# Patient Record
Sex: FEMALE | Race: WHITE | NOT HISPANIC OR LATINO | Employment: UNEMPLOYED | URBAN - METROPOLITAN AREA
[De-identification: names, ages, dates, MRNs, and addresses within clinical notes are randomized per-mention and may not be internally consistent; named-entity substitution may affect disease eponyms.]

---

## 2018-01-02 ENCOUNTER — OFFICE VISIT (OUTPATIENT)
Dept: FAMILY MEDICINE | Facility: CLINIC | Age: 63
End: 2018-01-02
Payer: COMMERCIAL

## 2018-01-02 VITALS
SYSTOLIC BLOOD PRESSURE: 130 MMHG | HEART RATE: 87 BPM | HEIGHT: 63 IN | BODY MASS INDEX: 37.92 KG/M2 | DIASTOLIC BLOOD PRESSURE: 74 MMHG | WEIGHT: 214 LBS | OXYGEN SATURATION: 96 % | TEMPERATURE: 99 F

## 2018-01-02 DIAGNOSIS — H66.002 ACUTE SUPPURATIVE OTITIS MEDIA OF LEFT EAR WITHOUT SPONTANEOUS RUPTURE OF TYMPANIC MEMBRANE, RECURRENCE NOT SPECIFIED: ICD-10-CM

## 2018-01-02 DIAGNOSIS — R05.9 COUGH: Primary | ICD-10-CM

## 2018-01-02 DIAGNOSIS — J01.00 ACUTE MAXILLARY SINUSITIS, RECURRENCE NOT SPECIFIED: ICD-10-CM

## 2018-01-02 PROCEDURE — 99213 OFFICE O/P EST LOW 20 MIN: CPT | Mod: ,,, | Performed by: NURSE PRACTITIONER

## 2018-01-02 RX ORDER — ATORVASTATIN CALCIUM 20 MG/1
20 TABLET, FILM COATED ORAL DAILY
COMMUNITY
End: 2018-06-11 | Stop reason: SDUPTHER

## 2018-01-02 RX ORDER — ALBUTEROL SULFATE 90 UG/1
2 AEROSOL, METERED RESPIRATORY (INHALATION) EVERY 6 HOURS PRN
Qty: 1 INHALER | Refills: 2 | Status: SHIPPED | OUTPATIENT
Start: 2018-01-02 | End: 2018-03-05

## 2018-01-02 RX ORDER — DOXYCYCLINE HYCLATE 100 MG
100 TABLET ORAL 2 TIMES DAILY
Qty: 20 TABLET | Refills: 0 | Status: SHIPPED | OUTPATIENT
Start: 2018-01-02 | End: 2018-03-05

## 2018-01-02 RX ORDER — PHENTERMINE HYDROCHLORIDE 37.5 MG/1
37.5 CAPSULE ORAL EVERY MORNING
COMMUNITY
End: 2018-03-05 | Stop reason: SDUPTHER

## 2018-01-02 RX ORDER — PROMETHAZINE HYDROCHLORIDE AND CODEINE PHOSPHATE 6.25; 1 MG/5ML; MG/5ML
5 SOLUTION ORAL EVERY 4 HOURS PRN
Qty: 118 ML | Refills: 0 | Status: SHIPPED | OUTPATIENT
Start: 2018-01-02 | End: 2018-01-12

## 2018-01-02 RX ORDER — FLUTICASONE PROPIONATE 50 MCG
1 SPRAY, SUSPENSION (ML) NASAL 2 TIMES DAILY
Qty: 1 BOTTLE | Refills: 1 | Status: SHIPPED | OUTPATIENT
Start: 2018-01-02 | End: 2018-03-05

## 2018-01-02 NOTE — PROGRESS NOTES
Subjective:       Patient ID: Nalini Wooten is a 62 y.o. female.    Chief Complaint: Cough and Nasal Congestion    Cough   This is a new problem. The current episode started 1 to 4 weeks ago (3 weeks). The problem has been waxing and waning. The problem occurs constantly. The cough is productive of sputum. Associated symptoms include chest pain, chills, ear congestion, a fever, headaches, myalgias, nasal congestion, postnasal drip, rhinorrhea and a sore throat. Pertinent negatives include no ear pain, heartburn, hemoptysis, rash, shortness of breath, sweats, weight loss or wheezing. The symptoms are aggravated by stress and cold air. She has tried rest (promethizine cough syrup and amoxil, mucinex) for the symptoms. The treatment provided mild relief. Her past medical history is significant for asthma. There is no history of environmental allergies.     Review of Systems   Constitutional: Positive for activity change, appetite change, chills, fatigue and fever. Negative for diaphoresis, unexpected weight change and weight loss.   HENT: Positive for postnasal drip, rhinorrhea, sinus pain, sinus pressure, sneezing and sore throat. Negative for congestion, ear discharge, ear pain, hearing loss, tinnitus, trouble swallowing and voice change.    Eyes: Negative for photophobia, pain, discharge and visual disturbance.   Respiratory: Positive for cough. Negative for hemoptysis, chest tightness, shortness of breath and wheezing.    Cardiovascular: Positive for chest pain. Negative for palpitations.   Gastrointestinal: Negative for abdominal pain, heartburn, nausea and vomiting.   Endocrine: Negative for cold intolerance and heat intolerance.   Genitourinary: Negative for difficulty urinating and dysuria.   Musculoskeletal: Positive for myalgias. Negative for arthralgias and gait problem.   Skin: Negative for rash.   Allergic/Immunologic: Negative for environmental allergies and immunocompromised state.   Neurological:  Positive for weakness and headaches. Negative for speech difficulty.   Psychiatric/Behavioral: Negative for confusion, self-injury and suicidal ideas.       Past Medical History:   Diagnosis Date    Bulging disc     Carpal tunnel syndrome     Degenerative disc disease     High cholesterol     Pinched nerve in neck     Urinary incontinence     Urinary tract infection       Past Surgical History:   Procedure Laterality Date    BLADDER SUSPENSION      FOOT SURGERY      HYSTERECTOMY      JOINT REPLACEMENT      KNEE SURGERY      MANDIBLE FRACTURE SURGERY      SPINE SURGERY      TONSILLECTOMY         Family History   Problem Relation Age of Onset    Arthritis Mother     Arthritis Father     Diabetes Father     Hearing loss Father     Heart disease Father     Hypertension Father     Diabetes Paternal Grandmother     Heart disease Paternal Grandfather        Social History     Social History    Marital status:      Spouse name: N/A    Number of children: N/A    Years of education: N/A     Social History Main Topics    Smoking status: Never Smoker    Smokeless tobacco: Never Used    Alcohol use 0.0 oz/week    Drug use: No    Sexual activity: Yes     Birth control/ protection: None     Other Topics Concern    None     Social History Narrative    None       Current Outpatient Prescriptions   Medication Sig Dispense Refill    atorvastatin (LIPITOR) 20 MG tablet Take 20 mg by mouth once daily.      mirabegron (MYRBETRIQ) 25 mg Tb24 ER tablet Take 25 mg by mouth once daily.      montelukast (SINGULAIR) 10 mg tablet Take 10 mg by mouth once daily.       phentermine 37.5 MG capsule Take 37.5 mg by mouth every morning.      albuterol 90 mcg/actuation inhaler Inhale 2 puffs into the lungs every 6 (six) hours as needed for Wheezing. 1 Inhaler 2    doxycycline (VIBRA-TABS) 100 MG tablet Take 1 tablet (100 mg total) by mouth 2 (two) times daily. 20 tablet 0    fluticasone (FLONASE) 50  "mcg/actuation nasal spray 1 spray by Each Nare route 2 (two) times daily. 1 Bottle 1    promethazine-codeine 6.25-10 mg/5 ml (PHENERGAN WITH CODEINE) 6.25-10 mg/5 mL syrup Take 5 mLs by mouth every 4 (four) hours as needed for Cough. 118 mL 0     No current facility-administered medications for this visit.        Review of patient's allergies indicates:   Allergen Reactions    Estrogens Other (See Comments)     Mini stroke    Tetanus vaccines and toxoid Swelling and Other (See Comments)     Swelling at injection site, fever     Objective:      Blood pressure 130/74, pulse 87, temperature 98.6 °F (37 °C), height 5' 3" (1.6 m), weight 97.1 kg (214 lb), SpO2 96 %. Body mass index is 37.91 kg/m².   Physical Exam   Constitutional: She is oriented to person, place, and time. She appears well-developed and well-nourished. She is cooperative. No distress.   HENT:   Head: Normocephalic and atraumatic.   Right Ear: Tympanic membrane is bulging. Tympanic membrane is not injected. A middle ear effusion is present.   Left Ear: Tympanic membrane is injected and bulging. A middle ear effusion is present.   Nose: Mucosal edema and rhinorrhea present. Right sinus exhibits maxillary sinus tenderness. Right sinus exhibits no frontal sinus tenderness. Left sinus exhibits maxillary sinus tenderness. Left sinus exhibits no frontal sinus tenderness.   Mouth/Throat: Uvula is midline and mucous membranes are normal. Oropharyngeal exudate (post nasal drainage noted) present. No posterior oropharyngeal erythema.   Eyes: Conjunctivae, EOM and lids are normal. Pupils are equal, round, and reactive to light. Lids are everted and swept, no foreign bodies found. Right pupil is round and reactive. Left pupil is round and reactive.   Neck: Trachea normal and normal range of motion. Neck supple.   Cardiovascular: Normal rate, regular rhythm, S1 normal, S2 normal, normal heart sounds and intact distal pulses.    No murmur heard.  Pulmonary/Chest: " Effort normal and breath sounds normal. No respiratory distress.   Abdominal: Soft. Bowel sounds are normal. There is no rigidity and no guarding.   Musculoskeletal: Normal range of motion.   Lymphadenopathy:     She has cervical adenopathy.        Right cervical: Superficial cervical adenopathy present.        Left cervical: Superficial cervical adenopathy present.     She has no axillary adenopathy.   Neurological: She is alert and oriented to person, place, and time.   Skin: Skin is warm and dry. Capillary refill takes less than 2 seconds.   Psychiatric: She has a normal mood and affect. Her behavior is normal. Judgment and thought content normal.   Nursing note and vitals reviewed.          Assessment:       1. Cough    2. Acute maxillary sinusitis, recurrence not specified    3. Acute suppurative otitis media of left ear without spontaneous rupture of tympanic membrane, recurrence not specified        Plan:       Nalini was seen today for cough and nasal congestion.    Diagnoses and all orders for this visit:    Cough  -     Cancel: POCT Influenza A/B  -     albuterol 90 mcg/actuation inhaler; Inhale 2 puffs into the lungs every 6 (six) hours as needed for Wheezing.  -     promethazine-codeine 6.25-10 mg/5 ml (PHENERGAN WITH CODEINE) 6.25-10 mg/5 mL syrup; Take 5 mLs by mouth every 4 (four) hours as needed for Cough.    Acute maxillary sinusitis, recurrence not specified  -     doxycycline (VIBRA-TABS) 100 MG tablet; Take 1 tablet (100 mg total) by mouth 2 (two) times daily.  -     fluticasone (FLONASE) 50 mcg/actuation nasal spray; 1 spray by Each Nare route 2 (two) times daily.    Acute suppurative otitis media of left ear without spontaneous rupture of tympanic membrane, recurrence not specified

## 2018-01-02 NOTE — PATIENT INSTRUCTIONS
When to Use Antibiotics   Antibiotics are medicines used to treat infections caused by bacteria. They dont work for illnesses caused by viruses or an allergic reaction. In fact, taking antibiotics for reasons other than a bacterial infection can cause problems. For example, you may have side effects from the medicine. And if you really need an antibiotic, it may not work well.                                                                                                                                              When antibiotics wont help  Your healthcare provider wont usually prescribe antibiotics for the following conditions. You can help by not asking for them if you have:   · A cold. This type of illness is caused by a virus. It can cause a runny nose, stuffed-up nose, sneezing, coughing, headache, mild body aches, and low fever. A cold gets better on its own in a few days to a week.  · The flu (influenza). This is a respiratory illness caused by a virus. The flu usually goes away on its own in a week or so. It can cause fever, body aches, sore throat, and fatigue.  · Bronchitis. This is an infection in the lungs most often caused by a virus. You may have coughing, phlegm, body aches, and a low fever. A common type of bronchitis is known as a chest cold (acute bronchitis). This often happens after you have a respiratory infection like a common cold. Bronchitis can take weeks to go away, but antibiotics usually dont help.  · Most sore throats. Sore throats are most often caused by viruses. Your throat may feel scratchy or achy, and it may hurt to swallow. You may also have a low fever and body aches. A sore throat usually gets better in a few days.  · Most ear infections. An ear infection may be caused by a virus or bacteria. It causes pain in the ear. Antibiotics usually dont help, and the infection goes away on its own.  · Most sinus infections (sinusitis). This kind of infection causes sinus pain and  swelling, and a runny nose. In most cases, sinusitis goes away on its own, and antibiotics dont make recovery quicker.  · Allergic rhinitis. This is a set of symptoms caused by an allergic reaction. You may have sneezing, a runny nose, itchy or watery eyes, or a sore throat. Allergies are not treated with antibiotics.  · Low fever. A mild fever thats less than 100.4°F (38°C) most likely doesnt need treatment with antibiotics.   When antibiotics can help   Antibiotics can be used to treat:                                                     · Strep throat. This is a throat infectioncaused by a certain type of bacteria. Symptoms of strep throat include a sore throat, white patches on the tonsils, red spots on the roof of the mouth, fever, body aches, and nausea and vomiting.  · Urinary tract infection (UTI). This is a bacterial infection of the bladder and the tube that takes urine out of the body. It can cause burning pain and urine thats cloudy or tinted with blood. UTIs are very common. Antibiotics usually help treat these infections.  · Some ear infections. In some cases, a healthcare provider may prescribe antibiotics for an ear infection. You may need a test to show whats causing the ear infection.  · Some sinus infections. In some cases, yourhealthcare provider may give you antibiotics. He or she may first need to make sure your symptoms arent caused by a virus, fungus, allergies, or air pollutants such as smoke.   Your doctor may also recommend antibiotics if you have a condition that can affect your immune system, such as diabetes or cancer.   Self-care at home   If your infection cant be treated with antibiotics, you can take other steps to feel better. Try the remedies below. In general:   · Rest and sleep as much as needed.  · Drink water and other clear fluids.  · Dont smoke, and avoid smoke from other people.  · Use over-the-counter medicine such as acetaminophen to ease pain or fever, as  directed by your healthcare provider.   To treat sinus pain or nasal congestion:   · Put a warm, moist washcloth on your face where you feel sinus pain or pressure.  · Use a nasal spray with medicine or saline, as directed by your healthcare provider.  · Breathe in steam from a hot shower.  · Use a humidifier or cool mist vaporizer.   To quiet a cough:   · Use a humidifier or cool mist vaporizer.  · Breathe in steam from a hot shower.  · Use cough lozenges.   To sooth a sore throat:   · Suck on ice chips, popsicles, or lozenges.  · Use a sore throat spray.  · Use a humidifier or cool mist vaporizer.  · Gargle with saltwater.  · Drink warm liquids.   To ease ear pain:   · Hold a warm, moist washcloth on the ear for 10 minutes at a time.  Date Last Reviewed: 9/1/2016  © 0368-6399 [employer]. [address] [place] [address]. All rights reserved. This information is not intended as a substitute for professional medical care. Always follow your healthcare professional's instructions.        Middle Ear Infection (Adult)  You have an infection of the middle ear, the space behind the eardrum. This is also called acute otitis media (AOM). Sometimes it is caused by the common cold. This is because congestion can block the internal passage (eustachian tube) that drains fluid from the middle ear. When the middle ear fills with fluid, bacteria can grow there and cause an infection. Oral antibiotics are used to treat this illness, not ear drops. Symptoms usually start to improve within 1 to 2 days of treatment.    Home care  The following are general care guidelines:  · Finish all of the antibiotic medicine given, even though you may feel better after the first few days.  · You may use over-the-counter medicine, such as acetaminophen or ibuprofen, to control pain and fever, unless something else was prescribed. If you have chronic liver or kidney disease or have ever had a stomach ulcer or  gastrointestinal bleeding, talk with your healthcare provider before using these medicines. Do not give aspirin to anyone under 18 years of age who has a fever. It may cause severe illness or death.  Follow-up care  Follow up with your healthcare provider, or as advised, in 2 weeks if all symptoms have not gotten better, or if hearing doesn't go back to normal within 1 month.  When to seek medical advice  Call your healthcare provider right away if any of these occur:  · Ear pain gets worse or does not improve after 3 days of treatment  · Unusual drowsiness or confusion  · Neck pain, stiff neck, or headache  · Fluid or blood draining from the ear canal  · Fever of 100.4°F (38°C) or as advised   · Seizure  Date Last Reviewed: 6/1/2016 © 2000-2017 [employer]. [address] [place] [address], [place] [address]. All rights reserved. This information is not intended as a substitute for professional medical care. Always follow your healthcare professional's instructions.        Understanding Sinus Problems    You dont often think about your sinuses until theres a problem. One day you realize you cant smell dinner cooking. Or you find you often have headaches or problems breathing through your nose.  Symptoms of sinus problems  Sinus problems can cause uncomfortable symptoms. Your nose may run constantly. You might have trouble sleeping at night. You may even lose your sense of smell. Other symptoms can include:  · Nasal congestion  · Fullness in ears  · Green, yellow, or bloody drainage from the nose  · Trouble tasting food  · Frequent headaches  · Facial pain  · Cough  When sinuses are blocked  If something blocks the passages in the nose or sinuses, mucus cant drain. Mucus-filled sinuses often become infected.  · Colds cause the lining of the nose and sinuses to swell and make extra mucus. A buildup of mucus can lead to a more serious infection.  · Allergies irritate turbinates and other tissues. This causes  swelling, which can cause a blockage. Over time, this irritation can also lead to sacs of swollen tissue (polyps).  · Polyps may form in both the sinuses and nose. Polyps can grow large enough to clog nasal passages and block drainage.  · A crooked (deviated) septum may block nasal passages. This is often the result of an injury.  Date Last Reviewed: 11/1/2016  © 5954-9854 [employer]. [address] [place] [address], [place] [address]. All rights reserved. This information is not intended as a substitute for professional medical care. Always follow your healthcare professional's instructions.

## 2018-01-24 DIAGNOSIS — R63.5 WEIGHT GAIN: Primary | ICD-10-CM

## 2018-03-04 PROBLEM — E03.9 ACQUIRED HYPOTHYROIDISM: Status: ACTIVE | Noted: 2018-03-04

## 2018-03-04 PROBLEM — Z86.73 HISTORY OF STROKE WITHOUT RESIDUAL DEFICITS: Status: ACTIVE | Noted: 2018-03-04

## 2018-03-04 PROBLEM — N32.81 OVERACTIVE BLADDER: Status: ACTIVE | Noted: 2018-03-04

## 2018-03-05 ENCOUNTER — OFFICE VISIT (OUTPATIENT)
Dept: INTERNAL MEDICINE | Facility: CLINIC | Age: 63
End: 2018-03-05
Payer: COMMERCIAL

## 2018-03-05 VITALS
DIASTOLIC BLOOD PRESSURE: 76 MMHG | TEMPERATURE: 99 F | OXYGEN SATURATION: 98 % | HEIGHT: 63 IN | HEART RATE: 82 BPM | BODY MASS INDEX: 38.27 KG/M2 | RESPIRATION RATE: 20 BRPM | SYSTOLIC BLOOD PRESSURE: 122 MMHG | WEIGHT: 216 LBS

## 2018-03-05 DIAGNOSIS — Z11.59 NEED FOR HEPATITIS C SCREENING TEST: ICD-10-CM

## 2018-03-05 DIAGNOSIS — Z12.39 BREAST SCREENING: Primary | ICD-10-CM

## 2018-03-05 DIAGNOSIS — T78.40XA ALLERGIC REACTION, INITIAL ENCOUNTER: ICD-10-CM

## 2018-03-05 DIAGNOSIS — Z86.73 HISTORY OF STROKE WITHOUT RESIDUAL DEFICITS: ICD-10-CM

## 2018-03-05 DIAGNOSIS — N32.81 OVERACTIVE BLADDER: ICD-10-CM

## 2018-03-05 DIAGNOSIS — E03.9 ACQUIRED HYPOTHYROIDISM: ICD-10-CM

## 2018-03-05 DIAGNOSIS — Z12.11 COLON CANCER SCREENING: ICD-10-CM

## 2018-03-05 DIAGNOSIS — Z00.00 ROUTINE MEDICAL EXAM: ICD-10-CM

## 2018-03-05 DIAGNOSIS — N32.81 OAB (OVERACTIVE BLADDER): ICD-10-CM

## 2018-03-05 DIAGNOSIS — R63.5 WEIGHT GAIN: ICD-10-CM

## 2018-03-05 DIAGNOSIS — L30.9 ECZEMA, UNSPECIFIED TYPE: ICD-10-CM

## 2018-03-05 PROCEDURE — 96372 THER/PROPH/DIAG INJ SC/IM: CPT | Mod: ,,, | Performed by: INTERNAL MEDICINE

## 2018-03-05 PROCEDURE — 99214 OFFICE O/P EST MOD 30 MIN: CPT | Mod: 25,,, | Performed by: INTERNAL MEDICINE

## 2018-03-05 RX ORDER — HYDROCODONE BITARTRATE AND ACETAMINOPHEN 7.5; 325 MG/1; MG/1
1 TABLET ORAL 2 TIMES DAILY PRN
Refills: 0 | COMMUNITY
Start: 2017-12-06 | End: 2018-09-20

## 2018-03-05 RX ORDER — MONTELUKAST SODIUM 10 MG/1
10 TABLET ORAL DAILY
Qty: 90 TABLET | Refills: 1 | Status: SHIPPED | OUTPATIENT
Start: 2018-03-05 | End: 2018-09-20 | Stop reason: SDUPTHER

## 2018-03-05 RX ORDER — CLOTRIMAZOLE 1 %
1 CREAM (GRAM) TOPICAL 2 TIMES DAILY
Qty: 60 G | Refills: 1 | Status: SHIPPED | OUTPATIENT
Start: 2018-03-05 | End: 2018-09-20 | Stop reason: SDUPTHER

## 2018-03-05 RX ORDER — TRIAMCINOLONE ACETONIDE 0.25 MG/G
CREAM TOPICAL 2 TIMES DAILY
Qty: 15 G | Refills: 0 | Status: SHIPPED | OUTPATIENT
Start: 2018-03-05 | End: 2018-04-05

## 2018-03-05 RX ORDER — CYCLOBENZAPRINE HCL 5 MG
5 TABLET ORAL NIGHTLY PRN
Refills: 2 | COMMUNITY
Start: 2018-03-02 | End: 2018-09-11

## 2018-03-05 RX ORDER — METHYLPREDNISOLONE 4 MG/1
TABLET ORAL
Qty: 1 PACKAGE | Refills: 0 | Status: SHIPPED | OUTPATIENT
Start: 2018-03-05 | End: 2018-03-26

## 2018-03-05 RX ORDER — TRAMADOL HYDROCHLORIDE 50 MG/1
50 TABLET ORAL 2 TIMES DAILY
Refills: 1 | COMMUNITY
Start: 2018-03-02 | End: 2018-04-05

## 2018-03-05 RX ORDER — TOLTERODINE 4 MG/1
4 CAPSULE, EXTENDED RELEASE ORAL DAILY
Qty: 30 CAPSULE | Refills: 2 | Status: SHIPPED | OUTPATIENT
Start: 2018-03-05 | End: 2018-04-05

## 2018-03-05 RX ORDER — PHENTERMINE HYDROCHLORIDE 37.5 MG/1
37.5 CAPSULE ORAL EVERY MORNING
Qty: 30 CAPSULE | Refills: 0 | Status: SHIPPED | OUTPATIENT
Start: 2018-03-05 | End: 2018-04-05 | Stop reason: SDUPTHER

## 2018-03-05 RX ORDER — CLOTRIMAZOLE 1 %
1 CREAM (GRAM) TOPICAL 2 TIMES DAILY
COMMUNITY
End: 2018-03-05 | Stop reason: SDUPTHER

## 2018-03-05 NOTE — PROGRESS NOTES
"  SUBJECTIVE:    Patient ID: Nalini Wooten is a 62 y.o. female.    Chief Complaint: Follow-up (3 month) and Medication Refill    HPI     In for recheck--had an allergic reaction to some acrylic liquid she used on her fingernails-now has allergic reaction and facial puffiness and eczema on the eyelids-eye is constantly watering and now the eyes are "bloodshot!"--redness on little finger of right hand progressing from the paronychial edge more proximally    NECK--continues with pain-saw neurosurgeon who did an ablation but it was no help with pain or headaches-last week he thought he did  4-5 and she has 2-3 disease-keeps getting elecrtical shocks in thee shoulder so another procedure is planned for a month  Past Medical History:   Diagnosis Date    Bulging disc     Carpal tunnel syndrome     Degenerative disc disease     High cholesterol     Pinched nerve in neck     Urinary incontinence     Urinary tract infection      Social History     Social History    Marital status:      Spouse name: N/A    Number of children: N/A    Years of education: N/A     Occupational History    Not on file.     Social History Main Topics    Smoking status: Never Smoker    Smokeless tobacco: Never Used    Alcohol use 0.0 oz/week    Drug use: No    Sexual activity: Yes     Birth control/ protection: None     Other Topics Concern    Not on file     Social History Narrative    No narrative on file     Past Surgical History:   Procedure Laterality Date    BLADDER SUSPENSION      FOOT SURGERY      HYSTERECTOMY      JOINT REPLACEMENT      KNEE SURGERY      MANDIBLE FRACTURE SURGERY      SPINE SURGERY      TONSILLECTOMY       Family History   Problem Relation Age of Onset    Arthritis Mother     Arthritis Father     Diabetes Father     Hearing loss Father     Heart disease Father     Hypertension Father     Diabetes Paternal Grandmother     Heart disease Paternal Grandfather        Review of Systems "   Constitutional: Negative for appetite change, chills, diaphoresis, fatigue, fever and unexpected weight change.   HENT: Negative for congestion, ear pain, hearing loss, nosebleeds, postnasal drip, sinus pain, sinus pressure, sneezing, sore throat, tinnitus, trouble swallowing and voice change.    Eyes: Negative for photophobia, pain, itching (allergic reaction) and visual disturbance.   Respiratory: Negative for apnea, cough, chest tightness, shortness of breath, wheezing and stridor.    Cardiovascular: Negative for chest pain, palpitations and leg swelling.   Gastrointestinal: Negative for abdominal distention, abdominal pain, blood in stool, constipation, diarrhea, nausea and vomiting.   Endocrine: Negative for cold intolerance, heat intolerance, polydipsia and polyuria.   Genitourinary: Negative for difficulty urinating, dyspareunia, dysuria, flank pain, frequency, hematuria, menstrual problem, pelvic pain, urgency, vaginal discharge and vaginal pain.        Went to get mybetriq and couldnt get it--   Musculoskeletal: Positive for arthralgias (fingers). Negative for back pain, joint swelling, myalgias, neck pain and neck stiffness.        Carpal tunnel syn   Skin: Negative for pallor.   Allergic/Immunologic: Negative for environmental allergies and food allergies.   Neurological: Positive for headaches. Negative for dizziness, tremors, speech difficulty, weakness, light-headedness and numbness.   Hematological: Does not bruise/bleed easily.   Psychiatric/Behavioral: Negative for agitation, confusion, decreased concentration, sleep disturbance and suicidal ideas. The patient is not nervous/anxious.         Situational depression over loss of pet today          Objective:      Physical Exam   Constitutional: She is oriented to person, place, and time. She appears well-developed and well-nourished. She is cooperative. No distress (situational).   Overweight-sad tear over the death of her dog found in pool this am    HENT:   Head: Normocephalic and atraumatic.   Right Ear: Tympanic membrane normal.   Left Ear: Tympanic membrane normal.   Mouth/Throat: Uvula is midline and mucous membranes are normal.   FACIAL AND NARROWING OF EYES DUE TO SWELLING-ERYTHEMA AND INJECTION OF CONJUNCTIVA   Eyes: Conjunctivae, EOM and lids are normal. Pupils are equal, round, and reactive to light. Right pupil is round and reactive. Left pupil is round and reactive.   Neck: Trachea normal and normal range of motion. Neck supple. No JVD present. No thyromegaly present.   Cardiovascular: Normal rate, regular rhythm, normal heart sounds and intact distal pulses.    Pulmonary/Chest: Effort normal and breath sounds normal. No tachypnea. No respiratory distress.   Abdominal: Soft. Bowel sounds are normal. There is no tenderness.   Musculoskeletal: Normal range of motion.   Lymphadenopathy:     She has no cervical adenopathy.   Neurological: She is alert and oriented to person, place, and time. She has normal strength.   Skin: Skin is warm and dry. No rash noted.   Psychiatric: She has a normal mood and affect. Her speech is normal.   Nursing note and vitals reviewed.          Assessment:       1. Breast screening    2. Acquired hypothyroidism    3. Overactive bladder    4. History of stroke without residual deficits    5. Routine medical exam    6. Colon cancer screening    7. Need for hepatitis C screening test    8. Weight gain    9. Eczema, unspecified type    10. Allergic reaction, initial encounter    11. BMI 38.0-38.9,adult    12. OAB (overactive bladder)         Plan:           Breast screening  -     Mammo Digital Screening Bilat without CA    Acquired hypothyroidism    Overactive bladder  -     tolterodine (DETROL LA) 4 MG 24 hr capsule; Take 1 capsule (4 mg total) by mouth once daily.  Dispense: 30 capsule; Refill: 2    History of stroke without residual deficits  -     Lipid panel; Future; Expected date: 07/05/2018    Routine medical exam  -      CBC auto differential; Future; Expected date: 07/05/2018  -     Comprehensive metabolic panel; Future; Expected date: 07/05/2018    Colon cancer screening  -     Occult blood x 1, stool; Future; Expected date: 07/05/2018    Need for hepatitis C screening test  -     Hepatitis C antibody; Future; Expected date: 07/05/2018    Weight gain  -     Discontinue: mirabegron (MYRBETRIQ) 25 mg Tb24 ER tablet; Take 1 tablet (25 mg total) by mouth once daily.  Dispense: 90 tablet; Refill: 1  -     phentermine 37.5 MG capsule; Take 1 capsule (37.5 mg total) by mouth every morning.  Dispense: 30 capsule; Refill: 0    Eczema, unspecified type  -     clotrimazole (LOTRIMIN) 1 % cream; Apply 1 application topically 2 (two) times daily.  Dispense: 60 g; Refill: 1    Allergic reaction, initial encounter  -     methylPREDNISolone sod suc(PF) injection 125 mg; Inject 125 mg into the muscle one time.    BMI 38.0-38.9,adult    OAB (overactive bladder)  -     tolterodine (DETROL LA) 4 MG 24 hr capsule; Take 1 capsule (4 mg total) by mouth once daily.  Dispense: 30 capsule; Refill: 2    Other orders  -     Cancel: POCT Urinalysis  -     montelukast (SINGULAIR) 10 mg tablet; Take 1 tablet (10 mg total) by mouth once daily.  Dispense: 90 tablet; Refill: 1  -     methylPREDNISolone (MEDROL DOSEPACK) 4 mg tablet; use as directed  Dispense: 1 Package; Refill: 0  -     triamcinolone acetonide 0.025% (KENALOG) 0.025 % cream; Apply topically 2 (two) times daily.  Dispense: 15 g; Refill: 0

## 2018-03-07 ENCOUNTER — PATIENT MESSAGE (OUTPATIENT)
Dept: INTERNAL MEDICINE | Facility: CLINIC | Age: 63
End: 2018-03-07

## 2018-04-05 ENCOUNTER — OFFICE VISIT (OUTPATIENT)
Dept: INTERNAL MEDICINE | Facility: CLINIC | Age: 63
End: 2018-04-05
Payer: COMMERCIAL

## 2018-04-05 VITALS
DIASTOLIC BLOOD PRESSURE: 80 MMHG | RESPIRATION RATE: 18 BRPM | SYSTOLIC BLOOD PRESSURE: 128 MMHG | BODY MASS INDEX: 37.6 KG/M2 | TEMPERATURE: 98 F | HEART RATE: 51 BPM | HEIGHT: 63 IN | WEIGHT: 212.19 LBS | OXYGEN SATURATION: 99 %

## 2018-04-05 DIAGNOSIS — H01.001 BLEPHARITIS OF UPPER EYELIDS OF BOTH EYES, UNSPECIFIED TYPE: Primary | ICD-10-CM

## 2018-04-05 DIAGNOSIS — H01.004 BLEPHARITIS OF UPPER EYELIDS OF BOTH EYES, UNSPECIFIED TYPE: Primary | ICD-10-CM

## 2018-04-05 DIAGNOSIS — R63.5 WEIGHT GAIN: ICD-10-CM

## 2018-04-05 DIAGNOSIS — N32.81 OVERACTIVE BLADDER: Primary | ICD-10-CM

## 2018-04-05 PROCEDURE — 99213 OFFICE O/P EST LOW 20 MIN: CPT | Mod: SA,,, | Performed by: NURSE PRACTITIONER

## 2018-04-05 RX ORDER — POLYMYXIN B SULFATE AND TRIMETHOPRIM 1; 10000 MG/ML; [USP'U]/ML
SOLUTION OPHTHALMIC
Qty: 10 ML | Refills: 0 | Status: SHIPPED | OUTPATIENT
Start: 2018-04-05 | End: 2018-09-23 | Stop reason: ALTCHOICE

## 2018-04-05 RX ORDER — OXYBUTYNIN CHLORIDE 5 MG/1
5 TABLET, EXTENDED RELEASE ORAL DAILY
Qty: 90 TABLET | Refills: 1 | Status: SHIPPED | OUTPATIENT
Start: 2018-04-05 | End: 2018-09-20

## 2018-04-05 RX ORDER — OXYBUTYNIN CHLORIDE 5 MG/1
5 TABLET, EXTENDED RELEASE ORAL DAILY
Refills: 0 | COMMUNITY
Start: 2018-03-09 | End: 2018-04-05 | Stop reason: SDUPTHER

## 2018-04-05 RX ORDER — PHENTERMINE HYDROCHLORIDE 37.5 MG/1
37.5 CAPSULE ORAL EVERY MORNING
Qty: 30 CAPSULE | Refills: 0 | Status: SHIPPED | OUTPATIENT
Start: 2018-04-05 | End: 2018-04-05

## 2018-04-05 RX ORDER — PHENTERMINE HYDROCHLORIDE 37.5 MG/1
37.5 TABLET ORAL
Qty: 30 TABLET | Refills: 0 | Status: SHIPPED | OUTPATIENT
Start: 2018-04-05 | End: 2018-05-05

## 2018-04-05 RX ORDER — PHENTERMINE HYDROCHLORIDE 37.5 MG/1
37.5 CAPSULE ORAL EVERY MORNING
Qty: 30 CAPSULE | Refills: 0 | Status: CANCELLED | OUTPATIENT
Start: 2018-04-05

## 2018-04-05 NOTE — PATIENT INSTRUCTIONS
Bacitracin; Polymyxin B eye ointment  What is this medicine?  BACITRACIN; POLYMYXIN (bass i TRAY sin; lobo i MIX in) is used to treat eye infections.  How should I use this medicine?  This medicine is only for use in the eye. Follow the directions on the prescription label. Wash hands before and after use. Tilt your head back slightly and pull your lower eyelid down with your index finger to form a pouch. Try not to touch the tip of the tube, to your eye, fingertips, or any other surface. Squeeze the end of the tube to apply a thin layer of the ointment to the inside of the lower eyelid. Close the eye gently to spread the ointment. Your vision may blur for a few minutes. Do not use your medicine more often than directed. Finish the full course prescribed by your doctor or health care professional even if you think your condition is better. Do not stop using except on the advice of your doctor or health care professional.  Talk to your pediatrician regarding the use of this medicine in children. Special care may be needed.  What side effects may I notice from receiving this medicine?  Side effects that you should report to your doctor or health care professional as soon as possible:  · allergic reactions like skin rash, itching or hives, swelling of the face, lips, or tongue  · changes in vision  · eye irritation, pain, sensitive to light  Side effects that usually do not require medical attention (Report these to your doctor or health care professional if they continue or are bothersome.):  · eye burning, stinging, irritation  What may interact with this medicine?  Interactions are not expected. Do not use any other eye products without telling your doctor or health care professional.  What if I miss a dose?  If you miss a dose, use it as soon as you can. If it is almost time for your next dose, use only that dose. Do not use double or extra doses.  Where should I keep my medicine?  Keep out of the reach of  children.  Store at room temperature between 15 and 25 degrees C (59 and 77 degrees F). Throw away any unused medicine after the expiration date.  What should I tell my health care provider before I take this medicine?  They need to know if you have any of these conditions:  · kidney disease  · wear contact lenses  · an unusual or allergic reaction to this bacitracin, neomycin, polymyxin B, other medicines, foods, dyes, or preservatives  · pregnant or trying to get pregnant  · breast-feeding  What should I watch for while using this medicine?  Tell your doctor or health care professional if your symptoms do not get better or if they get worse.  If you wear contact lenses, ask your doctor or health care professional when you can use your lenses again.  To prevent the spread of infection, do not share eye products, towels, and washcloths with anyone else. Throw away any unused eye products.  NOTE:This sheet is a summary. It may not cover all possible information. If you have questions about this medicine, talk to your doctor, pharmacist, or health care provider. Copyright© 2017 [employer]        Blepharitis    Blepharitis is an inflammation of the eyelid. It results in swelling of the eyelids, and it is usually caused by a bacterial infection or a skin condition. Blepharitis is a common eye condition. There are two types. Anterior blepharitis occurs where the eyelashes are attached (outside front edge of the eye). Posterior blepharitis affects the inner edge of the eyelid that touches the eyeball.  In addition to swollen eyelids, symptoms of blepharitis can include thick, yellow, dandruff-like scales that stick to the eyelid. There may be oily patches on the eyelid. The eyelashes may be crusted (with dandruff-like scales) when you wake up from sleeping. The irritated area may itch. The eyelids may be red. The eyes can be red and burn or sting. The eyes may tear a lot, or be dry. You can become sensitive to light or  have blurred vision. Symptoms of blepharitis can cause irritability.  Blepharitis is a chronic condition and difficult to cure. Even with successful treatment, recurrences are common. Good hygiene and home treatments (in the Home care section below) can improve your condition.  Causes  Causes of blepharitis may include:  · Problems with the oil glands in the eyelid (meibomian glands)  · Dandruff of the scalp and eyebrows (seborrheic dermatitis)  · Acne rosacea (a skin condition that causes redness of the face, and other symptoms)  · Eyelash mites (tiny organisms in the eyelash follicles)  · Allergic reactions to cosmetics or medicines  Home care  Medicine: The healthcare provider may prescribe an antibiotic eye drops or ointment, artificial tears, and/or steroid eye drops. Follow all instructions for using these medicines. Use all medicines as directed. If you have pain, take medicine as advised by the healthcare provider.  · Wash your hands carefully with soap and warm water before and after caring for your eyes.  · Apply a warm compress or a warm, moist washcloth to the eyelids for 1 minute, 2 to 3 times a day, to loosen the crust. Then, wipe away scales or crust from the eyelids.  · After applying the warm compress, gently scrub the base of the eyelashes for almost 15 seconds per eyelid. To do this, close your eyes and use a moist eyelid cleansing wipe, clean washcloth, or cotton swab. Ask your healthcare provider about products (such as nonirritating baby shampoo) to use to help clean the eyelids.  · You may be instructed to gently massage your eyelids to help unblock the eyelid glands. Follow all instructions given by the healthcare provider.  · Unless told otherwise, on a regular basis, with eyes closed, clean your eyelids as directed by the healthcare provider. Blepharitis can be an ongoing problem.  · Do not wear eye makeup until the inflammation goes away, or as directed by your healthcare  provider.  · Unless told otherwise, stop using contact lenses until you complete treatment for the condition.  · Wash your hands regularly to help prevent dirt and bacteria from coming in contact with your eyelid.  Follow-up care  Follow up with your healthcare provider, or as advised. Your healthcare provider may refer you to an eye specialist (an optometrist or ophthalmologist) for further evaluation and treatment.  When to seek medical advice  Call your healthcare provider right away if any of these occur:  · Increase in redness of the white part of the eye  · Increase in swelling, redness, irritation, or pain of the eyelids  · Eye pain  · Change in vision (trouble seeing or blurring)  · Drainage (pus, blood) from the eyelid  · Fever of 100.4ºF (38ºC) or higher, or as directed by your healthcare provider  Date Last Reviewed: 10/9/2015  © 5393-8769 [employer]. [address] [place] [address], [place] [address]. All rights reserved. This information is not intended as a substitute for professional medical care. Always follow your healthcare professional's instructions.

## 2018-04-05 NOTE — PROGRESS NOTES
SUBJECTIVE:      Patient ID: Nalini Wooten is a 63 y.o. female.    Chief Complaint: Follow-up (from 3/7 office visit)    In for follow-up on eye irritation/swelling    Had redness, swelling, itching to eyes last visit, completed her steroid script, states she has gotten rid of all her make-up since last visit, still using a Casie eye cream that she hasn't thrown out, states symptoms returned once she finished the steroid pack, denies change in perfume/hair products/lotions/make-up          Eye Problem    Both (worse on R) eyes are affected.This is a recurrent problem. The current episode started more than 1 month ago. The problem occurs intermittently. The problem has been waxing and waning. The injury mechanism is unknown. The pain is at a severity of 7/10 (only when she touches it, or puts water on her face). The pain is moderate. There is no known exposure to pink eye. She wears contacts (L eye). Associated symptoms include blurred vision, an eye discharge, eye redness and itching. Pertinent negatives include no double vision, fever, foreign body sensation, nausea, photophobia, recent URI, vomiting or weakness. Associated symptoms comments: Watery L eye, discharge R eye, caked discharge in the AM, states her R lid is peeling & puffy. She has tried eye drops (erythromycin, neosporin, cold compresses, takes daily claritin) for the symptoms. The treatment provided no relief.       Past Surgical History:   Procedure Laterality Date    BLADDER SUSPENSION      FOOT SURGERY      HYSTERECTOMY      JOINT REPLACEMENT      KNEE SURGERY      MANDIBLE FRACTURE SURGERY      SPINE SURGERY      TONSILLECTOMY       Family History   Problem Relation Age of Onset    Arthritis Mother     Arthritis Father     Diabetes Father     Hearing loss Father     Heart disease Father     Hypertension Father     Diabetes Paternal Grandmother     Heart disease Paternal Grandfather       Social History     Social History     Marital status:      Spouse name: N/A    Number of children: N/A    Years of education: N/A     Social History Main Topics    Smoking status: Never Smoker    Smokeless tobacco: Never Used    Alcohol use 0.0 oz/week    Drug use: No    Sexual activity: Yes     Birth control/ protection: None     Other Topics Concern    None     Social History Narrative    None     Current Outpatient Prescriptions   Medication Sig Dispense Refill    atorvastatin (LIPITOR) 20 MG tablet Take 20 mg by mouth once daily.      clotrimazole (LOTRIMIN) 1 % cream Apply 1 application topically 2 (two) times daily. 60 g 1    CYANOCOBALAMIN, VITAMIN B-12, INJ Inject 500 mcg as directed every other day.      cyclobenzaprine (FLEXERIL) 5 MG tablet Take 5 mg by mouth nightly as needed.  2    hydrocodone-acetaminophen 7.5-325mg (NORCO) 7.5-325 mg per tablet Take 1 tablet by mouth 2 (two) times daily as needed.  0    montelukast (SINGULAIR) 10 mg tablet Take 1 tablet (10 mg total) by mouth once daily. 90 tablet 1    oxybutynin (DITROPAN-XL) 5 MG TR24 Take 1 tablet (5 mg total) by mouth once daily. 90 tablet 1    phentermine (ADIPEX-P) 37.5 mg tablet Take 1 tablet (37.5 mg total) by mouth before breakfast. 30 tablet 0    polymyxin B sulf-trimethoprim (POLYTRIM) 10,000 unit- 1 mg/mL Drop Place 1 drop into both eyes every 4 (four) hours x 10 days. 10 mL 0     No current facility-administered medications for this visit.      Review of patient's allergies indicates:   Allergen Reactions    Estrogens Other (See Comments)     Mini stroke    Medrol [methylprednisolone] Other (See Comments)     Erythema and intense heat    Tetanus vaccines and toxoid Swelling and Other (See Comments)     Swelling at injection site, fever      Past Medical History:   Diagnosis Date    Bulging disc     Carpal tunnel syndrome     Degenerative disc disease     High cholesterol     Pinched nerve in neck     Urinary incontinence     Urinary tract  "infection      Past Surgical History:   Procedure Laterality Date    BLADDER SUSPENSION      FOOT SURGERY      HYSTERECTOMY      JOINT REPLACEMENT      KNEE SURGERY      MANDIBLE FRACTURE SURGERY      SPINE SURGERY      TONSILLECTOMY         Review of Systems   Constitutional: Negative for activity change, appetite change, fatigue, fever and unexpected weight change.   HENT: Negative for congestion, ear pain, hearing loss, postnasal drip, sinus pain, sinus pressure, sneezing and sore throat.    Eyes: Positive for blurred vision, discharge, redness, itching and visual disturbance (blurry). Negative for double vision, photophobia and pain.   Respiratory: Negative for cough, chest tightness, shortness of breath and wheezing.    Cardiovascular: Negative for chest pain, palpitations and leg swelling.   Gastrointestinal: Negative for abdominal distention, abdominal pain, blood in stool, constipation, diarrhea, nausea and vomiting.   Endocrine: Negative for cold intolerance, heat intolerance, polydipsia and polyuria.   Genitourinary: Negative for difficulty urinating, dysuria, flank pain, frequency, hematuria, pelvic pain and urgency.   Musculoskeletal: Negative for arthralgias, back pain, joint swelling, myalgias and neck pain.   Skin: Negative for pallor.        Redness both eyelids, swelling around both eyes   Allergic/Immunologic: Negative for environmental allergies and food allergies.   Neurological: Negative for dizziness, weakness, light-headedness and numbness.   Hematological: Does not bruise/bleed easily.   Psychiatric/Behavioral: Negative for agitation, confusion, decreased concentration and sleep disturbance. The patient is not nervous/anxious.       OBJECTIVE:      Vitals:    04/05/18 1003 04/05/18 1005   BP: 130/88 128/80   Pulse: (!) 51    Resp: 18    Temp: 97.5 °F (36.4 °C)    TempSrc: Skin    SpO2: 99%    Weight: 96.3 kg (212 lb 3.2 oz)    Height: 5' 3" (1.6 m)      Physical Exam   Constitutional: " She is oriented to person, place, and time. Vital signs are normal. She appears well-developed and well-nourished. She is cooperative. No distress.   HENT:   Head: Normocephalic and atraumatic.   Nose: Nose normal. No mucosal edema.   Mouth/Throat: Mucous membranes are normal.   Eyes: Conjunctivae, EOM and lids are normal. Pupils are equal, round, and reactive to light. Right eye exhibits exudate (yellow scaly crust along lash line). Right eye exhibits no discharge and no hordeolum. No foreign body present in the right eye. Left eye exhibits exudate (yellow scaly crust along lash line). Left eye exhibits no discharge and no hordeolum. No foreign body present in the left eye. Right conjunctiva is not injected. Left conjunctiva is not injected. No scleral icterus. Right pupil is round and reactive. Left pupil is round and reactive.   Neck: Trachea normal and normal range of motion. Neck supple. No thyromegaly present.   Cardiovascular: Normal rate, regular rhythm, normal heart sounds and intact distal pulses.  Exam reveals no gallop and no friction rub.    No murmur heard.  Pulses:       Radial pulses are 2+ on the right side, and 2+ on the left side.   Pulmonary/Chest: Effort normal and breath sounds normal. No respiratory distress. She has no decreased breath sounds. She has no wheezes. She has no rhonchi. She has no rales.   Abdominal: Soft. Bowel sounds are normal. She exhibits no distension. There is no tenderness. There is no rigidity and no guarding.   Musculoskeletal: Normal range of motion. She exhibits no edema.   Lymphadenopathy:     She has no cervical adenopathy.   Neurological: She is alert and oriented to person, place, and time. GCS eye subscore is 4. GCS verbal subscore is 5. GCS motor subscore is 6.   Skin: Skin is warm and dry. Capillary refill takes less than 2 seconds. No rash noted. There is erythema (pancho upper eyelids).   Psychiatric: She has a normal mood and affect. Her speech is normal and  behavior is normal. Judgment and thought content normal.   Vitals reviewed.     Assessment:       1. Blepharitis of upper eyelids of both eyes, unspecified type    2. Weight gain        Plan:       Blepharitis of upper eyelids of both eyes, unspecified type  -     polymyxin B sulf-trimethoprim (POLYTRIM) 10,000 unit- 1 mg/mL Drop; Place 1 drop into both eyes every 4 (four) hours x 10 days.  Dispense: 10 mL; Refill: 0       - Discontinue eye products/make-up until next visit    Weight gain  -     phentermine (ADIPEX-P) 37.5 mg tablet; Take 1 tablet (37.5 mg total) by mouth before breakfast.  Dispense: 30 tablet; Refill: 0      Follow-up in about 2 weeks (around 4/19/2018) for eye check.      4/6/2018 Katherine Ferguson MD

## 2018-06-11 RX ORDER — ATORVASTATIN CALCIUM 20 MG/1
20 TABLET, FILM COATED ORAL DAILY
Qty: 90 TABLET | Refills: 0 | Status: SHIPPED | OUTPATIENT
Start: 2018-06-11 | End: 2018-09-11 | Stop reason: SDUPTHER

## 2018-06-11 RX ORDER — ATORVASTATIN CALCIUM 20 MG/1
20 TABLET, FILM COATED ORAL DAILY
Qty: 90 TABLET | Refills: 1 | OUTPATIENT
Start: 2018-06-11

## 2018-09-10 ENCOUNTER — TELEPHONE (OUTPATIENT)
Dept: FAMILY MEDICINE | Facility: CLINIC | Age: 63
End: 2018-09-10

## 2018-09-10 NOTE — TELEPHONE ENCOUNTER
----- Message from Carole Moreno sent at 9/5/2018  8:54 AM CDT -----  Prior authorization, 9/04/18

## 2018-09-11 ENCOUNTER — OFFICE VISIT (OUTPATIENT)
Dept: ORTHOPEDICS | Facility: CLINIC | Age: 63
End: 2018-09-11
Payer: COMMERCIAL

## 2018-09-11 VITALS
SYSTOLIC BLOOD PRESSURE: 130 MMHG | HEIGHT: 63 IN | WEIGHT: 220 LBS | DIASTOLIC BLOOD PRESSURE: 70 MMHG | BODY MASS INDEX: 38.98 KG/M2

## 2018-09-11 DIAGNOSIS — S46.012A TRAUMATIC TEAR OF LEFT ROTATOR CUFF, UNSPECIFIED TEAR EXTENT, INITIAL ENCOUNTER: Primary | ICD-10-CM

## 2018-09-11 DIAGNOSIS — S80.02XA CONTUSION OF LEFT KNEE, INITIAL ENCOUNTER: ICD-10-CM

## 2018-09-11 DIAGNOSIS — S92.514A CLOSED NONDISPLACED FRACTURE OF PROXIMAL PHALANX OF LESSER TOE OF RIGHT FOOT, INITIAL ENCOUNTER: ICD-10-CM

## 2018-09-11 PROCEDURE — 73630 X-RAY EXAM OF FOOT: CPT | Mod: RT,,, | Performed by: ORTHOPAEDIC SURGERY

## 2018-09-11 PROCEDURE — 3008F BODY MASS INDEX DOCD: CPT | Mod: ,,, | Performed by: ORTHOPAEDIC SURGERY

## 2018-09-11 PROCEDURE — 73030 X-RAY EXAM OF SHOULDER: CPT | Mod: LT,,, | Performed by: ORTHOPAEDIC SURGERY

## 2018-09-11 PROCEDURE — 99214 OFFICE O/P EST MOD 30 MIN: CPT | Mod: 25,,, | Performed by: ORTHOPAEDIC SURGERY

## 2018-09-11 PROCEDURE — 73562 X-RAY EXAM OF KNEE 3: CPT | Mod: LT,,, | Performed by: ORTHOPAEDIC SURGERY

## 2018-09-11 RX ORDER — ATORVASTATIN CALCIUM 20 MG/1
TABLET, FILM COATED ORAL
Qty: 90 TABLET | Refills: 0 | Status: SHIPPED | OUTPATIENT
Start: 2018-09-11 | End: 2018-10-22 | Stop reason: SDUPTHER

## 2018-09-11 RX ORDER — DICLOFENAC SODIUM 75 MG/1
TABLET, DELAYED RELEASE ORAL
COMMUNITY
Start: 2018-08-25 | End: 2018-09-11

## 2018-09-11 RX ORDER — TRAMADOL HYDROCHLORIDE 50 MG/1
50 TABLET ORAL EVERY 4 HOURS PRN
COMMUNITY
Start: 2018-08-25 | End: 2019-03-11 | Stop reason: SDUPTHER

## 2018-09-11 NOTE — PROGRESS NOTES
[facility]    Subjective:     Chief Complaint:   Chief Complaint   Patient presents with    Left Shoulder - Pain     Left shoulder pain since she slipped and fell yesterday at home     Left Knee - Pain     Left knee pain since she slipped and fell at home yesterday    Right Foot - Pain     Right foot 4th and 5th toes she thinks may be fractured.       Past Medical History:   Diagnosis Date    Bulging disc     Carpal tunnel syndrome     Degenerative disc disease     High cholesterol     Pinched nerve in neck     Urinary incontinence     Urinary tract infection        Past Surgical History:   Procedure Laterality Date    BLADDER SUSPENSION      FOOT SURGERY      HYSTERECTOMY      JOINT REPLACEMENT      KNEE SURGERY      MANDIBLE FRACTURE SURGERY      SPINE SURGERY      TONSILLECTOMY         Current Outpatient Medications   Medication Sig    atorvastatin (LIPITOR) 20 MG tablet TAKE 1 TABLET EVERY DAY    clotrimazole (LOTRIMIN) 1 % cream Apply 1 application topically 2 (two) times daily.    CYANOCOBALAMIN, VITAMIN B-12, INJ Inject 500 mcg as directed every other day.    hydrocodone-acetaminophen 7.5-325mg (NORCO) 7.5-325 mg per tablet Take 1 tablet by mouth 2 (two) times daily as needed.    montelukast (SINGULAIR) 10 mg tablet Take 1 tablet (10 mg total) by mouth once daily.    oxybutynin (DITROPAN-XL) 5 MG TR24 Take 1 tablet (5 mg total) by mouth once daily.    polymyxin B sulf-trimethoprim (POLYTRIM) 10,000 unit- 1 mg/mL Drop Place 1 drop into both eyes every 4 (four) hours x 10 days.    traMADol (ULTRAM) 50 mg tablet      No current facility-administered medications for this visit.        Review of patient's allergies indicates:   Allergen Reactions    Estrogens Other (See Comments)     Mini stroke    Medrol [methylprednisolone] Other (See Comments)     Erythema and intense heat    Tetanus vaccines and toxoid Swelling and Other (See Comments)     Swelling at injection site,  fever       Family History   Problem Relation Age of Onset    Arthritis Mother     Arthritis Father     Diabetes Father     Hearing loss Father     Heart disease Father     Hypertension Father     Diabetes Paternal Grandmother     Heart disease Paternal Grandfather        Social History     Socioeconomic History    Marital status:      Spouse name: Not on file    Number of children: Not on file    Years of education: Not on file    Highest education level: Not on file   Social Needs    Financial resource strain: Not on file    Food insecurity - worry: Not on file    Food insecurity - inability: Not on file    Transportation needs - medical: Not on file    Transportation needs - non-medical: Not on file   Occupational History    Not on file   Tobacco Use    Smoking status: Never Smoker    Smokeless tobacco: Never Used   Substance and Sexual Activity    Alcohol use: Yes     Alcohol/week: 0.0 oz    Drug use: No    Sexual activity: Yes     Birth control/protection: None   Other Topics Concern    Not on file   Social History Narrative    Not on file       History of present illness: Patient comes in today for the left shoulder. Additionally she comes in for her left knee and her right small toe. She had a fall yesterday. She fell directly onto her shoulder. She also struck her knee. In terms of her foot she fell 2 weeks ago. The foot feeling much better she has once a strengthening program.      Review of Systems:    Constitution: Negative for chills, fever, and sweats.  Negative for unexplained weight loss.    HENT:  Negative for headaches and blurry vision.    Cardiovascular:Negative for chest pain or irregular heart beat. Negative for hypertension.    Respiratory:  Negative for cough and shortness of breath.    Gastrointestinal: Negative for abdominal pain, heartburn, melena, nausea, and vomitting.    Genitourinary:  Negative bladder incontinence and dysuria.    Musculoskeletal:  See  HPI for details.     Neurological: Negative for numbness.    Psychiatric/Behavioral: Negative for depression.  The patient is not nervous/anxious.      Endocrine: Negative for polyuria    Hematologic/Lymphatic: Negative for bleeding problem.  Does not bruise/bleed easily.    Skin: Negative for poor would healing and rash    Objective:      Physical Examination:    Vital Signs:    Vitals:    09/11/18 1100   BP: 130/70       Body mass index is 38.97 kg/m².    This a well-developed, well nourished patient in no acute distress.  They are alert and oriented and cooperative to examination.        Patient is tender over the right small toe. She has full subtalar and ankle motion. Sensation is preserved. Full range of motion of the left foot    Full range of motion of the bilateral knees. She has well-healed anterior incisions. She has a contusion over the left patella. She has no effusions bilaterally.    Patient has full range of motion of the left shoulder. She has positive impingement. Her rotator cuff is profoundly weak on the left side. Spurling sign is negative.  Pertinent New Results:    XRAY Report / Interpretation:   AP lateral and sunrise views of the bilateral knees demonstrate total knee arthroplasties to be in ideal position. No fractures or subluxations. No evidence of loosening.    AP and lateral of the left shoulder demonstrates a type II acromion. No fractures or subluxations.    AP lateral and oblique of the right foot demonstrates a fracture the proximal phalanx of the small toe. There is also a prior healed fracture of the fifth metatarsal head    Assessment/Plan:      Left shoulder rotator cuff tear. We will observe this for 2 weeks. If her pain and weakness does not improve she will need an MRI scan.  Contusion to the left knee. She may be weightbearing as tolerated. No intervention.  Minimally displaced fracture of the right proximal phalanx of the right small toe. She will wear a hard soled shoe. No  other intervention      This note was created using Dragon voice recognition software that occasionally misinterpreted phrases or words.

## 2018-09-12 ENCOUNTER — TELEPHONE (OUTPATIENT)
Dept: ORTHOPEDICS | Facility: CLINIC | Age: 63
End: 2018-09-12

## 2018-09-12 DIAGNOSIS — S46.012A TRAUMATIC TEAR OF LEFT ROTATOR CUFF, UNSPECIFIED TEAR EXTENT, INITIAL ENCOUNTER: Primary | ICD-10-CM

## 2018-09-12 NOTE — TELEPHONE ENCOUNTER
----- Message from Ronnie Brunson sent at 9/12/2018  8:19 AM CDT -----  Patient called in regards to her appt from yesterday 09/11/2018 , im assuming she has a few questions. [phone]

## 2018-09-13 ENCOUNTER — TELEPHONE (OUTPATIENT)
Dept: FAMILY MEDICINE | Facility: CLINIC | Age: 63
End: 2018-09-13

## 2018-09-20 ENCOUNTER — OFFICE VISIT (OUTPATIENT)
Dept: FAMILY MEDICINE | Facility: CLINIC | Age: 63
End: 2018-09-20
Payer: COMMERCIAL

## 2018-09-20 VITALS
HEART RATE: 63 BPM | TEMPERATURE: 98 F | DIASTOLIC BLOOD PRESSURE: 80 MMHG | SYSTOLIC BLOOD PRESSURE: 128 MMHG | RESPIRATION RATE: 15 BRPM | HEIGHT: 63 IN | OXYGEN SATURATION: 98 % | WEIGHT: 229 LBS | BODY MASS INDEX: 40.57 KG/M2

## 2018-09-20 DIAGNOSIS — E03.9 ACQUIRED HYPOTHYROIDISM: Primary | ICD-10-CM

## 2018-09-20 DIAGNOSIS — R39.15 URINARY URGENCY: ICD-10-CM

## 2018-09-20 DIAGNOSIS — N32.81 OVERACTIVE BLADDER: ICD-10-CM

## 2018-09-20 DIAGNOSIS — L73.9 FOLLICULITIS: ICD-10-CM

## 2018-09-20 DIAGNOSIS — E78.00 HIGH CHOLESTEROL: ICD-10-CM

## 2018-09-20 DIAGNOSIS — J30.89 ENVIRONMENTAL AND SEASONAL ALLERGIES: ICD-10-CM

## 2018-09-20 DIAGNOSIS — R79.89 LOW VITAMIN B12 LEVEL: ICD-10-CM

## 2018-09-20 DIAGNOSIS — R35.0 URINARY FREQUENCY: ICD-10-CM

## 2018-09-20 DIAGNOSIS — N90.4 LICHEN SCLEROSUS OF FEMALE GENITALIA: ICD-10-CM

## 2018-09-20 PROBLEM — L30.9 ECZEMA: Status: ACTIVE | Noted: 2018-09-20

## 2018-09-20 PROBLEM — L29.9 LOCALIZED PRURITUS: Status: ACTIVE | Noted: 2018-09-20

## 2018-09-20 LAB
ALBUMIN SERPL-MCNC: 4 G/DL (ref 3.1–4.7)
ALP SERPL-CCNC: 98 IU/L (ref 40–104)
ALT (SGPT): 21 IU/L (ref 3–33)
AST SERPL-CCNC: 21 IU/L (ref 10–40)
BASOPHILS NFR BLD: 0.1 K/UL (ref 0–0.2)
BASOPHILS NFR BLD: 0.6 %
BILIRUB SERPL-MCNC: 0.7 MG/DL (ref 0.3–1)
BILIRUB UR QL STRIP: NEGATIVE
BUN SERPL-MCNC: 15 MG/DL (ref 8–20)
CALCIUM SERPL-MCNC: 8.9 MG/DL (ref 7.7–10.4)
CHLORIDE: 105 MMOL/L (ref 98–110)
CO2 SERPL-SCNC: 26 MMOL/L (ref 22.8–31.6)
CREATININE: 0.66 MG/DL (ref 0.6–1.4)
EOSINOPHIL NFR BLD: 0.3 K/UL (ref 0–0.7)
EOSINOPHIL NFR BLD: 3 %
ERYTHROCYTE [DISTWIDTH] IN BLOOD BY AUTOMATED COUNT: 13.2 % (ref 11.7–14.9)
GLUCOSE UR QL STRIP: NEGATIVE
GLUCOSE: 113 MG/DL (ref 70–99)
GRAN #: 5.3 K/UL (ref 1.4–6.5)
GRAN%: 61 %
HCT VFR BLD AUTO: 43 % (ref 36–48)
HGB BLD-MCNC: 13.5 G/DL (ref 12–15)
IMMATURE GRANS (ABS): 0 K/UL (ref 0–1)
IMMATURE GRANULOCYTES: 0.3 %
KETONES UR QL STRIP: NEGATIVE
LEUKOCYTE ESTERASE UR QL STRIP: NEGATIVE
LYMPH #: 2.3 K/UL (ref 1.2–3.4)
LYMPH%: 26.2 %
MCH RBC QN AUTO: 26.6 PG (ref 25–35)
MCHC RBC AUTO-ENTMCNC: 31.4 G/DL (ref 31–36)
MCV RBC AUTO: 84.6 FL (ref 79–98)
MONO #: 0.8 K/UL (ref 0.1–0.6)
MONO%: 8.9 %
NUCLEATED RBCS: 0 %
PH, POC UA: 5
PLATELET # BLD AUTO: 302 K/UL (ref 140–440)
PMV BLD AUTO: 10 FL (ref 8.8–12.7)
POC BLOOD, URINE: NEGATIVE
POC NITRATES, URINE: NEGATIVE
POTASSIUM SERPL-SCNC: 4.1 MMOL/L (ref 3.5–5)
PROT SERPL-MCNC: 6.8 G/DL (ref 6–8.2)
PROT UR QL STRIP: NEGATIVE
RBC # BLD AUTO: 5.08 M/UL (ref 3.5–5.5)
SODIUM: 138 MMOL/L (ref 134–144)
SP GR UR STRIP: 1.02 (ref 1–1.03)
TSH SERPL DL<=0.005 MIU/L-ACNC: 1.8 ULU/ML (ref 0.3–5.6)
UROBILINOGEN UR STRIP-ACNC: NORMAL (ref 0.1–1.1)
VITAMIN B12: 1019 PG/ML (ref 62–940)
WBC # BLD AUTO: 8.7 K/UL (ref 5–10)

## 2018-09-20 PROCEDURE — 3008F BODY MASS INDEX DOCD: CPT | Mod: ,,, | Performed by: NURSE PRACTITIONER

## 2018-09-20 PROCEDURE — 99214 OFFICE O/P EST MOD 30 MIN: CPT | Mod: 25,SA,, | Performed by: NURSE PRACTITIONER

## 2018-09-20 PROCEDURE — 81003 URINALYSIS AUTO W/O SCOPE: CPT | Mod: QW,,, | Performed by: NURSE PRACTITIONER

## 2018-09-20 RX ORDER — PREDNISOLONE ACETATE 10 MG/ML
1 SUSPENSION/ DROPS OPHTHALMIC DAILY
COMMUNITY
End: 2018-11-15

## 2018-09-20 RX ORDER — CYANOCOBALAMIN 1000 UG/ML
1000 INJECTION, SOLUTION INTRAMUSCULAR; SUBCUTANEOUS EVERY OTHER DAY
Qty: 45 ML | Refills: 3 | Status: SHIPPED | OUTPATIENT
Start: 2018-09-20 | End: 2019-09-20

## 2018-09-20 RX ORDER — MUPIROCIN 20 MG/G
OINTMENT TOPICAL 3 TIMES DAILY
Qty: 15 G | Refills: 1 | Status: SHIPPED | OUTPATIENT
Start: 2018-09-20 | End: 2018-09-30

## 2018-09-20 RX ORDER — MONTELUKAST SODIUM 10 MG/1
10 TABLET ORAL DAILY
Qty: 90 TABLET | Refills: 1 | Status: SHIPPED | OUTPATIENT
Start: 2018-09-20 | End: 2018-10-22 | Stop reason: SDUPTHER

## 2018-09-20 RX ORDER — CLOTRIMAZOLE 1 %
1 CREAM (GRAM) TOPICAL 2 TIMES DAILY
Qty: 60 G | Refills: 1 | Status: SHIPPED | OUTPATIENT
Start: 2018-09-20 | End: 2019-03-11

## 2018-09-20 NOTE — PATIENT INSTRUCTIONS
Overactive Bladder Syndrome (OAB)     Normally, urine stays in the bladder until a person decides to release it. With OAB, the bladder muscles contract involuntarily, causing a sudden urge to urinate and even urine leakage.   When the bladder muscle contract or squeeze involuntarily, it is called overactive bladder syndrome. This causes an intense urge to urinate, known as urgency. Urgency can occur many times during the day and night. If urine leaks with the urgency, it is called urge incontinence.  A disease that affects the bladder nerves, such as multiple sclerosis, can cause overactive bladder syndrome. Other conditions, such as urinary tract infection (UTI) or prostate problems in men, can also lead to OAB. But the exact cause is often not known.  How is overactive bladder syndrome diagnosed?  Your healthcare provider will examine you and ask about your symptoms and health history. You may also have one or more of the following:  · Urine test to take samples of urine and have them checked for problems.  · Urinary diary to record how much fluid you take in and urinate out in a 3 day period.  · Bladder ultrasound to study the bladder as it empties. Ultrasound uses sound waves to create detailed images of the inside of the body.  · Cystoscopy to allow the healthcare provider to look for problems in the urinary tract. The test uses a thin, flexible scope called a cystoscope with a light and camera on the end. The scope is inserted into the urethra (the tube that carries urine out of the body).  · Urodynamic studies, a battery of tests designed to measure and record many aspects of urinary bladder function, including pressures, volume, and urine flow.  How is overactive bladder syndrome treated?  Treatment depends on the cause and severity of your OAB. Treatments may include the following:  · Changing urination habits may be suggested. For instance, your healthcare provider may suggest that you urinate as soon as  you feel the urge. You may also need to limit how much fluid you have during the day.  · Exercising your pelvic muscles can help strengthen muscles used during urination. These exercises are called Kegels. They involve pat as if you were stopping your urine stream and tightening your rectum as if trying not to pass gas. Your healthcare provider can help you learn how to do Kegels.  · Biofeedback to help you learn to control the movement of your bladder muscles. Sensors are placed on your abdomen. They turn signals given off by your muscles into lines on a computer screen.  · Medicine may be given to relax the bladder muscle. Medicine can also help ease bladder contractions, which reduces the urge to urinate.  · Neuromodulation may be done if medicine and behavioral changes dont work. Electrical pulses are sent to the sacral nerves (nerves that affect the pelvic area). These pulses help relieve OAB and urge incontinence.  · Surgery to make the bladder larger may be done in severe cases.  With treatment, OAB can be managed. A condition, such as UTI, that has caused you to have OAB will be treated. Treatment may involve taking medicine for months or years. You may also need to make changes in your daily routine. This may include going to the bathroom more often than you think you need to. Or, you may need to cut back on caffeine and alcohol because these can make symptoms worse. Your healthcare provider can tell you more.     When to call your healthcare provider  Call the healthcare provider right away if you have any of the following:  · Fever of 100.4°F (38.0 °C) or higher   · No improvement with treatment  · Trouble urinating because of pain  · Back or abdominal pain   Date Last Reviewed: 1/1/2017 © 2000-2017 [employer]. [address] [place] [address], [place] [address]. All rights reserved. This information is not intended as a substitute for professional medical care. Always follow your  healthcare professional's instructions.

## 2018-09-20 NOTE — PROGRESS NOTES
SUBJECTIVE:      Patient ID: Nalini Wooten is a 63 y.o. female.    Chief Complaint: Medication Refill and Urinary Tract Infection (Strong smell of ammonia to urine., discuss incontinence meds needs to see about [place]  for myrbetriq - ditropan & detrol do not work)    C/o urinary frequency & urgency, has tried detrol & ditropan for overactive bladder, states they work for a while & then problems return, had good success on myrbetriq but had to stop due to insurance change, new insurance now - would like to try myrbetriq again, feels complete bladder emptying, doesn't always have strong stream, may just trickle when she does go, if she doesn't get to bathroom when she feels urge she may have incontinent episode    Saw ENT for eye redness/swelling around lids/dry skin - told her allergy related, gave her daily drops & ointment, admits to not using regularly as prescribed but states relief of symptoms when she does use them    Follows with Dr. Marie (pain management) - bulging disk in neck/back, has been getting injections, had rhizotomy    Discussed outstanding health maintenance - colon with [facility] at [facility], hep C done - states she had to pay OOP as insurance didn't cover, will look at home for her records for both      Urinary Frequency    This is a new problem. The current episode started 1 to 4 weeks ago. The problem occurs every urination. The problem has been unchanged. The patient is experiencing no pain. There has been no fever. There is no history of pyelonephritis. Associated symptoms include frequency, hesitancy, sweats, urgency and rash (scalp, labia). Pertinent negatives include no behavior changes, chills, discharge, flank pain, hematuria, nausea, possible pregnancy, vomiting, weight loss, bubble bath use, constipation or withholding. Treatments tried: ditropan, detrol. The treatment provided mild relief. Her past medical history is significant for recurrent UTIs. There is no history of diabetes mellitus,  hypertension, kidney stones or a single kidney.       Past Surgical History:   Procedure Laterality Date    BLADDER SUSPENSION  1990    FOOT SURGERY  2001    HYSTERECTOMY  1990    JOINT REPLACEMENT      KNEE SURGERY Bilateral 2013    MANDIBLE FRACTURE SURGERY  1981    SPINE SURGERY      TONSILLECTOMY       Family History   Problem Relation Age of Onset    Arthritis Mother     Arthritis Father     Diabetes Father     Hearing loss Father     Heart disease Father     Hypertension Father     Diabetes Paternal Grandmother     Heart disease Paternal Grandfather     Leukemia Son     Crohn's disease Son     Ankylosing spondylitis Son     Other Son         avascular necrosis of pancho hips    Rheumatologic disease Son     Heart defect Son     Kidney failure Son     SIDS Maternal Aunt     Uterine cancer Maternal Aunt     Dementia Paternal Aunt       Social History     Socioeconomic History    Marital status:      Spouse name: None    Number of children: 3    Years of education: None    Highest education level: None   Social Needs    Financial resource strain: None    Food insecurity - worry: None    Food insecurity - inability: None    Transportation needs - medical: None    Transportation needs - non-medical: None   Occupational History    None   Tobacco Use    Smoking status: Never Smoker    Smokeless tobacco: Never Used   Substance and Sexual Activity    Alcohol use: Yes     Alcohol/week: 0.0 oz    Drug use: No    Sexual activity: Yes     Birth control/protection: None   Other Topics Concern    None   Social History Narrative    None     Current Outpatient Medications   Medication Sig Dispense Refill    atorvastatin (LIPITOR) 20 MG tablet TAKE 1 TABLET EVERY DAY 90 tablet 0    clotrimazole (LOTRIMIN) 1 % cream Apply 1 application topically 2 (two) times daily. 60 g 1    loteprednol etabonate 0.5 % Oint 2 (two) times daily.      montelukast (SINGULAIR) 10 mg tablet Take 1  "tablet (10 mg total) by mouth once daily. 90 tablet 1    prednisoLONE acetate (PRED FORTE) 1 % DrpS 1 drop once daily.      traMADol (ULTRAM) 50 mg tablet Take 50 mg by mouth every 4 (four) hours as needed for Pain.       cyanocobalamin 1,000 mcg/mL injection Inject 1 mL (1,000 mcg total) into the muscle every other day. 45 mL 3    mirabegron (MYRBETRIQ) 25 mg Tb24 ER tablet Take 1 tablet (25 mg total) by mouth once daily. 30 tablet 1    mupirocin (BACTROBAN) 2 % ointment Apply topically 3 (three) times daily. To bumps on scalp & hairlline for 10 days 15 g 1    oxybutynin (DITROPAN-XL) 5 MG TR24 TAKE 1 TABLET (5 MG TOTAL) BY MOUTH ONCE DAILY. 90 tablet 1    phentermine 37.5 MG capsule Take 1 capsule (37.5 mg total) by mouth every morning. 30 capsule 1    syringe-needle,safety,disp unt 3 mL 23 gauge x 1 1/2" Syrg 1 syringe with needle of patient's choice to administer with B12 100 Syringe 1     No current facility-administered medications for this visit.      Review of patient's allergies indicates:   Allergen Reactions    Estrogens Other (See Comments)     Mini stroke    Medrol [methylprednisolone] Other (See Comments)     Erythema and intense heat    Tetanus vaccines and toxoid Swelling and Other (See Comments)     Swelling at injection site, fever      Past Medical History:   Diagnosis Date    Bulging disc     Carpal tunnel syndrome     Degenerative disc disease     High cholesterol     Pinched nerve in neck     Urinary incontinence     Urinary tract infection      Past Surgical History:   Procedure Laterality Date    BLADDER SUSPENSION  1990    FOOT SURGERY  2001    HYSTERECTOMY  1990    JOINT REPLACEMENT      KNEE SURGERY Bilateral 2013    MANDIBLE FRACTURE SURGERY  1981    SPINE SURGERY      TONSILLECTOMY         Review of Systems   Constitutional: Negative for activity change, appetite change, chills, fatigue, unexpected weight change and weight loss.   HENT: Negative for congestion, " "ear pain, hearing loss, postnasal drip, sinus pressure, sinus pain, sneezing and sore throat.    Eyes: Positive for redness. Negative for photophobia and pain.   Respiratory: Negative for cough, chest tightness, shortness of breath and wheezing.    Cardiovascular: Negative for chest pain, palpitations and leg swelling.   Gastrointestinal: Negative for abdominal distention, abdominal pain, blood in stool, constipation, diarrhea, nausea and vomiting.   Endocrine: Negative for cold intolerance, heat intolerance, polydipsia and polyuria.   Genitourinary: Positive for frequency, hesitancy and urgency. Negative for difficulty urinating, dysuria, flank pain, hematuria and pelvic pain.   Musculoskeletal: Positive for back pain and neck pain. Negative for arthralgias, joint swelling and myalgias.   Skin: Positive for rash (scalp, labia). Negative for pallor.   Allergic/Immunologic: Negative for environmental allergies and food allergies.   Neurological: Negative for dizziness, weakness, light-headedness and numbness.   Hematological: Does not bruise/bleed easily.   Psychiatric/Behavioral: Negative for agitation, confusion, decreased concentration and sleep disturbance. The patient is not nervous/anxious.       OBJECTIVE:      Vitals:    09/20/18 0855   BP: 128/80   Pulse: 63   Resp: 15   Temp: 98.2 °F (36.8 °C)   SpO2: 98%   Weight: 103.9 kg (229 lb)   Height: 5' 3" (1.6 m)     Physical Exam   Constitutional: She is oriented to person, place, and time. Vital signs are normal. She appears well-developed and well-nourished. No distress.   obese   HENT:   Head: Normocephalic and atraumatic.   Right Ear: Hearing normal.   Left Ear: Hearing normal.   Nose: Nose normal. No rhinorrhea.   Mouth/Throat: Mucous membranes are normal.   Eyes: Pupils are equal, round, and reactive to light. Right eye exhibits no discharge. Left eye exhibits no discharge. Right conjunctiva is injected. Left conjunctiva is injected. Right pupil is round " and reactive. Left pupil is round and reactive. Pupils are equal.   Erythematous & edematous below pancho lower lids   Neck: Trachea normal and normal range of motion. Neck supple. No JVD present. No tracheal deviation present. No thyromegaly present.   Cardiovascular: Normal rate, regular rhythm, normal heart sounds and intact distal pulses. Exam reveals no gallop and no friction rub.   No murmur heard.  Pulses:       Radial pulses are 2+ on the right side, and 2+ on the left side.   Pulmonary/Chest: Effort normal and breath sounds normal. No stridor. No respiratory distress. She has no decreased breath sounds. She has no wheezes. She has no rhonchi. She has no rales.   Abdominal: Soft. Bowel sounds are normal. She exhibits no distension. There is no tenderness. There is no rigidity and no guarding.   Musculoskeletal: Normal range of motion. She exhibits no edema.   Lymphadenopathy:     She has no cervical adenopathy.   Neurological: She is alert and oriented to person, place, and time. She has normal strength. She displays no atrophy. She displays a negative Romberg sign. Coordination and gait normal.   Skin: Skin is warm and dry. Capillary refill takes less than 2 seconds. Rash noted. No lesion noted. No cyanosis. No pallor.   Folliculitis - scalp. Eczema-like rash to outer labia.   Psychiatric: She has a normal mood and affect. Her speech is normal and behavior is normal. Judgment and thought content normal. Cognition and memory are normal. She is attentive.   Nursing note and vitals reviewed.     Assessment:       1. Acquired hypothyroidism    2. Lichen sclerosus of female genitalia    3. Overactive bladder    4. Urinary frequency    5. Urinary urgency    6. Environmental and seasonal allergies    7. High cholesterol    8. Low vitamin B12 level    9. Folliculitis    10. BMI 40.0-44.9, adult        Plan:       Acquired hypothyroidism  -     TSH; Future; Expected date: 09/20/2018    Lichen sclerosus of female  "genitalia  -     clotrimazole (LOTRIMIN) 1 % cream; Apply 1 application topically 2 (two) times daily.  Dispense: 60 g; Refill: 1    Overactive bladder  -     mirabegron (MYRBETRIQ) 25 mg Tb24 ER tablet; Take 1 tablet (25 mg total) by mouth once daily.  Dispense: 30 tablet; Refill: 1    Urinary frequency  -     POCT Urinalysis, Dipstick, Automated, W/O Scope (negative)    Urinary urgency  -     POCT Urinalysis, Dipstick, Automated, W/O Scope (negative)    Environmental and seasonal allergies  -     montelukast (SINGULAIR) 10 mg tablet; Take 1 tablet (10 mg total) by mouth once daily.  Dispense: 90 tablet; Refill: 1    High cholesterol  -     CBC auto differential; Future; Expected date: 09/20/2018  -     Comprehensive metabolic panel; Future; Expected date: 09/20/2018  -     Lipid panel; Future; Expected date: 09/20/2018    Low vitamin B12 level  -     cyanocobalamin 1,000 mcg/mL injection; Inject 1 mL (1,000 mcg total) into the muscle every other day.  Dispense: 45 mL; Refill: 3  -     syringe-needle,safety,disp unt 3 mL 23 gauge x 1 1/2" Syrg; 1 syringe with needle of patient's choice to administer with B12  Dispense: 100 Syringe; Refill: 1  -     Vitamin B12; Future; Expected date: 09/20/2018    Folliculitis  -     mupirocin (BACTROBAN) 2 % ointment; Apply topically 3 (three) times daily. To bumps on scalp & hairlline for 10 days  Dispense: 15 g; Refill: 1    BMI 40.0-44.9, adult  -     phentermine 37.5 MG capsule; Take 1 capsule (37.5 mg total) by mouth every morning.  Dispense: 30 capsule; Refill: 1    Other orders  -     Estimated Glomerular Filtration Rate      Follow-up in about 4 weeks (around 10/18/2018) for bladder & weight check.      9/24/2018 CHICO Blackwood, FNP-C      "

## 2018-09-23 DIAGNOSIS — N32.81 OVERACTIVE BLADDER: ICD-10-CM

## 2018-09-23 PROBLEM — R79.89 LOW VITAMIN B12 LEVEL: Status: ACTIVE | Noted: 2018-09-23

## 2018-09-24 ENCOUNTER — TELEPHONE (OUTPATIENT)
Dept: FAMILY MEDICINE | Facility: CLINIC | Age: 63
End: 2018-09-24

## 2018-09-24 RX ORDER — PHENTERMINE HYDROCHLORIDE 37.5 MG/1
37.5 CAPSULE ORAL EVERY MORNING
Qty: 30 CAPSULE | Refills: 1 | Status: SHIPPED | OUTPATIENT
Start: 2018-09-24 | End: 2018-10-22 | Stop reason: SDUPTHER

## 2018-09-24 RX ORDER — OXYBUTYNIN CHLORIDE 5 MG/1
5 TABLET, EXTENDED RELEASE ORAL DAILY
Qty: 90 TABLET | Refills: 1 | Status: SHIPPED | OUTPATIENT
Start: 2018-09-24 | End: 2018-10-02 | Stop reason: SDUPTHER

## 2018-09-24 NOTE — TELEPHONE ENCOUNTER
Patient called and stated the oxybutin 5mg does not work and would like to be changed back to myrbetriq 25 mg po daily.

## 2018-09-24 NOTE — TELEPHONE ENCOUNTER
The following medication needs a prior authorization:     Medication Name: cyanocobalamin     Dosage: 1,000 mcg/ml inj    Frequency: Every other day    Directions for use: Inject 1 ml IM every other day    Diagnosis: Low vitamin B12 level    Is the request for a reauthorization? no    Is the patient currently stable on therapy? no    Please list all therapeutic alternatives previously used with start/end dates and outcome:    Oral  B12 tablets

## 2018-09-24 NOTE — TELEPHONE ENCOUNTER
The following medication needs a prior authorization:     Medication Name: Mybetriq    Dosage: 25mg    Frequency: po    Directions for use: one po daily    Diagnosis: bladder spasms, urinary incontinence    Is the request for a reauthorization? yes    Is the patient currently stable on therapy? no    Please list all therapeutic alternatives previously used with start/end dates and outcome:    oxybutin 5mg - not effective  detrol - not effective

## 2018-09-25 DIAGNOSIS — N32.81 OVERACTIVE BLADDER: ICD-10-CM

## 2018-10-01 ENCOUNTER — TELEPHONE (OUTPATIENT)
Dept: FAMILY MEDICINE | Facility: CLINIC | Age: 63
End: 2018-10-01

## 2018-10-01 ENCOUNTER — PATIENT MESSAGE (OUTPATIENT)
Dept: FAMILY MEDICINE | Facility: CLINIC | Age: 63
End: 2018-10-01

## 2018-10-01 DIAGNOSIS — N32.81 OVERACTIVE BLADDER: Primary | ICD-10-CM

## 2018-10-02 ENCOUNTER — OFFICE VISIT (OUTPATIENT)
Dept: ORTHOPEDICS | Facility: CLINIC | Age: 63
End: 2018-10-02
Payer: COMMERCIAL

## 2018-10-02 VITALS
HEART RATE: 73 BPM | BODY MASS INDEX: 40.57 KG/M2 | SYSTOLIC BLOOD PRESSURE: 151 MMHG | HEIGHT: 63 IN | WEIGHT: 229 LBS | DIASTOLIC BLOOD PRESSURE: 90 MMHG

## 2018-10-02 DIAGNOSIS — S46.012D TRAUMATIC COMPLETE TEAR OF LEFT ROTATOR CUFF, SUBSEQUENT ENCOUNTER: Primary | ICD-10-CM

## 2018-10-02 PROCEDURE — 99213 OFFICE O/P EST LOW 20 MIN: CPT | Mod: ,,, | Performed by: ORTHOPAEDIC SURGERY

## 2018-10-02 PROCEDURE — 3008F BODY MASS INDEX DOCD: CPT | Mod: ,,, | Performed by: ORTHOPAEDIC SURGERY

## 2018-10-02 RX ORDER — DICLOFENAC SODIUM 75 MG/1
TABLET, DELAYED RELEASE ORAL
Refills: 1 | COMMUNITY
Start: 2018-09-21 | End: 2018-12-13

## 2018-10-02 RX ORDER — CYCLOBENZAPRINE HCL 5 MG
TABLET ORAL
Refills: 0 | COMMUNITY
Start: 2018-09-12 | End: 2019-07-10

## 2018-10-02 RX ORDER — OXYBUTYNIN CHLORIDE 10 MG/1
10 TABLET, EXTENDED RELEASE ORAL DAILY
Qty: 90 TABLET | Refills: 1 | Status: SHIPPED | OUTPATIENT
Start: 2018-10-02 | End: 2018-10-22 | Stop reason: SDUPTHER

## 2018-10-02 NOTE — PROGRESS NOTES
"[facility]    Subjective:     Chief Complaint:   Chief Complaint   Patient presents with    Left Shoulder - Pain     Left shoulder pain/MRI results 09/27/18. States that her shoulder pain is about the same.        Past Medical History:   Diagnosis Date    Bulging disc     Carpal tunnel syndrome     Degenerative disc disease     High cholesterol     Pinched nerve in neck     Urinary incontinence     Urinary tract infection        Past Surgical History:   Procedure Laterality Date    BLADDER SUSPENSION  1990    FOOT SURGERY  2001    HYSTERECTOMY  1990    JOINT REPLACEMENT      KNEE SURGERY Bilateral 2013    MANDIBLE FRACTURE SURGERY  1981    SPINE SURGERY      TONSILLECTOMY         Current Outpatient Medications   Medication Sig    atorvastatin (LIPITOR) 20 MG tablet TAKE 1 TABLET EVERY DAY    clotrimazole (LOTRIMIN) 1 % cream Apply 1 application topically 2 (two) times daily.    cyanocobalamin 1,000 mcg/mL injection Inject 1 mL (1,000 mcg total) into the muscle every other day.    loteprednol etabonate 0.5 % Oint 2 (two) times daily.    montelukast (SINGULAIR) 10 mg tablet Take 1 tablet (10 mg total) by mouth once daily.    oxybutynin (DITROPAN-XL) 10 MG 24 hr tablet Take 1 tablet (10 mg total) by mouth once daily.    phentermine 37.5 MG capsule Take 1 capsule (37.5 mg total) by mouth every morning.    prednisoLONE acetate (PRED FORTE) 1 % DrpS 1 drop once daily.    syringe-needle,safety,disp unt 3 mL 23 gauge x 1 1/2" Syrg 1 syringe with needle of patient's choice to administer with B12    traMADol (ULTRAM) 50 mg tablet Take 50 mg by mouth every 4 (four) hours as needed for Pain.     cyclobenzaprine (FLEXERIL) 5 MG tablet TK ONE T PO HS PRN    diclofenac (VOLTAREN) 75 MG EC tablet TK 1 T PO ONCE D UTD     No current facility-administered medications for this visit.        Review of patient's allergies indicates:   Allergen Reactions    Estrogens Other (See Comments)     Mini " stroke    Medrol [methylprednisolone] Other (See Comments)     Erythema and intense heat    Tetanus vaccines and toxoid Swelling and Other (See Comments)     Swelling at injection site, fever       Family History   Problem Relation Age of Onset    Arthritis Mother     Arthritis Father     Diabetes Father     Hearing loss Father     Heart disease Father     Hypertension Father     Diabetes Paternal Grandmother     Heart disease Paternal Grandfather     Leukemia Son     Crohn's disease Son     Ankylosing spondylitis Son     Other Son         avascular necrosis of pancho hips    Rheumatologic disease Son     Heart defect Son     Kidney failure Son     SIDS Maternal Aunt     Uterine cancer Maternal Aunt     Dementia Paternal Aunt        Social History     Socioeconomic History    Marital status:      Spouse name: Not on file    Number of children: 3    Years of education: Not on file    Highest education level: Not on file   Social Needs    Financial resource strain: Not on file    Food insecurity - worry: Not on file    Food insecurity - inability: Not on file    Transportation needs - medical: Not on file    Transportation needs - non-medical: Not on file   Occupational History    Not on file   Tobacco Use    Smoking status: Never Smoker    Smokeless tobacco: Never Used   Substance and Sexual Activity    Alcohol use: Yes     Alcohol/week: 0.0 oz    Drug use: No    Sexual activity: Yes     Birth control/protection: None   Other Topics Concern    Not on file   Social History Narrative    Not on file       History of present illness: Patient returns today for evaluation of the left shoulder. She continues to have significant left shoulder pain. Typically sleeping difficulty with overhead activity.      Review of Systems:    Constitution: Negative for chills, fever, and sweats.  Negative for unexplained weight loss.    HENT:  Negative for headaches and blurry  vision.    Cardiovascular:Negative for chest pain or irregular heart beat. Negative for hypertension.    Respiratory:  Negative for cough and shortness of breath.    Gastrointestinal: Negative for abdominal pain, heartburn, melena, nausea, and vomitting.    Genitourinary:  Negative bladder incontinence and dysuria.    Musculoskeletal:  See HPI for details.     Neurological: Negative for numbness.    Psychiatric/Behavioral: Negative for depression.  The patient is not nervous/anxious.      Endocrine: Negative for polyuria    Hematologic/Lymphatic: Negative for bleeding problem.  Does not bruise/bleed easily.    Skin: Negative for poor would healing and rash    Objective:      Physical Examination:    Vital Signs:    Vitals:    10/02/18 1333   BP: (!) 151/90   Pulse: 73       Body mass index is 40.57 kg/m².    This a well-developed, well nourished patient in no acute distress.  They are alert and oriented and cooperative to examination.        Patient has full range of motion of the left shoulder. Positive impingement. Negative crossover. Her rotator cuff is very weak to resisted testing. Spurling sign is negative  Pertinent New Results:  MRI of the left shoulders reviewed. Demonstrates a full-thickness rotator cuff tear  XRAY Report / Interpretation:   No new XRAYS Today.    Assessment/Plan:      Rotator cuff tear left shoulder. I've offered her an arthroscopic rotator cuff repair. Additionally I've offered her a subacromial decompression distal clavicle resection. Some benefits of discussed with her in great detail. She understood and wished to proceed      This note was created using Dragon voice recognition software that occasionally misinterpreted phrases or words.

## 2018-10-03 ENCOUNTER — TELEPHONE (OUTPATIENT)
Dept: ORTHOPEDICS | Facility: CLINIC | Age: 63
End: 2018-10-03

## 2018-10-03 NOTE — TELEPHONE ENCOUNTER
----- Message from Alma Che sent at 10/2/2018  3:46 PM CDT -----  Contact: Pt called cell [phone]  Call pt about her surgery date.

## 2018-10-19 DIAGNOSIS — M19.012 ARTHRITIS OF LEFT SHOULDER REGION: ICD-10-CM

## 2018-10-19 DIAGNOSIS — M75.42 IMPINGEMENT SYNDROME OF LEFT SHOULDER: ICD-10-CM

## 2018-10-19 DIAGNOSIS — S46.012D TRAUMATIC TEAR OF LEFT ROTATOR CUFF, SUBSEQUENT ENCOUNTER: Primary | ICD-10-CM

## 2018-10-22 ENCOUNTER — OFFICE VISIT (OUTPATIENT)
Dept: FAMILY MEDICINE | Facility: CLINIC | Age: 63
End: 2018-10-22
Payer: COMMERCIAL

## 2018-10-22 VITALS
HEIGHT: 63 IN | SYSTOLIC BLOOD PRESSURE: 150 MMHG | BODY MASS INDEX: 39.58 KG/M2 | RESPIRATION RATE: 16 BRPM | OXYGEN SATURATION: 98 % | HEART RATE: 60 BPM | DIASTOLIC BLOOD PRESSURE: 86 MMHG | TEMPERATURE: 99 F | WEIGHT: 223.38 LBS

## 2018-10-22 DIAGNOSIS — J30.89 ENVIRONMENTAL AND SEASONAL ALLERGIES: ICD-10-CM

## 2018-10-22 DIAGNOSIS — E66.3 OVERWEIGHT: Primary | ICD-10-CM

## 2018-10-22 DIAGNOSIS — N32.81 OVERACTIVE BLADDER: ICD-10-CM

## 2018-10-22 DIAGNOSIS — E78.00 HIGH CHOLESTEROL: ICD-10-CM

## 2018-10-22 PROCEDURE — 3008F BODY MASS INDEX DOCD: CPT | Mod: ,,, | Performed by: NURSE PRACTITIONER

## 2018-10-22 PROCEDURE — 99214 OFFICE O/P EST MOD 30 MIN: CPT | Mod: SA,,, | Performed by: NURSE PRACTITIONER

## 2018-10-22 RX ORDER — ATORVASTATIN CALCIUM 20 MG/1
20 TABLET, FILM COATED ORAL DAILY
Qty: 90 TABLET | Refills: 1 | Status: SHIPPED | OUTPATIENT
Start: 2018-10-22 | End: 2019-03-11 | Stop reason: SDUPTHER

## 2018-10-22 RX ORDER — MONTELUKAST SODIUM 10 MG/1
10 TABLET ORAL DAILY
Qty: 90 TABLET | Refills: 1 | Status: SHIPPED | OUTPATIENT
Start: 2018-10-22 | End: 2019-03-11 | Stop reason: SDUPTHER

## 2018-10-22 RX ORDER — OXYBUTYNIN CHLORIDE 10 MG/1
10 TABLET, EXTENDED RELEASE ORAL DAILY
Qty: 90 TABLET | Refills: 1 | Status: SHIPPED | OUTPATIENT
Start: 2018-10-22 | End: 2019-03-11 | Stop reason: SDUPTHER

## 2018-10-22 RX ORDER — PHENTERMINE HYDROCHLORIDE 37.5 MG/1
37.5 CAPSULE ORAL EVERY MORNING
Qty: 30 CAPSULE | Refills: 1 | Status: CANCELLED | OUTPATIENT
Start: 2018-10-22 | End: 2018-11-21

## 2018-10-22 RX ORDER — PHENTERMINE HYDROCHLORIDE 37.5 MG/1
37.5 CAPSULE ORAL EVERY MORNING
Qty: 90 CAPSULE | Refills: 0 | Status: SHIPPED | OUTPATIENT
Start: 2018-10-22 | End: 2018-11-21

## 2018-10-22 NOTE — PATIENT INSTRUCTIONS
Overactive Bladder Syndrome (OAB)     Normally, urine stays in the bladder until a person decides to release it. With OAB, the bladder muscles contract involuntarily, causing a sudden urge to urinate and even urine leakage.   When the bladder muscle contract or squeeze involuntarily, it is called overactive bladder syndrome. This causes an intense urge to urinate, known as urgency. Urgency can occur many times during the day and night. If urine leaks with the urgency, it is called urge incontinence.  A disease that affects the bladder nerves, such as multiple sclerosis, can cause overactive bladder syndrome. Other conditions, such as urinary tract infection (UTI) or prostate problems in men, can also lead to OAB. But the exact cause is often not known.  How is overactive bladder syndrome diagnosed?  Your healthcare provider will examine you and ask about your symptoms and health history. You may also have one or more of the following:  · Urine test to take samples of urine and have them checked for problems.  · Urinary diary to record how much fluid you take in and urinate out in a 3 day period.  · Bladder ultrasound to study the bladder as it empties. Ultrasound uses sound waves to create detailed images of the inside of the body.  · Cystoscopy to allow the healthcare provider to look for problems in the urinary tract. The test uses a thin, flexible scope called a cystoscope with a light and camera on the end. The scope is inserted into the urethra (the tube that carries urine out of the body).  · Urodynamic studies, a battery of tests designed to measure and record many aspects of urinary bladder function, including pressures, volume, and urine flow.  How is overactive bladder syndrome treated?  Treatment depends on the cause and severity of your OAB. Treatments may include the following:  · Changing urination habits may be suggested. For instance, your healthcare provider may suggest that you urinate as soon as  you feel the urge. You may also need to limit how much fluid you have during the day.  · Exercising your pelvic muscles can help strengthen muscles used during urination. These exercises are called Kegels. They involve pat as if you were stopping your urine stream and tightening your rectum as if trying not to pass gas. Your healthcare provider can help you learn how to do Kegels.  · Biofeedback to help you learn to control the movement of your bladder muscles. Sensors are placed on your abdomen. They turn signals given off by your muscles into lines on a computer screen.  · Medicine may be given to relax the bladder muscle. Medicine can also help ease bladder contractions, which reduces the urge to urinate.  · Neuromodulation may be done if medicine and behavioral changes dont work. Electrical pulses are sent to the sacral nerves (nerves that affect the pelvic area). These pulses help relieve OAB and urge incontinence.  · Surgery to make the bladder larger may be done in severe cases.  With treatment, OAB can be managed. A condition, such as UTI, that has caused you to have OAB will be treated. Treatment may involve taking medicine for months or years. You may also need to make changes in your daily routine. This may include going to the bathroom more often than you think you need to. Or, you may need to cut back on caffeine and alcohol because these can make symptoms worse. Your healthcare provider can tell you more.     When to call your healthcare provider  Call the healthcare provider right away if you have any of the following:  · Fever of 100.4°F (38.0 °C) or higher   · No improvement with treatment  · Trouble urinating because of pain  · Back or abdominal pain   Date Last Reviewed: 1/1/2017 © 2000-2017 [employer]. [address] [place] [address], [place] [address]. All rights reserved. This information is not intended as a substitute for professional medical care. Always follow your  healthcare professional's instructions.

## 2018-10-22 NOTE — PROGRESS NOTES
SUBJECTIVE:      Patient ID: Nalini Wooten is a 63 y.o. female.    Chief Complaint: Follow-up (1 month f/u) and Weight Check    FU for weight check - has lost 6# since last visit 1 month ago    Significant improvement in urinary frequency & urgency since increasing ditropan     Still using daily drops & ointment from ENT for redness/flaky/dryness on upper lids & below eyes, wasn't using regularly but has started since last visit, noticing improvement    Female  Problem   The patient's pertinent negatives include no genital itching, genital odor, genital rash, pelvic pain, vaginal bleeding or vaginal discharge. Primary symptoms comment: OAB. This is a recurrent problem. The problem occurs daily. The problem has been gradually improving. The patient is experiencing no pain. She is not pregnant. Associated symptoms include frequency (improving), rash and urgency (improving). Pertinent negatives include no abdominal pain, anorexia, back pain, chills, constipation, diarrhea, discolored urine, dysuria, fever, flank pain, headaches, hematuria, joint pain, joint swelling, nausea, painful intercourse, sore throat or vomiting. Exacerbated by: ditropan. The treatment provided significant relief. She is postmenopausal.       Past Surgical History:   Procedure Laterality Date    BLADDER SUSPENSION  1990    FOOT SURGERY  2001    HAND SURGERY  2017    HYSTERECTOMY  1990    JOINT REPLACEMENT  2013    KNEE SURGERY Bilateral 2013    MANDIBLE FRACTURE SURGERY  1981    TONSILLECTOMY       Family History   Problem Relation Age of Onset    Arthritis Mother     Aneurysm Mother         Abdomenal Aneurysm    Arthritis Father     Diabetes Father     Hearing loss Father     Heart disease Father     Hypertension Father     Diabetes Paternal Grandmother     Heart disease Paternal Grandfather     Leukemia Son     Crohn's disease Son     Ankylosing spondylitis Son     Other Son         avascular necrosis of pancho hips     Rheumatologic disease Son     Heart defect Son     Kidney failure Son     SIDS Maternal Aunt     Uterine cancer Maternal Aunt     Dementia Paternal Aunt     No Known Problems Sister     Heart disease Brother         stent placement    Heart disease Brother         stent placement    Aneurysm Brother         Abdomenal Aneurysm    No Known Problems Sister       Social History     Socioeconomic History    Marital status:      Spouse name: None    Number of children: 3    Years of education: None    Highest education level: None   Social Needs    Financial resource strain: None    Food insecurity - worry: None    Food insecurity - inability: None    Transportation needs - medical: None    Transportation needs - non-medical: None   Occupational History    None   Tobacco Use    Smoking status: Never Smoker    Smokeless tobacco: Never Used   Substance and Sexual Activity    Alcohol use: Yes     Alcohol/week: 0.0 oz    Drug use: No    Sexual activity: Yes     Birth control/protection: None   Other Topics Concern    None   Social History Narrative    None     Current Outpatient Medications   Medication Sig Dispense Refill    atorvastatin (LIPITOR) 20 MG tablet Take 1 tablet (20 mg total) by mouth once daily. 90 tablet 1    clotrimazole (LOTRIMIN) 1 % cream Apply 1 application topically 2 (two) times daily. 60 g 1    cyanocobalamin 1,000 mcg/mL injection Inject 1 mL (1,000 mcg total) into the muscle every other day. 45 mL 3    cyclobenzaprine (FLEXERIL) 5 MG tablet TK ONE T PO HS PRN  0    diclofenac (VOLTAREN) 75 MG EC tablet TK 1 T PO ONCE D UTD  1    loteprednol etabonate 0.5 % Oint 2 (two) times daily.      montelukast (SINGULAIR) 10 mg tablet Take 1 tablet (10 mg total) by mouth once daily. 90 tablet 1    oxybutynin (DITROPAN-XL) 10 MG 24 hr tablet Take 1 tablet (10 mg total) by mouth once daily. 90 tablet 1    phentermine 37.5 MG capsule Take 1 capsule (37.5 mg total) by  "mouth every morning. 30 capsule 1    prednisoLONE acetate (PRED FORTE) 1 % DrpS 1 drop once daily.      syringe-needle,safety,disp unt 3 mL 23 gauge x 1 1/2" Syrg 1 syringe with needle of patient's choice to administer with B12 100 Syringe 1    traMADol (ULTRAM) 50 mg tablet Take 50 mg by mouth every 4 (four) hours as needed for Pain.        No current facility-administered medications for this visit.      Review of patient's allergies indicates:   Allergen Reactions    Estrogens Other (See Comments)     Mini stroke    Tetanus vaccines and toxoid Swelling and Other (See Comments)     Swelling at injection site, fever      Past Medical History:   Diagnosis Date    Bulging disc     Carpal tunnel syndrome     Degenerative disc disease     High cholesterol     Pinched nerve in neck     Urinary incontinence     Urinary tract infection      Past Surgical History:   Procedure Laterality Date    BLADDER SUSPENSION  1990    FOOT SURGERY  2001    HAND SURGERY  2017    HYSTERECTOMY  1990    JOINT REPLACEMENT  2013    KNEE SURGERY Bilateral 2013    MANDIBLE FRACTURE SURGERY  1981    TONSILLECTOMY         Review of Systems   Constitutional: Negative for activity change, appetite change, chills, fatigue, fever and unexpected weight change.   HENT: Negative for congestion, ear pain, hearing loss, postnasal drip, sinus pressure, sinus pain, sneezing and sore throat.    Eyes: Negative for photophobia and pain.   Respiratory: Negative for cough, chest tightness, shortness of breath and wheezing.    Cardiovascular: Negative for chest pain, palpitations and leg swelling.   Gastrointestinal: Negative for abdominal distention, abdominal pain, anorexia, blood in stool, constipation, diarrhea, nausea and vomiting.   Endocrine: Negative for cold intolerance, heat intolerance, polydipsia and polyuria.   Genitourinary: Positive for frequency (improving) and urgency (improving). Negative for difficulty urinating, dysuria, " "flank pain, hematuria, pelvic pain and vaginal discharge.   Musculoskeletal: Negative for arthralgias, back pain, joint pain, joint swelling, myalgias and neck pain.   Skin: Positive for rash. Negative for pallor.   Allergic/Immunologic: Negative for environmental allergies and food allergies.   Neurological: Negative for dizziness, weakness, light-headedness, numbness and headaches.   Hematological: Does not bruise/bleed easily.   Psychiatric/Behavioral: Negative for agitation, confusion, decreased concentration and sleep disturbance. The patient is not nervous/anxious.       OBJECTIVE:      Vitals:    10/22/18 1046 10/22/18 1105   BP: (!) 152/86 (!) 150/86   Pulse: 60    Resp: 16    Temp: 98.7 °F (37.1 °C)    TempSrc: Oral    SpO2: 98%    Weight: 101.3 kg (223 lb 6.4 oz)    Height: 5' 3" (1.6 m)      Physical Exam   Constitutional: She is oriented to person, place, and time. Vital signs are normal. She appears well-developed and well-nourished. No distress.   HENT:   Head: Normocephalic and atraumatic.   Right Ear: Hearing normal.   Left Ear: Hearing normal.   Nose: Nose normal. No rhinorrhea.   Mouth/Throat: Mucous membranes are normal.   Eyes: Conjunctivae and lids are normal. Pupils are equal, round, and reactive to light. Right eye exhibits no discharge. Left eye exhibits no discharge. Right conjunctiva is not injected. Left conjunctiva is not injected. Right pupil is round and reactive. Left pupil is round and reactive. Pupils are equal.       Neck: Trachea normal and normal range of motion. Neck supple. No JVD present. No tracheal deviation present. No thyromegaly present.   Cardiovascular: Normal rate, regular rhythm, normal heart sounds and intact distal pulses. Exam reveals no gallop and no friction rub.   No murmur heard.  Pulses:       Radial pulses are 2+ on the right side, and 2+ on the left side.   Pulmonary/Chest: Effort normal and breath sounds normal. No stridor. No respiratory distress. She has " no decreased breath sounds. She has no wheezes. She has no rhonchi. She has no rales.   Abdominal: Soft. Bowel sounds are normal. She exhibits no distension. There is no tenderness. There is no rigidity and no guarding.   Musculoskeletal: Normal range of motion. She exhibits no edema.   Lymphadenopathy:     She has no cervical adenopathy.   Neurological: She is alert and oriented to person, place, and time. She has normal strength. She displays no atrophy. She displays a negative Romberg sign. Coordination and gait normal.   Skin: Skin is warm and dry. Capillary refill takes less than 2 seconds. No lesion and no rash noted. No cyanosis. No pallor.   Psychiatric: She has a normal mood and affect. Her speech is normal and behavior is normal. Judgment and thought content normal. Cognition and memory are normal. She is attentive.   Nursing note and vitals reviewed.     Assessment:       1. Overweight    2. BMI 40.0-44.9, adult    3. Overactive bladder    4. Environmental and seasonal allergies    5. High cholesterol        Plan:       Overweight  -     phentermine 37.5 MG capsule; Take 1 capsule (37.5 mg total) by mouth every morning.  Dispense: 90 capsule; Refill: 0    BMI 40.0-44.9, adult  -     phentermine 37.5 MG capsule; Take 1 capsule (37.5 mg total) by mouth every morning.  Dispense: 90 capsule; Refill: 0    Overactive bladder  -     oxybutynin (DITROPAN-XL) 10 MG 24 hr tablet; Take 1 tablet (10 mg total) by mouth once daily.  Dispense: 90 tablet; Refill: 1    Environmental and seasonal allergies  -     montelukast (SINGULAIR) 10 mg tablet; Take 1 tablet (10 mg total) by mouth once daily.  Dispense: 90 tablet; Refill: 1    High cholesterol  -     atorvastatin (LIPITOR) 20 MG tablet; Take 1 tablet (20 mg total) by mouth once daily.  Dispense: 90 tablet; Refill: 1      Follow-up in about 3 months (around 1/22/2019) for phentermine refills.      10/22/2018 Omayra Huddleston, CHICO, FNP-C

## 2018-10-30 LAB
ALBUMIN SERPL-MCNC: 3.9 G/DL (ref 3.1–4.7)
ALP SERPL-CCNC: 91 IU/L (ref 40–104)
ALT (SGPT): 26 IU/L (ref 3–33)
AST SERPL-CCNC: 21 IU/L (ref 10–40)
BILIRUB SERPL-MCNC: 0.6 MG/DL (ref 0.3–1)
BUN SERPL-MCNC: 18 MG/DL (ref 8–20)
CALCIUM SERPL-MCNC: 9.3 MG/DL (ref 7.7–10.4)
CHLORIDE: 104 MMOL/L (ref 98–110)
CO2 SERPL-SCNC: 26.7 MMOL/L (ref 22.8–31.6)
CREATININE: 0.73 MG/DL (ref 0.6–1.4)
GLUCOSE: 120 MG/DL (ref 70–99)
HCT VFR BLD AUTO: 44.3 % (ref 36–48)
HGB BLD-MCNC: 14 G/DL (ref 12–15)
MCH RBC QN AUTO: 27 PG (ref 25–35)
MCHC RBC AUTO-ENTMCNC: 31.6 G/DL (ref 31–36)
MCV RBC AUTO: 85.4 FL (ref 79–98)
NUCLEATED RBCS: 0 %
PLATELET # BLD AUTO: 285 K/UL (ref 140–440)
POTASSIUM SERPL-SCNC: 4.3 MMOL/L (ref 3.5–5)
PROT SERPL-MCNC: 6.9 G/DL (ref 6–8.2)
RBC # BLD AUTO: 5.19 M/UL (ref 3.5–5.5)
SODIUM: 141 MMOL/L (ref 134–144)
WBC # BLD AUTO: 7.2 K/UL (ref 5–10)

## 2018-11-06 ENCOUNTER — TELEPHONE (OUTPATIENT)
Dept: ORTHOPEDICS | Facility: CLINIC | Age: 63
End: 2018-11-06

## 2018-11-06 NOTE — TELEPHONE ENCOUNTER
----- Message from Rosi Winchester sent at 11/6/2018  1:25 PM CST -----  Thuy from [facility] she is calling she needs the booking sheet with the cpt code ext# [phone]

## 2018-11-15 ENCOUNTER — OFFICE VISIT (OUTPATIENT)
Dept: ORTHOPEDICS | Facility: CLINIC | Age: 63
End: 2018-11-15
Payer: COMMERCIAL

## 2018-11-15 VITALS
HEART RATE: 76 BPM | SYSTOLIC BLOOD PRESSURE: 134 MMHG | HEIGHT: 63 IN | WEIGHT: 220 LBS | DIASTOLIC BLOOD PRESSURE: 82 MMHG | BODY MASS INDEX: 38.98 KG/M2

## 2018-11-15 DIAGNOSIS — M75.42 IMPINGEMENT SYNDROME OF LEFT SHOULDER: ICD-10-CM

## 2018-11-15 DIAGNOSIS — Z98.890 STATUS POST ARTHROSCOPY OF LEFT SHOULDER: Primary | ICD-10-CM

## 2018-11-15 DIAGNOSIS — S46.012D TRAUMATIC TEAR OF LEFT ROTATOR CUFF, SUBSEQUENT ENCOUNTER: ICD-10-CM

## 2018-11-15 PROCEDURE — 99024 POSTOP FOLLOW-UP VISIT: CPT | Mod: ,,, | Performed by: ORTHOPAEDIC SURGERY

## 2018-11-15 RX ORDER — OXYCODONE AND ACETAMINOPHEN 7.5; 325 MG/1; MG/1
TABLET ORAL
Refills: 0 | COMMUNITY
Start: 2018-11-07 | End: 2018-12-13

## 2018-11-15 NOTE — PROGRESS NOTES
[facility]    Subjective:     Chief Complaint:   Chief Complaint   Patient presents with    Left Shoulder - Post-op Evaluation     P/O LT shoulder scope 11/7/18. Suture removal. States she is doing okay as long as she doesn't move arm. Has been wearing sling, forgot it at home. Overall doing okay.       Past Medical History:   Diagnosis Date    Bulging disc     Carpal tunnel syndrome     Degenerative disc disease     High cholesterol     Pinched nerve in neck     Urinary incontinence     Urinary tract infection        Past Surgical History:   Procedure Laterality Date    BLADDER SUSPENSION  1990    FOOT SURGERY  2001    HAND SURGERY  2017    HYSTERECTOMY  1990    JOINT REPLACEMENT  2013    KNEE SURGERY Bilateral 2013    MANDIBLE FRACTURE SURGERY  1981    SHOULDER ARTHROSCOPY      TONSILLECTOMY         Current Outpatient Medications   Medication Sig    atorvastatin (LIPITOR) 20 MG tablet Take 1 tablet (20 mg total) by mouth once daily.    clotrimazole (LOTRIMIN) 1 % cream Apply 1 application topically 2 (two) times daily.    cyanocobalamin 1,000 mcg/mL injection Inject 1 mL (1,000 mcg total) into the muscle every other day.    cyclobenzaprine (FLEXERIL) 5 MG tablet TK ONE T PO HS PRN    diclofenac (VOLTAREN) 75 MG EC tablet TK 1 T PO ONCE D UTD    loteprednol etabonate 0.5 % Oint 2 (two) times daily.    montelukast (SINGULAIR) 10 mg tablet Take 1 tablet (10 mg total) by mouth once daily.    oxybutynin (DITROPAN-XL) 10 MG 24 hr tablet Take 1 tablet (10 mg total) by mouth once daily.    oxyCODONE-acetaminophen (PERCOCET) 7.5-325 mg per tablet TK 1 T PO Q 4 H PRN    phentermine 37.5 MG capsule Take 1 capsule (37.5 mg total) by mouth every morning.    traMADol (ULTRAM) 50 mg tablet Take 50 mg by mouth every 4 (four) hours as needed for Pain.      No current facility-administered medications for this visit.        Review of patient's allergies indicates:   Allergen Reactions     Estrogens Other (See Comments)     Mini stroke    Tetanus vaccines and toxoid Swelling and Other (See Comments)     Swelling at injection site, fever       Family History   Problem Relation Age of Onset    Arthritis Mother     Aneurysm Mother         Abdomenal Aneurysm    Arthritis Father     Diabetes Father     Hearing loss Father     Heart disease Father     Hypertension Father     Diabetes Paternal Grandmother     Heart disease Paternal Grandfather     Leukemia Son     Crohn's disease Son     Ankylosing spondylitis Son     Other Son         avascular necrosis of pancho hips    Rheumatologic disease Son     Heart defect Son     Kidney failure Son     SIDS Maternal Aunt     Uterine cancer Maternal Aunt     Dementia Paternal Aunt     No Known Problems Sister     Heart disease Brother         stent placement    Heart disease Brother         stent placement    Aneurysm Brother         Abdomenal Aneurysm    No Known Problems Sister        Social History     Socioeconomic History    Marital status:      Spouse name: Not on file    Number of children: 3    Years of education: Not on file    Highest education level: Not on file   Social Needs    Financial resource strain: Not on file    Food insecurity - worry: Not on file    Food insecurity - inability: Not on file    Transportation needs - medical: Not on file    Transportation needs - non-medical: Not on file   Occupational History    Not on file   Tobacco Use    Smoking status: Never Smoker    Smokeless tobacco: Never Used   Substance and Sexual Activity    Alcohol use: Yes     Alcohol/week: 0.0 oz    Drug use: No    Sexual activity: Yes     Birth control/protection: None   Other Topics Concern    Not on file   Social History Narrative    Not on file       History of present illness: Patient comes in today for the left shoulder. She has been noncompliant with sling use. She is generally doing well she pain is  minimal.      Review of Systems:    Constitution: Negative for chills, fever, and sweats.  Negative for unexplained weight loss.    HENT:  Negative for headaches and blurry vision.    Cardiovascular:Negative for chest pain or irregular heart beat. Negative for hypertension.    Respiratory:  Negative for cough and shortness of breath.    Gastrointestinal: Negative for abdominal pain, heartburn, melena, nausea, and vomitting.    Genitourinary:  Negative bladder incontinence and dysuria.    Musculoskeletal:  See HPI for details.     Neurological: Negative for numbness.    Psychiatric/Behavioral: Negative for depression.  The patient is not nervous/anxious.      Endocrine: Negative for polyuria    Hematologic/Lymphatic: Negative for bleeding problem.  Does not bruise/bleed easily.    Skin: Negative for poor would healing and rash    Objective:      Physical Examination:    Vital Signs:    Vitals:    11/15/18 1125   BP: 134/82   Pulse: 76       Body mass index is 38.97 kg/m².    This a well-developed, well nourished patient in no acute distress.  They are alert and oriented and cooperative to examination.        Wounds are clean dry and intact.  Pertinent New Results:    XRAY Report / Interpretation:   No new XRAYS Today.    Assessment/Plan:      Stable following left rotator cuff repair. I spoke to her about the importance of using the sling to protect the rotator cuff. I've started her on physical therapy. She will follow-up in one month      This note was created using Dragon voice recognition software that occasionally misinterpreted phrases or words.

## 2018-11-26 ENCOUNTER — TELEPHONE (OUTPATIENT)
Dept: ORTHOPEDICS | Facility: CLINIC | Age: 63
End: 2018-11-26

## 2018-12-13 ENCOUNTER — OFFICE VISIT (OUTPATIENT)
Dept: ORTHOPEDICS | Facility: CLINIC | Age: 63
End: 2018-12-13
Payer: COMMERCIAL

## 2018-12-13 VITALS
HEART RATE: 77 BPM | HEIGHT: 63 IN | WEIGHT: 200 LBS | DIASTOLIC BLOOD PRESSURE: 88 MMHG | SYSTOLIC BLOOD PRESSURE: 146 MMHG | BODY MASS INDEX: 35.44 KG/M2

## 2018-12-13 DIAGNOSIS — Z98.890 STATUS POST ARTHROSCOPY OF LEFT SHOULDER: Primary | ICD-10-CM

## 2018-12-13 PROCEDURE — 99024 POSTOP FOLLOW-UP VISIT: CPT | Mod: ,,, | Performed by: ORTHOPAEDIC SURGERY

## 2018-12-13 RX ORDER — PHENTERMINE HYDROCHLORIDE 37.5 MG/1
CAPSULE ORAL
Refills: 0 | COMMUNITY
Start: 2018-12-02 | End: 2019-03-11 | Stop reason: SDUPTHER

## 2018-12-13 NOTE — PROGRESS NOTES
[facility] POST-OP NOTE    Subjective:           Chief Complaint:   Chief Complaint   Patient presents with    Left Shoulder - Post-op Evaluation     Left shoulder scope 11/7/18. States that her shoulder is doing good and that she is in PT and it is helping.        Past Medical History:   Diagnosis Date    Bulging disc     Carpal tunnel syndrome     Degenerative disc disease     High cholesterol     Pinched nerve in neck     Urinary incontinence     Urinary tract infection        Past Surgical History:   Procedure Laterality Date    BLADDER SUSPENSION  1990    FOOT SURGERY  2001    HAND SURGERY  2017    HYSTERECTOMY  1990    JOINT REPLACEMENT  2013    KNEE SURGERY Bilateral 2013    MANDIBLE FRACTURE SURGERY  1981    SHOULDER ARTHROSCOPY      TONSILLECTOMY         Current Outpatient Medications   Medication Sig    atorvastatin (LIPITOR) 20 MG tablet Take 1 tablet (20 mg total) by mouth once daily.    clotrimazole (LOTRIMIN) 1 % cream Apply 1 application topically 2 (two) times daily.    cyanocobalamin 1,000 mcg/mL injection Inject 1 mL (1,000 mcg total) into the muscle every other day.    cyclobenzaprine (FLEXERIL) 5 MG tablet TK ONE T PO HS PRN    loteprednol etabonate 0.5 % Oint 2 (two) times daily.    montelukast (SINGULAIR) 10 mg tablet Take 1 tablet (10 mg total) by mouth once daily.    oxybutynin (DITROPAN-XL) 10 MG 24 hr tablet Take 1 tablet (10 mg total) by mouth once daily.    phentermine 37.5 MG capsule     traMADol (ULTRAM) 50 mg tablet Take 50 mg by mouth every 4 (four) hours as needed for Pain.      No current facility-administered medications for this visit.        Review of patient's allergies indicates:   Allergen Reactions    Estrogens Other (See Comments)     Mini stroke    Tetanus vaccines and toxoid Swelling and Other (See Comments)     Swelling at injection site, fever       Family History   Problem Relation Age of Onset    Arthritis Mother     Aneurysm  Mother         Abdomenal Aneurysm    Arthritis Father     Diabetes Father     Hearing loss Father     Heart disease Father     Hypertension Father     Diabetes Paternal Grandmother     Heart disease Paternal Grandfather     Leukemia Son     Crohn's disease Son     Ankylosing spondylitis Son     Other Son         avascular necrosis of pancho hips    Rheumatologic disease Son     Heart defect Son     Kidney failure Son     SIDS Maternal Aunt     Uterine cancer Maternal Aunt     Dementia Paternal Aunt     No Known Problems Sister     Heart disease Brother         stent placement    Heart disease Brother         stent placement    Aneurysm Brother         Abdomenal Aneurysm    No Known Problems Sister        Social History     Socioeconomic History    Marital status:      Spouse name: Not on file    Number of children: 3    Years of education: Not on file    Highest education level: Not on file   Social Needs    Financial resource strain: Not on file    Food insecurity - worry: Not on file    Food insecurity - inability: Not on file    Transportation needs - medical: Not on file    Transportation needs - non-medical: Not on file   Occupational History    Not on file   Tobacco Use    Smoking status: Never Smoker    Smokeless tobacco: Never Used   Substance and Sexual Activity    Alcohol use: Yes     Alcohol/week: 0.0 oz    Drug use: No    Sexual activity: Yes     Birth control/protection: None   Other Topics Concern    Not on file   Social History Narrative    Not on file       History of present illness: Patient returns today for her left shoulder. She is doing very well. She is in physical therapy. She's not only using any narcotics.      Review of Systems:    Musculoskeletal:  See HPI      Objective:        Physical Examination:    Vital Signs:    Vitals:    12/13/18 1019   BP: (!) 146/88   Pulse: 77       Body mass index is 35.43 kg/m².    This a well-developed, well  nourished patient in no acute distress.  They are alert and oriented and cooperative to examination.        Wounds are clean dry and intact. Passive range of motion is full.  Pertinent New Results:    XRAY Report / Interpretation:   No new XRAYS Today.   Assessment/Plan:   Stable following repair of a large rotator cuff tear. Continue physical therapy. Wean brace. Follow-up in 8 weeks      This note was created using Dragon voice recognition software that occasionally misinterpreted phrases or words.

## 2019-01-10 ENCOUNTER — OFFICE VISIT (OUTPATIENT)
Dept: ORTHOPEDICS | Facility: CLINIC | Age: 64
End: 2019-01-10
Payer: COMMERCIAL

## 2019-01-10 VITALS
HEIGHT: 63 IN | DIASTOLIC BLOOD PRESSURE: 91 MMHG | WEIGHT: 205 LBS | BODY MASS INDEX: 36.32 KG/M2 | SYSTOLIC BLOOD PRESSURE: 146 MMHG | HEART RATE: 77 BPM

## 2019-01-10 DIAGNOSIS — Z98.890 STATUS POST ARTHROSCOPY OF LEFT SHOULDER: Primary | ICD-10-CM

## 2019-01-10 PROCEDURE — 99024 POSTOP FOLLOW-UP VISIT: CPT | Mod: ,,, | Performed by: ORTHOPAEDIC SURGERY

## 2019-01-10 PROCEDURE — 99024 PR POST-OP FOLLOW-UP VISIT: ICD-10-PCS | Mod: ,,, | Performed by: ORTHOPAEDIC SURGERY

## 2019-01-10 NOTE — PROGRESS NOTES
[facility] ORTH[facility]ED[facility] POST-OP NOTE    Subjective:           Chief Complaint:   Chief Complaint   Patient presents with    Left Shoulder - Post-op Evaluation     Left shoulder scope 11/17/18. States that her shoulder is doing great and that she is able to bring it over her head again.        Past Medical History:   Diagnosis Date    Bulging disc     Carpal tunnel syndrome     Degenerative disc disease     High cholesterol     Pinched nerve in neck     Urinary incontinence     Urinary tract infection        Past Surgical History:   Procedure Laterality Date    BLADDER SUSPENSION  1990    FOOT SURGERY  2001    HAND SURGERY  2017    HYSTERECTOMY  1990    JOINT REPLACEMENT  2013    KNEE SURGERY Bilateral 2013    MANDIBLE FRACTURE SURGERY  1981    SHOULDER ARTHROSCOPY      TONSILLECTOMY         Current Outpatient Medications   Medication Sig    atorvastatin (LIPITOR) 20 MG tablet Take 1 tablet (20 mg total) by mouth once daily.    clotrimazole (LOTRIMIN) 1 % cream Apply 1 application topically 2 (two) times daily.    cyanocobalamin 1,000 mcg/mL injection Inject 1 mL (1,000 mcg total) into the muscle every other day.    cyclobenzaprine (FLEXERIL) 5 MG tablet TK ONE T PO HS PRN    loteprednol etabonate 0.5 % Oint 2 (two) times daily.    montelukast (SINGULAIR) 10 mg tablet Take 1 tablet (10 mg total) by mouth once daily.    oxybutynin (DITROPAN-XL) 10 MG 24 hr tablet Take 1 tablet (10 mg total) by mouth once daily.    phentermine 37.5 MG capsule     traMADol (ULTRAM) 50 mg tablet Take 50 mg by mouth every 4 (four) hours as needed for Pain.      No current facility-administered medications for this visit.        Review of patient's allergies indicates:   Allergen Reactions    Estrogens Other (See Comments)     Mini stroke    Tetanus vaccines and toxoid Swelling and Other (See Comments)     Swelling at injection site, fever       Family History   Problem Relation Age of Onset    Arthritis Mother      Aneurysm Mother         Abdomenal Aneurysm    Arthritis Father     Diabetes Father     Hearing loss Father     Heart disease Father     Hypertension Father     Diabetes Paternal Grandmother     Heart disease Paternal Grandfather     Leukemia Son     Crohn's disease Son     Ankylosing spondylitis Son     Other Son         avascular necrosis of pancho hips    Rheumatologic disease Son     Heart defect Son     Kidney failure Son     SIDS Maternal Aunt     Uterine cancer Maternal Aunt     Dementia Paternal Aunt     No Known Problems Sister     Heart disease Brother         stent placement    Heart disease Brother         stent placement    Aneurysm Brother         Abdomenal Aneurysm    No Known Problems Sister        Social History     Socioeconomic History    Marital status:      Spouse name: Not on file    Number of children: 3    Years of education: Not on file    Highest education level: Not on file   Social Needs    Financial resource strain: Not on file    Food insecurity - worry: Not on file    Food insecurity - inability: Not on file    Transportation needs - medical: Not on file    Transportation needs - non-medical: Not on file   Occupational History    Not on file   Tobacco Use    Smoking status: Never Smoker    Smokeless tobacco: Never Used   Substance and Sexual Activity    Alcohol use: Yes     Alcohol/week: 0.0 oz    Drug use: No    Sexual activity: Yes     Birth control/protection: None   Other Topics Concern    Not on file   Social History Narrative    Not on file       History of present illness: Patient returns for her left shoulder. She is doing remarkably well. She's really not having any issues      Review of Systems:    Musculoskeletal:  See HPI      Objective:        Physical Examination:    Vital Signs:    Vitals:    01/10/19 1000   BP: (!) 146/91   Pulse: 77       Body mass index is 36.31 kg/m².    This a well-developed, well nourished patient in no  acute distress.  They are alert and oriented and cooperative to examination.        Patient has full passive and active range of motion shoulder. Very mild impingement  Pertinent New Results:    XRAY Report / Interpretation:   No new XRAYS Today.    Assessment/Plan:      Stable following left shoulder arthroscopy and rotator cuff repair. No intervention. Continue home strengthening program. Follow-up when necessary    This note was created using Dragon voice recognition software that occasionally misinterpreted phrases or words.

## 2019-02-05 ENCOUNTER — TELEPHONE (OUTPATIENT)
Dept: ORTHOPEDICS | Facility: CLINIC | Age: 64
End: 2019-02-05

## 2019-02-05 DIAGNOSIS — M75.102 TEAR OF LEFT ROTATOR CUFF, UNSPECIFIED TEAR EXTENT: Primary | ICD-10-CM

## 2019-02-05 NOTE — TELEPHONE ENCOUNTER
Returning your call from yesterday. I informed her that you were currently busy and would return her call shortly.
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
## 2019-02-11 ENCOUNTER — TELEPHONE (OUTPATIENT)
Dept: ORTHOPEDICS | Facility: CLINIC | Age: 64
End: 2019-02-11
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
## 2019-02-12 ENCOUNTER — OFFICE VISIT (OUTPATIENT)
Dept: ORTHOPEDICS | Facility: CLINIC | Age: 64
End: 2019-02-12
Payer: COMMERCIAL

## 2019-02-12 VITALS
DIASTOLIC BLOOD PRESSURE: 88 MMHG | HEART RATE: 77 BPM | HEIGHT: 63 IN | SYSTOLIC BLOOD PRESSURE: 128 MMHG | WEIGHT: 220 LBS | BODY MASS INDEX: 38.98 KG/M2

## 2019-02-12 DIAGNOSIS — M25.561 ACUTE PAIN OF RIGHT KNEE: ICD-10-CM

## 2019-02-12 DIAGNOSIS — Z96.651 HISTORY OF TOTAL KNEE REPLACEMENT, RIGHT: Primary | ICD-10-CM

## 2019-02-12 DIAGNOSIS — M75.122 COMPLETE TEAR OF LEFT ROTATOR CUFF: ICD-10-CM

## 2019-02-12 PROCEDURE — 73562 PR  X-RAY KNEE 3 VIEW: ICD-10-PCS | Mod: RT,,, | Performed by: ORTHOPAEDIC SURGERY

## 2019-02-12 PROCEDURE — 3008F BODY MASS INDEX DOCD: CPT | Mod: ,,, | Performed by: ORTHOPAEDIC SURGERY

## 2019-02-12 PROCEDURE — 99214 OFFICE O/P EST MOD 30 MIN: CPT | Mod: ,,, | Performed by: ORTHOPAEDIC SURGERY

## 2019-02-12 PROCEDURE — 99214 PR OFFICE/OUTPT VISIT, EST, LEVL IV, 30-39 MIN: ICD-10-PCS | Mod: ,,, | Performed by: ORTHOPAEDIC SURGERY

## 2019-02-12 PROCEDURE — 3008F PR BODY MASS INDEX (BMI) DOCUMENTED: ICD-10-PCS | Mod: ,,, | Performed by: ORTHOPAEDIC SURGERY

## 2019-02-12 PROCEDURE — 73562 X-RAY EXAM OF KNEE 3: CPT | Mod: RT,,, | Performed by: ORTHOPAEDIC SURGERY

## 2019-02-12 RX ORDER — DICLOFENAC SODIUM 10 MG/G
GEL TOPICAL
Refills: 2 | COMMUNITY
Start: 2019-02-08 | End: 2019-07-18

## 2019-02-12 RX ORDER — TIZANIDINE HYDROCHLORIDE 4 MG/1
1 CAPSULE, GELATIN COATED ORAL DAILY PRN
COMMUNITY
Start: 2019-02-08 | End: 2019-10-21 | Stop reason: SDUPTHER

## 2019-02-12 RX ORDER — DICLOFENAC SODIUM 75 MG/1
TABLET, DELAYED RELEASE ORAL
Refills: 0 | COMMUNITY
Start: 2019-02-03 | End: 2019-07-18

## 2019-02-12 RX ORDER — METHYLPREDNISOLONE 4 MG/1
TABLET ORAL
Refills: 0 | COMMUNITY
Start: 2019-02-08 | End: 2019-02-18
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
## 2019-02-13 DIAGNOSIS — M75.122 COMPLETE ROTATOR CUFF TEAR OF LEFT SHOULDER: Primary | ICD-10-CM

## 2019-02-14 ENCOUNTER — TELEPHONE (OUTPATIENT)
Dept: ORTHOPEDICS | Facility: CLINIC | Age: 64
End: 2019-02-14

## 2019-02-14 NOTE — TELEPHONE ENCOUNTER
Spoke with pt, suffering with her neck and back and she made an appointment with Dr. Mclain for next week

## 2019-02-18 ENCOUNTER — OFFICE VISIT (OUTPATIENT)
Dept: ORTHOPEDICS | Facility: CLINIC | Age: 64
End: 2019-02-18
Payer: COMMERCIAL

## 2019-02-18 VITALS
SYSTOLIC BLOOD PRESSURE: 132 MMHG | DIASTOLIC BLOOD PRESSURE: 84 MMHG | HEIGHT: 63 IN | HEART RATE: 74 BPM | WEIGHT: 220 LBS | BODY MASS INDEX: 38.98 KG/M2

## 2019-02-18 DIAGNOSIS — M54.16 LUMBAR RADICULITIS: Primary | ICD-10-CM

## 2019-02-18 DIAGNOSIS — M54.12 CERVICAL RADICULITIS: ICD-10-CM
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
## 2019-02-18 DIAGNOSIS — M75.102 TEAR OF LEFT ROTATOR CUFF, UNSPECIFIED TEAR EXTENT: ICD-10-CM

## 2019-02-18 PROCEDURE — 3008F PR BODY MASS INDEX (BMI) DOCUMENTED: ICD-10-PCS | Mod: ,,, | Performed by: ORTHOPAEDIC SURGERY

## 2019-02-18 PROCEDURE — 72170 PR  X-RAY PELVIS 1/2 VW: ICD-10-PCS | Mod: ,,, | Performed by: ORTHOPAEDIC SURGERY

## 2019-02-18 PROCEDURE — 72100 PR  X-RAY LUMBAR SPINE 2/3 VW: ICD-10-PCS | Mod: ,,, | Performed by: ORTHOPAEDIC SURGERY

## 2019-02-18 PROCEDURE — 3008F BODY MASS INDEX DOCD: CPT | Mod: ,,, | Performed by: ORTHOPAEDIC SURGERY

## 2019-02-18 PROCEDURE — 72100 X-RAY EXAM L-S SPINE 2/3 VWS: CPT | Mod: ,,, | Performed by: ORTHOPAEDIC SURGERY

## 2019-02-18 PROCEDURE — 99213 PR OFFICE/OUTPT VISIT, EST, LEVL III, 20-29 MIN: ICD-10-PCS | Mod: ,,, | Performed by: ORTHOPAEDIC SURGERY

## 2019-02-18 PROCEDURE — 99213 OFFICE O/P EST LOW 20 MIN: CPT | Mod: ,,, | Performed by: ORTHOPAEDIC SURGERY

## 2019-02-18 PROCEDURE — 72170 X-RAY EXAM OF PELVIS: CPT | Mod: ,,, | Performed by: ORTHOPAEDIC SURGERY
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
## 2019-02-19 LAB
ALBUMIN SERPL-MCNC: 3.5 G/DL (ref 3.1–4.7)
ALP SERPL-CCNC: 109 IU/L (ref 40–104)
ALT (SGPT): 29 IU/L (ref 3–33)
AST SERPL-CCNC: 26 IU/L (ref 10–40)
BILIRUB SERPL-MCNC: 0.7 MG/DL (ref 0.3–1)
BUN SERPL-MCNC: 15 MG/DL (ref 8–20)
CALCIUM SERPL-MCNC: 8.6 MG/DL (ref 7.7–10.4)
CHLORIDE: 102 MMOL/L (ref 98–110)
CO2 SERPL-SCNC: 29.6 MMOL/L (ref 22.8–31.6)
CREATININE: 0.65 MG/DL (ref 0.6–1.4)
GLUCOSE: 192 MG/DL (ref 70–99)
HCT VFR BLD AUTO: 43.1 % (ref 36–48)
HGB BLD-MCNC: 13.1 G/DL (ref 12–15)
MCH RBC QN AUTO: 26.1 PG (ref 25–35)
MCHC RBC AUTO-ENTMCNC: 30.4 G/DL (ref 31–36)
MCV RBC AUTO: 86 FL (ref 79–98)
NUCLEATED RBCS: 0 %
PLATELET # BLD AUTO: 281 K/UL (ref 140–440)
POTASSIUM SERPL-SCNC: 4.3 MMOL/L (ref 3.5–5)
PROT SERPL-MCNC: 6.5 G/DL (ref 6–8.2)
RBC # BLD AUTO: 5.01 M/UL (ref 3.5–5.5)
SODIUM: 140 MMOL/L (ref 134–144)
WBC # BLD AUTO: 7.8 K/UL (ref 5–10)

## 2019-02-22 ENCOUNTER — PATIENT MESSAGE (OUTPATIENT)
Dept: FAMILY MEDICINE | Facility: CLINIC | Age: 64
End: 2019-02-22

## 2019-02-25 ENCOUNTER — OFFICE VISIT (OUTPATIENT)
Dept: ORTHOPEDICS | Facility: CLINIC | Age: 64
End: 2019-02-25
Payer: COMMERCIAL

## 2019-02-25 VITALS
BODY MASS INDEX: 38.8 KG/M2 | HEIGHT: 63 IN | WEIGHT: 219 LBS | HEART RATE: 82 BPM | DIASTOLIC BLOOD PRESSURE: 82 MMHG | SYSTOLIC BLOOD PRESSURE: 130 MMHG

## 2019-02-25 DIAGNOSIS — M54.16 LUMBAR RADICULITIS: Primary | ICD-10-CM

## 2019-02-25 DIAGNOSIS — M47.816 LUMBAR SPONDYLOSIS: ICD-10-CM
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
## 2019-02-25 DIAGNOSIS — M54.12 CERVICAL RADICULITIS: ICD-10-CM

## 2019-02-25 DIAGNOSIS — M50.320 OTHER CERVICAL DISC DEGENERATION, MID-CERVICAL REGION, UNSPECIFIED LEVEL: ICD-10-CM

## 2019-02-25 PROCEDURE — 3008F PR BODY MASS INDEX (BMI) DOCUMENTED: ICD-10-PCS | Mod: ,,, | Performed by: ORTHOPAEDIC SURGERY

## 2019-02-25 PROCEDURE — 99214 OFFICE O/P EST MOD 30 MIN: CPT | Mod: ,,, | Performed by: ORTHOPAEDIC SURGERY

## 2019-02-25 PROCEDURE — 99214 PR OFFICE/OUTPT VISIT, EST, LEVL IV, 30-39 MIN: ICD-10-PCS | Mod: ,,, | Performed by: ORTHOPAEDIC SURGERY

## 2019-02-25 PROCEDURE — 3008F BODY MASS INDEX DOCD: CPT | Mod: ,,, | Performed by: ORTHOPAEDIC SURGERY
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
## 2019-02-28 ENCOUNTER — TELEPHONE (OUTPATIENT)
Dept: ORTHOPEDICS | Facility: CLINIC | Age: 64
End: 2019-02-28

## 2019-03-07 ENCOUNTER — OFFICE VISIT (OUTPATIENT)
Dept: ORTHOPEDICS | Facility: CLINIC | Age: 64
End: 2019-03-07
Payer: COMMERCIAL

## 2019-03-07 VITALS
DIASTOLIC BLOOD PRESSURE: 80 MMHG | BODY MASS INDEX: 38.98 KG/M2 | HEART RATE: 80 BPM | HEIGHT: 63 IN | WEIGHT: 220 LBS | SYSTOLIC BLOOD PRESSURE: 138 MMHG

## 2019-03-07 DIAGNOSIS — Z98.890 STATUS POST ARTHROSCOPY OF LEFT SHOULDER: Primary | ICD-10-CM

## 2019-03-07 PROCEDURE — 99024 PR POST-OP FOLLOW-UP VISIT: ICD-10-PCS | Mod: ,,, | Performed by: ORTHOPAEDIC SURGERY

## 2019-03-07 PROCEDURE — 99024 POSTOP FOLLOW-UP VISIT: CPT | Mod: ,,, | Performed by: ORTHOPAEDIC SURGERY

## 2019-03-07 NOTE — PROGRESS NOTES
[facility] POST-OP NOTE    Subjective:           Chief Complaint:   Chief Complaint   Patient presents with    Left Shoulder - Post-op Evaluation     sutures out: L-shld scope on 2/27/19. She has soreness and swelling but doing ok       Past Medical History:   Diagnosis Date    Bulging disc     Carpal tunnel syndrome     Degenerative disc disease     High cholesterol     Pinched nerve in neck     Urinary incontinence     Urinary tract infection        Past Surgical History:   Procedure Laterality Date    BLADDER SUSPENSION  1990    FOOT SURGERY  2001    HAND SURGERY  2017    HYSTERECTOMY  1990    JOINT REPLACEMENT  2013    KNEE SURGERY Bilateral 2013    MANDIBLE FRACTURE SURGERY  1981    SHOULDER ARTHROSCOPY      TONSILLECTOMY         Current Outpatient Medications   Medication Sig    atorvastatin (LIPITOR) 20 MG tablet Take 1 tablet (20 mg total) by mouth once daily.    clotrimazole (LOTRIMIN) 1 % cream Apply 1 application topically 2 (two) times daily.    cyanocobalamin 1,000 mcg/mL injection Inject 1 mL (1,000 mcg total) into the muscle every other day.    cyclobenzaprine (FLEXERIL) 5 MG tablet TK ONE T PO HS PRN    diclofenac (VOLTAREN) 75 MG EC tablet TK 1 T PO BID    diclofenac sodium (VOLTAREN) 1 % Gel APPLY 2 G TOPICALLY 2 (TWO) TIMES DAILY.    loteprednol etabonate 0.5 % Oint 2 (two) times daily.    montelukast (SINGULAIR) 10 mg tablet Take 1 tablet (10 mg total) by mouth once daily.    oxybutynin (DITROPAN-XL) 10 MG 24 hr tablet Take 1 tablet (10 mg total) by mouth once daily.    phentermine 37.5 MG capsule     tiZANidine 4 mg Cap     traMADol (ULTRAM) 50 mg tablet Take 50 mg by mouth every 4 (four) hours as needed for Pain.      No current facility-administered medications for this visit.        Review of patient's allergies indicates:   Allergen Reactions    Estrogens Other (See Comments)     Mini stroke    Tetanus vaccines and toxoid Swelling and Other (See  Comments)     Swelling at injection site, fever       Family History   Problem Relation Age of Onset    Arthritis Mother     Aneurysm Mother         Abdomenal Aneurysm    Arthritis Father     Diabetes Father     Hearing loss Father     Heart disease Father     Hypertension Father     Diabetes Paternal Grandmother     Heart disease Paternal Grandfather     Leukemia Son     Crohn's disease Son     Ankylosing spondylitis Son     Other Son         avascular necrosis of pancho hips    Rheumatologic disease Son     Heart defect Son     Kidney failure Son     SIDS Maternal Aunt     Uterine cancer Maternal Aunt     Dementia Paternal Aunt     No Known Problems Sister     Heart disease Brother         stent placement    Heart disease Brother         stent placement    Aneurysm Brother         Abdomenal Aneurysm    No Known Problems Sister        Social History     Socioeconomic History    Marital status:      Spouse name: Not on file    Number of children: 3    Years of education: Not on file    Highest education level: Not on file   Social Needs    Financial resource strain: Not on file    Food insecurity - worry: Not on file    Food insecurity - inability: Not on file    Transportation needs - medical: Not on file    Transportation needs - non-medical: Not on file   Occupational History    Not on file   Tobacco Use    Smoking status: Never Smoker    Smokeless tobacco: Never Used   Substance and Sexual Activity    Alcohol use: Yes     Alcohol/week: 0.0 oz    Drug use: No    Sexual activity: Yes     Birth control/protection: None   Other Topics Concern    Not on file   Social History Narrative    Not on file       History of present illness: Patient comes back for the left shoulder. She is doing very well.      Review of Systems:    Musculoskeletal:  See HPI      Objective:        Physical Examination:    Vital Signs:    Vitals:    03/07/19 1525   BP: 138/80   Pulse: 80       Body  mass index is 38.97 kg/m².    This a well-developed, well nourished patient in no acute distress.  They are alert and oriented and cooperative to examination.        Wounds are clean dry and intact. She is neurovascular intact the fingertips.  Pertinent New Results:    XRAY Report / Interpretation:       Assessment/Plan:      Left shoulder stable following arthroscopy and rotator cuff repair. Start her on physical therapy. Follow-up in one month    This note was created using Dragon voice recognition software that occasionally misinterpreted phrases or words.

## 2019-03-11 ENCOUNTER — TELEPHONE (OUTPATIENT)
Dept: FAMILY MEDICINE | Facility: CLINIC | Age: 64
End: 2019-03-11

## 2019-03-11 ENCOUNTER — OFFICE VISIT (OUTPATIENT)
Dept: FAMILY MEDICINE | Facility: CLINIC | Age: 64
End: 2019-03-11
Payer: COMMERCIAL

## 2019-03-11 VITALS
OXYGEN SATURATION: 97 % | SYSTOLIC BLOOD PRESSURE: 124 MMHG | BODY MASS INDEX: 39 KG/M2 | WEIGHT: 220.13 LBS | HEIGHT: 63 IN | TEMPERATURE: 99 F | DIASTOLIC BLOOD PRESSURE: 78 MMHG | HEART RATE: 83 BPM | RESPIRATION RATE: 14 BRPM

## 2019-03-11 DIAGNOSIS — R21 FACIAL RASH: Primary | ICD-10-CM

## 2019-03-11 DIAGNOSIS — N32.81 OVERACTIVE BLADDER: ICD-10-CM

## 2019-03-11 DIAGNOSIS — E78.00 HIGH CHOLESTEROL: ICD-10-CM

## 2019-03-11 DIAGNOSIS — E66.09 CLASS 2 OBESITY DUE TO EXCESS CALORIES WITHOUT SERIOUS COMORBIDITY WITH BODY MASS INDEX (BMI) OF 38.0 TO 38.9 IN ADULT: Primary | ICD-10-CM

## 2019-03-11 DIAGNOSIS — M51.36 DDD (DEGENERATIVE DISC DISEASE), LUMBAR: ICD-10-CM

## 2019-03-11 DIAGNOSIS — M51.36 DEGENERATION OF INTERVERTEBRAL DISC OF LUMBAR REGION: ICD-10-CM

## 2019-03-11 DIAGNOSIS — E66.09 CLASS 2 OBESITY DUE TO EXCESS CALORIES WITHOUT SERIOUS COMORBIDITY WITH BODY MASS INDEX (BMI) OF 38.0 TO 38.9 IN ADULT: ICD-10-CM

## 2019-03-11 DIAGNOSIS — J30.89 ENVIRONMENTAL AND SEASONAL ALLERGIES: ICD-10-CM

## 2019-03-11 DIAGNOSIS — E04.1 THYROID NODULE: ICD-10-CM

## 2019-03-11 PROBLEM — M51.369 DDD (DEGENERATIVE DISC DISEASE), LUMBAR: Status: ACTIVE | Noted: 2019-03-11

## 2019-03-11 PROBLEM — E66.9 OBESITY: Status: ACTIVE | Noted: 2019-03-11

## 2019-03-11 PROCEDURE — 3008F BODY MASS INDEX DOCD: CPT | Mod: ,,, | Performed by: NURSE PRACTITIONER

## 2019-03-11 PROCEDURE — 99214 PR OFFICE/OUTPT VISIT, EST, LEVL IV, 30-39 MIN: ICD-10-PCS | Mod: ,,, | Performed by: NURSE PRACTITIONER

## 2019-03-11 PROCEDURE — 3008F PR BODY MASS INDEX (BMI) DOCUMENTED: ICD-10-PCS | Mod: ,,, | Performed by: NURSE PRACTITIONER

## 2019-03-11 PROCEDURE — 99214 OFFICE O/P EST MOD 30 MIN: CPT | Mod: ,,, | Performed by: NURSE PRACTITIONER

## 2019-03-11 RX ORDER — HYDROCORTISONE 25 MG/ML
LOTION TOPICAL 2 TIMES DAILY
Qty: 59 ML | Refills: 1 | Status: SHIPPED | OUTPATIENT
Start: 2019-03-11 | End: 2019-09-23 | Stop reason: SDUPTHER

## 2019-03-11 RX ORDER — TRAMADOL HYDROCHLORIDE 50 MG/1
50 TABLET ORAL EVERY 4 HOURS PRN
Qty: 30 TABLET | Refills: 1 | Status: CANCELLED | OUTPATIENT
Start: 2019-03-11

## 2019-03-11 RX ORDER — TRAMADOL HYDROCHLORIDE 50 MG/1
50 TABLET ORAL EVERY 4 HOURS PRN
Qty: 42 TABLET | Refills: 0 | Status: CANCELLED | OUTPATIENT
Start: 2019-03-11

## 2019-03-11 RX ORDER — OXYBUTYNIN CHLORIDE 10 MG/1
10 TABLET, EXTENDED RELEASE ORAL DAILY
Qty: 90 TABLET | Refills: 1 | Status: SHIPPED | OUTPATIENT
Start: 2019-03-11 | End: 2019-07-10

## 2019-03-11 RX ORDER — LEVOCETIRIZINE DIHYDROCHLORIDE 5 MG/1
5 TABLET, FILM COATED ORAL NIGHTLY
Qty: 90 TABLET | Refills: 1 | Status: SHIPPED | OUTPATIENT
Start: 2019-03-11 | End: 2019-09-23 | Stop reason: SDUPTHER

## 2019-03-11 RX ORDER — PHENTERMINE HYDROCHLORIDE 37.5 MG/1
37.5 CAPSULE ORAL EVERY MORNING
Qty: 30 CAPSULE | Refills: 0 | Status: CANCELLED | OUTPATIENT
Start: 2019-03-11

## 2019-03-11 RX ORDER — ATORVASTATIN CALCIUM 20 MG/1
20 TABLET, FILM COATED ORAL DAILY
Qty: 90 TABLET | Refills: 1 | Status: SHIPPED | OUTPATIENT
Start: 2019-03-11 | End: 2019-09-23 | Stop reason: SDUPTHER

## 2019-03-11 RX ORDER — MONTELUKAST SODIUM 10 MG/1
10 TABLET ORAL DAILY
Qty: 90 TABLET | Refills: 1 | Status: SHIPPED | OUTPATIENT
Start: 2019-03-11 | End: 2019-09-23 | Stop reason: SDUPTHER
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
## 2019-03-11 NOTE — PATIENT INSTRUCTIONS
Nasal Allergies: Related Problems  Allergies can cause nasal passages to swell. This narrows the air passages. Allergies also cause increased mucus production in the nose. These changes result in nasal allergy symptoms. Common symptoms include itching, sneezing, stuffy nose, and runny nose. Nasal allergies can also cause problems in other parts of the respiratory system. Some of the more common problems are discussed below. If you think you have any of these problems, talk to your healthcare provider about treatment choices.    Sinus infections  Fluid may be trapped in the sinuses. Bacteria may grow in trapped fluid. This causes sinus infection (sinusitis).  Conjunctivitis  Allergens irritate your eyes, including the lining of the conjunctiva. This causes eyes to become red, itchy, puffy, and watery.  Ear problems  The eustachian tube connects the middle ear to nasal passages.  Allergies can block this tube, and make the ears feel plugged. Fluid may also build up, leading to an ear infection (otitis media).  Nasal polyps  Allergies cause nasal passages to swell. Constant swelling can lead to formation of a sac called a polyp. Polyps can grow large enough to block nasal passages.  Asthma  Asthma is inflammation and swelling of the air passages in the lungs. The symptoms are wheezing, shortness of breath, coughing, and chest tightness. Allergies, including nasal allergies, are common in people with asthma.  Date Last Reviewed: 9/1/2016 © 2000-2017 [employer]. [address] [place] [address]. All rights reserved. This information is not intended as a substitute for professional medical care. Always follow your healthcare professional's instructions.

## 2019-03-12 RX ORDER — PHENTERMINE HYDROCHLORIDE 37.5 MG/1
37.5 CAPSULE ORAL EVERY MORNING
Qty: 30 CAPSULE | Refills: 0 | Status: SHIPPED | OUTPATIENT
Start: 2019-03-12 | End: 2019-04-12 | Stop reason: SDUPTHER

## 2019-03-12 RX ORDER — TRAMADOL HYDROCHLORIDE 50 MG/1
50 TABLET ORAL EVERY 4 HOURS PRN
Qty: 42 TABLET | Refills: 0 | Status: SHIPPED | OUTPATIENT
Start: 2019-03-12 | End: 2023-04-13 | Stop reason: SDUPTHER

## 2019-03-18 ENCOUNTER — TELEPHONE (OUTPATIENT)
Dept: FAMILY MEDICINE | Facility: CLINIC | Age: 64
End: 2019-03-18

## 2019-03-20 ENCOUNTER — TELEPHONE (OUTPATIENT)
Dept: FAMILY MEDICINE | Facility: CLINIC | Age: 64
End: 2019-03-20

## 2019-03-20 DIAGNOSIS — E04.1 LEFT THYROID NODULE: Primary | ICD-10-CM

## 2019-03-26 ENCOUNTER — OFFICE VISIT (OUTPATIENT)
Dept: SURGERY | Facility: CLINIC | Age: 64
End: 2019-03-26
Payer: COMMERCIAL

## 2019-03-26 VITALS
HEIGHT: 63 IN | HEART RATE: 67 BPM | SYSTOLIC BLOOD PRESSURE: 140 MMHG | DIASTOLIC BLOOD PRESSURE: 88 MMHG | TEMPERATURE: 98 F | BODY MASS INDEX: 39.16 KG/M2 | WEIGHT: 221 LBS

## 2019-03-26 DIAGNOSIS — E07.9 THYROID MASS: Primary | ICD-10-CM

## 2019-03-26 DIAGNOSIS — E04.2 MULTIPLE THYROID NODULES: ICD-10-CM

## 2019-03-26 PROCEDURE — 99244 OFF/OP CNSLTJ NEW/EST MOD 40: CPT | Mod: ,,, | Performed by: SURGERY

## 2019-03-26 PROCEDURE — 99244 PR OFFICE CONSULTATION,LEVEL IV: ICD-10-PCS | Mod: ,,, | Performed by: SURGERY
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
## 2019-03-26 NOTE — LETTER
March 26, 2019      Katherine Ferguson MD  [address]  [place] [address]           [facility] - General Surgery  [address]  [place] [address]  Phone: [phone]  Fax: [phone]          Patient: Nalini Wooten   MR Number: [id number]   YOB: 1955   Date of Visit: 3/26/2019       Dear Dr. Katherine Ferguson:    Thank you for referring Nalini Wooten to me for evaluation. Attached you will find relevant portions of my assessment and plan of care.    If you have questions, please do not hesitate to call me. I look forward to following Nalini Wooten along with you.    Sincerely,    Lizet Chacon MD    Enclosure  CC:  No Recipients    If you would like to receive this communication electronically, please contact [email or web address] or [phone] to request more information on [employer] access.    For providers and/or their staff who would like to refer a patient to [facility], please contact us through our one-stop-shop provider referral line, [facility], at [phone].    If you feel you have received this communication in error or would no longer like to receive these types of communications, please e-mail [email or web address]

## 2019-04-12 DIAGNOSIS — E66.09 CLASS 2 OBESITY DUE TO EXCESS CALORIES WITHOUT SERIOUS COMORBIDITY WITH BODY MASS INDEX (BMI) OF 38.0 TO 38.9 IN ADULT: ICD-10-CM

## 2019-04-15 ENCOUNTER — TELEPHONE (OUTPATIENT)
Dept: FAMILY MEDICINE | Facility: CLINIC | Age: 64
End: 2019-04-15

## 2019-04-15 ENCOUNTER — TELEPHONE (OUTPATIENT)
Dept: ORTHOPEDICS | Facility: CLINIC | Age: 64
End: 2019-04-15

## 2019-04-15 DIAGNOSIS — M81.0 OSTEOPOROSIS, UNSPECIFIED OSTEOPOROSIS TYPE, UNSPECIFIED PATHOLOGICAL FRACTURE PRESENCE: Primary | ICD-10-CM

## 2019-04-15 RX ORDER — PHENTERMINE HYDROCHLORIDE 37.5 MG/1
CAPSULE ORAL
Qty: 30 CAPSULE | Refills: 0 | Status: SHIPPED | OUTPATIENT
Start: 2019-04-15 | End: 2019-06-07 | Stop reason: SDUPTHER

## 2019-04-15 NOTE — TELEPHONE ENCOUNTER
----- Message from Shefali Rivera sent at 4/15/2019 11:08 AM CDT -----  Contact: patient  We had to r/s patient's appt from 4/16 to 5/2, she will run out of therapy, call her if we can get more therapy at [phone].

## 2019-04-15 NOTE — TELEPHONE ENCOUNTER
Pt needs calcium order for prolia and M81.0 Osteoperosis Dx. To further work on and send. Pt informed of the steps.

## 2019-04-22 ENCOUNTER — TELEPHONE (OUTPATIENT)
Dept: SURGERY | Facility: CLINIC | Age: 64
End: 2019-04-22

## 2019-04-30 ENCOUNTER — OFFICE VISIT (OUTPATIENT)
Dept: SURGERY | Facility: CLINIC | Age: 64
End: 2019-04-30
Payer: COMMERCIAL

## 2019-04-30 VITALS
DIASTOLIC BLOOD PRESSURE: 88 MMHG | HEART RATE: 74 BPM | SYSTOLIC BLOOD PRESSURE: 128 MMHG | WEIGHT: 221 LBS | HEIGHT: 63 IN | BODY MASS INDEX: 39.16 KG/M2 | TEMPERATURE: 98 F

## 2019-04-30 DIAGNOSIS — E07.9 THYROID MASS: Primary | ICD-10-CM

## 2019-04-30 DIAGNOSIS — E04.2 MULTIPLE THYROID NODULES: ICD-10-CM

## 2019-04-30 LAB
BUN SERPL-MCNC: 16 MG/DL (ref 8–20)
CALCIUM SERPL-MCNC: 9.4 MG/DL (ref 7.7–10.4)
CHLORIDE: 102 MMOL/L (ref 98–110)
CO2 SERPL-SCNC: 28 MMOL/L (ref 22.8–31.6)
CREATININE: 0.59 MG/DL (ref 0.6–1.4)
GLUCOSE: 103 MG/DL (ref 70–99)
HCT VFR BLD AUTO: 41.9 % (ref 36–48)
HGB BLD-MCNC: 13.2 G/DL (ref 12–15)
MCH RBC QN AUTO: 26.7 PG (ref 25–35)
MCHC RBC AUTO-ENTMCNC: 31.5 G/DL (ref 31–36)
MCV RBC AUTO: 84.8 FL (ref 79–98)
NUCLEATED RBCS: 0 %
PLATELET # BLD AUTO: 294 K/UL (ref 140–440)
POTASSIUM SERPL-SCNC: 4.2 MMOL/L (ref 3.5–5)
RBC # BLD AUTO: 4.94 M/UL (ref 3.5–5.5)
SODIUM: 139 MMOL/L (ref 134–144)
T4 FREE SP9 P CHAL SERPL-SCNC: 1.22 NG/DL (ref 0.45–1.27)
TSH SERPL DL<=0.005 MIU/L-ACNC: 1.91 ULU/ML (ref 0.3–5.6)
WBC # BLD AUTO: 7.4 K/UL (ref 5–10)

## 2019-04-30 PROCEDURE — 99214 OFFICE O/P EST MOD 30 MIN: CPT | Mod: ,,, | Performed by: SURGERY

## 2019-04-30 PROCEDURE — 3008F PR BODY MASS INDEX (BMI) DOCUMENTED: ICD-10-PCS | Mod: ,,, | Performed by: SURGERY

## 2019-04-30 PROCEDURE — 99214 PR OFFICE/OUTPT VISIT, EST, LEVL IV, 30-39 MIN: ICD-10-PCS | Mod: ,,, | Performed by: SURGERY

## 2019-04-30 PROCEDURE — 3008F BODY MASS INDEX DOCD: CPT | Mod: ,,, | Performed by: SURGERY

## 2019-05-02 ENCOUNTER — OFFICE VISIT (OUTPATIENT)
Dept: ORTHOPEDICS | Facility: CLINIC | Age: 64
End: 2019-05-02
Payer: COMMERCIAL

## 2019-05-02 VITALS
HEIGHT: 63 IN | BODY MASS INDEX: 38.98 KG/M2 | DIASTOLIC BLOOD PRESSURE: 90 MMHG | WEIGHT: 220 LBS | HEART RATE: 81 BPM | SYSTOLIC BLOOD PRESSURE: 150 MMHG

## 2019-05-02 DIAGNOSIS — G56.01 CARPAL TUNNEL SYNDROME OF RIGHT WRIST: ICD-10-CM

## 2019-05-02 DIAGNOSIS — Z98.890 STATUS POST ARTHROSCOPY OF LEFT SHOULDER: Primary | ICD-10-CM

## 2019-05-02 PROCEDURE — 99024 PR POST-OP FOLLOW-UP VISIT: ICD-10-PCS | Mod: ,,, | Performed by: ORTHOPAEDIC SURGERY

## 2019-05-02 PROCEDURE — 20526 CARPAL TUNNEL: ICD-10-PCS | Mod: 79,RT,, | Performed by: ORTHOPAEDIC SURGERY

## 2019-05-02 PROCEDURE — 99024 POSTOP FOLLOW-UP VISIT: CPT | Mod: ,,, | Performed by: ORTHOPAEDIC SURGERY

## 2019-05-02 PROCEDURE — 20526 THER INJECTION CARP TUNNEL: CPT | Mod: 79,RT,, | Performed by: ORTHOPAEDIC SURGERY

## 2019-05-02 RX ORDER — METHYLPREDNISOLONE ACETATE 40 MG/ML
40 INJECTION, SUSPENSION INTRA-ARTICULAR; INTRALESIONAL; INTRAMUSCULAR; SOFT TISSUE
Status: DISCONTINUED | OUTPATIENT
Start: 2019-05-02 | End: 2019-05-02 | Stop reason: HOSPADM

## 2019-05-02 RX ADMIN — METHYLPREDNISOLONE ACETATE 40 MG: 40 INJECTION, SUSPENSION INTRA-ARTICULAR; INTRALESIONAL; INTRAMUSCULAR; SOFT TISSUE at 11:05

## 2019-05-02 NOTE — PROGRESS NOTES
[facility] ORTHOPEDICS POST-OP NOTE    Subjective:           Chief Complaint:   Chief Complaint   Patient presents with    Left Shoulder - Post-op Evaluation     PO L Sld Scope 2/27/19 follow up. She has minor soreness depending on activity       Past Medical History:   Diagnosis Date    Bulging disc     Carpal tunnel syndrome     Degenerative disc disease     High cholesterol     Pinched nerve in neck     Urinary incontinence     Urinary tract infection        Past Surgical History:   Procedure Laterality Date    BLADDER SUSPENSION  1990    FOOT SURGERY Bilateral 2001    HAND SURGERY Left 2017    HYSTERECTOMY  1990    JOINT REPLACEMENT  2013    KNEE SURGERY Bilateral 2013    MANDIBLE FRACTURE SURGERY  1981    SHOULDER ARTHROSCOPY Left 02/27/2019    Thyroid FNA  04/15/2019    TONSILLECTOMY         Current Outpatient Medications   Medication Sig    atorvastatin (LIPITOR) 20 MG tablet Take 1 tablet (20 mg total) by mouth once daily.    cyanocobalamin 1,000 mcg/mL injection Inject 1 mL (1,000 mcg total) into the muscle every other day.    cyclobenzaprine (FLEXERIL) 5 MG tablet TK ONE T PO HS PRN    diclofenac (VOLTAREN) 75 MG EC tablet TK 1 T PO BID    diclofenac sodium (VOLTAREN) 1 % Gel APPLY 2 G TOPICALLY 2 (TWO) TIMES DAILY.    hydrocortisone 2.5 % lotion Apply topically 2 (two) times daily.    levocetirizine (XYZAL) 5 MG tablet Take 1 tablet (5 mg total) by mouth every evening.    montelukast (SINGULAIR) 10 mg tablet Take 1 tablet (10 mg total) by mouth once daily.    oxybutynin (DITROPAN-XL) 10 MG 24 hr tablet Take 1 tablet (10 mg total) by mouth once daily.    phentermine 37.5 MG capsule TAKE 1 CAPSULE(37.5 MG) BY MOUTH EVERY MORNING    tiZANidine 4 mg Cap Take 1 capsule by mouth daily as needed.     traMADol (ULTRAM) 50 mg tablet Take 1 tablet (50 mg total) by mouth every 4 (four) hours as needed for Pain.     No current facility-administered medications for this visit.         Review of patient's allergies indicates:   Allergen Reactions    Estrogens Other (See Comments)     Mini stroke    Tetanus vaccines and toxoid Swelling and Other (See Comments)     Swelling at injection site, fever       Family History   Problem Relation Age of Onset    Arthritis Mother     Aneurysm Mother         Abdominal Aneurysm     Arthritis Father     Diabetes Father     Hearing loss Father     Heart disease Father     Hypertension Father     Dementia Father     Diabetes Paternal Grandmother     Heart disease Paternal Grandfather     Leukemia Son     Crohn's disease Son     Ankylosing spondylitis Son     Other Son         avascular necrosis of pancho hips    Rheumatologic disease Son     Heart defect Son     Kidney failure Son     SIDS Maternal Aunt     Uterine cancer Maternal Aunt     Dementia Paternal Aunt     No Known Problems Sister     Heart disease Brother         stent placement    Heart disease Brother         stent placement    Aneurysm Brother         Abdominal Aneurysm    No Known Problems Sister        Social History     Socioeconomic History    Marital status:      Spouse name: Not on file    Number of children: 3    Years of education: Not on file    Highest education level: Not on file   Occupational History    Not on file   Social Needs    Financial resource strain: Not on file    Food insecurity:     Worry: Not on file     Inability: Not on file    Transportation needs:     Medical: Not on file     Non-medical: Not on file   Tobacco Use    Smoking status: Never Smoker    Smokeless tobacco: Never Used   Substance and Sexual Activity    Alcohol use: Never     Alcohol/week: 0.0 oz     Frequency: Never    Drug use: No    Sexual activity: Yes     Birth control/protection: None   Lifestyle    Physical activity:     Days per week: Not on file     Minutes per session: Not on file    Stress: Not on file   Relationships    Social connections:      Talks on phone: Not on file     Gets together: Not on file     Attends Restoration service: Not on file     Active member of club or organization: Not on file     Attends meetings of clubs or organizations: Not on file     Relationship status: Not on file   Other Topics Concern    Not on file   Social History Narrative    Not on file       History of present illness: Patient comes in today for the left shoulder and right hand. In terms of her left shoulder she is doing very well she's really not having any issues. In terms of the right hand she is long-standing carpal tunnel syndrome. She is scheduled for thyroid surgery but her carpal tunnel is bothering her and she wants to have an injection.      Review of Systems:    Musculoskeletal:  See HPI      Objective:        Physical Examination:    Vital Signs:    Vitals:    05/02/19 1013   BP: (!) 150/90   Pulse:        Body mass index is 38.97 kg/m².    This a well-developed, well nourished patient in no acute distress.  They are alert and oriented and cooperative to examination.        Patient has full range of motion of the left shoulder. No impingement. Her rotator cuff is strong. In terms of the right hand she has numbness and tingling in the median digits. She has a positive Tinel's. She has a negative Phalen's.  Pertinent New Results:    XRAY Report / Interpretation:       Assessment/Plan:      Stable following left rotator cuff repair. No further intervention. Follow-up when necessary.  Right carpal tunnel syndrome. I injected the carpal canal with Depo-Medrol and lidocaine. Cautions and precautions are discussed with her. She will follow-up when necessary    This note was created using Dragon voice recognition software that occasionally misinterpreted phrases or words.

## 2019-05-02 NOTE — PROCEDURES
Carpal Tunnel  Date/Time: 5/2/2019 11:21 AM  Performed by: Dustin Vanegas MD  Authorized by: Dustin Vanegas MD     Consent Done?: Yes (Verbal)  Indications:  Pain  Site marked: The procedure site was marked   Timeout: Prior to procedure the correct patient, procedure, and site was verified      Location:  Wrist  Prep: Patient was prepped and draped in usual sterile fashion    Ultrasonic Guidance for needle placement: No  Needle size:  25 G  Medications:  40 mg methylPREDNISolone acetate 40 mg/mL  Patient tolerance:  Patient tolerated the procedure well with no immediate complications

## 2019-05-14 LAB — CREATININE: 0.73 MG/DL (ref 0.6–1.4)

## 2019-05-16 ENCOUNTER — TELEPHONE (OUTPATIENT)
Dept: SURGERY | Facility: CLINIC | Age: 64
End: 2019-05-16

## 2019-05-16 NOTE — TELEPHONE ENCOUNTER
----- Message from Lizet Chacon MD sent at 5/15/2019  5:30 PM CDT -----  Call patient - path benign

## 2019-05-28 ENCOUNTER — OFFICE VISIT (OUTPATIENT)
Dept: SURGERY | Facility: CLINIC | Age: 64
End: 2019-05-28
Payer: COMMERCIAL

## 2019-05-28 VITALS
WEIGHT: 220 LBS | BODY MASS INDEX: 38.98 KG/M2 | SYSTOLIC BLOOD PRESSURE: 133 MMHG | TEMPERATURE: 98 F | DIASTOLIC BLOOD PRESSURE: 81 MMHG | HEART RATE: 67 BPM | HEIGHT: 63 IN

## 2019-05-28 DIAGNOSIS — E07.9 THYROID MASS: Primary | ICD-10-CM

## 2019-05-28 DIAGNOSIS — E04.2 MULTIPLE THYROID NODULES: ICD-10-CM

## 2019-05-28 PROCEDURE — 99024 POSTOP FOLLOW-UP VISIT: CPT | Mod: ,,, | Performed by: SURGERY

## 2019-05-28 PROCEDURE — 99024 PR POST-OP FOLLOW-UP VISIT: ICD-10-PCS | Mod: ,,, | Performed by: SURGERY

## 2019-05-28 NOTE — PROGRESS NOTES
Subjective:       Patient ID: Nalini Wooten is a 64 y.o. female.    Chief Complaint: Post-op Evaluation (FU DOS 05/13/2019 Left Thyroidectomy)      HPI:  Nalini Wooten is here for post-op. Patient has no systemic complaints. Post operative   pain is under control.  Tolerating diet, no nausea/vomiting.  Having normal bowel movements.    C/o increased sweating since surgery.        Objective:      Physical Exam   Constitutional: She is oriented to person, place, and time. She is cooperative. No distress.   Neurological: She is alert and oriented to person, place, and time.   Skin:   Incisions are clean, dry and intact   There is no evidence of infection, hematoma.  Small amount of swelling due to fluid.         Assessment/Plan:   Thyroid mass    Multiple thyroid nodules  Comments:  RIGHT - 5mm x2  Orders:  -     US Soft Tissue Head Neck Thyroid; Future; Expected date: 11/28/2019      Path - reviewed, OK    I would recommend waiting 3-4 more weeks to see if the sweating improves or resolves. It would be highly unlikely for her to be hypothyroid since we only took half of her gland. It may just be a temporary disturbance that will normalize.  If it continues, PCP can check thyroid labs.        Follow up in about 6 months (around 11/28/2019) for F/U - Make appt after diagnostic tests.

## 2019-05-28 NOTE — LETTER
May 28, 2019      Katherine Ferguson MD  [address]  [place] [address]           [facility] - General Surgery  [address]  [place] [address]  Phone: [phone]  Fax: [phone]          Patient: Nalini Wooten   MR Number: [id number]   YOB: 1955   Date of Visit: 5/28/2019       Dear Dr. Katherine Ferguson:    Thank you for referring Nalini Wooten to me for evaluation. Attached you will find relevant portions of my assessment and plan of care.    If you have questions, please do not hesitate to call me. I look forward to following Nalini Wooten along with you.    Sincerely,    Lizet Chacon MD    Enclosure  CC:  No Recipients    If you would like to receive this communication electronically, please contact [email or web address] or [phone] to request more information on [employer] access.    For providers and/or their staff who would like to refer a patient to [facility], please contact us through our one-stop-shop provider referral line, [facility], at [phone].    If you feel you have received this communication in error or would no longer like to receive these types of communications, please e-mail [email or web address]

## 2019-06-07 DIAGNOSIS — E66.09 CLASS 2 OBESITY DUE TO EXCESS CALORIES WITHOUT SERIOUS COMORBIDITY WITH BODY MASS INDEX (BMI) OF 38.0 TO 38.9 IN ADULT: ICD-10-CM

## 2019-06-07 RX ORDER — PHENTERMINE HYDROCHLORIDE 37.5 MG/1
CAPSULE ORAL
Qty: 30 CAPSULE | Refills: 0 | Status: SHIPPED | OUTPATIENT
Start: 2019-06-07 | End: 2019-07-07

## 2019-06-10 RX ORDER — PHENTERMINE HYDROCHLORIDE 37.5 MG/1
CAPSULE ORAL
Qty: 30 CAPSULE | Refills: 0 | Status: SHIPPED | OUTPATIENT
Start: 2019-06-10 | End: 2019-07-10

## 2019-07-10 ENCOUNTER — OFFICE VISIT (OUTPATIENT)
Dept: FAMILY MEDICINE | Facility: CLINIC | Age: 64
End: 2019-07-10
Payer: COMMERCIAL

## 2019-07-10 VITALS
HEART RATE: 76 BPM | HEIGHT: 63 IN | SYSTOLIC BLOOD PRESSURE: 130 MMHG | OXYGEN SATURATION: 98 % | WEIGHT: 226 LBS | DIASTOLIC BLOOD PRESSURE: 78 MMHG | BODY MASS INDEX: 40.04 KG/M2 | RESPIRATION RATE: 12 BRPM | TEMPERATURE: 98 F

## 2019-07-10 DIAGNOSIS — R63.5 WEIGHT GAIN: Primary | ICD-10-CM

## 2019-07-10 DIAGNOSIS — Z23 NEED FOR VACCINATION: ICD-10-CM

## 2019-07-10 DIAGNOSIS — R40.0 DAYTIME SOMNOLENCE: ICD-10-CM

## 2019-07-10 DIAGNOSIS — R32 URINARY INCONTINENCE, UNSPECIFIED TYPE: ICD-10-CM

## 2019-07-10 DIAGNOSIS — E03.9 ACQUIRED HYPOTHYROIDISM: ICD-10-CM

## 2019-07-10 PROCEDURE — 3008F PR BODY MASS INDEX (BMI) DOCUMENTED: ICD-10-PCS | Mod: ,,, | Performed by: INTERNAL MEDICINE

## 2019-07-10 PROCEDURE — 3008F BODY MASS INDEX DOCD: CPT | Mod: ,,, | Performed by: INTERNAL MEDICINE

## 2019-07-10 PROCEDURE — 99214 PR OFFICE/OUTPT VISIT, EST, LEVL IV, 30-39 MIN: ICD-10-PCS | Mod: ,,, | Performed by: INTERNAL MEDICINE

## 2019-07-10 PROCEDURE — 99214 OFFICE O/P EST MOD 30 MIN: CPT | Mod: ,,, | Performed by: INTERNAL MEDICINE

## 2019-07-10 RX ORDER — PHENTERMINE HYDROCHLORIDE 37.5 MG/1
37.5 TABLET ORAL
COMMUNITY
End: 2019-07-10 | Stop reason: SDUPTHER

## 2019-07-10 RX ORDER — PHENTERMINE HYDROCHLORIDE 37.5 MG/1
37.5 TABLET ORAL
Qty: 90 TABLET | Refills: 0 | Status: SHIPPED | OUTPATIENT
Start: 2019-07-10 | End: 2019-10-21 | Stop reason: SDUPTHER
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
## 2019-07-16 DIAGNOSIS — R32 URINARY INCONTINENCE, UNSPECIFIED TYPE: Primary | ICD-10-CM

## 2019-07-16 RX ORDER — OXYBUTYNIN CHLORIDE 10 MG/1
10 TABLET, EXTENDED RELEASE ORAL DAILY
Qty: 30 TABLET | Refills: 11 | Status: SHIPPED | OUTPATIENT
Start: 2019-07-16 | End: 2019-09-23 | Stop reason: HOSPADM

## 2019-07-16 NOTE — TELEPHONE ENCOUNTER
myrbetriq is not covered under pt's insurance. Alternate is oxybutynin or oxybutynin er, toviaz or oxytrol OTC.    I pended Oxybutynin ER 10mg

## 2019-07-17 ENCOUNTER — TELEPHONE (OUTPATIENT)
Dept: ORTHOPEDICS | Facility: CLINIC | Age: 64
End: 2019-07-17

## 2019-07-17 NOTE — TELEPHONE ENCOUNTER
----- Message from Valarie Rutledge sent at 7/17/2019  9:47 AM CDT -----  Contact: Patient  Patient having extreme pain in her left knee please call at [phone]

## 2019-07-17 NOTE — TELEPHONE ENCOUNTER
Spoke with pt, woke up this morning L-knee very painful and swollen, can't walk on it. Made appointment for tomorrow

## 2019-07-18 ENCOUNTER — OFFICE VISIT (OUTPATIENT)
Dept: ORTHOPEDICS | Facility: CLINIC | Age: 64
End: 2019-07-18
Payer: COMMERCIAL

## 2019-07-18 VITALS
DIASTOLIC BLOOD PRESSURE: 78 MMHG | WEIGHT: 220 LBS | HEIGHT: 63 IN | BODY MASS INDEX: 38.98 KG/M2 | HEART RATE: 83 BPM | SYSTOLIC BLOOD PRESSURE: 138 MMHG

## 2019-07-18 DIAGNOSIS — Z96.652 HISTORY OF TOTAL KNEE REPLACEMENT, LEFT: Primary | ICD-10-CM

## 2019-07-18 PROCEDURE — 73562 PR  X-RAY KNEE 3 VIEW: ICD-10-PCS | Mod: LT,,, | Performed by: ORTHOPAEDIC SURGERY

## 2019-07-18 PROCEDURE — 3008F PR BODY MASS INDEX (BMI) DOCUMENTED: ICD-10-PCS | Mod: ,,, | Performed by: ORTHOPAEDIC SURGERY

## 2019-07-18 PROCEDURE — 3008F BODY MASS INDEX DOCD: CPT | Mod: ,,, | Performed by: ORTHOPAEDIC SURGERY

## 2019-07-18 PROCEDURE — 99213 PR OFFICE/OUTPT VISIT, EST, LEVL III, 20-29 MIN: ICD-10-PCS | Mod: 25,,, | Performed by: ORTHOPAEDIC SURGERY

## 2019-07-18 PROCEDURE — 99213 OFFICE O/P EST LOW 20 MIN: CPT | Mod: 25,,, | Performed by: ORTHOPAEDIC SURGERY

## 2019-07-18 PROCEDURE — 73562 X-RAY EXAM OF KNEE 3: CPT | Mod: LT,,, | Performed by: ORTHOPAEDIC SURGERY

## 2019-07-18 NOTE — PROGRESS NOTES
[facility]    Subjective:     Chief Complaint:   Chief Complaint   Patient presents with    Left Knee - Pain     Left knee pain / swelling started yesterday. She just woke up that way       Past Medical History:   Diagnosis Date    Bulging disc     Carpal tunnel syndrome     Degenerative disc disease     High cholesterol     Pinched nerve in neck     Urinary incontinence     Urinary tract infection        Past Surgical History:   Procedure Laterality Date    BLADDER SUSPENSION  1990    FOOT SURGERY Bilateral 2001    HAND SURGERY Left 2017    HYSTERECTOMY  1990    JOINT REPLACEMENT  2013    KNEE SURGERY Bilateral 2013    MANDIBLE FRACTURE SURGERY  1981    SHOULDER ARTHROSCOPY Left 02/27/2019    Thyroid FNA  04/15/2019    THYROIDECTOMY Left 05/13/2019        TONSILLECTOMY         Current Outpatient Medications   Medication Sig    atorvastatin (LIPITOR) 20 MG tablet Take 1 tablet (20 mg total) by mouth once daily.    cyanocobalamin 1,000 mcg/mL injection Inject 1 mL (1,000 mcg total) into the muscle every other day.    hydrocortisone 2.5 % lotion Apply topically 2 (two) times daily.    levocetirizine (XYZAL) 5 MG tablet Take 1 tablet (5 mg total) by mouth every evening.    mirabegron (MYRBETRIQ) 50 mg Tb24 On po daily    montelukast (SINGULAIR) 10 mg tablet Take 1 tablet (10 mg total) by mouth once daily.    oxybutynin (DITROPAN-XL) 10 MG 24 hr tablet Take 1 tablet (10 mg total) by mouth once daily.    phentermine (ADIPEX-P) 37.5 mg tablet Take 1 tablet (37.5 mg total) by mouth before breakfast.    tiZANidine 4 mg Cap Take 1 capsule by mouth daily as needed.     traMADol (ULTRAM) 50 mg tablet Take 1 tablet (50 mg total) by mouth every 4 (four) hours as needed for Pain.     No current facility-administered medications for this visit.        Review of patient's allergies indicates:   Allergen Reactions    Estrogens Other (See Comments)     Mini stroke    Tetanus  vaccines and toxoid Swelling and Other (See Comments)     Swelling at injection site, fever       Family History   Problem Relation Age of Onset    Arthritis Mother     Aneurysm Mother         Abdominal Aneurysm     Arthritis Father     Diabetes Father     Hearing loss Father     Heart disease Father     Hypertension Father     Dementia Father     Diabetes Paternal Grandmother     Heart disease Paternal Grandfather     Leukemia Son     Crohn's disease Son     Ankylosing spondylitis Son     Other Son         avascular necrosis of pancho hips    Rheumatologic disease Son     Heart defect Son     Kidney failure Son     SIDS Maternal Aunt     Uterine cancer Maternal Aunt     Dementia Paternal Aunt     No Known Problems Sister     Heart disease Brother         stent placement    Heart disease Brother         stent placement    Aneurysm Brother         Abdominal Aneurysm    No Known Problems Sister        Social History     Socioeconomic History    Marital status:      Spouse name: Not on file    Number of children: 3    Years of education: Not on file    Highest education level: Not on file   Occupational History    Not on file   Social Needs    Financial resource strain: Not on file    Food insecurity:     Worry: Not on file     Inability: Not on file    Transportation needs:     Medical: Not on file     Non-medical: Not on file   Tobacco Use    Smoking status: Never Smoker    Smokeless tobacco: Never Used   Substance and Sexual Activity    Alcohol use: Never     Alcohol/week: 0.0 oz     Frequency: Never    Drug use: No    Sexual activity: Yes     Birth control/protection: None   Lifestyle    Physical activity:     Days per week: Not on file     Minutes per session: Not on file    Stress: Not on file   Relationships    Social connections:     Talks on phone: Not on file     Gets together: Not on file     Attends [employer] service: Not on file     Active member of club or  organization: Not on file     Attends meetings of clubs or organizations: Not on file     Relationship status: Not on file   Other Topics Concern    Not on file   Social History Narrative    Not on file       History of present illness: Patient comes in today for the left knee. She was doing wonderfully with her left knee until yesterday. She woke up with severe pain in the left knee. 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 She's had no problems. She's had no fevers or chills or any kind of bladder infections or anything like that recently. She just doesn't understand. Today it feeling 50% better.      Review of Systems:    Constitution: Negative for chills, fever, and sweats.  Negative for unexplained weight loss.    HENT:  Negative for headaches and blurry vision.    Cardiovascular:Negative for chest pain or irregular heart beat. Negative for hypertension.    Respiratory:  Negative for cough and shortness of breath.    Gastrointestinal: Negative for abdominal pain, heartburn, melena, nausea, and vomitting.    Genitourinary:  Negative bladder incontinence and dysuria.    Musculoskeletal:  See HPI for details.     Neurological: Negative for numbness.    Psychiatric/Behavioral: Negative for depression.  The patient is not nervous/anxious.      Endocrine: Negative for polyuria    Hematologic/Lymphatic: Negative for bleeding problem.  Does not bruise/bleed easily.    Skin: Negative for poor would healing and rash    Objective:      Physical Examination:    Vital Signs:    Vitals:    07/18/19 1107   BP: 138/78   Pulse: 83       Body mass index is 38.97 kg/m².    This a well-developed, well nourished patient in no acute distress.  They are alert and oriented and cooperative to examination.        Patient has range of motion of the left knee from 0-130 degrees. This is symmetric to the right knee. She denies any real pain with range of motion. She has no effusions. Her patella feels stable bilaterally. Well-healed incisions. Negative  straight leg raises. EHLs are intact.  Pertinent New Results:    XRAY Report / Interpretation:   AP lateral and sunrise views of the left knee demonstrate her left total knee to be in ideal position     Assessment/Plan:      Left total knee with 1 day history of pain. It is not totally clear to me why she's had this onset of pain. Because it's ready resolving within a watch this clinically for now. I'm in a see her in 10 days. If she still has pain in 10 days we'll proceed with the typical workup.      This note was created using Dragon voice recognition software that occasionally misinterpreted phrases or words.

## 2019-07-19 LAB
T4 FREE SP9 P CHAL SERPL-SCNC: 1.86 NG/DL (ref 0.45–1.27)
TSH SERPL DL<=0.005 MIU/L-ACNC: 3.49 ULU/ML (ref 0.3–5.6)

## 2019-07-23 ENCOUNTER — TELEPHONE (OUTPATIENT)
Dept: FAMILY MEDICINE | Facility: CLINIC | Age: 64
End: 2019-07-23

## 2019-07-23 NOTE — TELEPHONE ENCOUNTER
----- Message from Katherine Ferguson MD sent at 7/20/2019  5:06 PM CDT -----  Test results are normal.

## 2019-07-23 NOTE — TELEPHONE ENCOUNTER
----- Message from Katherine Ferguson MD sent at 7/19/2019 10:39 PM CDT -----  Test results are normal.

## 2019-07-24 ENCOUNTER — PATIENT MESSAGE (OUTPATIENT)
Dept: FAMILY MEDICINE | Facility: CLINIC | Age: 64
End: 2019-07-24

## 2019-08-14 DIAGNOSIS — G47.19 EXCESSIVE DAYTIME SLEEPINESS: ICD-10-CM

## 2019-08-14 DIAGNOSIS — G25.81 RESTLESS LEGS: ICD-10-CM

## 2019-08-14 DIAGNOSIS — G47.33 OSA (OBSTRUCTIVE SLEEP APNEA): Primary | ICD-10-CM

## 2019-08-14 DIAGNOSIS — R06.83 SNORING: ICD-10-CM

## 2019-09-08 ENCOUNTER — PROCEDURE VISIT (OUTPATIENT)
Dept: SLEEP MEDICINE | Facility: HOSPITAL | Age: 64
End: 2019-09-08
Attending: INTERNAL MEDICINE
Payer: COMMERCIAL

## 2019-09-08 DIAGNOSIS — G47.33 OSA (OBSTRUCTIVE SLEEP APNEA): ICD-10-CM

## 2019-09-08 DIAGNOSIS — G25.81 RESTLESS LEGS: ICD-10-CM

## 2019-09-08 DIAGNOSIS — R06.83 SNORING: ICD-10-CM

## 2019-09-08 DIAGNOSIS — G47.19 EXCESSIVE DAYTIME SLEEPINESS: ICD-10-CM

## 2019-09-08 PROCEDURE — 95810 POLYSOM 6/> YRS 4/> PARAM: CPT

## 2019-09-23 ENCOUNTER — OFFICE VISIT (OUTPATIENT)
Dept: FAMILY MEDICINE | Facility: CLINIC | Age: 64
End: 2019-09-23
Payer: COMMERCIAL

## 2019-09-23 VITALS
HEIGHT: 63 IN | DIASTOLIC BLOOD PRESSURE: 82 MMHG | OXYGEN SATURATION: 98 % | SYSTOLIC BLOOD PRESSURE: 136 MMHG | RESPIRATION RATE: 16 BRPM | WEIGHT: 220 LBS | BODY MASS INDEX: 38.98 KG/M2 | HEART RATE: 80 BPM

## 2019-09-23 DIAGNOSIS — Z00.00 ROUTINE HEALTH MAINTENANCE: Primary | ICD-10-CM

## 2019-09-23 PROBLEM — R63.5 WEIGHT GAIN: Status: ACTIVE | Noted: 2019-09-23

## 2019-09-23 PROBLEM — R21 FACIAL RASH: Status: ACTIVE | Noted: 2019-09-23

## 2019-09-23 PROBLEM — R79.89 ELEVATED TSH: Status: ACTIVE | Noted: 2019-09-23

## 2019-09-23 PROCEDURE — 99396 PR PREVENTIVE VISIT,EST,40-64: ICD-10-PCS | Mod: S$GLB,,, | Performed by: NURSE PRACTITIONER

## 2019-09-23 PROCEDURE — 99999 PR PBB SHADOW E&M-EST. PATIENT-LVL IV: CPT | Mod: PBBFAC,,, | Performed by: NURSE PRACTITIONER

## 2019-09-23 PROCEDURE — 99396 PREV VISIT EST AGE 40-64: CPT | Mod: S$GLB,,, | Performed by: NURSE PRACTITIONER

## 2019-09-23 PROCEDURE — 99999 PR PBB SHADOW E&M-EST. PATIENT-LVL IV: ICD-10-PCS | Mod: PBBFAC,,, | Performed by: NURSE PRACTITIONER

## 2019-09-23 RX ORDER — LEVOCETIRIZINE DIHYDROCHLORIDE 5 MG/1
5 TABLET, FILM COATED ORAL NIGHTLY
Qty: 90 TABLET | Refills: 3 | Status: SHIPPED | OUTPATIENT
Start: 2019-09-23 | End: 2019-09-24 | Stop reason: SDUPTHER

## 2019-09-23 RX ORDER — CLOTRIMAZOLE 1 %
113 CREAM (GRAM) TOPICAL 2 TIMES DAILY
Qty: 113 G | Refills: 2 | Status: SHIPPED | OUTPATIENT
Start: 2019-09-23 | End: 2019-09-24 | Stop reason: SDUPTHER

## 2019-09-23 RX ORDER — ATORVASTATIN CALCIUM 20 MG/1
20 TABLET, FILM COATED ORAL DAILY
Qty: 90 TABLET | Refills: 3 | Status: SHIPPED | OUTPATIENT
Start: 2019-09-23 | End: 2019-09-24 | Stop reason: SDUPTHER

## 2019-09-23 RX ORDER — HYDROCORTISONE 25 MG/ML
LOTION TOPICAL 2 TIMES DAILY
Qty: 59 ML | Refills: 2 | Status: SHIPPED | OUTPATIENT
Start: 2019-09-23 | End: 2019-09-24 | Stop reason: SDUPTHER

## 2019-09-23 RX ORDER — PHENTERMINE HYDROCHLORIDE 37.5 MG/1
37.5 TABLET ORAL
Qty: 90 TABLET | Refills: 0 | Status: CANCELLED | OUTPATIENT
Start: 2019-09-23

## 2019-09-23 RX ORDER — MONTELUKAST SODIUM 10 MG/1
10 TABLET ORAL DAILY
Qty: 90 TABLET | Refills: 3 | Status: SHIPPED | OUTPATIENT
Start: 2019-09-23 | End: 2019-09-24 | Stop reason: SDUPTHER

## 2019-09-23 RX ORDER — CLOTRIMAZOLE 1 %
30 CREAM (GRAM) TOPICAL 2 TIMES DAILY
COMMUNITY
End: 2019-09-23 | Stop reason: SDUPTHER

## 2019-09-23 NOTE — PROGRESS NOTES
SUBJECTIVE:      Patient ID: Nalini Wooten is a 64 y.o. female.    Chief Complaint: Annual Exam    Patient presents for annual wellness exam - she states her 's company gives a discount/reimbursement on insurance for wellness exams, she has brought a paper to be filled out and understands that no refills for chronic conditions can be placed in this visit, she states she will make another visit in a few weeks for said refills, she feels Myrbetriq is working well for her OAB, no physical complaints this visit      Past Surgical History:   Procedure Laterality Date    BLADDER SUSPENSION  1990    FOOT SURGERY Bilateral 2001    HAND SURGERY Left 2017    HYSTERECTOMY  1990    JOINT REPLACEMENT  2013    KNEE SURGERY Bilateral 2013    MANDIBLE FRACTURE SURGERY  1981    SHOULDER ARTHROSCOPY Left 02/27/2019    Thyroid FNA  04/15/2019    THYROIDECTOMY Left 05/13/2019        TONSILLECTOMY       Family History   Problem Relation Age of Onset    Arthritis Mother     Aneurysm Mother         Abdominal Aneurysm     Arthritis Father     Diabetes Father     Hearing loss Father     Heart disease Father     Hypertension Father     Dementia Father     Diabetes Paternal Grandmother     Heart disease Paternal Grandfather     Leukemia Son     Crohn's disease Son     Ankylosing spondylitis Son     Other Son         avascular necrosis of pancho hips    Rheumatologic disease Son     Heart defect Son     Kidney failure Son     SIDS Maternal Aunt     Uterine cancer Maternal Aunt     Dementia Paternal Aunt     No Known Problems Sister     Heart disease Brother         stent placement    Heart disease Brother         stent placement    Aneurysm Brother         Abdominal Aneurysm    No Known Problems Sister       Social History     Socioeconomic History    Marital status:      Spouse name: Not on file    Number of children: 3    Years of education: Not on file    Highest education  level: Not on file   Occupational History    Not on file   Social Needs    Financial resource strain: Not hard at all    Food insecurity:     Worry: Never true     Inability: Never true    Transportation needs:     Medical: No     Non-medical: No   Tobacco Use    Smoking status: Never Smoker    Smokeless tobacco: Never Used   Substance and Sexual Activity    Alcohol use: Never     Alcohol/week: 0.0 standard drinks     Frequency: Monthly or less     Drinks per session: 1 or 2     Binge frequency: Never    Drug use: No    Sexual activity: Yes     Birth control/protection: None   Lifestyle    Physical activity:     Days per week: 3 days     Minutes per session: 20 min    Stress: To some extent   Relationships    Social connections:     Talks on phone: More than three times a week     Gets together: More than three times a week     Attends Gnosticist service: Not on file     Active member of club or organization: Yes     Attends meetings of clubs or organizations: More than 4 times per year     Relationship status:    Other Topics Concern    Not on file   Social History Narrative    Not on file     Current Outpatient Medications   Medication Sig Dispense Refill    phentermine (ADIPEX-P) 37.5 mg tablet Take 1 tablet (37.5 mg total) by mouth before breakfast. 90 tablet 0    tiZANidine 4 mg Cap Take 1 capsule by mouth daily as needed.       traMADol (ULTRAM) 50 mg tablet Take 1 tablet (50 mg total) by mouth every 4 (four) hours as needed for Pain. 42 tablet 0    atorvastatin (LIPITOR) 20 MG tablet Take 1 tablet (20 mg total) by mouth once daily. 90 tablet 3    clotrimazole (LOTRIMIN) 1 % cream Apply 113 g topically 2 (two) times daily. 113 g 2    hydrocortisone 2.5 % lotion Apply topically 2 (two) times daily. 59 mL 2    levocetirizine (XYZAL) 5 MG tablet Take 1 tablet (5 mg total) by mouth every evening. 90 tablet 3    mirabegron (MYRBETRIQ) 50 mg Tb24 Take 1 tablet (50 mg total) by mouth once  daily. On po daily 90 tablet 3    montelukast (SINGULAIR) 10 mg tablet Take 1 tablet (10 mg total) by mouth once daily. 90 tablet 3     No current facility-administered medications for this visit.      Review of patient's allergies indicates:   Allergen Reactions    Estrogens Other (See Comments)     Mini stroke    Tetanus vaccines and toxoid Swelling and Other (See Comments)     Swelling at injection site, fever      Past Medical History:   Diagnosis Date    Bulging disc     Carpal tunnel syndrome     Degenerative disc disease     High cholesterol     Pinched nerve in neck     Urinary incontinence     Urinary tract infection      Past Surgical History:   Procedure Laterality Date    BLADDER SUSPENSION  1990    FOOT SURGERY Bilateral 2001    HAND SURGERY Left 2017    HYSTERECTOMY  1990    JOINT REPLACEMENT  2013    KNEE SURGERY Bilateral 2013    MANDIBLE FRACTURE SURGERY  1981    SHOULDER ARTHROSCOPY Left 02/27/2019    Thyroid FNA  04/15/2019    THYROIDECTOMY Left 05/13/2019        TONSILLECTOMY         Review of Systems   Constitutional: Negative for activity change, appetite change, fatigue and unexpected weight change.   HENT: Negative for hearing loss, postnasal drip, rhinorrhea, sinus pain and trouble swallowing.    Eyes: Negative for photophobia, pain, discharge and visual disturbance.   Respiratory: Negative for chest tightness and wheezing.    Cardiovascular: Negative for chest pain, palpitations and leg swelling.   Gastrointestinal: Negative for abdominal distention, abdominal pain, blood in stool, constipation, diarrhea, nausea and vomiting.   Endocrine: Negative for cold intolerance, heat intolerance, polydipsia and polyuria.   Genitourinary: Positive for frequency (improved) and urgency (improved). Negative for difficulty urinating, dysuria, flank pain, hematuria, menstrual problem and pelvic pain.        OAB   Musculoskeletal: Positive for arthralgias, back pain and  "myalgias. Negative for joint swelling.   Skin: Positive for rash. Negative for pallor.   Allergic/Immunologic: Negative for environmental allergies and food allergies.   Neurological: Positive for headaches. Negative for dizziness, weakness, light-headedness and numbness.   Hematological: Does not bruise/bleed easily.   Psychiatric/Behavioral: Negative for agitation, confusion, decreased concentration, dysphoric mood and sleep disturbance. The patient is not nervous/anxious.       OBJECTIVE:      Vitals:    09/23/19 1527   BP: 136/82   Pulse: 80   Resp: 16   SpO2: 98%   Weight: 99.8 kg (220 lb)   Height: 5' 3" (1.6 m)     Physical Exam   Constitutional: She is oriented to person, place, and time. Vital signs are normal. She appears well-developed and well-nourished. No distress.   Obese - 6 lb loss since 7/10 visit   HENT:   Head: Normocephalic and atraumatic.   Right Ear: Hearing normal.   Left Ear: Hearing normal.   Nose: Nose normal. No rhinorrhea.   Mouth/Throat: Mucous membranes are normal.   Eyes: Pupils are equal, round, and reactive to light. Conjunctivae and lids are normal. Right eye exhibits no discharge. Left eye exhibits no discharge. Right conjunctiva is not injected. Left conjunctiva is not injected. Right pupil is round and reactive. Left pupil is round and reactive. Pupils are equal.   Scaling to pancho upper eyelids, mild erythema & edema   Neck: Trachea normal and normal range of motion. Neck supple. No JVD present. No tracheal deviation present. No thyromegaly present.   Cardiovascular: Normal rate, regular rhythm, normal heart sounds and intact distal pulses. Exam reveals no gallop and no friction rub.   No murmur heard.  Pulses:       Radial pulses are 2+ on the right side, and 2+ on the left side.   Pulmonary/Chest: Effort normal and breath sounds normal. No stridor. No respiratory distress. She has no decreased breath sounds. She has no wheezes. She has no rhonchi. She has no rales.   Abdominal: " Soft. Bowel sounds are normal. She exhibits no distension. There is no tenderness. There is no rigidity and no guarding.   Musculoskeletal: Normal range of motion. She exhibits no edema.   Lymphadenopathy:     She has no cervical adenopathy.   Neurological: She is alert and oriented to person, place, and time. She has normal strength. She displays no atrophy. She displays a negative Romberg sign. Coordination and gait normal.   Skin: Skin is warm and dry. Capillary refill takes less than 2 seconds. No lesion and no rash noted. No cyanosis. No pallor.   Psychiatric: She has a normal mood and affect. Her speech is normal and behavior is normal. Judgment and thought content normal. Cognition and memory are normal. She is attentive.   Nursing note and vitals reviewed.     Assessment:       1. Routine health maintenance        Plan:       Routine health maintenance  -     Lipid panel; Future; Expected date: 11/18/2019  -     TSH; Future; Expected date: 11/18/2019  - continue on current med regimen  - doing well on phentermine, will request refill via Dr. Ferguson D/T scope of practice limitations          Follow up in about 2 weeks (around 10/7/2019) for med refills/lab orders.      9/24/2019 CHICO Blackwood, FNP-C

## 2019-09-24 DIAGNOSIS — J30.89 ENVIRONMENTAL AND SEASONAL ALLERGIES: ICD-10-CM

## 2019-09-24 DIAGNOSIS — R32 URINARY INCONTINENCE, UNSPECIFIED TYPE: ICD-10-CM

## 2019-09-24 DIAGNOSIS — E78.00 HIGH CHOLESTEROL: ICD-10-CM

## 2019-09-24 DIAGNOSIS — R21 FACIAL RASH: ICD-10-CM

## 2019-09-24 DIAGNOSIS — N90.4 LICHEN SCLEROSUS OF FEMALE GENITALIA: ICD-10-CM

## 2019-09-24 RX ORDER — CLOTRIMAZOLE 1 %
113 CREAM (GRAM) TOPICAL 2 TIMES DAILY
Qty: 113 G | Refills: 2 | Status: SHIPPED | OUTPATIENT
Start: 2019-09-24 | End: 2020-02-17

## 2019-09-24 RX ORDER — ATORVASTATIN CALCIUM 20 MG/1
20 TABLET, FILM COATED ORAL DAILY
Qty: 90 TABLET | Refills: 3 | Status: SHIPPED | OUTPATIENT
Start: 2019-09-24 | End: 2020-02-17 | Stop reason: SINTOL

## 2019-09-24 RX ORDER — HYDROCORTISONE 25 MG/ML
LOTION TOPICAL 2 TIMES DAILY
Qty: 59 ML | Refills: 2 | Status: SHIPPED | OUTPATIENT
Start: 2019-09-24 | End: 2020-02-17

## 2019-09-24 RX ORDER — LEVOCETIRIZINE DIHYDROCHLORIDE 5 MG/1
5 TABLET, FILM COATED ORAL NIGHTLY
Qty: 90 TABLET | Refills: 3 | Status: SHIPPED | OUTPATIENT
Start: 2019-09-24 | End: 2020-12-03 | Stop reason: SDUPTHER

## 2019-09-24 RX ORDER — MONTELUKAST SODIUM 10 MG/1
10 TABLET ORAL DAILY
Qty: 90 TABLET | Refills: 3 | Status: SHIPPED | OUTPATIENT
Start: 2019-09-24 | End: 2020-12-03 | Stop reason: SDUPTHER

## 2019-09-30 ENCOUNTER — TELEPHONE (OUTPATIENT)
Dept: ORTHOPEDICS | Facility: CLINIC | Age: 64
End: 2019-09-30

## 2019-09-30 NOTE — TELEPHONE ENCOUNTER
----- Message from Selam Fonseca sent at 9/30/2019  2:36 PM CDT -----  Contact: patient   Pt called and said that she  Is returning a phone call regarding her .    PTS # [phone]

## 2019-10-21 ENCOUNTER — OFFICE VISIT (OUTPATIENT)
Dept: FAMILY MEDICINE | Facility: CLINIC | Age: 64
End: 2019-10-21
Payer: COMMERCIAL

## 2019-10-21 VITALS
DIASTOLIC BLOOD PRESSURE: 68 MMHG | SYSTOLIC BLOOD PRESSURE: 106 MMHG | RESPIRATION RATE: 16 BRPM | BODY MASS INDEX: 38.7 KG/M2 | HEART RATE: 59 BPM | HEIGHT: 63 IN | WEIGHT: 218.38 LBS | OXYGEN SATURATION: 98 %

## 2019-10-21 DIAGNOSIS — G25.81 RLS (RESTLESS LEGS SYNDROME): ICD-10-CM

## 2019-10-21 DIAGNOSIS — Z78.0 POSTMENOPAUSAL: ICD-10-CM

## 2019-10-21 DIAGNOSIS — R63.5 WEIGHT GAIN: ICD-10-CM

## 2019-10-21 DIAGNOSIS — N90.4 LICHEN SCLEROSUS OF FEMALE GENITALIA: Primary | ICD-10-CM

## 2019-10-21 PROCEDURE — 3008F PR BODY MASS INDEX (BMI) DOCUMENTED: ICD-10-PCS | Mod: S$GLB,,, | Performed by: NURSE PRACTITIONER

## 2019-10-21 PROCEDURE — 3008F BODY MASS INDEX DOCD: CPT | Mod: S$GLB,,, | Performed by: NURSE PRACTITIONER

## 2019-10-21 PROCEDURE — 99214 PR OFFICE/OUTPT VISIT, EST, LEVL IV, 30-39 MIN: ICD-10-PCS | Mod: S$GLB,,, | Performed by: NURSE PRACTITIONER

## 2019-10-21 PROCEDURE — 99214 OFFICE O/P EST MOD 30 MIN: CPT | Mod: S$GLB,,, | Performed by: NURSE PRACTITIONER

## 2019-10-21 RX ORDER — PHENTERMINE HYDROCHLORIDE 37.5 MG/1
37.5 TABLET ORAL
Qty: 90 TABLET | Refills: 0 | Status: CANCELLED | OUTPATIENT
Start: 2019-10-21

## 2019-10-21 RX ORDER — MONTELUKAST SODIUM 10 MG/1
10 TABLET ORAL DAILY
Qty: 90 TABLET | Refills: 3 | Status: CANCELLED | OUTPATIENT
Start: 2019-10-21

## 2019-10-21 RX ORDER — ATORVASTATIN CALCIUM 20 MG/1
20 TABLET, FILM COATED ORAL DAILY
Qty: 90 TABLET | Refills: 3 | Status: CANCELLED | OUTPATIENT
Start: 2019-10-21

## 2019-10-21 RX ORDER — CLOBETASOL PROPIONATE 0.5 MG/G
OINTMENT TOPICAL 2 TIMES DAILY
Qty: 60 G | Refills: 2 | Status: SHIPPED | OUTPATIENT
Start: 2019-10-21 | End: 2021-02-09

## 2019-10-21 RX ORDER — PHENTERMINE HYDROCHLORIDE 37.5 MG/1
37.5 TABLET ORAL
Qty: 90 TABLET | Refills: 0 | Status: SHIPPED | OUTPATIENT
Start: 2019-10-21 | End: 2020-02-17 | Stop reason: SDUPTHER

## 2019-10-21 RX ORDER — TIZANIDINE HYDROCHLORIDE 4 MG/1
1 CAPSULE, GELATIN COATED ORAL DAILY PRN
Qty: 90 CAPSULE | Refills: 1 | Status: SHIPPED | OUTPATIENT
Start: 2019-10-21 | End: 2020-05-20

## 2019-10-21 NOTE — PROGRESS NOTES
SUBJECTIVE:      Patient ID: Nalini Wooten is a 64 y.o. female.    Chief Complaint: Medication Refill    Leg Pain    The incident occurred more than 1 week ago. There was no injury mechanism. The pain is present in the left leg and right leg. The quality of the pain is described as cramping and shooting. The pain is moderate. Pain course: at night when lying in bed. Pertinent negatives include no numbness. She reports no foreign bodies present. Treatments tried: muscle relaxers. The treatment provided significant relief.   Female  Problem   The patient's primary symptoms include genital itching and a genital rash. The patient's pertinent negatives include no pelvic pain. This is a chronic problem. The current episode started more than 1 month ago. The problem occurs daily. The problem has been unchanged. The pain is moderate. She is not pregnant. Associated symptoms include back pain, headaches and rash. Pertinent negatives include no abdominal pain, constipation, diarrhea, dysuria, flank pain, frequency, hematuria, nausea, sore throat, urgency or vomiting. Treatments tried: steroid cream. The treatment provided mild relief. She is postmenopausal.       Past Surgical History:   Procedure Laterality Date    BLADDER SUSPENSION  1990    FOOT SURGERY Bilateral 2001    HAND SURGERY Left 2017    HYSTERECTOMY  1990    JOINT REPLACEMENT  2013    KNEE SURGERY Bilateral 2013    MANDIBLE FRACTURE SURGERY  1981    SHOULDER ARTHROSCOPY Left 02/27/2019    Thyroid FNA  04/15/2019    THYROIDECTOMY Left 05/13/2019        TONSILLECTOMY       Family History   Problem Relation Age of Onset    Arthritis Mother     Aneurysm Mother         Abdominal Aneurysm     Arthritis Father     Diabetes Father     Hearing loss Father     Heart disease Father     Hypertension Father     Dementia Father     Diabetes Paternal Grandmother     Heart disease Paternal Grandfather     Leukemia Son     Crohn's disease  Son     Ankylosing spondylitis Son     Other Son         avascular necrosis of pancho hips    Rheumatologic disease Son     Heart defect Son     Kidney failure Son     SIDS Maternal Aunt     Uterine cancer Maternal Aunt     Dementia Paternal Aunt     No Known Problems Sister     Heart disease Brother         stent placement    Heart disease Brother         stent placement    Aneurysm Brother         Abdominal Aneurysm    No Known Problems Sister       Social History     Socioeconomic History    Marital status:      Spouse name: Not on file    Number of children: 3    Years of education: Not on file    Highest education level: Not on file   Occupational History    Not on file   Social Needs    Financial resource strain: Not hard at all    Food insecurity:     Worry: Never true     Inability: Never true    Transportation needs:     Medical: No     Non-medical: No   Tobacco Use    Smoking status: Never Smoker    Smokeless tobacco: Never Used   Substance and Sexual Activity    Alcohol use: Never     Alcohol/week: 0.0 standard drinks     Frequency: Monthly or less     Drinks per session: 1 or 2     Binge frequency: Never    Drug use: No    Sexual activity: Yes     Birth control/protection: None   Lifestyle    Physical activity:     Days per week: 3 days     Minutes per session: 20 min    Stress: To some extent   Relationships    Social connections:     Talks on phone: More than three times a week     Gets together: More than three times a week     Attends Anabaptist service: Not on file     Active member of club or organization: Yes     Attends meetings of clubs or organizations: More than 4 times per year     Relationship status:    Other Topics Concern    Not on file   Social History Narrative    Not on file     Current Outpatient Medications   Medication Sig Dispense Refill    atorvastatin (LIPITOR) 20 MG tablet Take 1 tablet (20 mg total) by mouth once daily. 90 tablet 3     clotrimazole (LOTRIMIN) 1 % cream Apply 113 g topically 2 (two) times daily. 113 g 2    hydrocortisone 2.5 % lotion Apply topically 2 (two) times daily. 59 mL 2    levocetirizine (XYZAL) 5 MG tablet Take 1 tablet (5 mg total) by mouth every evening. 90 tablet 3    mirabegron (MYRBETRIQ) 50 mg Tb24 Take 1 tablet (50 mg total) by mouth once daily. On po daily 90 tablet 3    montelukast (SINGULAIR) 10 mg tablet Take 1 tablet (10 mg total) by mouth once daily. 90 tablet 3    phentermine (ADIPEX-P) 37.5 mg tablet Take 1 tablet (37.5 mg total) by mouth before breakfast. 90 tablet 0    tiZANidine 4 mg Cap Take 1 capsule by mouth daily as needed. 90 capsule 1    traMADol (ULTRAM) 50 mg tablet Take 1 tablet (50 mg total) by mouth every 4 (four) hours as needed for Pain. 42 tablet 0    clobetasol 0.05% (TEMOVATE) 0.05 % Oint Apply topically 2 (two) times daily. 60 g 2     No current facility-administered medications for this visit.      Review of patient's allergies indicates:   Allergen Reactions    Estrogens Other (See Comments)     Mini stroke    Tetanus vaccines and toxoid Swelling and Other (See Comments)     Swelling at injection site, fever      Past Medical History:   Diagnosis Date    Bulging disc     Carpal tunnel syndrome     Degenerative disc disease     High cholesterol     Pinched nerve in neck     Urinary incontinence     Urinary tract infection      Past Surgical History:   Procedure Laterality Date    BLADDER SUSPENSION  1990    FOOT SURGERY Bilateral 2001    HAND SURGERY Left 2017    HYSTERECTOMY  1990    JOINT REPLACEMENT  2013    KNEE SURGERY Bilateral 2013    MANDIBLE FRACTURE SURGERY  1981    SHOULDER ARTHROSCOPY Left 02/27/2019    Thyroid FNA  04/15/2019    THYROIDECTOMY Left 05/13/2019        TONSILLECTOMY         Review of Systems   Constitutional: Negative for activity change, appetite change, fatigue and unexpected weight change.   HENT: Negative for  "congestion, ear pain, hearing loss, postnasal drip, sinus pressure, sinus pain, sneezing and sore throat.    Eyes: Negative for photophobia and pain.   Respiratory: Negative for cough, chest tightness, shortness of breath and wheezing.    Cardiovascular: Negative for chest pain, palpitations and leg swelling.   Gastrointestinal: Negative for abdominal distention, abdominal pain, blood in stool, constipation, diarrhea, nausea and vomiting.   Endocrine: Negative for cold intolerance, heat intolerance, polydipsia and polyuria.   Genitourinary: Negative for difficulty urinating, dysuria, flank pain, frequency, hematuria, pelvic pain and urgency.        OAB, vaginal itching   Musculoskeletal: Positive for arthralgias, back pain and myalgias. Negative for joint swelling and neck pain.   Skin: Positive for rash. Negative for pallor.   Allergic/Immunologic: Positive for environmental allergies. Negative for food allergies.   Neurological: Positive for headaches. Negative for dizziness, weakness, light-headedness and numbness.   Hematological: Does not bruise/bleed easily.   Psychiatric/Behavioral: Positive for sleep disturbance (RLS). Negative for agitation, confusion and decreased concentration. The patient is not nervous/anxious.       OBJECTIVE:      Vitals:    10/21/19 0827   BP: 106/68   Pulse: (!) 59   Resp: 16   SpO2: 98%   Weight: 99.1 kg (218 lb 6.4 oz)   Height: 5' 3" (1.6 m)     Physical Exam   Constitutional: She is oriented to person, place, and time. Vital signs are normal. She appears well-developed and well-nourished. No distress.   obese   HENT:   Head: Normocephalic and atraumatic.   Right Ear: Hearing normal.   Left Ear: Hearing normal.   Nose: Nose normal. No rhinorrhea.   Mouth/Throat: Mucous membranes are normal.   Eyes: Pupils are equal, round, and reactive to light. Conjunctivae and lids are normal. Right eye exhibits no discharge. Left eye exhibits no discharge. Right conjunctiva is not injected. " Left conjunctiva is not injected. Right pupil is round and reactive. Left pupil is round and reactive. Pupils are equal.   Neck: Trachea normal and normal range of motion. Neck supple. No JVD present. No tracheal deviation present. No thyromegaly present.   Cardiovascular: Normal rate, regular rhythm, normal heart sounds and intact distal pulses. Exam reveals no gallop and no friction rub.   No murmur heard.  Pulses:       Radial pulses are 2+ on the right side, and 2+ on the left side.   Pulmonary/Chest: Effort normal and breath sounds normal. No stridor. No respiratory distress. She has no decreased breath sounds. She has no wheezes. She has no rhonchi. She has no rales.   Abdominal: Soft. Bowel sounds are normal. She exhibits no distension. There is no tenderness. There is no rigidity and no guarding.   Musculoskeletal: Normal range of motion. She exhibits no edema.   Lymphadenopathy:     She has no cervical adenopathy.   Neurological: She is alert and oriented to person, place, and time. She has normal strength. She displays no atrophy. She displays a negative Romberg sign. Coordination and gait normal.   Skin: Skin is warm and dry. Capillary refill takes less than 2 seconds. No lesion and no rash noted. No cyanosis. No pallor.   Psychiatric: She has a normal mood and affect. Her speech is normal and behavior is normal. Judgment and thought content normal. Cognition and memory are normal. She is attentive.   Nursing note and vitals reviewed.     Assessment:       1. Lichen sclerosus of female genitalia    2. RLS (restless legs syndrome)    3. Postmenopausal    4. BMI 38.0-38.9,adult        Plan:       Lichen sclerosus of female genitalia  -     clobetasol 0.05% (TEMOVATE) 0.05 % Oint; Apply topically 2 (two) times daily.  Dispense: 60 g; Refill: 2  - trying stronger topical steroid in ointment formulation, taper twice daily x 4 weeks, then once daily x 4 weeks, then twice weekly x 4 weeks  - will eval  improvement at next visit    RLS (restless legs syndrome)  -     tiZANidine 4 mg Cap; Take 1 capsule by mouth daily as needed.  Dispense: 90 capsule; Refill: 1    Postmenopausal  -     DXA Bone Density Appendicular Skeleton; Future; Expected date: 10/21/2019    BMI 38.0-38.9,adult        - phentermine script sent to Dr. Ferguson r/t scope of practice limitations      Follow up in about 3 months (around 1/21/2020) for weight refills.      10/21/2019 Omayra Huddleston, CHICO, FNP-C

## 2019-10-21 NOTE — PATIENT INSTRUCTIONS
RASH TITRATION:    Twice daily for 4 weeks THEN once daily for 4 weeks THEN twice weekly for 4 weeks

## 2019-10-23 ENCOUNTER — HOSPITAL ENCOUNTER (OUTPATIENT)
Dept: RADIOLOGY | Facility: HOSPITAL | Age: 64
Discharge: HOME | End: 2019-10-23
Attending: NURSE PRACTITIONER

## 2019-10-23 DIAGNOSIS — Z78.0 POSTMENOPAUSAL: ICD-10-CM

## 2019-10-23 PROCEDURE — 77080 DXA BONE DENSITY AXIAL: CPT | Mod: TC,PO

## 2019-10-29 ENCOUNTER — PATIENT MESSAGE (OUTPATIENT)
Dept: FAMILY MEDICINE | Facility: CLINIC | Age: 64
End: 2019-10-29

## 2019-11-07 DIAGNOSIS — R32 URINARY INCONTINENCE, UNSPECIFIED TYPE: ICD-10-CM

## 2019-12-16 DIAGNOSIS — J30.89 ENVIRONMENTAL AND SEASONAL ALLERGIES: ICD-10-CM

## 2019-12-16 RX ORDER — MONTELUKAST SODIUM 10 MG/1
TABLET ORAL
Qty: 90 TABLET | Refills: 0 | OUTPATIENT
Start: 2019-12-16

## 2019-12-17 ENCOUNTER — HOSPITAL ENCOUNTER (OUTPATIENT)
Dept: RADIOLOGY | Facility: HOSPITAL | Age: 64
Discharge: HOME | End: 2019-12-17
Attending: SURGERY
Payer: COMMERCIAL

## 2019-12-17 DIAGNOSIS — E04.2 MULTIPLE THYROID NODULES: ICD-10-CM

## 2019-12-17 PROCEDURE — 76536 US EXAM OF HEAD AND NECK: CPT | Mod: TC,PO

## 2019-12-23 ENCOUNTER — TELEPHONE (OUTPATIENT)
Dept: SURGERY | Facility: CLINIC | Age: 64
End: 2019-12-23

## 2019-12-23 NOTE — TELEPHONE ENCOUNTER
----- Message from Lizet Chacon MD sent at 12/19/2019  4:31 PM CST -----  Call patient - test result is ok, RTO to discuss

## 2020-01-02 ENCOUNTER — OFFICE VISIT (OUTPATIENT)
Dept: SURGERY | Facility: CLINIC | Age: 65
End: 2020-01-02
Payer: COMMERCIAL

## 2020-01-02 VITALS
DIASTOLIC BLOOD PRESSURE: 93 MMHG | WEIGHT: 218 LBS | HEIGHT: 63 IN | TEMPERATURE: 98 F | HEART RATE: 64 BPM | BODY MASS INDEX: 38.62 KG/M2 | SYSTOLIC BLOOD PRESSURE: 151 MMHG

## 2020-01-02 DIAGNOSIS — E04.2 MULTIPLE THYROID NODULES: Primary | ICD-10-CM

## 2020-01-02 PROCEDURE — 99213 PR OFFICE/OUTPT VISIT, EST, LEVL III, 20-29 MIN: ICD-10-PCS | Mod: S$GLB,,, | Performed by: SURGERY

## 2020-01-02 PROCEDURE — 99213 OFFICE O/P EST LOW 20 MIN: CPT | Mod: S$GLB,,, | Performed by: SURGERY

## 2020-01-02 RX ORDER — TERBINAFINE HYDROCHLORIDE 250 MG/1
TABLET ORAL
Refills: 0 | COMMUNITY
Start: 2019-12-05 | End: 2020-05-20 | Stop reason: ALTCHOICE

## 2020-01-02 NOTE — PROGRESS NOTES
Subjective:       Patient ID: Nalini Wooten is a 64 y.o. female.    Chief Complaint: Other (Thyroid U/S FU)      HPI:  S/p Left thyroidectomy 05/13/2019  Here to follow-up after ultrasound to follow right-sided thyroid nodules    Patient complains of feeling like food gets stuck when she's swallowing.  This has been occurring the last couple of months.    US - stable 5-6mm nodules right thyroid      Review of Systems   Constitutional: Negative for appetite change, chills, fever and unexpected weight change.   HENT: Negative for hearing loss, rhinorrhea, sore throat and voice change.    Eyes: Negative for photophobia and visual disturbance.   Respiratory: Negative for cough, choking and shortness of breath.    Cardiovascular: Negative for chest pain, palpitations and leg swelling.   Gastrointestinal: Negative for abdominal pain, blood in stool, constipation, diarrhea, nausea and vomiting.   Endocrine: Negative for cold intolerance, heat intolerance, polydipsia and polyuria.   Musculoskeletal: Negative for arthralgias, back pain, joint swelling and neck stiffness.   Skin: Negative for color change, pallor and rash.   Neurological: Negative for dizziness, seizures, syncope and headaches.   Hematological: Negative for adenopathy. Does not bruise/bleed easily.   Psychiatric/Behavioral: Negative for agitation, behavioral problems and confusion.       Objective:      Physical Exam   Constitutional: She appears well-developed and well-nourished.  Non-toxic appearance. No distress.   HENT:   Head: Normocephalic and atraumatic. Head is without abrasion and without laceration.   Right Ear: External ear normal.   Left Ear: External ear normal.   Nose: Nose normal.   Mouth/Throat: Oropharynx is clear and moist.   Eyes: Pupils are equal, round, and reactive to light. EOM are normal.   Neck: Trachea normal. No tracheal deviation and normal range of motion present. No thyroid mass and no thyromegaly present.       Tiny nodule  vaguely palpable very anterior likely corresponding to the nodule at the junction of the isthmus and right lobe, slight tenderness to palpation   Cardiovascular: Normal rate and regular rhythm.   Pulmonary/Chest: Effort normal. No accessory muscle usage. No tachypnea. No respiratory distress.   Abdominal: Soft. Normal appearance and bowel sounds are normal. She exhibits no distension and no mass. There is no hepatosplenomegaly. There is no tenderness. There is no tenderness at McBurney's point and negative Ly's sign. No hernia.   Lymphadenopathy:     She has no cervical adenopathy.     She has no axillary adenopathy.        Right: No inguinal adenopathy present.        Left: No inguinal adenopathy present.   Neurological: She is alert. Coordination and gait normal.   Skin: Skin is warm and intact.   Psychiatric: She has a normal mood and affect. Her speech is normal and behavior is normal.       Assessment/Plan:   Multiple thyroid nodules  Comments:  Right; largest 6mm  Orders:  -     US Soft Tissue Head Neck Thyroid; Future; Expected date: 07/02/2020      It would be extremely surprising for this tiny nodule to be causing any compressive symptoms related to swallowing.  I advised patient that if it persists, I would recommend GI referral for EGD to evaluate the esophagus further.    For now the 2 thyroid nodules are stable in size and too small to biopsy.  We will do short-term follow-up ultrasound in 6 months.  If remains stable, will go to once a year.      Follow up in about 6 months (around 7/2/2020) for F/U - Make appt after diagnostic tests.

## 2020-02-11 ENCOUNTER — LAB VISIT (OUTPATIENT)
Dept: LAB | Facility: HOSPITAL | Age: 65
End: 2020-02-11
Attending: NURSE PRACTITIONER
Payer: COMMERCIAL

## 2020-02-11 DIAGNOSIS — Z00.00 ROUTINE HEALTH MAINTENANCE: ICD-10-CM

## 2020-02-11 LAB
CHOLEST SERPL-MCNC: 278 MG/DL (ref 120–199)
CHOLEST/HDLC SERPL: 5.9 {RATIO} (ref 2–5)
HDLC SERPL-MCNC: 47 MG/DL (ref 40–75)
HDLC SERPL: 16.9 % (ref 20–50)
LDLC SERPL CALC-MCNC: 204.2 MG/DL (ref 63–159)
NONHDLC SERPL-MCNC: 231 MG/DL
TRIGL SERPL-MCNC: 134 MG/DL (ref 30–150)
TSH SERPL DL<=0.005 MIU/L-ACNC: 2.13 UIU/ML (ref 0.34–5.6)

## 2020-02-11 PROCEDURE — 80061 LIPID PANEL: CPT

## 2020-02-11 PROCEDURE — 36415 COLL VENOUS BLD VENIPUNCTURE: CPT

## 2020-02-11 PROCEDURE — 84443 ASSAY THYROID STIM HORMONE: CPT

## 2020-02-13 ENCOUNTER — TELEPHONE (OUTPATIENT)
Dept: FAMILY MEDICINE | Facility: CLINIC | Age: 65
End: 2020-02-13

## 2020-02-13 DIAGNOSIS — E78.00 HIGH CHOLESTEROL: Primary | ICD-10-CM

## 2020-02-13 RX ORDER — ATORVASTATIN CALCIUM 80 MG/1
80 TABLET, FILM COATED ORAL DAILY
Qty: 90 TABLET | Refills: 3 | Status: SHIPPED | OUTPATIENT
Start: 2020-02-13 | End: 2020-05-20

## 2020-02-13 NOTE — TELEPHONE ENCOUNTER
Pended since 2/13/ 2020  May need to open encounter  Portal message sent , RX and labs pended, please sign

## 2020-02-13 NOTE — TELEPHONE ENCOUNTER
----- Message from Katherine Ferguson MD sent at 2/12/2020  1:19 PM CST -----  Thyroid normal- cholesterol too high-needs to increase atorvastatin to 80 mg and we will send in new rx-until then take 4 of the 20 mg-recheck lipids and liver functions in 3 months-low fat diet is critical

## 2020-02-14 NOTE — TELEPHONE ENCOUNTER
----- Message from CHICO Mackenzie,FNP-C sent at 2/12/2020  9:32 AM CST -----  Will discuss all labs at next visit - patient has visit with Dr. Ferguson in a few days, needs to have a discussion about switching from atorvastatin to Crestor or increasing atorvastatin dose as cholesterol is not well controlled (specifically LDL)

## 2020-02-17 ENCOUNTER — OFFICE VISIT (OUTPATIENT)
Dept: FAMILY MEDICINE | Facility: CLINIC | Age: 65
End: 2020-02-17
Payer: COMMERCIAL

## 2020-02-17 VITALS
WEIGHT: 216 LBS | RESPIRATION RATE: 16 BRPM | OXYGEN SATURATION: 99 % | BODY MASS INDEX: 38.27 KG/M2 | TEMPERATURE: 98 F | DIASTOLIC BLOOD PRESSURE: 80 MMHG | SYSTOLIC BLOOD PRESSURE: 136 MMHG | HEART RATE: 66 BPM | HEIGHT: 63 IN

## 2020-02-17 DIAGNOSIS — E03.9 ACQUIRED HYPOTHYROIDISM: ICD-10-CM

## 2020-02-17 DIAGNOSIS — E78.00 PURE HYPERCHOLESTEROLEMIA: ICD-10-CM

## 2020-02-17 DIAGNOSIS — R63.5 WEIGHT GAIN: ICD-10-CM

## 2020-02-17 DIAGNOSIS — E78.00 HIGH CHOLESTEROL: ICD-10-CM

## 2020-02-17 DIAGNOSIS — H93.13 RINGING IN EARS, BILATERAL: Primary | ICD-10-CM

## 2020-02-17 DIAGNOSIS — L08.9 PUSTULE OF NOSTRIL: ICD-10-CM

## 2020-02-17 PROCEDURE — 99214 PR OFFICE/OUTPT VISIT, EST, LEVL IV, 30-39 MIN: ICD-10-PCS | Mod: S$GLB,,, | Performed by: INTERNAL MEDICINE

## 2020-02-17 PROCEDURE — 3008F PR BODY MASS INDEX (BMI) DOCUMENTED: ICD-10-PCS | Mod: S$GLB,,, | Performed by: INTERNAL MEDICINE

## 2020-02-17 PROCEDURE — 99214 OFFICE O/P EST MOD 30 MIN: CPT | Mod: S$GLB,,, | Performed by: INTERNAL MEDICINE

## 2020-02-17 PROCEDURE — 3008F BODY MASS INDEX DOCD: CPT | Mod: S$GLB,,, | Performed by: INTERNAL MEDICINE

## 2020-02-17 RX ORDER — GABAPENTIN 300 MG/1
300 CAPSULE ORAL 3 TIMES DAILY
Qty: 90 CAPSULE | Refills: 2 | Status: SHIPPED | OUTPATIENT
Start: 2020-02-17 | End: 2020-08-27

## 2020-02-17 RX ORDER — PHENTERMINE HYDROCHLORIDE 37.5 MG/1
37.5 TABLET ORAL
Qty: 90 TABLET | Refills: 0 | Status: SHIPPED | OUTPATIENT
Start: 2020-02-17 | End: 2020-05-20 | Stop reason: SDUPTHER

## 2020-02-17 RX ORDER — ROSUVASTATIN CALCIUM 5 MG/1
5 TABLET, COATED ORAL DAILY
Qty: 90 TABLET | Refills: 1 | Status: SHIPPED | OUTPATIENT
Start: 2020-02-17 | End: 2020-08-27 | Stop reason: SDUPTHER

## 2020-02-17 NOTE — PROGRESS NOTES
"  SUBJECTIVE:    Patient ID: Nalini Wooten is a 64 y.o. female.    Chief Complaint: Weight Check; Results (labs); and Follow-up    HPI     Patient comes in for recheck of laboratory studies.  Although she was on low-dose Lipitor, her cholesterol is now 278 with LDL of 204 and HDL of 47, triglyceride 134.  Atorvastatin was increased from 20 mg to 80 mg but consideration is given for Crestor maximum dose-she did not start the higher dose because she thinks the rx made her have some sx of sweating and muscular pain-legs were restless at night-off it three days those sx stopped-we will start the low dose crestor low dose three times a week-she has been on the keto diet for about 6 weeks --    She is feeling well-phentermine is helping --230-216    Thinks she feels a cyst inside right nares-    Ringing in the ears--saw ENT-no finding but it driving her insane-has to keep sound in her home     Last 3 sets of Vitals    Vitals - 1 value per visit 10/21/2019 1/2/2020 2/17/2020   SYSTOLIC 106 151 136   DIASTOLIC 68 93 80   PULSE 59 64 66   TEMPERATURE - 98.3 98.3   RESPIRATIONS 16 - 16   SPO2 98 - 99   Weight (lb) 218.4 218 216   Weight (kg) 99.066 98.884 97.977   HEIGHT 5' 3" 5' 3" 5' 3"   BODY MASS INDEX 38.69 38.62 38.26   VISIT REPORT - - -   Pain Score  0 0 0       Lab Visit on 02/11/2020   Component Date Value Ref Range Status    Cholesterol 02/11/2020 278* 120 - 199 mg/dL Final    Triglycerides 02/11/2020 134  30 - 150 mg/dL Final    HDL 02/11/2020 47  40 - 75 mg/dL Final    LDL Cholesterol 02/11/2020 204.2* 63.0 - 159.0 mg/dL Final    Hdl/Cholesterol Ratio 02/11/2020 16.9* 20.0 - 50.0 % Final    Total Cholesterol/HDL Ratio 02/11/2020 5.9* 2.0 - 5.0 Final    Non-HDL Cholesterol 02/11/2020 231  mg/dL Final    TSH 02/11/2020 2.130  0.340 - 5.600 uIU/mL Final       Past Medical History:   Diagnosis Date    Bulging disc     Carpal tunnel syndrome     Degenerative disc disease     High cholesterol     Pinched " nerve in neck      Past Surgical History:   Procedure Laterality Date    BLADDER SUSPENSION  1990    FOOT SURGERY Bilateral 2001    HAND SURGERY Left 2017    HYSTERECTOMY  1990    JOINT REPLACEMENT  2013    KNEE SURGERY Bilateral 2013    MANDIBLE FRACTURE SURGERY  1981    SHOULDER ARTHROSCOPY Left 02/27/2019    THYROIDECTOMY Left 05/13/2019        TONSILLECTOMY       Family History   Problem Relation Age of Onset    Arthritis Mother     Aneurysm Mother         Abdominal Aneurysm     Arthritis Father     Diabetes Father     Hearing loss Father     Heart disease Father     Hypertension Father     Dementia Father     Diabetes Paternal Grandmother     Heart disease Paternal Grandfather     Leukemia Son     Crohn's disease Son     Ankylosing spondylitis Son     Other Son         avascular necrosis of pancho hips    Rheumatologic disease Son     Heart defect Son     Kidney failure Son     SIDS Maternal Aunt     Uterine cancer Maternal Aunt     Dementia Paternal Aunt     No Known Problems Sister     Heart disease Brother         stent placement    Heart disease Brother         stent placement    Aneurysm Brother         Abdominal Aneurysm    No Known Problems Sister        Marital Status:   Alcohol History:  reports that she does not drink alcohol.  Tobacco History:  reports that she has never smoked. She has never used smokeless tobacco.  Drug History:  reports that she does not use drugs.    Review of patient's allergies indicates:   Allergen Reactions    Estrogens Other (See Comments)     Mini stroke    Tetanus vaccines and toxoid Swelling and Other (See Comments)     Swelling at injection site, fever       Current Outpatient Medications:     clobetasol 0.05% (TEMOVATE) 0.05 % Oint, Apply topically 2 (two) times daily., Disp: 60 g, Rfl: 2    levocetirizine (XYZAL) 5 MG tablet, Take 1 tablet (5 mg total) by mouth every evening., Disp: 90 tablet, Rfl: 3    mirabegron  (MYRBETRIQ) 50 mg Tb24, Take 1 tablet (50 mg total) by mouth once daily. On po daily, Disp: 90 tablet, Rfl: 3    montelukast (SINGULAIR) 10 mg tablet, Take 1 tablet (10 mg total) by mouth once daily., Disp: 90 tablet, Rfl: 3    phentermine (ADIPEX-P) 37.5 mg tablet, Take 1 tablet (37.5 mg total) by mouth before breakfast., Disp: 90 tablet, Rfl: 0    terbinafine HCl (LAMISIL) 250 mg tablet, TK 1 T PO QD, Disp: , Rfl: 0    tiZANidine 4 mg Cap, Take 1 capsule by mouth daily as needed., Disp: 90 capsule, Rfl: 1    traMADol (ULTRAM) 50 mg tablet, Take 1 tablet (50 mg total) by mouth every 4 (four) hours as needed for Pain., Disp: 42 tablet, Rfl: 0    atorvastatin (LIPITOR) 80 MG tablet, Take 1 tablet (80 mg total) by mouth once daily. (Patient not taking: Reported on 2/17/2020), Disp: 90 tablet, Rfl: 3    gabapentin (NEURONTIN) 300 MG capsule, Take 1 capsule (300 mg total) by mouth 3 (three) times daily., Disp: 90 capsule, Rfl: 2    rosuvastatin (CRESTOR) 5 MG tablet, Take 1 tablet (5 mg total) by mouth once daily., Disp: 90 tablet, Rfl: 1    Review of Systems   Constitutional: Negative for chills, fatigue, fever and unexpected weight change.   HENT: Positive for dental problem. Negative for congestion, ear pain, hearing loss, sinus pain and sore throat.         HPI--ears negative   Eyes: Negative for pain and visual disturbance.   Respiratory: Negative for cough, shortness of breath and wheezing.    Cardiovascular: Negative for chest pain, palpitations and leg swelling.   Gastrointestinal: Negative for abdominal pain, blood in stool, constipation, diarrhea, nausea and vomiting.   Endocrine: Negative for cold intolerance and heat intolerance.   Genitourinary: Negative for difficulty urinating, dysuria, frequency, pelvic pain and urgency.   Musculoskeletal: Negative for back pain, joint swelling and neck pain.   Skin: Negative for pallor and rash.        Lesion in left nares with some erythema   Neurological:  "Negative for dizziness, tremors, weakness, numbness and headaches.   Hematological: Does not bruise/bleed easily.   Psychiatric/Behavioral: Negative for agitation, sleep disturbance and suicidal ideas.          Objective:      Vitals:    02/17/20 1035   BP: 136/80   Pulse: 66   Resp: 16   Temp: 98.3 °F (36.8 °C)   SpO2: 99%   Weight: 98 kg (216 lb)   Height: 5' 3" (1.6 m)     Physical Exam   Constitutional: She is oriented to person, place, and time. She appears well-developed. She is cooperative. No distress.   Increased BMI   HENT:   Head: Normocephalic and atraumatic.   Right Ear: Tympanic membrane normal.   Left Ear: Tympanic membrane normal.   Mouth/Throat: Uvula is midline and mucous membranes are normal.   Left anterior nares with very small pustule with surrounding erythema with early purulent base   Eyes: Pupils are equal, round, and reactive to light. Conjunctivae and EOM are normal. Right eye exhibits no discharge. Left eye exhibits no discharge. No scleral icterus. Right pupil is round and reactive. Left pupil is round and reactive.   Neck: Trachea normal and normal range of motion. Neck supple. No JVD present. Carotid bruit is not present. No thyromegaly present.   Cardiovascular: Normal rate, regular rhythm and intact distal pulses. Exam reveals no gallop and no friction rub.   No murmur heard.  Pulmonary/Chest: Effort normal and breath sounds normal. No respiratory distress. She has no wheezes. She has no rales.   Abdominal: Soft. Bowel sounds are normal. She exhibits no distension and no mass. There is no tenderness. There is no guarding. No hernia.   Musculoskeletal: Normal range of motion. She exhibits no edema.   Neurological: She is alert and oriented to person, place, and time. She has normal strength.   Skin: Skin is warm and dry. Capillary refill takes less than 2 seconds. No lesion and no rash noted. No cyanosis. Nails show no clubbing.   Psychiatric: She has a normal mood and affect. Her " speech is normal and behavior is normal. Judgment and thought content normal.   Nursing note and vitals reviewed.        Assessment:       1. Ringing in ears, bilateral    2. Pustule of nostril    3. Pure hypercholesterolemia    4. High cholesterol    5. Acquired hypothyroidism    6. Weight gain    7. BMI 38.0-38.9,adult         Plan:       Ringing in ears, bilateral  -     gabapentin (NEURONTIN) 300 MG capsule; Take 1 capsule (300 mg total) by mouth 3 (three) times daily.  Dispense: 90 capsule; Refill: 2    Pustule of nostril  -     CBC auto differential; Future; Expected date: 05/17/2020    Pure hypercholesterolemia  -     Lipid panel; Future; Expected date: 05/17/2020  -     Comprehensive metabolic panel; Future; Expected date: 05/17/2020  -     rosuvastatin (CRESTOR) 5 MG tablet; Take 1 tablet (5 mg total) by mouth once daily.  Dispense: 90 tablet; Refill: 1    High cholesterol  -     Comprehensive metabolic panel; Future; Expected date: 05/17/2020    Acquired hypothyroidism  -     TSH; Future; Expected date: 05/17/2020  -     Comprehensive metabolic panel; Future; Expected date: 05/17/2020    Weight gain  -     phentermine (ADIPEX-P) 37.5 mg tablet; Take 1 tablet (37.5 mg total) by mouth before breakfast.  Dispense: 90 tablet; Refill: 0    BMI 38.0-38.9,adult      No follow-ups on file.        2/17/2020 Katherine Ferguson M.D.

## 2020-03-05 ENCOUNTER — OFFICE VISIT (OUTPATIENT)
Dept: FAMILY MEDICINE | Facility: CLINIC | Age: 65
End: 2020-03-05
Payer: COMMERCIAL

## 2020-03-05 VITALS
DIASTOLIC BLOOD PRESSURE: 76 MMHG | WEIGHT: 221 LBS | BODY MASS INDEX: 39.16 KG/M2 | HEART RATE: 68 BPM | OXYGEN SATURATION: 98 % | HEIGHT: 63 IN | SYSTOLIC BLOOD PRESSURE: 136 MMHG

## 2020-03-05 DIAGNOSIS — T63.483A INSECT STINGS, ASSAULT, INITIAL ENCOUNTER: Primary | ICD-10-CM

## 2020-03-05 DIAGNOSIS — L30.8 PRURITIC DERMATITIS: ICD-10-CM

## 2020-03-05 DIAGNOSIS — L27.0 DERMATITIS DUE TO DRUG REACTION: ICD-10-CM

## 2020-03-05 DIAGNOSIS — E03.9 ACQUIRED HYPOTHYROIDISM: ICD-10-CM

## 2020-03-05 DIAGNOSIS — L03.114 CELLULITIS OF LEFT UPPER EXTREMITY: ICD-10-CM

## 2020-03-05 PROCEDURE — 99213 OFFICE O/P EST LOW 20 MIN: CPT | Mod: S$GLB,,, | Performed by: NURSE PRACTITIONER

## 2020-03-05 PROCEDURE — 3008F PR BODY MASS INDEX (BMI) DOCUMENTED: ICD-10-PCS | Mod: S$GLB,,, | Performed by: NURSE PRACTITIONER

## 2020-03-05 PROCEDURE — 99213 PR OFFICE/OUTPT VISIT, EST, LEVL III, 20-29 MIN: ICD-10-PCS | Mod: S$GLB,,, | Performed by: NURSE PRACTITIONER

## 2020-03-05 PROCEDURE — 3008F BODY MASS INDEX DOCD: CPT | Mod: S$GLB,,, | Performed by: NURSE PRACTITIONER

## 2020-03-05 RX ORDER — METHYLPREDNISOLONE 4 MG/1
TABLET ORAL
Qty: 1 PACKAGE | Refills: 0 | Status: SHIPPED | OUTPATIENT
Start: 2020-03-05 | End: 2020-03-26

## 2020-03-05 RX ORDER — HYDROXYZINE HYDROCHLORIDE 25 MG/1
25 TABLET, FILM COATED ORAL 3 TIMES DAILY PRN
Qty: 60 TABLET | Refills: 0 | Status: SHIPPED | OUTPATIENT
Start: 2020-03-05 | End: 2020-05-20

## 2020-03-05 RX ORDER — DOXYCYCLINE HYCLATE 100 MG
100 TABLET ORAL 2 TIMES DAILY
Qty: 20 TABLET | Refills: 0 | Status: SHIPPED | OUTPATIENT
Start: 2020-03-05 | End: 2020-05-14 | Stop reason: ALTCHOICE

## 2020-03-05 NOTE — PROGRESS NOTES
SUBJECTIVE:    Patient ID: Nalini Wooten is a 64 y.o. female.    Chief Complaint: Insect Bite    Ms Wooten is a 64-year-old female who is here today with complaint of suspected spider bite.  She was working in her garden and some many small black jumping spiders and then felt stinging to left posterior arm proximal to elbow.  There are 3 raised erythemic lesions with surrounding erythema which has progressed since she marked the area of redness yesterday.  She reports that last night the papules were draining clear fluid.  Area is warm to touch.  Her  told her she felt febrile yesterday to touch.    Denies any cardiovascular complaint.  Denies dyspepsia, cough, hemoptysis or melena.      She does report incessant itching which is diffuse and over her entire body for the last week prior to the insect sting.  Her skin appears dry and slightly flaky when she scratches.  She does report that she keeps well hydrated.  Benadryl has not helped.  She was started on 2 new medications after her visit with Dr. Ferguson last week, gabapentin and Crestor.  Advised to stop both of these new medications for 1 week or until itching ceases, resume Crestor for 1-2 weeks then add gabapentin to ensure that this diffuse pruritus is not a medication reaction.      Past Medical History:   Diagnosis Date    Bulging disc     Carpal tunnel syndrome     Degenerative disc disease     High cholesterol     Pinched nerve in neck      Past Surgical History:   Procedure Laterality Date    BLADDER SUSPENSION  1990    FOOT SURGERY Bilateral 2001    HAND SURGERY Left 2017    HYSTERECTOMY  1990    JOINT REPLACEMENT  2013    KNEE SURGERY Bilateral 2013    MANDIBLE FRACTURE SURGERY  1981    SHOULDER ARTHROSCOPY Left 02/27/2019    THYROIDECTOMY Left 05/13/2019        TONSILLECTOMY       Family History   Problem Relation Age of Onset    Arthritis Mother     Aneurysm Mother         Abdominal Aneurysm     Arthritis Father      Diabetes Father     Hearing loss Father     Heart disease Father     Hypertension Father     Dementia Father     Diabetes Paternal Grandmother     Heart disease Paternal Grandfather     Leukemia Son     Crohn's disease Son     Ankylosing spondylitis Son     Other Son         avascular necrosis of pancho hips    Rheumatologic disease Son     Heart defect Son     Kidney failure Son     SIDS Maternal Aunt     Uterine cancer Maternal Aunt     Dementia Paternal Aunt     No Known Problems Sister     Heart disease Brother         stent placement    Heart disease Brother         stent placement    Aneurysm Brother         Abdominal Aneurysm    No Known Problems Sister        Marital Status:   Alcohol History:  reports that she does not drink alcohol.  Tobacco History:  reports that she has never smoked. She has never used smokeless tobacco.  Drug History:  reports that she does not use drugs.    Review of patient's allergies indicates:   Allergen Reactions    Estrogens Other (See Comments)     Mini stroke    Tetanus vaccines and toxoid Swelling and Other (See Comments)     Swelling at injection site, fever       Current Outpatient Medications:     atorvastatin (LIPITOR) 80 MG tablet, Take 1 tablet (80 mg total) by mouth once daily., Disp: 90 tablet, Rfl: 3    clobetasol 0.05% (TEMOVATE) 0.05 % Oint, Apply topically 2 (two) times daily., Disp: 60 g, Rfl: 2    gabapentin (NEURONTIN) 300 MG capsule, Take 1 capsule (300 mg total) by mouth 3 (three) times daily., Disp: 90 capsule, Rfl: 2    levocetirizine (XYZAL) 5 MG tablet, Take 1 tablet (5 mg total) by mouth every evening., Disp: 90 tablet, Rfl: 3    mirabegron (MYRBETRIQ) 50 mg Tb24, Take 1 tablet (50 mg total) by mouth once daily. On po daily, Disp: 90 tablet, Rfl: 3    montelukast (SINGULAIR) 10 mg tablet, Take 1 tablet (10 mg total) by mouth once daily., Disp: 90 tablet, Rfl: 3    phentermine (ADIPEX-P) 37.5 mg tablet, Take 1 tablet  "(37.5 mg total) by mouth before breakfast., Disp: 90 tablet, Rfl: 0    rosuvastatin (CRESTOR) 5 MG tablet, Take 1 tablet (5 mg total) by mouth once daily., Disp: 90 tablet, Rfl: 1    terbinafine HCl (LAMISIL) 250 mg tablet, TK 1 T PO QD, Disp: , Rfl: 0    tiZANidine 4 mg Cap, Take 1 capsule by mouth daily as needed., Disp: 90 capsule, Rfl: 1    traMADol (ULTRAM) 50 mg tablet, Take 1 tablet (50 mg total) by mouth every 4 (four) hours as needed for Pain., Disp: 42 tablet, Rfl: 0    doxycycline (VIBRA-TABS) 100 MG tablet, Take 1 tablet (100 mg total) by mouth 2 (two) times daily., Disp: 20 tablet, Rfl: 0    hydrOXYzine HCl (ATARAX) 25 MG tablet, Take 1 tablet (25 mg total) by mouth 3 (three) times daily as needed for Itching., Disp: 60 tablet, Rfl: 0    methylPREDNISolone (MEDROL DOSEPACK) 4 mg tablet, use as directed, Disp: 1 Package, Rfl: 0    ranitidine (ZANTAC) 75 MG tablet, Take 1 tablet (75 mg total) by mouth 2 (two) times daily., Disp: 60 tablet, Rfl: 0    Review of Systems   Skin: Positive for wound (insect sting x3, L elbow, with surrounding erythema).        Generalized itching   All other systems reviewed and are negative.         Objective:      Vitals:    03/05/20 1624   BP: 136/76   Pulse: 68   SpO2: 98%   Weight: 100.2 kg (221 lb)   Height: 5' 3" (1.6 m)     Body mass index is 39.15 kg/m².  Physical Exam   Constitutional: She is oriented to person, place, and time. She appears well-developed and well-nourished.   Obese   HENT:   Head: Normocephalic.   Eyes: Pupils are equal, round, and reactive to light. Conjunctivae and EOM are normal.   Neck: Normal range of motion. Neck supple. No thyromegaly present.   Cardiovascular: Normal rate, regular rhythm and normal heart sounds.   Pulmonary/Chest: Effort normal and breath sounds normal.   Musculoskeletal: Normal range of motion.   Neurological: She is alert and oriented to person, place, and time.   Skin: Skin is warm and dry. Capillary refill takes " less than 2 seconds. There is erythema (3 erythemic raised papules w/o drng to posterior left arm approximately 1 cm in diameter each, w without drainage, with surrounding erythema of approximately 3x8 cm isa, warm/tender to touch, to inner LUE, area of erythema was marked with black pen).   Psychiatric: She has a normal mood and affect.   Nursing note and vitals reviewed.        Assessment:       1. Insect stings, assault, initial encounter    2. Cellulitis of left upper extremity    3. Pruritic dermatitis    4. Dermatitis due to drug reaction    5. Acquired hypothyroidism         Plan:          Insect stings, assault, initial encounter  -     methylPREDNISolone (MEDROL DOSEPACK) 4 mg tablet; use as directed  Dispense: 1 Package; Refill: 0  -     doxycycline (VIBRA-TABS) 100 MG tablet; Take 1 tablet (100 mg total) by mouth 2 (two) times daily.  Dispense: 20 tablet; Refill: 0  -     ranitidine (ZANTAC) 75 MG tablet; Take 1 tablet (75 mg total) by mouth 2 (two) times daily.  Dispense: 60 tablet; Refill: 0    Cellulitis of left upper extremity  -     methylPREDNISolone (MEDROL DOSEPACK) 4 mg tablet; use as directed  Dispense: 1 Package; Refill: 0  -     doxycycline (VIBRA-TABS) 100 MG tablet; Take 1 tablet (100 mg total) by mouth 2 (two) times daily.  Dispense: 20 tablet; Refill: 0  -     ranitidine (ZANTAC) 75 MG tablet; Take 1 tablet (75 mg total) by mouth 2 (two) times daily.  Dispense: 60 tablet; Refill: 0    Pruritic dermatitis  -     methylPREDNISolone (MEDROL DOSEPACK) 4 mg tablet; use as directed  Dispense: 1 Package; Refill: 0  -     hydrOXYzine HCl (ATARAX) 25 MG tablet; Take 1 tablet (25 mg total) by mouth 3 (three) times daily as needed for Itching.  Dispense: 60 tablet; Refill: 0  -     ranitidine (ZANTAC) 75 MG tablet; Take 1 tablet (75 mg total) by mouth 2 (two) times daily.  Dispense: 60 tablet; Refill: 0    Dermatitis due to drug reaction ?  Possible. Stop new medications for 1-2 weeks until pruritus  ceases, restart Crestor for 1-2 weeks then resume gabapentin tp ensure pruritus is not sequelae drug reaction.    Acquired hypothyroidism  Continue current regimen.    Follow up as previously scheduled, sooner if symptoms worsen or fail to improve.

## 2020-03-05 NOTE — PATIENT INSTRUCTIONS
"  Contact Dermatitis  Contact dermatitis is a skin rash caused by something that touches the skin and makes it irritated and inflamed. Your skin may be red, swollen, dry, and may be cracked. Blisters may form and ooze. The rash will itch.  Contact dermatitis can form on the face and neck, backs of hands, forearms, genitals, and lower legs.  People can get contact dermatitis from lots of sources. These include:  · Plants such as poison ivy, oak, or sumac  · Chemicals in hair dyes and rinses, soaps, solvents, waxes, fingernail polish, and deodorants   · Jewelry or watchbands made of nickel  Contact dermatitis is not passed from person to person.  Talk with your healthcare provider about what may have caused the rash. A type of allergy testing called "patch testing" may be used to discover what you are allergic to. You will need to avoid the source of your rash in the future to prevent it from coming back.  Treatment is done to relieve itching and prevent the rash from coming back. The rash should go away in a few days to a few weeks.  Home care  Your healthcare provider may prescribe medicine to relieve swelling and itching. Follow all instructions when using these medicines.  General care:  · Avoid anything that heats up your skin, such as hot showers or baths, or direct sunlight. This can make itching worse.  · Apply cold compresses to soothe your sores to help relieve your symptoms. Do this for 30 minutes 3 to 4 times a day. You can make a cold compress by soaking a cloth in cold water. Squeeze out excess water. You can add colloidal oatmeal to the water to help reduce itching. For severe itching in a small area, apply an ice pack wrapped in a thin towel. Do this for 20 minutes 3 to 4 times a day.  · You can also try wet dressings. One way to do this is to wear a wet piece of clothing under a dry one. Wear a damp shirt under a dry shirt if your upper body is affected. This can relieve itching and prevent you from " scratching the affected area.  · You can also help relieve large areas of itching by taking a lukewarm bath with colloidal oatmeal added to the water.  · Use hydrocortisone cream for redness and irritation, unless another medicine was prescribed. You can also use benzocaine anesthetic cream or spray. Calamine lotion can also relieve mild symptoms.  · Use oral diphenhydramine to help reduce itching. You can buy this antihistamine at drug and grocery stores. It can make you sleepy, so use lower doses during the daytime. Or you can use loratadine. This is an antihistamine that will not make you sleepy. Do not use diphenhydramine if you have glaucoma or have trouble urinating due to an enlarged prostate.  · If a plant causes your rash, make sure to wash your skin and the clothes you were wearing when you came into contact with the plant. This is to wash away the plant oils that gave you the rash and prevent more or worse symptoms.  · Stay away from the substance or object that causes your symptoms. If you cant avoid it, wear gloves or some other type of protection.  Follow-up care  Follow up with your healthcare provider, or as advised.  When to seek medical advice  Call your healthcare provider right away if any of these occur:  · Spreading of the rash to other parts of your body  · Severe swelling of your face, eyelids, mouth, throat or tongue  · Trouble urinating due to swelling in the genital area  · Fever of 100.4°F (38°C) or higher  · Redness or swelling that gets worse  · Pain that gets worse  · Foul-smelling fluid leaking from the skin  · Yellow-brown crusts on the open blisters  Date Last Reviewed: 9/1/2016  © 0394-8333 [employer]. [address] [place] [address], [place] [address]. All rights reserved. This information is not intended as a substitute for professional medical care. Always follow your healthcare professional's instructions.        Insect Sting: Local Reaction   You have been stung or  bitten by an insect. The insects venom or body fluid is causing your skin to react in the area where you were stung or bitten. This often causes redness, itching and swelling. This reaction will fade over a few hours, but it can last a few days. An insect bite or sting can become infected 1 to 3 days later, so watch for the signs below. Sometimes it is hard to tell the difference between a local reaction to the insect bite or sting and an early infection, so you may be given antibiotics.  Common insect stings causing problems are from wasps, bees, yellow jackets, and hornets. Common bites are from spiders, mosquitoes, fleas, or ticks. Other types of insects may be more common in different parts of the country or world.  Most people think of allergic reactions when someone has a rash or itchy skin. Symptoms can include:  · Rash, hives, redness, welts, or blisters  · Itching, burning, stinging, or pain  · Swelling around the sting area.  Sometimes swelling spreads to other areas.  Home care  Medicines  The healthcare provider may prescribe medicines to relieve swelling, itching, and pain. Follow the providers instructions when taking these medicines.  · If you had a severe reaction, the provider may prescribe an epinephrine kit. Epinephrine will stop an allergic reaction from getting worse. Before you leave the hospital, be sure that you understand when and how to use this medicine.  · Diphenhydramine is an oral antihistamine available at drugstores and groceries. Unless a prescription antihistamine was given, you can use this medicine to reduce itching if large areas of the skin are involved. The medicine may make you sleepy, so be careful using it in the daytime or when going to school, working, or driving. Dont use diphenhydramine if you have glaucoma or if you are a man with trouble urinating because of an enlarged prostate. Other antihistamines cause less drowsiness and are good choices for daytime use. Ask  your pharmacist for suggestions.  · Dont use diphenhydramine cream on your skin. In some people it can cause additional reaction and make you allergic to this medicine.  · Calamine lotion or oatmeal baths sometimes help with itching.  · You may use acetaminophen or ibuprofen to control pain, unless another pain medicine was prescribed. Talk with your healthcare provider before using these medicines if you have chronic liver or kidney disease. Also talk with your provider if youve had a stomach ulcer or GI bleeding.  General care    · If itching is a problem, dont take hot showers or baths. Stay out of direct sunlight. These heat up your skin and will make the itching worse.  · Use an ice pack to reduce local areas of redness and itching. You can make your own ice pack by putting ice cubes in a bag that seals and wrapping it in a thin towel. Dont put the ice directly on your skin, because it can damage the skin.  · Try not to scratch any affected areas and damage the skin. This will help prevent an infection.  · If oral antibiotics were prescribed, be sure to take them until finished.  Preventing future reactions  · Future reactions could be worse than this one, so try to stay away from places where you might be stung again.  · Be aware that honeybees nest in trees. Wasps and yellow jackets nest in the ground, trees, or roof eaves.  · If you are stung by a honeybee, a stinger will remain in your skin. Wasps, yellow jackets, and hornets dont leave a stinger behind. Move away from the nest area right away. The stinger of a honeybee releases a substance that will attract other bees to you. Once you are away from the nest, then remove the stinger as quickly as possible.  · After any sting, you may apply ice and take diphenhydramine or another antihistamine. If you develop any of the warning signs below, seek help right away.  · If you are at high risk for another sting, or if your reaction included dizziness,  fainting, or trouble breathing or swallowing, ask your doctor for an insect allergy kit.  · Remove any ticks on the skin with a set of fine tweezers.  the tick as close to the skin as possible. Pull back gently but firmly. Use an even, steady pressure. Dont jerk or twist. Dont squeeze, crush, or puncture the body of the tick. The bodily fluids may contain infection-causing germs. Dont use a smoldering match or cigarette, nail polish, petroleum jelly, liquid soap, or kerosene. These may irritate the tick. If any mouthparts of the tick remain in the skin, these should be left alone. They will fall off on their own. Trying to remove these parts may damage the skin unless they can be removed very easily. After the tick is removed, wash the bite area with rubbing alcohol, iodine, or soap and water.  Follow-up care  Follow up with your doctor in 2 days, or as advised, if your symptoms dont start to get better.  Call 911  Call 911 if any of these occur:  · Trouble breathing or swallowing, or wheezing  · New or worsening swelling in the mouth, throat, or tongue  · Hoarse voice or trouble speaking  · Confused  · Very drowsy or trouble awakening  · Fainting or loss of consciousness  · Rapid heart rate  · Low blood pressure  · Feeling of doom  · Nausea, vomiting, abdominal pain, or diarrhea  · Vomiting blood, or large amounts of blood in stool  · Seizure  When to seek medical advice  Call your healthcare provider right away if any of these occur:  · Spreading areas of itching, redness or swelling  · New or worse swelling in the face, eyelids, or  lips  · Dizziness or weakness  Also call your provider right away if you have signs of infection:  · Spreading redness  · Increased pain or swelling  · Fever of 100.4°F (38°C) or higher, or as directed by your healthcare provider  · Colored fluid draining from the sting area   Date Last Reviewed: 10/1/2016  © 7483-2403 [employer]. [address] [place] [address],  [place] [address]. All rights reserved. This information is not intended as a substitute for professional medical care. Always follow your healthcare professional's instructions.        Discharge Instructions for Cellulitis  You have been diagnosed with cellulitis. This is an infection in the deepest layer of the skin. In some cases, the infection also affects the muscle. Cellulitis is caused by bacteria. The bacteria can enter the body through broken skin. This can happen with a cut, scratch, animal bite, or an insect bite that has been scratched. You may have been treated in the hospital with antibiotics and fluids. You will likely be given a prescription for antibiotics to take at home. This sheet will help you take care of yourself at home.  Home care  When you are home:  · Take the prescribed antibiotic medicine you are given as directed until it is gone. Take it even if you feel better. It treats the infection and stops it from returning. Not taking all the medicine can make future infections hard to treat.  · Keep the infected area clean.  · When possible, raise the infected area above the level of your heart. This helps keep swelling down.  · Talk with your healthcare provider if you are in pain. Ask what kind of over-the-counter medicine you can take for pain.  · Apply clean bandages as advised.  · Take your temperature once a day for a week.  · Wash your hands often to prevent spreading the infection.  In the future, wash your hands before and after you touch cuts, scratches, or bandages. This will help prevent infection.   When to call your healthcare provider  Call your healthcare provider immediately if you have any of the following:  · Difficulty or pain when moving the joints above or below the infected area  · Discharge or pus draining from the area  · Fever of 100.4°F (38°C) or higher, or as directed by your healthcare provider  · Pain that gets worse in or around the infected   · Redness that gets  worse in or around the infected area, particularly if the area of redness expands to a wider area  · Shaking chills  · Swelling of the infected area  · Vomiting   Date Last Reviewed: 8/1/2016  © 6952-5842 [employer]. [address] [place] [address], [place] [address]. All rights reserved. This information is not intended as a substitute for professional medical care. Always follow your healthcare professional's instructions.

## 2020-05-14 ENCOUNTER — TELEPHONE (OUTPATIENT)
Dept: FAMILY MEDICINE | Facility: CLINIC | Age: 65
End: 2020-05-14

## 2020-05-19 NOTE — PROGRESS NOTES
SUBJECTIVE:    Patient ID: Nalini Wooten is a 65 y.o. female.    Chief Complaint: No chief complaint on file.    HPI    Admit Date:     Discharge Date:    Discharge Facility: {Discharge Facility:[id number]}    Medication Reconciliation:  {Medication Reconciliation:10752}. ***  New Prescriptions filled after discharge: {YES/NO/NOT APPLICABLE:21442}  Discharge summary reviewed:  {YES/NO/NOT APPLICABLE:21442}  Pending test results at discharge reviewed:   {YES/NO/NOT APPLICABLE:21442}  Follow up appointments scheduled:  {YES/NO/NOT APPLICABLE:21442}   {Follow-Up Appt Scheduled:27198}  Follow up labs/tests ordered:   {YES/NO/NOT APPLICABLE:21442}  Home Health ordered on discharge:   {YES/NO/NOT APPLICABLE:21442}  Home Health company name: {Home Health Agencies:[id number]}  DME ordered at discharge:   {YES/NO/NOT APPLICABLE:21442}  How patient is feeling since discharge from the hospital?  ***         Lab Visit on 02/11/2020   Component Date Value Ref Range Status    Cholesterol 02/11/2020 278* 120 - 199 mg/dL Final    Triglycerides 02/11/2020 134  30 - 150 mg/dL Final    HDL 02/11/2020 47  40 - 75 mg/dL Final    LDL Cholesterol 02/11/2020 204.2* 63.0 - 159.0 mg/dL Final    Hdl/Cholesterol Ratio 02/11/2020 16.9* 20.0 - 50.0 % Final    Total Cholesterol/HDL Ratio 02/11/2020 5.9* 2.0 - 5.0 Final    Non-HDL Cholesterol 02/11/2020 231  mg/dL Final    TSH 02/11/2020 2.130  0.340 - 5.600 uIU/mL Final       Past Medical History:   Diagnosis Date    Bulging disc     Carpal tunnel syndrome     Degenerative disc disease     High cholesterol     Pinched nerve in neck      Past Surgical History:   Procedure Laterality Date    BLADDER SUSPENSION  1990    FOOT SURGERY Bilateral 2001    HAND SURGERY Left 2017    HYSTERECTOMY  1990    JOINT REPLACEMENT  2013    KNEE SURGERY Bilateral 2013    MANDIBLE FRACTURE SURGERY  1981    SHOULDER ARTHROSCOPY Left 02/27/2019    THYROIDECTOMY Left 05/13/2019         TONSILLECTOMY       Family History   Problem Relation Age of Onset    Arthritis Mother     Aneurysm Mother         Abdominal Aneurysm     Arthritis Father     Diabetes Father     Hearing loss Father     Heart disease Father     Hypertension Father     Dementia Father     Diabetes Paternal Grandmother     Heart disease Paternal Grandfather     Leukemia Son     Crohn's disease Son     Ankylosing spondylitis Son     Other Son         avascular necrosis of pancho hips    Rheumatologic disease Son     Heart defect Son     Kidney failure Son     SIDS Maternal Aunt     Uterine cancer Maternal Aunt     Dementia Paternal Aunt     No Known Problems Sister     Heart disease Brother         stent placement    Heart disease Brother         stent placement    Aneurysm Brother         Abdominal Aneurysm    No Known Problems Sister        Marital Status:   Alcohol History:  reports that she does not drink alcohol.  Tobacco History:  reports that she has never smoked. She has never used smokeless tobacco.  Drug History:  reports that she does not use drugs.    Review of patient's allergies indicates:   Allergen Reactions    Estrogens Other (See Comments)     Mini stroke    Tetanus vaccines and toxoid Swelling and Other (See Comments)     Swelling at injection site, fever       Current Outpatient Medications:     atorvastatin (LIPITOR) 80 MG tablet, Take 1 tablet (80 mg total) by mouth once daily., Disp: 90 tablet, Rfl: 3    clobetasol 0.05% (TEMOVATE) 0.05 % Oint, Apply topically 2 (two) times daily., Disp: 60 g, Rfl: 2    gabapentin (NEURONTIN) 300 MG capsule, Take 1 capsule (300 mg total) by mouth 3 (three) times daily., Disp: 90 capsule, Rfl: 2    hydrOXYzine HCl (ATARAX) 25 MG tablet, Take 1 tablet (25 mg total) by mouth 3 (three) times daily as needed for Itching., Disp: 60 tablet, Rfl: 0    levocetirizine (XYZAL) 5 MG tablet, Take 1 tablet (5 mg total) by mouth every evening., Disp: 90  tablet, Rfl: 3    mirabegron (MYRBETRIQ) 50 mg Tb24, Take 1 tablet (50 mg total) by mouth once daily. On po daily, Disp: 90 tablet, Rfl: 3    montelukast (SINGULAIR) 10 mg tablet, Take 1 tablet (10 mg total) by mouth once daily., Disp: 90 tablet, Rfl: 3    phentermine (ADIPEX-P) 37.5 mg tablet, Take 1 tablet (37.5 mg total) by mouth before breakfast., Disp: 90 tablet, Rfl: 0    ranitidine (ZANTAC) 75 MG tablet, Take 1 tablet (75 mg total) by mouth 2 (two) times daily., Disp: 60 tablet, Rfl: 0    rosuvastatin (CRESTOR) 5 MG tablet, Take 1 tablet (5 mg total) by mouth once daily., Disp: 90 tablet, Rfl: 1    terbinafine HCl (LAMISIL) 250 mg tablet, TK 1 T PO QD, Disp: , Rfl: 0    tiZANidine 4 mg Cap, Take 1 capsule by mouth daily as needed., Disp: 90 capsule, Rfl: 1    traMADol (ULTRAM) 50 mg tablet, Take 1 tablet (50 mg total) by mouth every 4 (four) hours as needed for Pain., Disp: 42 tablet, Rfl: 0    Review of Systems     Objective:      There were no vitals filed for this visit.  Physical Exam    Assessment:       No diagnosis found.     Plan:       There are no diagnoses linked to this encounter.  No follow-ups on file.

## 2020-05-19 NOTE — PROGRESS NOTES
SUBJECTIVE:    Patient ID: Nalini Wooten is a 65 y.o. female.    Chief Complaint: Weight Check and Follow-up    HPI    PATIENT IN FOR WEIGHT CHECK.  LAST WEIGHT 221 LB    Laboratory studies revealed cholesterol 278 , triglycerides 134, HDL 47, TSH--atorvastatin was increased to 80 mg and emphasis placed on strict low fat-she is on phentermine and diet.She has lost 9 pounds since last check-she has been on keto diet-has cut out all starches and feels a lot better.She was treated with steroid taper-This may be due to gabapentin which I gave her for tinnitis.    She has chronic pruritis-bx is pending-    TSH is normal-      Lab Visit on 02/11/2020   Component Date Value Ref Range Status    Cholesterol 02/11/2020 278* 120 - 199 mg/dL Final    Triglycerides 02/11/2020 134  30 - 150 mg/dL Final    HDL 02/11/2020 47  40 - 75 mg/dL Final    LDL Cholesterol 02/11/2020 204.2* 63.0 - 159.0 mg/dL Final    Hdl/Cholesterol Ratio 02/11/2020 16.9* 20.0 - 50.0 % Final    Total Cholesterol/HDL Ratio 02/11/2020 5.9* 2.0 - 5.0 Final    Non-HDL Cholesterol 02/11/2020 231  mg/dL Final    TSH 02/11/2020 2.130  0.340 - 5.600 uIU/mL Final       Past Medical History:   Diagnosis Date    Bulging disc     Carpal tunnel syndrome     Degenerative disc disease     High cholesterol     Pinched nerve in neck      Past Surgical History:   Procedure Laterality Date    BLADDER SUSPENSION  1990    FOOT SURGERY Bilateral 2001    HAND SURGERY Left 2017    HYSTERECTOMY  1990    JOINT REPLACEMENT  2013    KNEE SURGERY Bilateral 2013    MANDIBLE FRACTURE SURGERY  1981    SHOULDER ARTHROSCOPY Left 02/27/2019    THYROIDECTOMY Left 05/13/2019        TONSILLECTOMY       Family History   Problem Relation Age of Onset    Arthritis Mother     Aneurysm Mother         Abdominal Aneurysm     Arthritis Father     Diabetes Father     Hearing loss Father     Heart disease Father     Hypertension Father     Dementia  Father     Diabetes Paternal Grandmother     Heart disease Paternal Grandfather     Leukemia Son     Crohn's disease Son     Ankylosing spondylitis Son     Other Son         avascular necrosis of pancho hips    Rheumatologic disease Son     Heart defect Son     Kidney failure Son     SIDS Maternal Aunt     Uterine cancer Maternal Aunt     Dementia Paternal Aunt     No Known Problems Sister     Heart disease Brother         stent placement    Heart disease Brother         stent placement    Aneurysm Brother         Abdominal Aneurysm    No Known Problems Sister        Marital Status:   Alcohol History:  reports that she does not drink alcohol.  Tobacco History:  reports that she has never smoked. She has never used smokeless tobacco.  Drug History:  reports that she does not use drugs.    Review of patient's allergies indicates:   Allergen Reactions    Estrogens Other (See Comments)     Mini stroke    Tetanus vaccines and toxoid Swelling and Other (See Comments)     Swelling at injection site, fever       Current Outpatient Medications:     clobetasol 0.05% (TEMOVATE) 0.05 % Oint, Apply topically 2 (two) times daily., Disp: 60 g, Rfl: 2    gabapentin (NEURONTIN) 300 MG capsule, Take 1 capsule (300 mg total) by mouth 3 (three) times daily., Disp: 90 capsule, Rfl: 2    levocetirizine (XYZAL) 5 MG tablet, Take 1 tablet (5 mg total) by mouth every evening., Disp: 90 tablet, Rfl: 3    mirabegron (MYRBETRIQ) 50 mg Tb24, Take 1 tablet (50 mg total) by mouth once daily. On po daily, Disp: 90 tablet, Rfl: 3    montelukast (SINGULAIR) 10 mg tablet, Take 1 tablet (10 mg total) by mouth once daily., Disp: 90 tablet, Rfl: 3    ofloxacin (OCUFLOX) 0.3 % ophthalmic solution, , Disp: , Rfl:     phentermine (ADIPEX-P) 37.5 mg tablet, Take 1 tablet (37.5 mg total) by mouth before breakfast., Disp: 90 tablet, Rfl: 0    ranitidine (ZANTAC) 75 MG tablet, Take 1 tablet (75 mg total) by mouth 2 (two) times  "daily., Disp: 60 tablet, Rfl: 0    rosuvastatin (CRESTOR) 5 MG tablet, Take 1 tablet (5 mg total) by mouth once daily., Disp: 90 tablet, Rfl: 1    traMADol (ULTRAM) 50 mg tablet, Take 1 tablet (50 mg total) by mouth every 4 (four) hours as needed for Pain., Disp: 42 tablet, Rfl: 0    triamcinolone acetonide 0.1% (KENALOG) 0.1 % cream, APPLY UTD TO THE AFFECTED AREA BID, Disp: , Rfl:     Review of Systems   Constitutional: Negative for chills, fatigue, fever and unexpected weight change.   HENT: Negative for congestion, ear pain, hearing loss, sinus pain and sore throat.    Eyes: Negative for pain and visual disturbance.   Respiratory: Negative for cough, shortness of breath and wheezing.    Cardiovascular: Negative for chest pain, palpitations and leg swelling.   Gastrointestinal: Negative for abdominal pain, blood in stool, constipation, diarrhea, nausea and vomiting.   Endocrine: Negative for cold intolerance and heat intolerance.   Genitourinary: Negative for difficulty urinating, dysuria, frequency, pelvic pain and urgency.   Musculoskeletal: Negative for back pain, joint swelling and neck pain.   Skin: Negative for pallor and rash.        Pruritus--   Neurological: Negative for dizziness, tremors, weakness, numbness and headaches.   Hematological: Does not bruise/bleed easily.   Psychiatric/Behavioral: Negative for agitation, sleep disturbance and suicidal ideas.          Objective:      Vitals:    05/20/20 1335   BP: 120/72   Pulse: 68   Resp: 18   Temp: 98.1 °F (36.7 °C)   SpO2: 97%   Weight: 96.2 kg (212 lb)   Height: 5' 3" (1.6 m)     Physical Exam   Constitutional: She is oriented to person, place, and time. She appears well-developed and well-nourished. She is cooperative.   Eyes: Conjunctivae, EOM and lids are normal. Right pupil is round and reactive. Left pupil is round and reactive.   Neck: Trachea normal and normal range of motion. Neck supple. No JVD present. Carotid bruit is not present. No " thyromegaly present.   Cardiovascular: Normal rate, regular rhythm, S1 normal, S2 normal, normal heart sounds and intact distal pulses. Exam reveals no gallop and no friction rub.   No murmur heard.  Pulmonary/Chest: Breath sounds normal. No respiratory distress. She has no wheezes. She has no rales.   Abdominal: Soft. Bowel sounds are normal. She exhibits no mass. There is no tenderness. There is no rigidity and no guarding.   Musculoskeletal: She exhibits no edema.   Neurological: She is alert and oriented to person, place, and time.   Skin: Skin is warm and dry. No lesion and no rash noted. Nails show no clubbing.   Psychiatric: She has a normal mood and affect. Her behavior is normal. Judgment and thought content normal.   Nursing note and vitals reviewed.        Assessment:       1. Itching due to drug    2. Ringing in ears, bilateral    3. Environmental and seasonal allergies    4. Weight gain    5. BMI 37.0-37.9, adult         Plan:       Itching due to drug        -     Stop chang for two weeks and let me know re pruritis     Ringing in ears, bilateral    Environmental and seasonal allergies    Weight gain  -     phentermine (ADIPEX-P) 37.5 mg tablet; Take 1 tablet (37.5 mg total) by mouth before breakfast.  Dispense: 90 tablet; Refill: 0    BMI 37.0-37.9, adult  -     phentermine (ADIPEX-P) 37.5 mg tablet; Take 1 tablet (37.5 mg total) by mouth before breakfast.  Dispense: 90 tablet; Refill: 0      No follow-ups on file.        5/20/2020 Katherine Ferguson M.D.

## 2020-05-20 ENCOUNTER — OFFICE VISIT (OUTPATIENT)
Dept: FAMILY MEDICINE | Facility: CLINIC | Age: 65
End: 2020-05-20
Payer: COMMERCIAL

## 2020-05-20 VITALS
TEMPERATURE: 98 F | WEIGHT: 212 LBS | SYSTOLIC BLOOD PRESSURE: 120 MMHG | OXYGEN SATURATION: 97 % | RESPIRATION RATE: 18 BRPM | HEIGHT: 63 IN | HEART RATE: 68 BPM | DIASTOLIC BLOOD PRESSURE: 72 MMHG | BODY MASS INDEX: 37.56 KG/M2

## 2020-05-20 DIAGNOSIS — L29.8 ITCHING DUE TO DRUG: Primary | ICD-10-CM

## 2020-05-20 DIAGNOSIS — R63.5 WEIGHT GAIN: ICD-10-CM

## 2020-05-20 DIAGNOSIS — J30.89 ENVIRONMENTAL AND SEASONAL ALLERGIES: ICD-10-CM

## 2020-05-20 DIAGNOSIS — H93.13 RINGING IN EARS, BILATERAL: ICD-10-CM

## 2020-05-20 DIAGNOSIS — T50.905A ITCHING DUE TO DRUG: Primary | ICD-10-CM

## 2020-05-20 PROCEDURE — 99213 PR OFFICE/OUTPT VISIT, EST, LEVL III, 20-29 MIN: ICD-10-PCS | Mod: S$GLB,,, | Performed by: INTERNAL MEDICINE

## 2020-05-20 PROCEDURE — 3008F PR BODY MASS INDEX (BMI) DOCUMENTED: ICD-10-PCS | Mod: S$GLB,,, | Performed by: INTERNAL MEDICINE

## 2020-05-20 PROCEDURE — 3008F BODY MASS INDEX DOCD: CPT | Mod: S$GLB,,, | Performed by: INTERNAL MEDICINE

## 2020-05-20 PROCEDURE — 99213 OFFICE O/P EST LOW 20 MIN: CPT | Mod: S$GLB,,, | Performed by: INTERNAL MEDICINE

## 2020-05-20 RX ORDER — PHENTERMINE HYDROCHLORIDE 37.5 MG/1
37.5 TABLET ORAL
Qty: 90 TABLET | Refills: 0 | Status: SHIPPED | OUTPATIENT
Start: 2020-05-20 | End: 2020-08-27 | Stop reason: SDUPTHER

## 2020-05-20 RX ORDER — TRIAMCINOLONE ACETONIDE 1 MG/G
CREAM TOPICAL
COMMUNITY
Start: 2020-04-23 | End: 2020-12-03

## 2020-05-20 RX ORDER — OFLOXACIN 3 MG/ML
SOLUTION/ DROPS OPHTHALMIC
COMMUNITY
Start: 2020-05-15 | End: 2020-09-24

## 2020-05-20 RX ORDER — PREDNISONE 20 MG/1
TABLET ORAL
COMMUNITY
Start: 2020-05-07 | End: 2020-05-20 | Stop reason: ALTCHOICE

## 2020-05-26 ENCOUNTER — PATIENT MESSAGE (OUTPATIENT)
Dept: FAMILY MEDICINE | Facility: CLINIC | Age: 65
End: 2020-05-26

## 2020-05-29 ENCOUNTER — PATIENT MESSAGE (OUTPATIENT)
Dept: FAMILY MEDICINE | Facility: CLINIC | Age: 65
End: 2020-05-29

## 2020-05-29 DIAGNOSIS — R30.0 DYSURIA: Primary | ICD-10-CM

## 2020-05-29 RX ORDER — CIPROFLOXACIN 500 MG/1
500 TABLET ORAL 2 TIMES DAILY
Qty: 20 TABLET | Refills: 0 | Status: SHIPPED | OUTPATIENT
Start: 2020-05-29 | End: 2020-08-27 | Stop reason: ALTCHOICE

## 2020-05-29 RX ORDER — PHENAZOPYRIDINE HYDROCHLORIDE 100 MG/1
100 TABLET, FILM COATED ORAL 3 TIMES DAILY PRN
Qty: 9 TABLET | Refills: 0 | Status: SHIPPED | OUTPATIENT
Start: 2020-05-29 | End: 2020-06-08

## 2020-08-22 NOTE — PROGRESS NOTES
SUBJECTIVE:    Patient ID: Nalini Wooten is a 65 y.o. female.    Chief Complaint: No chief complaint on file.    HPI    No visits with results within 6 Month(s) from this visit.   Latest known visit with results is:   Lab Visit on 02/11/2020   Component Date Value Ref Range Status    Cholesterol 02/11/2020 278* 120 - 199 mg/dL Final    Triglycerides 02/11/2020 134  30 - 150 mg/dL Final    HDL 02/11/2020 47  40 - 75 mg/dL Final    LDL Cholesterol 02/11/2020 204.2* 63.0 - 159.0 mg/dL Final    Hdl/Cholesterol Ratio 02/11/2020 16.9* 20.0 - 50.0 % Final    Total Cholesterol/HDL Ratio 02/11/2020 5.9* 2.0 - 5.0 Final    Non-HDL Cholesterol 02/11/2020 231  mg/dL Final    TSH 02/11/2020 2.130  0.340 - 5.600 uIU/mL Final       Past Medical History:   Diagnosis Date    Bulging disc     Carpal tunnel syndrome     Degenerative disc disease     High cholesterol     Pinched nerve in neck      Past Surgical History:   Procedure Laterality Date    BLADDER SUSPENSION  1990    FOOT SURGERY Bilateral 2001    HAND SURGERY Left 2017    HYSTERECTOMY  1990    JOINT REPLACEMENT  2013    KNEE SURGERY Bilateral 2013    MANDIBLE FRACTURE SURGERY  1981    SHOULDER ARTHROSCOPY Left 02/27/2019    THYROIDECTOMY Left 05/13/2019        TONSILLECTOMY       Family History   Problem Relation Age of Onset    Arthritis Mother     Aneurysm Mother         Abdominal Aneurysm     Arthritis Father     Diabetes Father     Hearing loss Father     Heart disease Father     Hypertension Father     Dementia Father     Diabetes Paternal Grandmother     Heart disease Paternal Grandfather     Leukemia Son     Crohn's disease Son     Ankylosing spondylitis Son     Other Son         avascular necrosis of pancho hips    Rheumatologic disease Son     Heart defect Son     Kidney failure Son     SIDS Maternal Aunt     Uterine cancer Maternal Aunt     Dementia Paternal Aunt     No Known Problems Sister     Heart disease  Brother         stent placement    Heart disease Brother         stent placement    Aneurysm Brother         Abdominal Aneurysm    No Known Problems Sister        Marital Status:   Alcohol History:  reports no history of alcohol use.  Tobacco History:  reports that she has never smoked. She has never used smokeless tobacco.  Drug History:  reports no history of drug use.    Review of patient's allergies indicates:   Allergen Reactions    Estrogens Other (See Comments)     Mini stroke    Tetanus vaccines and toxoid Swelling and Other (See Comments)     Swelling at injection site, fever       Current Outpatient Medications:     ciprofloxacin HCl (CIPRO) 500 MG tablet, Take 1 tablet (500 mg total) by mouth 2 (two) times daily., Disp: 20 tablet, Rfl: 0    clobetasol 0.05% (TEMOVATE) 0.05 % Oint, Apply topically 2 (two) times daily., Disp: 60 g, Rfl: 2    gabapentin (NEURONTIN) 300 MG capsule, Take 1 capsule (300 mg total) by mouth 3 (three) times daily., Disp: 90 capsule, Rfl: 2    levocetirizine (XYZAL) 5 MG tablet, Take 1 tablet (5 mg total) by mouth every evening., Disp: 90 tablet, Rfl: 3    mirabegron (MYRBETRIQ) 50 mg Tb24, Take 1 tablet (50 mg total) by mouth once daily. On po daily, Disp: 90 tablet, Rfl: 3    montelukast (SINGULAIR) 10 mg tablet, Take 1 tablet (10 mg total) by mouth once daily., Disp: 90 tablet, Rfl: 3    ofloxacin (OCUFLOX) 0.3 % ophthalmic solution, , Disp: , Rfl:     phentermine (ADIPEX-P) 37.5 mg tablet, Take 1 tablet (37.5 mg total) by mouth before breakfast., Disp: 90 tablet, Rfl: 0    ranitidine (ZANTAC) 75 MG tablet, Take 1 tablet (75 mg total) by mouth 2 (two) times daily., Disp: 60 tablet, Rfl: 0    rosuvastatin (CRESTOR) 5 MG tablet, Take 1 tablet (5 mg total) by mouth once daily., Disp: 90 tablet, Rfl: 1    traMADol (ULTRAM) 50 mg tablet, Take 1 tablet (50 mg total) by mouth every 4 (four) hours as needed for Pain., Disp: 42 tablet, Rfl: 0    triamcinolone  acetonide 0.1% (KENALOG) 0.1 % cream, APPLY UTD TO THE AFFECTED AREA BID, Disp: , Rfl:     Review of Systems       Objective:      There were no vitals filed for this visit.  Physical Exam      Assessment:       No diagnosis found.     Plan:       There are no diagnoses linked to this encounter.  No follow-ups on file.        8/22/2020 Katherine Ferguson M.D.

## 2020-08-27 ENCOUNTER — OFFICE VISIT (OUTPATIENT)
Dept: FAMILY MEDICINE | Facility: CLINIC | Age: 65
End: 2020-08-27
Payer: COMMERCIAL

## 2020-08-27 VITALS
OXYGEN SATURATION: 97 % | WEIGHT: 199 LBS | RESPIRATION RATE: 16 BRPM | HEART RATE: 72 BPM | SYSTOLIC BLOOD PRESSURE: 128 MMHG | TEMPERATURE: 98 F | HEIGHT: 63 IN | DIASTOLIC BLOOD PRESSURE: 80 MMHG | BODY MASS INDEX: 35.26 KG/M2

## 2020-08-27 DIAGNOSIS — E78.00 PURE HYPERCHOLESTEROLEMIA: ICD-10-CM

## 2020-08-27 DIAGNOSIS — R63.5 WEIGHT GAIN: ICD-10-CM

## 2020-08-27 DIAGNOSIS — R30.0 DYSURIA: Primary | ICD-10-CM

## 2020-08-27 DIAGNOSIS — J30.89 ENVIRONMENTAL AND SEASONAL ALLERGIES: ICD-10-CM

## 2020-08-27 PROCEDURE — 99213 OFFICE O/P EST LOW 20 MIN: CPT | Mod: S$GLB,,, | Performed by: INTERNAL MEDICINE

## 2020-08-27 PROCEDURE — 3008F BODY MASS INDEX DOCD: CPT | Mod: S$GLB,,, | Performed by: INTERNAL MEDICINE

## 2020-08-27 PROCEDURE — 99213 PR OFFICE/OUTPT VISIT, EST, LEVL III, 20-29 MIN: ICD-10-PCS | Mod: S$GLB,,, | Performed by: INTERNAL MEDICINE

## 2020-08-27 PROCEDURE — 3008F PR BODY MASS INDEX (BMI) DOCUMENTED: ICD-10-PCS | Mod: S$GLB,,, | Performed by: INTERNAL MEDICINE

## 2020-08-27 RX ORDER — ROSUVASTATIN CALCIUM 5 MG/1
5 TABLET, COATED ORAL DAILY
Qty: 90 TABLET | Refills: 1 | Status: SHIPPED | OUTPATIENT
Start: 2020-08-27 | End: 2020-12-03 | Stop reason: SDUPTHER

## 2020-08-27 RX ORDER — PHENTERMINE HYDROCHLORIDE 37.5 MG/1
37.5 TABLET ORAL
Qty: 90 TABLET | Refills: 0 | Status: SHIPPED | OUTPATIENT
Start: 2020-08-27 | End: 2020-12-03 | Stop reason: SDUPTHER

## 2020-08-27 NOTE — PROGRESS NOTES
Subjective:        The chief complaint leading to consultation is: weight check  The patient location is:  {Patient Location:[address]}  Visit type: Virtual visit with synchronous audio/video or audio only  {Virtual Visit Type:11719}    HPI     Weight check-221 in March 212 in May and today    Pt on pravastatin for chol 278 and LDL of 204 and time for recheck lipids        No visits with results within 6 Month(s) from this visit.   Latest known visit with results is:   Lab Visit on 02/11/2020   Component Date Value Ref Range Status    Cholesterol 02/11/2020 278* 120 - 199 mg/dL Final    Triglycerides 02/11/2020 134  30 - 150 mg/dL Final    HDL 02/11/2020 47  40 - 75 mg/dL Final    LDL Cholesterol 02/11/2020 204.2* 63.0 - 159.0 mg/dL Final    Hdl/Cholesterol Ratio 02/11/2020 16.9* 20.0 - 50.0 % Final    Total Cholesterol/HDL Ratio 02/11/2020 5.9* 2.0 - 5.0 Final    Non-HDL Cholesterol 02/11/2020 231  mg/dL Final    TSH 02/11/2020 2.130  0.340 - 5.600 uIU/mL Final       Past Surgical History:   Procedure Laterality Date    BLADDER SUSPENSION  1990    FOOT SURGERY Bilateral 2001    HAND SURGERY Left 2017    HYSTERECTOMY  1990    JOINT REPLACEMENT  2013    KNEE SURGERY Bilateral 2013    MANDIBLE FRACTURE SURGERY  1981    SHOULDER ARTHROSCOPY Left 02/27/2019    THYROIDECTOMY Left 05/13/2019        TONSILLECTOMY       Past Medical History:   Diagnosis Date    Bulging disc     Carpal tunnel syndrome     Degenerative disc disease     High cholesterol     Pinched nerve in neck      Family History   Problem Relation Age of Onset    Arthritis Mother     Aneurysm Mother         Abdominal Aneurysm     Arthritis Father     Diabetes Father     Hearing loss Father     Heart disease Father     Hypertension Father     Dementia Father     Diabetes Paternal Grandmother     Heart disease Paternal Grandfather     Leukemia Son     Crohn's disease Son     Ankylosing spondylitis Son     Other  Son         avascular necrosis of pancho hips    Rheumatologic disease Son     Heart defect Son     Kidney failure Son     SIDS Maternal Aunt     Uterine cancer Maternal Aunt     Dementia Paternal Aunt     No Known Problems Sister     Heart disease Brother         stent placement    Heart disease Brother         stent placement    Aneurysm Brother         Abdominal Aneurysm    No Known Problems Sister         Social History:   Marital Status:   Alcohol History:  reports no history of alcohol use.  Tobacco History:  reports that she has never smoked. She has never used smokeless tobacco.  Drug History:  reports no history of drug use.    Review of patient's allergies indicates:   Allergen Reactions    Estrogens Other (See Comments)     Mini stroke    Tetanus vaccines and toxoid Swelling and Other (See Comments)     Swelling at injection site, fever       Current Outpatient Medications   Medication Sig Dispense Refill    ciprofloxacin HCl (CIPRO) 500 MG tablet Take 1 tablet (500 mg total) by mouth 2 (two) times daily. 20 tablet 0    clobetasol 0.05% (TEMOVATE) 0.05 % Oint Apply topically 2 (two) times daily. 60 g 2    gabapentin (NEURONTIN) 300 MG capsule Take 1 capsule (300 mg total) by mouth 3 (three) times daily. 90 capsule 2    levocetirizine (XYZAL) 5 MG tablet Take 1 tablet (5 mg total) by mouth every evening. 90 tablet 3    mirabegron (MYRBETRIQ) 50 mg Tb24 Take 1 tablet (50 mg total) by mouth once daily. On po daily 90 tablet 3    montelukast (SINGULAIR) 10 mg tablet Take 1 tablet (10 mg total) by mouth once daily. 90 tablet 3    ofloxacin (OCUFLOX) 0.3 % ophthalmic solution       phentermine (ADIPEX-P) 37.5 mg tablet Take 1 tablet (37.5 mg total) by mouth before breakfast. 90 tablet 0    ranitidine (ZANTAC) 75 MG tablet Take 1 tablet (75 mg total) by mouth 2 (two) times daily. 60 tablet 0    rosuvastatin (CRESTOR) 5 MG tablet Take 1 tablet (5 mg total) by mouth once daily. 90 tablet  1    traMADol (ULTRAM) 50 mg tablet Take 1 tablet (50 mg total) by mouth every 4 (four) hours as needed for Pain. 42 tablet 0    triamcinolone acetonide 0.1% (KENALOG) 0.1 % cream APPLY UTD TO THE AFFECTED AREA BID       No current facility-administered medications for this visit.        Review of Systems      Objective:        Physical Exam:   Physical Exam         Assessment:       No diagnosis found.  Plan:   There are no diagnoses linked to this encounter.  No follow-ups on file.    Total time spent with patient: ***    Each patient to whom he or she provides medical services by telemedicine is:  (1) informed of the relationship between the physician and patient and the respective role of any other health care provider with respect to management of the patient; and (2) notified that he or she may decline to receive medical services by telemedicine and may withdraw from such care at any time.    This note was created using [employer] voice recognition software that occasionally misinterprets phrases or words.

## 2020-08-27 NOTE — PROGRESS NOTES
SUBJECTIVE:    Patient ID: Nalini Wooten is a 65 y.o. female.    Chief Complaint: Weight Check and Follow-up    HPI     Weight check-221 in March 212 in May now 199 today-has lost weight on the keto diet-energy good-she is walking for aerobic activity-    Lipids belwo were up and we need to recheck with rx and weight loss-TSH good    No visits with results within 6 Month(s) from this visit.   Latest known visit with results is:   Lab Visit on 02/11/2020   Component Date Value Ref Range Status    Cholesterol 02/11/2020 278* 120 - 199 mg/dL Final    Triglycerides 02/11/2020 134  30 - 150 mg/dL Final    HDL 02/11/2020 47  40 - 75 mg/dL Final    LDL Cholesterol 02/11/2020 204.2* 63.0 - 159.0 mg/dL Final    Hdl/Cholesterol Ratio 02/11/2020 16.9* 20.0 - 50.0 % Final    Total Cholesterol/HDL Ratio 02/11/2020 5.9* 2.0 - 5.0 Final    Non-HDL Cholesterol 02/11/2020 231  mg/dL Final    TSH 02/11/2020 2.130  0.340 - 5.600 uIU/mL Final       Past Medical History:   Diagnosis Date    Bulging disc     Carpal tunnel syndrome     Degenerative disc disease     High cholesterol     Pinched nerve in neck      Past Surgical History:   Procedure Laterality Date    BLADDER SUSPENSION  1990    FOOT SURGERY Bilateral 2001    HAND SURGERY Left 2017    HYSTERECTOMY  1990    JOINT REPLACEMENT  2013    KNEE SURGERY Bilateral 2013    MANDIBLE FRACTURE SURGERY  1981    SHOULDER ARTHROSCOPY Left 02/27/2019    THYROIDECTOMY Left 05/13/2019        TONSILLECTOMY       Family History   Problem Relation Age of Onset    Arthritis Mother     Aneurysm Mother         Abdominal Aneurysm     Arthritis Father     Diabetes Father     Hearing loss Father     Heart disease Father     Hypertension Father     Dementia Father     Diabetes Paternal Grandmother     Heart disease Paternal Grandfather     Leukemia Son     Crohn's disease Son     Ankylosing spondylitis Son     Other Son         avascular necrosis of pancho  hips    Rheumatologic disease Son     Heart defect Son     Kidney failure Son     SIDS Maternal Aunt     Uterine cancer Maternal Aunt     Dementia Paternal Aunt     No Known Problems Sister     Heart disease Brother         stent placement    Heart disease Brother         stent placement    Aneurysm Brother         Abdominal Aneurysm    No Known Problems Sister        Marital Status:   Alcohol History:  reports no history of alcohol use.  Tobacco History:  reports that she has never smoked. She has never used smokeless tobacco.  Drug History:  reports no history of drug use.    Review of patient's allergies indicates:   Allergen Reactions    Estrogens Other (See Comments)     Mini stroke    Gabapentin Itching and Rash    Tetanus vaccines and toxoid Swelling and Other (See Comments)     Swelling at injection site, fever       Current Outpatient Medications:     clobetasol 0.05% (TEMOVATE) 0.05 % Oint, Apply topically 2 (two) times daily., Disp: 60 g, Rfl: 2    levocetirizine (XYZAL) 5 MG tablet, Take 1 tablet (5 mg total) by mouth every evening., Disp: 90 tablet, Rfl: 3    mirabegron (MYRBETRIQ) 50 mg Tb24, Take 1 tablet (50 mg total) by mouth once daily. On po daily, Disp: 90 tablet, Rfl: 3    montelukast (SINGULAIR) 10 mg tablet, Take 1 tablet (10 mg total) by mouth once daily., Disp: 90 tablet, Rfl: 3    ofloxacin (OCUFLOX) 0.3 % ophthalmic solution, , Disp: , Rfl:     ranitidine (ZANTAC) 75 MG tablet, Take 1 tablet (75 mg total) by mouth 2 (two) times daily., Disp: 60 tablet, Rfl: 0    traMADol (ULTRAM) 50 mg tablet, Take 1 tablet (50 mg total) by mouth every 4 (four) hours as needed for Pain., Disp: 42 tablet, Rfl: 0    triamcinolone acetonide 0.1% (KENALOG) 0.1 % cream, APPLY UTD TO THE AFFECTED AREA BID, Disp: , Rfl:     phentermine (ADIPEX-P) 37.5 mg tablet, Take 1 tablet (37.5 mg total) by mouth before breakfast., Disp: 90 tablet, Rfl: 0    rosuvastatin (CRESTOR) 5 MG tablet,  "Take 1 tablet (5 mg total) by mouth once daily., Disp: 90 tablet, Rfl: 1    Review of Systems   Constitutional: Negative for activity change, appetite change, chills, fatigue, fever and unexpected weight change.   HENT: Positive for hearing loss and tinnitus. Negative for congestion, ear pain, postnasal drip, rhinorrhea, sinus pain, sneezing, sore throat and trouble swallowing.    Eyes: Negative for pain, discharge and visual disturbance.   Respiratory: Negative for cough, choking, chest tightness, shortness of breath and wheezing.    Cardiovascular: Negative for chest pain, palpitations and leg swelling.   Gastrointestinal: Negative for abdominal distention, abdominal pain, blood in stool, constipation, diarrhea, nausea and vomiting.   Endocrine: Negative for cold intolerance, heat intolerance, polydipsia and polyuria.   Genitourinary: Positive for decreased urine volume, difficulty urinating (trouble holding urine-decreased force , trickling with urination-evaluated in past-no dx-), frequency and urgency (but decreasedflow-). Negative for dysuria, hematuria, menstrual problem and pelvic pain.   Musculoskeletal: Positive for arthralgias. Negative for back pain, joint swelling and neck pain.   Skin: Negative for pallor and rash.   Allergic/Immunologic: Negative for environmental allergies and food allergies.   Neurological: Positive for headaches (decreased frequency, about 1 a week, mostly in the am). Negative for dizziness, tremors, weakness and numbness.        RLS   Hematological: Does not bruise/bleed easily.   Psychiatric/Behavioral: Negative for agitation, confusion, dysphoric mood, hallucinations, self-injury, sleep disturbance and suicidal ideas. The patient is not nervous/anxious and is not hyperactive.           Objective:      Vitals:    08/27/20 1059   BP: 128/80   Pulse: 72   Resp: 16   Temp: 97.5 °F (36.4 °C)   SpO2: 97%   Weight: 90.3 kg (199 lb)   Height: 5' 3" (1.6 m)     Physical Exam  Vitals " signs and nursing note reviewed.   Constitutional:       General: She is not in acute distress.     Appearance: Normal appearance. She is well-developed. She is obese. She is not ill-appearing, toxic-appearing or diaphoretic.   HENT:      Head: Normocephalic and atraumatic.      Right Ear: External ear normal.      Left Ear: External ear normal.   Eyes:      General: Lids are normal. No scleral icterus.        Right eye: No discharge.         Left eye: No discharge.      Extraocular Movements: Extraocular movements intact.      Conjunctiva/sclera: Conjunctivae normal.      Pupils: Pupils are equal, round, and reactive to light.      Right eye: Pupil is round and reactive.      Left eye: Pupil is round and reactive.   Neck:      Musculoskeletal: Normal range of motion and neck supple.      Thyroid: No thyromegaly.      Vascular: No carotid bruit or JVD.      Trachea: Trachea normal.   Cardiovascular:      Rate and Rhythm: Normal rate and regular rhythm.      Heart sounds: Normal heart sounds, S1 normal and S2 normal. No murmur. No friction rub. No gallop.    Pulmonary:      Effort: No respiratory distress.      Breath sounds: Normal breath sounds. No wheezing or rales.   Abdominal:      General: Bowel sounds are normal.      Palpations: Abdomen is soft. Abdomen is not rigid. There is no mass.      Tenderness: There is no abdominal tenderness. There is no guarding.   Skin:     General: Skin is warm and dry.      Capillary Refill: Capillary refill takes less than 2 seconds.      Coloration: Skin is not jaundiced or pale.      Findings: No bruising, lesion or rash.      Nails: There is no clubbing.        Comments: Purpura right arm   Neurological:      Mental Status: She is alert and oriented to person, place, and time.   Psychiatric:         Behavior: Behavior normal. Behavior is cooperative.         Thought Content: Thought content normal.         Judgment: Judgment normal.           Assessment:       1. Dysuria     2. Weight gain    3. Pure hypercholesterolemia    4. Environmental and seasonal allergies    5. BMI 35.0-35.9,adult         Plan:       Dysuria        -     Continue myrbetriq    Weight gain  -     phentermine (ADIPEX-P) 37.5 mg tablet; Take 1 tablet (37.5 mg total) by mouth before breakfast.  Dispense: 90 tablet; Refill: 0    Pure hypercholesterolemia  -     rosuvastatin (CRESTOR) 5 MG tablet; Take 1 tablet (5 mg total) by mouth once daily.  Dispense: 90 tablet; Refill: 1    Environmental and seasonal allergies    BMI 35.0-35.9,adult  -     phentermine (ADIPEX-P) 37.5 mg tablet; Take 1 tablet (37.5 mg total) by mouth before breakfast.  Dispense: 90 tablet; Refill: 0      No follow-ups on file.        8/27/2020 Katherine Ferguson M.D.

## 2020-08-27 NOTE — PATIENT INSTRUCTIONS
The patient is asked to make an attempt to improve diet and exercise patterns to aid in medical management of this problem.-continue diet

## 2020-09-24 ENCOUNTER — OFFICE VISIT (OUTPATIENT)
Dept: ORTHOPEDICS | Facility: CLINIC | Age: 65
End: 2020-09-24
Payer: COMMERCIAL

## 2020-09-24 VITALS
SYSTOLIC BLOOD PRESSURE: 148 MMHG | DIASTOLIC BLOOD PRESSURE: 90 MMHG | HEART RATE: 80 BPM | WEIGHT: 199 LBS | BODY MASS INDEX: 35.25 KG/M2

## 2020-09-24 DIAGNOSIS — M25.531 RIGHT WRIST PAIN: ICD-10-CM

## 2020-09-24 DIAGNOSIS — M18.11 PRIMARY OSTEOARTHRITIS OF FIRST CARPOMETACARPAL JOINT OF RIGHT HAND: ICD-10-CM

## 2020-09-24 DIAGNOSIS — M25.511 ACUTE PAIN OF RIGHT SHOULDER: ICD-10-CM

## 2020-09-24 DIAGNOSIS — G56.01 CARPAL TUNNEL SYNDROME OF RIGHT WRIST: Primary | ICD-10-CM

## 2020-09-24 DIAGNOSIS — M54.12 CERVICAL RADICULITIS: ICD-10-CM

## 2020-09-24 DIAGNOSIS — M79.641 RIGHT HAND PAIN: ICD-10-CM

## 2020-09-24 DIAGNOSIS — M75.41 IMPINGEMENT SYNDROME OF RIGHT SHOULDER: ICD-10-CM

## 2020-09-24 PROCEDURE — 20610 LARGE JOINT ASPIRATION/INJECTION: R SUBACROMIAL BURSA: ICD-10-PCS | Mod: RT,S$GLB,, | Performed by: ORTHOPAEDIC SURGERY

## 2020-09-24 PROCEDURE — 3008F BODY MASS INDEX DOCD: CPT | Mod: S$GLB,,, | Performed by: ORTHOPAEDIC SURGERY

## 2020-09-24 PROCEDURE — 99214 OFFICE O/P EST MOD 30 MIN: CPT | Mod: 25,S$GLB,, | Performed by: ORTHOPAEDIC SURGERY

## 2020-09-24 PROCEDURE — 99214 PR OFFICE/OUTPT VISIT, EST, LEVL IV, 30-39 MIN: ICD-10-PCS | Mod: 25,S$GLB,, | Performed by: ORTHOPAEDIC SURGERY

## 2020-09-24 PROCEDURE — 20610 DRAIN/INJ JOINT/BURSA W/O US: CPT | Mod: RT,S$GLB,, | Performed by: ORTHOPAEDIC SURGERY

## 2020-09-24 PROCEDURE — 3008F PR BODY MASS INDEX (BMI) DOCUMENTED: ICD-10-PCS | Mod: S$GLB,,, | Performed by: ORTHOPAEDIC SURGERY

## 2020-09-24 RX ORDER — METHYLPREDNISOLONE ACETATE 40 MG/ML
40 INJECTION, SUSPENSION INTRA-ARTICULAR; INTRALESIONAL; INTRAMUSCULAR; SOFT TISSUE
Status: DISCONTINUED | OUTPATIENT
Start: 2020-09-24 | End: 2020-09-24 | Stop reason: HOSPADM

## 2020-09-24 RX ADMIN — METHYLPREDNISOLONE ACETATE 40 MG: 40 INJECTION, SUSPENSION INTRA-ARTICULAR; INTRALESIONAL; INTRAMUSCULAR; SOFT TISSUE at 01:09

## 2020-09-24 NOTE — PROGRESS NOTES
[facility]    Subjective:     Chief Complaint:   Chief Complaint   Patient presents with    Right Shoulder - Pain     Right shoulder pain x 6 months off and on    Right Hand - Pain     Right hand/wrist pain x years.        Past Medical History:   Diagnosis Date    Bulging disc     Carpal tunnel syndrome     Degenerative disc disease     High cholesterol     Pinched nerve in neck        Past Surgical History:   Procedure Laterality Date    BLADDER SUSPENSION  1990    FOOT SURGERY Bilateral 2001    HAND SURGERY Left 2017    HYSTERECTOMY  1990    JOINT REPLACEMENT  2013    KNEE SURGERY Bilateral 2013    MANDIBLE FRACTURE SURGERY  1981    SHOULDER ARTHROSCOPY Left 02/27/2019    THYROIDECTOMY Left 05/13/2019        TONSILLECTOMY         Current Outpatient Medications   Medication Sig    clobetasol 0.05% (TEMOVATE) 0.05 % Oint Apply topically 2 (two) times daily.    levocetirizine (XYZAL) 5 MG tablet Take 1 tablet (5 mg total) by mouth every evening.    mirabegron (MYRBETRIQ) 50 mg Tb24 Take 1 tablet (50 mg total) by mouth once daily. On po daily    montelukast (SINGULAIR) 10 mg tablet Take 1 tablet (10 mg total) by mouth once daily.    phentermine (ADIPEX-P) 37.5 mg tablet Take 1 tablet (37.5 mg total) by mouth before breakfast.    rosuvastatin (CRESTOR) 5 MG tablet Take 1 tablet (5 mg total) by mouth once daily.    traMADol (ULTRAM) 50 mg tablet Take 1 tablet (50 mg total) by mouth every 4 (four) hours as needed for Pain.    triamcinolone acetonide 0.1% (KENALOG) 0.1 % cream APPLY UTD TO THE AFFECTED AREA BID     No current facility-administered medications for this visit.        Review of patient's allergies indicates:   Allergen Reactions    Estrogens Other (See Comments)     Mini stroke    Gabapentin Itching and Rash    Tetanus vaccines and toxoid Swelling and Other (See Comments)     Swelling at injection site, fever       Family History   Problem Relation Age of  Onset    Arthritis Mother     Aneurysm Mother         Abdominal Aneurysm     Arthritis Father     Diabetes Father     Hearing loss Father     Heart disease Father     Hypertension Father     Dementia Father     Diabetes Paternal Grandmother     Heart disease Paternal Grandfather     Leukemia Son     Crohn's disease Son     Ankylosing spondylitis Son     Other Son         avascular necrosis of pancho hips    Rheumatologic disease Son     Heart defect Son     Kidney failure Son     SIDS Maternal Aunt     Uterine cancer Maternal Aunt     Dementia Paternal Aunt     No Known Problems Sister     Heart disease Brother         stent placement    Heart disease Brother         stent placement    Aneurysm Brother         Abdominal Aneurysm    No Known Problems Sister        Social History     Socioeconomic History    Marital status:      Spouse name: Not on file    Number of children: 3    Years of education: Not on file    Highest education level: Not on file   Occupational History    Not on file   Social Needs    Financial resource strain: Not hard at all    Food insecurity     Worry: Never true     Inability: Never true    Transportation needs     Medical: No     Non-medical: No   Tobacco Use    Smoking status: Never Smoker    Smokeless tobacco: Never Used   Substance and Sexual Activity    Alcohol use: Never     Alcohol/week: 0.0 standard drinks     Frequency: Monthly or less     Drinks per session: 1 or 2     Binge frequency: Never    Drug use: No    Sexual activity: Yes     Birth control/protection: None   Lifestyle    Physical activity     Days per week: 3 days     Minutes per session: 20 min    Stress: To some extent   Relationships    Social connections     Talks on phone: More than three times a week     Gets together: More than three times a week     Attends Latter day service: Not on file     Active member of club or organization: Yes     Attends meetings of clubs or  organizations: More than 4 times per year     Relationship status:    Other Topics Concern    Not on file   Social History Narrative    Not on file       History of present illness:  Patient comes in today for the right shoulder on the right hand.  She has had longstanding basilar arthritis.  I have injected her in the past.  She has have it left interpositional arthroplasty.  She has also had numbness and tingling in her right hand.  She has got longstanding carpal tunnel syndrome.  She has had the left done already.  She wants to have her thumb and hand fixed.  She also complains of right shoulder pain.  She has had that for several months.  Some difficulty sleeping at night.      Review of Systems:    Constitution: Negative for chills, fever, and sweats.  Negative for unexplained weight loss.    HENT:  Negative for headaches and blurry vision.    Cardiovascular:Negative for chest pain or irregular heart beat. Negative for hypertension.    Respiratory:  Negative for cough and shortness of breath.    Gastrointestinal: Negative for abdominal pain, heartburn, melena, nausea, and vomitting.    Genitourinary:  Negative bladder incontinence and dysuria.    Musculoskeletal:  See HPI for details.     Neurological: Negative for numbness.    Psychiatric/Behavioral: Negative for depression.  The patient is not nervous/anxious.      Endocrine: Negative for polyuria    Hematologic/Lymphatic: Negative for bleeding problem.  Does not bruise/bleed easily.    Skin: Negative for poor would healing and rash    Objective:      Physical Examination:    Vital Signs:    Vitals:    09/24/20 1308   BP: (!) 148/90   Pulse: 80       Body mass index is 35.25 kg/m².    This a well-developed, well nourished patient in no acute distress.  They are alert and oriented and cooperative to examination.        Patient has full range of motion of her cervical spine.  She does have mild tenderness with motion of the cervical spine.  Negative  Tinel's.  Negative Spurling sign.  Full range of motion the right shoulder.  Positive impingement positive crossover.  The rotator cuff is very tender to resisted testing but grossly intact.  Full range of motion of the left shoulder without difficulty.  Full range of motion of her bilateral fingers and thumbs.  She has a positive Tinel's at the right wrist.  Negative at the left wrist.  She has severe pain with CMC grind at the right thumb.  Pertinent New Results:    XRAY Report / Interpretation:   AP and lateral the cervical spine demonstrates moderate degenerative changes throughout the cervical segments.    AP and lateral the right shoulder demonstrates a type 2 acromion.  Moderate acromioclavicular arthrosis    Three views of the right hand demonstrates severe basilar arthritis.    Assessment/Plan:      Basilar arthritis right hand.  She is followed a prolonged course of conservative therapy including splinting activity modification and Depo-Medrol injection.  I have offered her interpositional arthroplasty including trap is ectomy and transfer of the flexor carpi radialis.  In terms of the right shoulder she has subacromial impingement and bursitis.  I injected the subacromial space with Depo-Medrol and lidocaine.  She will follow-up for the shoulder p.r.n..      This note was created using Dragon voice recognition software that occasionally misinterpreted phrases or words.

## 2020-09-24 NOTE — PROCEDURES
Large Joint Aspiration/Injection: R subacromial bursa    Date/Time: 9/24/2020 1:00 PM  Performed by: Dustin Vanegas MD  Authorized by: Dustin Vanegas MD     Consent Done?:  Yes (Verbal)  Indications:  Pain  Site marked: the procedure site was marked    Timeout: prior to procedure the correct patient, procedure, and site was verified    Prep: patient was prepped and draped in usual sterile fashion      Local anesthesia used?: Yes    Local anesthetic:  Lidocaine 1% without epinephrine    Details:  Needle Size:  25 G  Ultrasonic Guidance for needle placement?: No    Location:  Shoulder  Site:  R subacromial bursa  Medications:  40 mg methylPREDNISolone acetate 40 mg/mL  Patient tolerance:  Patient tolerated the procedure well with no immediate complications

## 2020-09-28 ENCOUNTER — TELEPHONE (OUTPATIENT)
Dept: ORTHOPEDICS | Facility: CLINIC | Age: 65
End: 2020-09-28

## 2020-09-28 DIAGNOSIS — G56.01 CARPAL TUNNEL SYNDROME OF RIGHT WRIST: ICD-10-CM

## 2020-09-28 DIAGNOSIS — M18.11 PRIMARY OSTEOARTHRITIS OF FIRST CARPOMETACARPAL JOINT OF RIGHT HAND: Primary | ICD-10-CM

## 2020-09-29 NOTE — TELEPHONE ENCOUNTER
-Keep your wound dressing clean, dry, and intact.    -Change your dressing if it becomes soiled, soaked, or falls off.    -Should you experience any significant changes in your wound(s), such as infection (redness, swelling, localized heat, increased pain, fever > 101 F, chills) or have any questions regarding your home care instructions, please contact the wound center at [phone]. If after hours, contact your primary care physician or go to the hospital emergency room.      Thyroid FNA results on desk for review & signature.

## 2020-10-27 ENCOUNTER — OFFICE VISIT (OUTPATIENT)
Dept: ORTHOPEDICS | Facility: CLINIC | Age: 65
End: 2020-10-27
Payer: COMMERCIAL

## 2020-10-27 VITALS
DIASTOLIC BLOOD PRESSURE: 80 MMHG | HEIGHT: 63 IN | BODY MASS INDEX: 35.26 KG/M2 | HEART RATE: 83 BPM | WEIGHT: 199 LBS | SYSTOLIC BLOOD PRESSURE: 130 MMHG

## 2020-10-27 DIAGNOSIS — Z98.890 S/P CARPAL TUNNEL RELEASE: ICD-10-CM

## 2020-10-27 DIAGNOSIS — M18.11 PRIMARY OSTEOARTHRITIS OF FIRST CARPOMETACARPAL JOINT OF RIGHT HAND: Primary | ICD-10-CM

## 2020-10-27 PROCEDURE — 99024 PR POST-OP FOLLOW-UP VISIT: ICD-10-PCS | Mod: S$GLB,,, | Performed by: ORTHOPAEDIC SURGERY

## 2020-10-27 PROCEDURE — 99024 POSTOP FOLLOW-UP VISIT: CPT | Mod: S$GLB,,, | Performed by: ORTHOPAEDIC SURGERY

## 2020-10-27 NOTE — PROGRESS NOTES
[facility] POST-OP NOTE    Subjective:           Chief Complaint:   Chief Complaint   Patient presents with    Right Hand - Post-op Evaluation     Right hand CMC arthoplasty/CTR 10.16.2020. Suture removal. She is doing good, pain comes and goes.        Past Medical History:   Diagnosis Date    Bulging disc     Carpal tunnel syndrome     Degenerative disc disease     High cholesterol     Pinched nerve in neck        Past Surgical History:   Procedure Laterality Date    BLADDER SUSPENSION  1990    FOOT SURGERY Bilateral 2001    HAND SURGERY Left 2017    HYSTERECTOMY  1990    JOINT REPLACEMENT  2013    KNEE SURGERY Bilateral 2013    MANDIBLE FRACTURE SURGERY  1981    SHOULDER ARTHROSCOPY Left 02/27/2019    THYROIDECTOMY Left 05/13/2019        TONSILLECTOMY         Current Outpatient Medications   Medication Sig    clobetasol 0.05% (TEMOVATE) 0.05 % Oint Apply topically 2 (two) times daily.    levocetirizine (XYZAL) 5 MG tablet Take 1 tablet (5 mg total) by mouth every evening.    mirabegron (MYRBETRIQ) 50 mg Tb24 Take 1 tablet (50 mg total) by mouth once daily. On po daily    montelukast (SINGULAIR) 10 mg tablet Take 1 tablet (10 mg total) by mouth once daily.    phentermine (ADIPEX-P) 37.5 mg tablet Take 1 tablet (37.5 mg total) by mouth before breakfast.    rosuvastatin (CRESTOR) 5 MG tablet Take 1 tablet (5 mg total) by mouth once daily.    traMADol (ULTRAM) 50 mg tablet Take 1 tablet (50 mg total) by mouth every 4 (four) hours as needed for Pain.    triamcinolone acetonide 0.1% (KENALOG) 0.1 % cream APPLY UTD TO THE AFFECTED AREA BID     No current facility-administered medications for this visit.        Review of patient's allergies indicates:   Allergen Reactions    Estrogens Other (See Comments)     Mini stroke    Gabapentin Itching and Rash    Tetanus vaccines and toxoid Swelling and Other (See Comments)     Swelling at injection site, fever       Family History    Problem Relation Age of Onset    Arthritis Mother     Aneurysm Mother         Abdominal Aneurysm     Arthritis Father     Diabetes Father     Hearing loss Father     Heart disease Father     Hypertension Father     Dementia Father     Diabetes Paternal Grandmother     Heart disease Paternal Grandfather     Leukemia Son     Crohn's disease Son     Ankylosing spondylitis Son     Other Son         avascular necrosis of pancho hips    Rheumatologic disease Son     Heart defect Son     Kidney failure Son     SIDS Maternal Aunt     Uterine cancer Maternal Aunt     Dementia Paternal Aunt     No Known Problems Sister     Heart disease Brother         stent placement    Heart disease Brother         stent placement    Aneurysm Brother         Abdominal Aneurysm    No Known Problems Sister        Social History     Socioeconomic History    Marital status:      Spouse name: Not on file    Number of children: 3    Years of education: Not on file    Highest education level: Not on file   Occupational History    Not on file   Social Needs    Financial resource strain: Not hard at all    Food insecurity     Worry: Never true     Inability: Never true    Transportation needs     Medical: No     Non-medical: No   Tobacco Use    Smoking status: Never Smoker    Smokeless tobacco: Never Used   Substance and Sexual Activity    Alcohol use: Never     Alcohol/week: 0.0 standard drinks     Frequency: Monthly or less     Drinks per session: 1 or 2     Binge frequency: Never    Drug use: No    Sexual activity: Yes     Birth control/protection: None   Lifestyle    Physical activity     Days per week: 3 days     Minutes per session: 20 min    Stress: To some extent   Relationships    Social connections     Talks on phone: More than three times a week     Gets together: More than three times a week     Attends Mormonism service: Not on file     Active member of club or organization: Yes     Attends  "meetings of clubs or organizations: More than 4 times per year     Relationship status:    Other Topics Concern    Not on file   Social History Narrative    Not on file       History of present illness:  Patient returns to clinic today for the right hand, she is status post right CMC arthroplasty and right carpal tunnel release done on October 16.  Overall doing well, pain is well controlled.      Review of Systems:    Musculoskeletal:  See HPI      Objective:        Physical Examination:    Vital Signs:    Vitals:    10/27/20 1114   BP: 130/80   Pulse: 83       Body mass index is 35.25 kg/m².    This a well-developed, well nourished patient in no acute distress.  They are alert and oriented and cooperative to examination.        Examination of the right hand and wrist, patient has well-healing surgical incisions over the base of the palm, right thumb and the forearm.  Sutures were removed today.    Pertinent New Results:    XRAY Report / Interpretation:       Assessment/Plan:      Stable status post right CMC arthroplasty and right carpal tunnel release.  Will place the patient in a a splint for another 4 weeks.  Check her back in a month.    Blane Miranda PA-C served as a scribe for this patient encounter. A "face to face" encounter occured with Dr. Dustin Vanegas on this date. The treatment plan and medical decision making are outlined as above:    This note was created using Dragon voice recognition software that occasionally misinterpreted phrases or words.        "

## 2020-11-02 ENCOUNTER — TELEPHONE (OUTPATIENT)
Dept: ORTHOPEDICS | Facility: CLINIC | Age: 65
End: 2020-11-02

## 2020-11-02 NOTE — TELEPHONE ENCOUNTER
Do you have a cough? no  Have you had a cough since your surgical procedure ?no  Are you short of breath?no  Have you experienced shortness of breath since your surgical procedure?no  Do you have a fever? no   Did you have a fever since your surgical procedure? no  If yes, what was your temperature   Any redness at the surgery site? no Warm to the touch? no  Have you been tested for COVID-19 since your surgical procedure? no What was your result? N/A

## 2020-11-18 ENCOUNTER — TELEPHONE (OUTPATIENT)
Dept: ORTHOPEDICS | Facility: CLINIC | Age: 65
End: 2020-11-18

## 2020-11-18 RX ORDER — CLINDAMYCIN HYDROCHLORIDE 300 MG/1
300 CAPSULE ORAL 3 TIMES DAILY
COMMUNITY
Start: 2020-11-18 | End: 2020-11-22

## 2020-11-18 NOTE — TELEPHONE ENCOUNTER
----- Message from Alma Che sent at 11/18/2020 11:07 AM CST -----  Patient returning your call.  [place] [phone] [facility] [phone]

## 2020-11-18 NOTE — TELEPHONE ENCOUNTER
Pt came in, 1 stitch removed. Incision very red with a small discharge. As per Blane called in antibiotic

## 2020-11-24 ENCOUNTER — OFFICE VISIT (OUTPATIENT)
Dept: ORTHOPEDICS | Facility: CLINIC | Age: 65
End: 2020-11-24
Payer: COMMERCIAL

## 2020-11-24 VITALS
BODY MASS INDEX: 35.26 KG/M2 | HEART RATE: 78 BPM | SYSTOLIC BLOOD PRESSURE: 146 MMHG | DIASTOLIC BLOOD PRESSURE: 102 MMHG | HEIGHT: 63 IN | WEIGHT: 199 LBS

## 2020-11-24 DIAGNOSIS — M18.11 PRIMARY OSTEOARTHRITIS OF FIRST CARPOMETACARPAL JOINT OF RIGHT HAND: Primary | ICD-10-CM

## 2020-11-24 DIAGNOSIS — Z98.890 HISTORY OF THUMB SURGERY: ICD-10-CM

## 2020-11-24 DIAGNOSIS — Z98.890 S/P CARPAL TUNNEL RELEASE: ICD-10-CM

## 2020-11-24 PROCEDURE — 99024 POSTOP FOLLOW-UP VISIT: CPT | Mod: S$GLB,,, | Performed by: ORTHOPAEDIC SURGERY

## 2020-11-24 PROCEDURE — 3008F BODY MASS INDEX DOCD: CPT | Mod: S$GLB,,, | Performed by: ORTHOPAEDIC SURGERY

## 2020-11-24 PROCEDURE — 1125F AMNT PAIN NOTED PAIN PRSNT: CPT | Mod: S$GLB,,, | Performed by: ORTHOPAEDIC SURGERY

## 2020-11-24 PROCEDURE — 99024 PR POST-OP FOLLOW-UP VISIT: ICD-10-PCS | Mod: S$GLB,,, | Performed by: ORTHOPAEDIC SURGERY

## 2020-11-24 PROCEDURE — 3008F PR BODY MASS INDEX (BMI) DOCUMENTED: ICD-10-PCS | Mod: S$GLB,,, | Performed by: ORTHOPAEDIC SURGERY

## 2020-11-24 PROCEDURE — 1125F PR PAIN SEVERITY QUANTIFIED, PAIN PRESENT: ICD-10-PCS | Mod: S$GLB,,, | Performed by: ORTHOPAEDIC SURGERY

## 2020-11-24 NOTE — PROGRESS NOTES
[facility] ORTHOPEDICS POST-OP NOTE    Subjective:           Chief Complaint:   Chief Complaint   Patient presents with    Right Wrist - Post-op Evaluation     Pt states the incision area is doing better. Overall her pain is mild at a 4/10       Past Medical History:   Diagnosis Date    Bulging disc     Carpal tunnel syndrome     Degenerative disc disease     High cholesterol     Pinched nerve in neck        Past Surgical History:   Procedure Laterality Date    BLADDER SUSPENSION  1990    FOOT SURGERY Bilateral 2001    HAND SURGERY Left 2017    HYSTERECTOMY  1990    JOINT REPLACEMENT  2013    KNEE SURGERY Bilateral 2013    MANDIBLE FRACTURE SURGERY  1981    SHOULDER ARTHROSCOPY Left 02/27/2019    THYROIDECTOMY Left 05/13/2019        TONSILLECTOMY         Current Outpatient Medications   Medication Sig    clobetasol 0.05% (TEMOVATE) 0.05 % Oint Apply topically 2 (two) times daily.    levocetirizine (XYZAL) 5 MG tablet Take 1 tablet (5 mg total) by mouth every evening.    mirabegron (MYRBETRIQ) 50 mg Tb24 Take 1 tablet (50 mg total) by mouth once daily. On po daily    montelukast (SINGULAIR) 10 mg tablet Take 1 tablet (10 mg total) by mouth once daily.    phentermine (ADIPEX-P) 37.5 mg tablet Take 1 tablet (37.5 mg total) by mouth before breakfast.    rosuvastatin (CRESTOR) 5 MG tablet Take 1 tablet (5 mg total) by mouth once daily.    traMADol (ULTRAM) 50 mg tablet Take 1 tablet (50 mg total) by mouth every 4 (four) hours as needed for Pain.    triamcinolone acetonide 0.1% (KENALOG) 0.1 % cream APPLY UTD TO THE AFFECTED AREA BID     No current facility-administered medications for this visit.        Review of patient's allergies indicates:   Allergen Reactions    Estrogens Other (See Comments)     Mini stroke    Gabapentin Itching and Rash    Tetanus vaccines and toxoid Swelling and Other (See Comments)     Swelling at injection site, fever       Family History   Problem Relation  Age of Onset    Arthritis Mother     Aneurysm Mother         Abdominal Aneurysm     Arthritis Father     Diabetes Father     Hearing loss Father     Heart disease Father     Hypertension Father     Dementia Father     Diabetes Paternal Grandmother     Heart disease Paternal Grandfather     Leukemia Son     Crohn's disease Son     Ankylosing spondylitis Son     Other Son         avascular necrosis of pancho hips    Rheumatologic disease Son     Heart defect Son     Kidney failure Son     SIDS Maternal Aunt     Uterine cancer Maternal Aunt     Dementia Paternal Aunt     No Known Problems Sister     Heart disease Brother         stent placement    Heart disease Brother         stent placement    Aneurysm Brother         Abdominal Aneurysm    No Known Problems Sister        Social History     Socioeconomic History    Marital status:      Spouse name: Not on file    Number of children: 3    Years of education: Not on file    Highest education level: Not on file   Occupational History    Not on file   Social Needs    Financial resource strain: Not hard at all    Food insecurity     Worry: Never true     Inability: Never true    Transportation needs     Medical: No     Non-medical: No   Tobacco Use    Smoking status: Never Smoker    Smokeless tobacco: Never Used   Substance and Sexual Activity    Alcohol use: Never     Alcohol/week: 0.0 standard drinks     Frequency: Monthly or less     Drinks per session: 1 or 2     Binge frequency: Never    Drug use: No    Sexual activity: Yes     Birth control/protection: None   Lifestyle    Physical activity     Days per week: 3 days     Minutes per session: 20 min    Stress: To some extent   Relationships    Social connections     Talks on phone: More than three times a week     Gets together: More than three times a week     Attends Jain service: Not on file     Active member of club or organization: Yes     Attends meetings of clubs  or organizations: More than 4 times per year     Relationship status:    Other Topics Concern    Not on file   Social History Narrative    Not on file       History of present illness:  Patient comes in today for the right wrist she is generally doing very well.  She has small suture abscess which is resolving.  She has been on antibiotics for that.      Review of Systems:    Musculoskeletal:  See HPI      Objective:        Physical Examination:    Vital Signs:    Vitals:    11/24/20 1035   BP: (!) 146/102   Pulse:        Body mass index is 35.25 kg/m².    This a well-developed, well nourished patient in no acute distress.  They are alert and oriented and cooperative to examination.        Patient has mild pain with CMC grind.  Very mild.  Full range of motion of her fingers and thumb.  Pertinent New Results:    XRAY Report / Interpretation:       Assessment/Plan:      Status post interpositional arthroplasty and carpal tunnel release.  Continue home exercise program.  Follow-up in 2 months if she is symptomatic    This note was created using Dragon voice recognition software that occasionally misinterpreted phrases or words.

## 2020-12-01 ENCOUNTER — LAB VISIT (OUTPATIENT)
Dept: LAB | Facility: HOSPITAL | Age: 65
End: 2020-12-01
Attending: INTERNAL MEDICINE
Payer: MEDICARE

## 2020-12-01 DIAGNOSIS — L08.9 PUSTULE OF NOSTRIL: ICD-10-CM

## 2020-12-01 DIAGNOSIS — E78.00 PURE HYPERCHOLESTEROLEMIA: ICD-10-CM

## 2020-12-01 DIAGNOSIS — E03.9 ACQUIRED HYPOTHYROIDISM: ICD-10-CM

## 2020-12-01 DIAGNOSIS — E78.00 HIGH CHOLESTEROL: ICD-10-CM

## 2020-12-01 LAB
ALBUMIN SERPL BCP-MCNC: 3.9 G/DL (ref 3.5–5.2)
ALBUMIN SERPL BCP-MCNC: 3.9 G/DL (ref 3.5–5.2)
ALP SERPL-CCNC: 81 U/L (ref 55–135)
ALP SERPL-CCNC: 81 U/L (ref 55–135)
ALT SERPL W/O P-5'-P-CCNC: 21 U/L (ref 10–44)
ALT SERPL W/O P-5'-P-CCNC: 21 U/L (ref 10–44)
ANION GAP SERPL CALC-SCNC: 10 MMOL/L (ref 8–16)
AST SERPL-CCNC: 18 U/L (ref 10–40)
AST SERPL-CCNC: 18 U/L (ref 10–40)
BASOPHILS # BLD AUTO: 0.06 K/UL (ref 0–0.2)
BASOPHILS NFR BLD: 0.8 % (ref 0–1.9)
BILIRUB DIRECT SERPL-MCNC: 0.1 MG/DL (ref 0.1–0.3)
BILIRUB SERPL-MCNC: 0.7 MG/DL (ref 0.1–1)
BILIRUB SERPL-MCNC: 0.7 MG/DL (ref 0.1–1)
BUN SERPL-MCNC: 19 MG/DL (ref 8–23)
CALCIUM SERPL-MCNC: 8.9 MG/DL (ref 8.7–10.5)
CHLORIDE SERPL-SCNC: 102 MMOL/L (ref 95–110)
CHOLEST SERPL-MCNC: 210 MG/DL (ref 120–199)
CHOLEST SERPL-MCNC: 210 MG/DL (ref 120–199)
CHOLEST/HDLC SERPL: 4.3 {RATIO} (ref 2–5)
CHOLEST/HDLC SERPL: 4.3 {RATIO} (ref 2–5)
CO2 SERPL-SCNC: 26 MMOL/L (ref 23–29)
CREAT SERPL-MCNC: 0.8 MG/DL (ref 0.5–1.4)
DIFFERENTIAL METHOD: ABNORMAL
EOSINOPHIL # BLD AUTO: 0.2 K/UL (ref 0–0.5)
EOSINOPHIL NFR BLD: 2.7 % (ref 0–8)
ERYTHROCYTE [DISTWIDTH] IN BLOOD BY AUTOMATED COUNT: 12.9 % (ref 11.5–14.5)
EST. GFR  (AFRICAN AMERICAN): >60 ML/MIN/1.73 M^2
EST. GFR  (NON AFRICAN AMERICAN): >60 ML/MIN/1.73 M^2
GLUCOSE SERPL-MCNC: 123 MG/DL (ref 70–110)
HCT VFR BLD AUTO: 44.3 % (ref 37–48.5)
HDLC SERPL-MCNC: 49 MG/DL (ref 40–75)
HDLC SERPL-MCNC: 49 MG/DL (ref 40–75)
HDLC SERPL: 23.3 % (ref 20–50)
HDLC SERPL: 23.3 % (ref 20–50)
HGB BLD-MCNC: 13.7 G/DL (ref 12–16)
IMM GRANULOCYTES # BLD AUTO: 0.01 K/UL (ref 0–0.04)
IMM GRANULOCYTES NFR BLD AUTO: 0.1 % (ref 0–0.5)
LDLC SERPL CALC-MCNC: 127.4 MG/DL (ref 63–159)
LDLC SERPL CALC-MCNC: 127.4 MG/DL (ref 63–159)
LYMPHOCYTES # BLD AUTO: 2.3 K/UL (ref 1–4.8)
LYMPHOCYTES NFR BLD: 29.1 % (ref 18–48)
MCH RBC QN AUTO: 26.9 PG (ref 27–31)
MCHC RBC AUTO-ENTMCNC: 30.9 G/DL (ref 32–36)
MCV RBC AUTO: 87 FL (ref 82–98)
MONOCYTES # BLD AUTO: 0.6 K/UL (ref 0.3–1)
MONOCYTES NFR BLD: 8.2 % (ref 4–15)
NEUTROPHILS # BLD AUTO: 4.6 K/UL (ref 1.8–7.7)
NEUTROPHILS NFR BLD: 59.1 % (ref 38–73)
NONHDLC SERPL-MCNC: 161 MG/DL
NONHDLC SERPL-MCNC: 161 MG/DL
NRBC BLD-RTO: 0 /100 WBC
PLATELET # BLD AUTO: 288 K/UL (ref 150–350)
PMV BLD AUTO: 9.8 FL (ref 9.2–12.9)
POTASSIUM SERPL-SCNC: 4.4 MMOL/L (ref 3.5–5.1)
PROT SERPL-MCNC: 7 G/DL (ref 6–8.4)
PROT SERPL-MCNC: 7 G/DL (ref 6–8.4)
RBC # BLD AUTO: 5.1 M/UL (ref 4–5.4)
SODIUM SERPL-SCNC: 138 MMOL/L (ref 136–145)
TRIGL SERPL-MCNC: 168 MG/DL (ref 30–150)
TRIGL SERPL-MCNC: 168 MG/DL (ref 30–150)
TSH SERPL DL<=0.005 MIU/L-ACNC: 3.34 UIU/ML (ref 0.34–5.6)
WBC # BLD AUTO: 7.73 K/UL (ref 3.9–12.7)

## 2020-12-01 PROCEDURE — 80061 LIPID PANEL: CPT

## 2020-12-01 PROCEDURE — 80053 COMPREHEN METABOLIC PANEL: CPT

## 2020-12-01 PROCEDURE — 36415 COLL VENOUS BLD VENIPUNCTURE: CPT

## 2020-12-01 PROCEDURE — 85025 COMPLETE CBC W/AUTO DIFF WBC: CPT

## 2020-12-01 PROCEDURE — 84443 ASSAY THYROID STIM HORMONE: CPT

## 2020-12-03 ENCOUNTER — OFFICE VISIT (OUTPATIENT)
Dept: FAMILY MEDICINE | Facility: CLINIC | Age: 65
End: 2020-12-03
Payer: COMMERCIAL

## 2020-12-03 ENCOUNTER — PATIENT MESSAGE (OUTPATIENT)
Dept: FAMILY MEDICINE | Facility: CLINIC | Age: 65
End: 2020-12-03

## 2020-12-03 VITALS
DIASTOLIC BLOOD PRESSURE: 80 MMHG | RESPIRATION RATE: 16 BRPM | HEART RATE: 72 BPM | TEMPERATURE: 98 F | BODY MASS INDEX: 36.32 KG/M2 | OXYGEN SATURATION: 99 % | HEIGHT: 63 IN | SYSTOLIC BLOOD PRESSURE: 132 MMHG | WEIGHT: 205 LBS

## 2020-12-03 DIAGNOSIS — R53.83 FATIGUE, UNSPECIFIED TYPE: ICD-10-CM

## 2020-12-03 DIAGNOSIS — D17.1 LIPOMA OF BUTTOCK: ICD-10-CM

## 2020-12-03 DIAGNOSIS — Z12.31 ENCOUNTER FOR SCREENING MAMMOGRAM FOR BREAST CANCER: ICD-10-CM

## 2020-12-03 DIAGNOSIS — E78.00 PURE HYPERCHOLESTEROLEMIA: ICD-10-CM

## 2020-12-03 DIAGNOSIS — Z11.4 ENCOUNTER FOR SCREENING FOR HIV: ICD-10-CM

## 2020-12-03 DIAGNOSIS — R32 URINARY INCONTINENCE, UNSPECIFIED TYPE: ICD-10-CM

## 2020-12-03 DIAGNOSIS — R73.09 ELEVATED GLUCOSE: Primary | ICD-10-CM

## 2020-12-03 DIAGNOSIS — Z11.59 NEED FOR HEPATITIS C SCREENING TEST: ICD-10-CM

## 2020-12-03 DIAGNOSIS — J30.89 ENVIRONMENTAL AND SEASONAL ALLERGIES: ICD-10-CM

## 2020-12-03 DIAGNOSIS — R63.5 WEIGHT GAIN: ICD-10-CM

## 2020-12-03 LAB — HBA1C MFR BLD: 5.9 %

## 2020-12-03 PROCEDURE — 1126F PR PAIN SEVERITY QUANTIFIED, NO PAIN PRESENT: ICD-10-PCS | Mod: S$GLB,,, | Performed by: INTERNAL MEDICINE

## 2020-12-03 PROCEDURE — 99214 PR OFFICE/OUTPT VISIT, EST, LEVL IV, 30-39 MIN: ICD-10-PCS | Mod: S$GLB,,, | Performed by: INTERNAL MEDICINE

## 2020-12-03 PROCEDURE — 1126F AMNT PAIN NOTED NONE PRSNT: CPT | Mod: S$GLB,,, | Performed by: INTERNAL MEDICINE

## 2020-12-03 PROCEDURE — 3008F PR BODY MASS INDEX (BMI) DOCUMENTED: ICD-10-PCS | Mod: S$GLB,,, | Performed by: INTERNAL MEDICINE

## 2020-12-03 PROCEDURE — 83036 POCT HEMOGLOBIN A1C: ICD-10-PCS | Mod: QW,S$GLB,, | Performed by: INTERNAL MEDICINE

## 2020-12-03 PROCEDURE — 3008F BODY MASS INDEX DOCD: CPT | Mod: S$GLB,,, | Performed by: INTERNAL MEDICINE

## 2020-12-03 PROCEDURE — 99214 OFFICE O/P EST MOD 30 MIN: CPT | Mod: S$GLB,,, | Performed by: INTERNAL MEDICINE

## 2020-12-03 PROCEDURE — 83036 HEMOGLOBIN GLYCOSYLATED A1C: CPT | Mod: QW,S$GLB,, | Performed by: INTERNAL MEDICINE

## 2020-12-03 RX ORDER — ROSUVASTATIN CALCIUM 10 MG/1
TABLET, COATED ORAL
Qty: 90 TABLET | Refills: 1 | Status: SHIPPED | OUTPATIENT
Start: 2020-12-03 | End: 2021-03-03 | Stop reason: ALTCHOICE

## 2020-12-03 RX ORDER — METFORMIN HYDROCHLORIDE 500 MG/1
500 TABLET ORAL 2 TIMES DAILY WITH MEALS
Qty: 90 TABLET | Refills: 1 | Status: SHIPPED | OUTPATIENT
Start: 2020-12-03 | End: 2021-02-17 | Stop reason: SDUPTHER

## 2020-12-03 RX ORDER — MIRABEGRON 50 MG/1
50 TABLET, FILM COATED, EXTENDED RELEASE ORAL DAILY
Qty: 90 TABLET | Refills: 3 | Status: SHIPPED | OUTPATIENT
Start: 2020-12-03 | End: 2021-09-14 | Stop reason: SDUPTHER

## 2020-12-03 RX ORDER — MONTELUKAST SODIUM 10 MG/1
10 TABLET ORAL DAILY
Qty: 90 TABLET | Refills: 3 | Status: SHIPPED | OUTPATIENT
Start: 2020-12-03 | End: 2021-06-01 | Stop reason: SDUPTHER

## 2020-12-03 RX ORDER — LEVOCETIRIZINE DIHYDROCHLORIDE 5 MG/1
5 TABLET, FILM COATED ORAL NIGHTLY
Qty: 90 TABLET | Refills: 3 | Status: SHIPPED | OUTPATIENT
Start: 2020-12-03 | End: 2021-09-14 | Stop reason: SDUPTHER

## 2020-12-03 RX ORDER — PHENTERMINE HYDROCHLORIDE 37.5 MG/1
37.5 TABLET ORAL
Qty: 90 TABLET | Refills: 0 | Status: SHIPPED | OUTPATIENT
Start: 2020-12-03 | End: 2021-03-03 | Stop reason: SDUPTHER

## 2020-12-03 RX ORDER — CYANOCOBALAMIN 1000 UG/ML
INJECTION, SOLUTION INTRAMUSCULAR; SUBCUTANEOUS
Qty: 10 ML | Refills: 1 | Status: SHIPPED | OUTPATIENT
Start: 2020-12-03 | End: 2021-09-14 | Stop reason: SDUPTHER

## 2020-12-03 NOTE — PROGRESS NOTES
SUBJECTIVE:    Patient ID: Nalini Wooten is a 65 y.o. female.    Chief Complaint: Medication Refill, Mass (left hip), Results (labs), and Follow-up    HPI    In for labs-    In for lab results-    CHOL 210  TRIG   168  HDL      49  LDL     127  GLU     123-A1C 5.9 have to add metformin and begin more strict diet  H/H       13.7/44.3  PLT      288,000    Patient has mass left hip-she describes feeling a mass on the hip-no tenderness-she has noted it since about 6 months ago-it remains the same size-    Lab Visit on 12/01/2020   Component Date Value Ref Range Status    Cholesterol 12/01/2020 210* 120 - 199 mg/dL Final    Triglycerides 12/01/2020 168* 30 - 150 mg/dL Final    HDL 12/01/2020 49  40 - 75 mg/dL Final    LDL Cholesterol 12/01/2020 127.4  63.0 - 159.0 mg/dL Final    HDL/Cholesterol Ratio 12/01/2020 23.3  20.0 - 50.0 % Final    Total Cholesterol/HDL Ratio 12/01/2020 4.3  2.0 - 5.0 Final    Non-HDL Cholesterol 12/01/2020 161  mg/dL Final    Total Protein 12/01/2020 7.0  6.0 - 8.4 g/dL Final    Albumin 12/01/2020 3.9  3.5 - 5.2 g/dL Final    Total Bilirubin 12/01/2020 0.7  0.1 - 1.0 mg/dL Final    Bilirubin, Direct 12/01/2020 0.1  0.1 - 0.3 mg/dL Final    AST 12/01/2020 18  10 - 40 U/L Final    ALT 12/01/2020 21  10 - 44 U/L Final    Alkaline Phosphatase 12/01/2020 81  55 - 135 U/L Final    Cholesterol 12/01/2020 210* 120 - 199 mg/dL Final    Triglycerides 12/01/2020 168* 30 - 150 mg/dL Final    HDL 12/01/2020 49  40 - 75 mg/dL Final    LDL Cholesterol 12/01/2020 127.4  63.0 - 159.0 mg/dL Final    HDL/Cholesterol Ratio 12/01/2020 23.3  20.0 - 50.0 % Final    Total Cholesterol/HDL Ratio 12/01/2020 4.3  2.0 - 5.0 Final    Non-HDL Cholesterol 12/01/2020 161  mg/dL Final    TSH 12/01/2020 3.340  0.340 - 5.600 uIU/mL Final    Sodium 12/01/2020 138  136 - 145 mmol/L Final    Potassium 12/01/2020 4.4  3.5 - 5.1 mmol/L Final    Chloride 12/01/2020 102  95 - 110 mmol/L Final    CO2  12/01/2020 26  23 - 29 mmol/L Final    Glucose 12/01/2020 123* 70 - 110 mg/dL Final    BUN 12/01/2020 19  8 - 23 mg/dL Final    Creatinine 12/01/2020 0.8  0.5 - 1.4 mg/dL Final    Calcium 12/01/2020 8.9  8.7 - 10.5 mg/dL Final    Total Protein 12/01/2020 7.0  6.0 - 8.4 g/dL Final    Albumin 12/01/2020 3.9  3.5 - 5.2 g/dL Final    Total Bilirubin 12/01/2020 0.7  0.1 - 1.0 mg/dL Final    Alkaline Phosphatase 12/01/2020 81  55 - 135 U/L Final    AST 12/01/2020 18  10 - 40 U/L Final    ALT 12/01/2020 21  10 - 44 U/L Final    Anion Gap 12/01/2020 10  8 - 16 mmol/L Final    eGFR if African American 12/01/2020 >60.0  >60 mL/min/1.73 m^2 Final    eGFR if non African American 12/01/2020 >60.0  >60 mL/min/1.73 m^2 Final    WBC 12/01/2020 7.73  3.90 - 12.70 K/uL Final    RBC 12/01/2020 5.10  4.00 - 5.40 M/uL Final    Hemoglobin 12/01/2020 13.7  12.0 - 16.0 g/dL Final    Hematocrit 12/01/2020 44.3  37.0 - 48.5 % Final    MCV 12/01/2020 87  82 - 98 fL Final    MCH 12/01/2020 26.9* 27.0 - 31.0 pg Final    MCHC 12/01/2020 30.9* 32.0 - 36.0 g/dL Final    RDW 12/01/2020 12.9  11.5 - 14.5 % Final    Platelets 12/01/2020 288  150 - 350 K/uL Final    MPV 12/01/2020 9.8  9.2 - 12.9 fL Final    Immature Granulocytes 12/01/2020 0.1  0.0 - 0.5 % Final    Gran # (ANC) 12/01/2020 4.6  1.8 - 7.7 K/uL Final    Immature Grans (Abs) 12/01/2020 0.01  0.00 - 0.04 K/uL Final    Lymph # 12/01/2020 2.3  1.0 - 4.8 K/uL Final    Mono # 12/01/2020 0.6  0.3 - 1.0 K/uL Final    Eos # 12/01/2020 0.2  0.0 - 0.5 K/uL Final    Baso # 12/01/2020 0.06  0.00 - 0.20 K/uL Final    nRBC 12/01/2020 0  0 /100 WBC Final    Gran % 12/01/2020 59.1  38.0 - 73.0 % Final    Lymph % 12/01/2020 29.1  18.0 - 48.0 % Final    Mono % 12/01/2020 8.2  4.0 - 15.0 % Final    Eosinophil % 12/01/2020 2.7  0.0 - 8.0 % Final    Basophil % 12/01/2020 0.8  0.0 - 1.9 % Final    Differential Method 12/01/2020 Automated   Final       Past Medical History:    Diagnosis Date    Bulging disc     Carpal tunnel syndrome     Degenerative disc disease     High cholesterol     Pinched nerve in neck      Past Surgical History:   Procedure Laterality Date    BLADDER SUSPENSION  1990    FOOT SURGERY Bilateral 2001    HAND SURGERY Left 2017    HYSTERECTOMY  1990    JOINT REPLACEMENT  2013    KNEE SURGERY Bilateral 2013    MANDIBLE FRACTURE SURGERY  1981    SHOULDER ARTHROSCOPY Left 02/27/2019    THYROIDECTOMY Left 05/13/2019        TONSILLECTOMY       Family History   Problem Relation Age of Onset    Arthritis Mother     Aneurysm Mother         Abdominal Aneurysm     Arthritis Father     Diabetes Father     Hearing loss Father     Heart disease Father     Hypertension Father     Dementia Father     Diabetes Paternal Grandmother     Heart disease Paternal Grandfather     Leukemia Son     Crohn's disease Son     Ankylosing spondylitis Son     Other Son         avascular necrosis of pancho hips    Rheumatologic disease Son     Heart defect Son     Kidney failure Son     SIDS Maternal Aunt     Uterine cancer Maternal Aunt     Dementia Paternal Aunt     No Known Problems Sister     Heart disease Brother         stent placement    Heart disease Brother         stent placement    Aneurysm Brother         Abdominal Aneurysm    No Known Problems Sister        Marital Status:   Alcohol History:  reports no history of alcohol use.  Tobacco History:  reports that she has never smoked. She has never used smokeless tobacco.  Drug History:  reports no history of drug use.    Review of patient's allergies indicates:   Allergen Reactions    Estrogens Other (See Comments)     Mini stroke    Gabapentin Itching and Rash    Tetanus vaccines and toxoid Swelling and Other (See Comments)     Swelling at injection site, fever    Shingrix (pf)  [varicella-zoster [id number] (pf)] Diarrhea, Itching and Nausea And Vomiting       Current Outpatient  Medications:     clobetasol 0.05% (TEMOVATE) 0.05 % Oint, Apply topically 2 (two) times daily., Disp: 60 g, Rfl: 2    levocetirizine (XYZAL) 5 MG tablet, Take 1 tablet (5 mg total) by mouth every evening., Disp: 90 tablet, Rfl: 3    mirabegron (MYRBETRIQ) 50 mg Tb24, Take 1 tablet (50 mg total) by mouth once daily. On po daily, Disp: 90 tablet, Rfl: 3    montelukast (SINGULAIR) 10 mg tablet, Take 1 tablet (10 mg total) by mouth once daily., Disp: 90 tablet, Rfl: 3    phentermine (ADIPEX-P) 37.5 mg tablet, Take 1 tablet (37.5 mg total) by mouth before breakfast., Disp: 90 tablet, Rfl: 0    rosuvastatin (CRESTOR) 5 MG tablet, Take 1 tablet (5 mg total) by mouth once daily., Disp: 90 tablet, Rfl: 1    traMADol (ULTRAM) 50 mg tablet, Take 1 tablet (50 mg total) by mouth every 4 (four) hours as needed for Pain., Disp: 42 tablet, Rfl: 0    Review of Systems   Constitutional: Positive for fatigue (no motivation). Negative for activity change, appetite change, chills, fever and unexpected weight change.   HENT: Negative for congestion, ear pain, hearing loss, postnasal drip, sinus pain, sneezing, sore throat, tinnitus and trouble swallowing.    Eyes: Negative for pain, discharge and visual disturbance.   Respiratory: Negative for cough, choking, shortness of breath and wheezing.    Cardiovascular: Negative for chest pain, palpitations and leg swelling.   Gastrointestinal: Negative for abdominal distention, abdominal pain, blood in stool, constipation, diarrhea, nausea and vomiting.   Endocrine: Negative for cold intolerance and heat intolerance.   Genitourinary: Negative for difficulty urinating, dysuria, frequency, pelvic pain and urgency.   Musculoskeletal: Negative for back pain, joint swelling and neck pain.   Skin: Negative for pallor and rash.        HPI   Allergic/Immunologic: Negative for environmental allergies and food allergies.   Neurological: Positive for headaches (bitemporal -relieved easily). Negative  "for dizziness, tremors, weakness and numbness.   Hematological: Does not bruise/bleed easily.   Psychiatric/Behavioral: Negative for agitation, confusion, dysphoric mood, sleep disturbance and suicidal ideas. The patient is not nervous/anxious.         Objective:      Vitals:    12/03/20 1357   BP: 132/80   Pulse: 72   Resp: 16   Temp: 98.1 °F (36.7 °C)   SpO2: 99%   Weight: 93 kg (205 lb)   Height: 5' 3" (1.6 m)     Physical Exam  Vitals signs and nursing note reviewed.   Constitutional:       Appearance: Normal appearance.      Comments: Increased BMI   HENT:      Head: Normocephalic and atraumatic.   Eyes:      Conjunctiva/sclera: Conjunctivae normal.      Pupils: Pupils are equal, round, and reactive to light.   Neck:      Musculoskeletal: Normal range of motion and neck supple.   Cardiovascular:      Rate and Rhythm: Normal rate and regular rhythm.      Pulses: Normal pulses.      Heart sounds: Normal heart sounds.   Pulmonary:      Effort: Pulmonary effort is normal.      Breath sounds: Normal breath sounds.   Abdominal:      Palpations: Abdomen is soft.   Musculoskeletal: Normal range of motion.   Skin:     General: Skin is warm and dry.      Comments: Rupert egg size lateral med buttock freely movable and nontender     Neurological:      General: No focal deficit present.      Mental Status: She is alert.   Psychiatric:         Mood and Affect: Mood normal.         Behavior: Behavior normal.         Thought Content: Thought content normal.         Judgment: Judgment normal.         Assessment:       1. Elevated glucose    2. Encounter for screening mammogram for breast cancer    3. Environmental and seasonal allergies    4. Weight gain    5. BMI 35.0-35.9,adult    6. Urinary incontinence, unspecified type    7. Pure hypercholesterolemia    8. Encounter for screening for HIV    9. Need for hepatitis C screening test    10. BMI 36.0-36.9,adult         Plan:       Elevated glucose    Encounter for screening " mammogram for breast cancer    Environmental and seasonal allergies    Weight gain    BMI 35.0-35.9,adult    Urinary incontinence, unspecified type    Pure hypercholesterolemia    Encounter for screening for HIV    Need for hepatitis C screening test    BMI 36.0-36.9,adult      No follow-ups on file.

## 2020-12-03 NOTE — PATIENT INSTRUCTIONS
The patient is asked to make an attempt to improve diet and exercise patterns to aid in medical management of this problem.  Low-Fat Diet    A low-fat diet will help you lose weight. It also can lower cholesterol and prevent symptoms of gallbladder disease. The average American diet contains up to 50% fat. This means that 50% of all calories come from fat (about 80 grams to 100 grams of fat per day). Choosing normal portions of foods from the list below can help lower your fat intake. Experts recommend that only 20% to 35% of your daily calories come from fat. The remaining 65% to 80% of calories will come from protein and carbohydrates. This is much healthier for you.  Breads  Ok: Whole-wheat or rye bread, xochitl or soda crackers, elizabeth toast, plain rolls, bagels, English muffins  Avoid: Rolls and breads containing whole milk or egg; waffles, pancakes, biscuits, corn bread; cheese crackers, other flavored crackers, pastries, doughnuts  Cereals  Ok: Oatmeal, whole-wheat, bran, multigrain, rice  Avoid: Granola or other cereals that have oil, coconut, or more than 2 grams of fat per serving  Cheese and eggs  Ok: Cheeses labeled low-fat; 3 whole eggs per week; egg whites and egg substitutes as desired  Avoid: All other cheeses  Desserts  Ok: Gelatin, slushy, shaji food cake, meringues, nonfat yogurt, and puddings or sherbet made with nonfat milk  Avoid: Any other store-bought desserts, or desserts that have fat, whole milk, cream, chocolate, and coconut  Drinks  Ok: Nonfat milk, coffee, tea, fizzy (carbonated) drinks  Avoid: Whole and reduced-fat milk, evaporated and condensed milk, hot chocolate mixes, milk shakes, malts, eggnog  Fats  Ok: You may have up to 3 teaspoons of fat daily. This can be butter, margarine, mayonnaise, or healthy oils (canola or olive)  Avoid: Cream, nondairy creams, cream cheese, gravies, and cream sauces  Fruits  Ok: All fruits made without fat  Avoid: Coconut, olives  Meats, poultry,  fish  Ok: Limit meat to 6 ounces daily (broiled, roasted, baked, grilled, or boiled). Buy lean cuts, and trim off the fat. Try beef, fish, lamb, pork, and canned fish packed in water; also chicken and turkey with the skin removed.  Avoid: Fried meats, fish, or poultry; fried eggs, and fish canned in oils; fatty meats such as donaldson, sausage, corned beef, hot dogs, and lunch meats; meats with gravies and sauces  Potatoes, beans, pasta  Ok: Dried beans, split peas, lentils, potatoes, rice, pasta made without added fat  Avoid: French fries, potato chips, potatoes prepared with butter, refried beans  Soups  Ok: Clear broth soups without fat and with allowed vegetables  Avoid: Cream-based soups  Vegetables  Ok: Fresh, frozen, canned or dried vegetables, all made without added fat  Avoid: Fried vegetables and those prepared with butter, cream, sauces  Miscellaneous  Ok: Salt, sugar, jelly, hard candy, marshmallows, honey, syrup, spices and herbs, mustard, ketchup, lemon, and vinegar. Try to limit sweets and added sugars.  Avoid: Chocolate, nuts, coconut, and cream candies; sunflower, sesame, and other seeds; fried foods; cream sauces and gravies; pizza  Date Last Reviewed: 8/1/2016  © 1109-0184 [employer]. [address] [place] [address]. All rights reserved. This information is not intended as a substitute for professional medical care. Always follow your healthcare professional's instructions.        Potassium-Rich Foods  The normal adult diet usually contains 2,000 mg to 4,000 mg of potassium per day. More potassium is needed when you lose too much potassium from your body. This can happen if you have diarrhea or vomiting. It can also happen if you take a medicine to make you urinate more (diuretic). To increase the amount of potassium in your diet, include these high-potassium foods.     [The (*) indicates foods highest in potassium.]  Vegetables  Artichokes. Cooked 1/2 cup, 200 mg to 300  mg*  Asparagus. Cooked 1/2 cup, 200 mg to 300 mg  Beans. White, red, dumont cooked 1/2 cup, 300 mg to 500 mg*  Beets. Cooked 1/2 cup, 200 mg to 300 mg  Broccoli. Cooked or raw 1 cup, 200 mg to 500 mg*  [place] sprouts. Cooked 1/2 cup, 200 mg to 300 mg  Cabbage. Raw 1 cup, 100 mg to 200 mg  Carrots. Raw or cooked 1/2 cup, 100 mg to 200 mg  Celery. Raw 1 cup, 200 mg to 300 mg  Lima beans. Fresh or frozen 1/2 cup, 300 mg to 500 mg*   Mushrooms. Raw or cooked 1/2 cup, 100 mg to 300 mg  Peas. Cooked 1/2 cup, 150 mg to 250 mg   Potatoes. Baked 1 medium, 500 mg to 900 mg*   Spinach. Cooked 1 cup, 800 mg to 900 mg*   Spinach. Raw 2 cups, 300 mg to 400 mg *  Squash, winter. Fresh, frozen, or cooked 1/2 cup, 200 mg to 400 mg   Tomato. Fresh 1 medium, 200 mg to 300 mg   Tomato juice. Canned 1/2 cup, 200 mg to 300 mg   Fruits  Apple juice. Unsweetened 1 cup, 200 mg to 300 mg   Apricots. Canned 1/2 cup, 200 mg to 300 mg   Apricots. Dried 4 pieces, 100 mg to 200 mg   Avocado. Raw 1/2 cup, 300 mg to 400 mg*  Banana. Fresh 1 small, 300 mg to 400 mg*   Cantaloupe. Fresh 1 cup diced, 300 mg to 400 mg*   Grape juice. Unsweetened 1 cup, 200 mg to 300 mg   Honeydew melon. Fresh 1 cup diced, 300 mg to 400 mg*   Orange. Fresh 1 medium, 200 mg to 300 mg    Orange juice. Unsweetened, fresh or frozen 1/2 cup, 200 mg to 300 mg  Pineapple juice. Unsweetened 1 cup, 300 mg to 400 mg   Prune juice. Unsweetened 1/2 cup, 300 mg to 400 mg*   Prunes. Dried 5 pieces, 300 mg to 400 mg*   Strawberries. Fresh or frozen 1 cup, 200 mg to 300 mg  Meat  Red meat. Cooked 3 ounces, 100 mg to 300 mg   Seafood  Cod, flounder, halibut. Cooked 3 ounces, 100 mg to 300 mg*  [place]. Cooked, 3 ounces 300 mg to 400 mg*   Scallops. Cooked 3 ounces, 200 mg to 300 mg*  Shrimp. Cooked 3/4 cup, 100 mg to 200 mg   Tuna. Fresh or canned 3/4 cup, 200 mg to 500 mg   Date Last Reviewed: 10/1/2016  © 1420-4391 [employer]. [address] [place] [address], [place] [address].  All rights reserved. This information is not intended as a substitute for professional medical care. Always follow your healthcare professional's instructions.        Potassium-Rich Foods  The normal adult diet usually contains 2,000 mg to 4,000 mg of potassium per day. More potassium is needed when you lose too much potassium from your body. This can happen if you have diarrhea or vomiting. It can also happen if you take a medicine to make you urinate more (diuretic). To increase the amount of potassium in your diet, include these high-potassium foods.     [The (*) indicates foods highest in potassium.]  Vegetables  Artichokes. Cooked 1/2 cup, 200 mg to 300 mg*  Asparagus. Cooked 1/2 cup, 200 mg to 300 mg  Beans. White, red, dumont cooked 1/2 cup, 300 mg to 500 mg*  Beets. Cooked 1/2 cup, 200 mg to 300 mg  Broccoli. Cooked or raw 1 cup, 200 mg to 500 mg*  [place] sprouts. Cooked 1/2 cup, 200 mg to 300 mg  Cabbage. Raw 1 cup, 100 mg to 200 mg  Carrots. Raw or cooked 1/2 cup, 100 mg to 200 mg  Celery. Raw 1 cup, 200 mg to 300 mg  Lima beans. Fresh or frozen 1/2 cup, 300 mg to 500 mg*   Mushrooms. Raw or cooked 1/2 cup, 100 mg to 300 mg  Peas. Cooked 1/2 cup, 150 mg to 250 mg   Potatoes. Baked 1 medium, 500 mg to 900 mg*   Spinach. Cooked 1 cup, 800 mg to 900 mg*   Spinach. Raw 2 cups, 300 mg to 400 mg *  Squash, winter. Fresh, frozen, or cooked 1/2 cup, 200 mg to 400 mg   Tomato. Fresh 1 medium, 200 mg to 300 mg   Tomato juice. Canned 1/2 cup, 200 mg to 300 mg   Fruits  Apple juice. Unsweetened 1 cup, 200 mg to 300 mg   Apricots. Canned 1/2 cup, 200 mg to 300 mg   Apricots. Dried 4 pieces, 100 mg to 200 mg   Avocado. Raw 1/2 cup, 300 mg to 400 mg*  Banana. Fresh 1 small, 300 mg to 400 mg*   Cantaloupe. Fresh 1 cup diced, 300 mg to 400 mg*   Grape juice. Unsweetened 1 cup, 200 mg to 300 mg   Honeydew melon. Fresh 1 cup diced, 300 mg to 400 mg*   Orange. Fresh 1 medium, 200 mg to 300 mg    Orange juice. Unsweetened,  fresh or frozen 1/2 cup, 200 mg to 300 mg  Pineapple juice. Unsweetened 1 cup, 300 mg to 400 mg   Prune juice. Unsweetened 1/2 cup, 300 mg to 400 mg*   Prunes. Dried 5 pieces, 300 mg to 400 mg*   Strawberries. Fresh or frozen 1 cup, 200 mg to 300 mg  Meat  Red meat. Cooked 3 ounces, 100 mg to 300 mg   Seafood  Cod, flounder, halibut. Cooked 3 ounces, 100 mg to 300 mg*  [place]. Cooked, 3 ounces 300 mg to 400 mg*   Scallops. Cooked 3 ounces, 200 mg to 300 mg*  Shrimp. Cooked 3/4 cup, 100 mg to 200 mg   Tuna. Fresh or canned 3/4 cup, 200 mg to 500 mg   Date Last Reviewed: 10/1/2016  © 3378-9522 [employer]. [address] [place] [address], [place] [address]. All rights reserved. This information is not intended as a substitute for professional medical care. Always follow your healthcare professional's instructions.

## 2021-01-12 ENCOUNTER — HOSPITAL ENCOUNTER (OUTPATIENT)
Dept: RADIOLOGY | Facility: HOSPITAL | Age: 66
Discharge: HOME | End: 2021-01-12
Attending: INTERNAL MEDICINE
Payer: MEDICARE

## 2021-01-12 VITALS — HEIGHT: 63 IN | BODY MASS INDEX: 36.32 KG/M2 | WEIGHT: 205 LBS

## 2021-01-12 DIAGNOSIS — Z12.31 ENCOUNTER FOR SCREENING MAMMOGRAM FOR BREAST CANCER: ICD-10-CM

## 2021-01-12 PROCEDURE — 77067 SCR MAMMO BI INCL CAD: CPT | Mod: TC,PO

## 2021-01-21 ENCOUNTER — PATIENT MESSAGE (OUTPATIENT)
Dept: FAMILY MEDICINE | Facility: CLINIC | Age: 66
End: 2021-01-21

## 2021-01-21 DIAGNOSIS — H93.13 RINGING IN EARS, BILATERAL: Primary | ICD-10-CM

## 2021-01-25 DIAGNOSIS — H93.19 TINNITUS, UNSPECIFIED LATERALITY: Primary | ICD-10-CM

## 2021-01-26 ENCOUNTER — OFFICE VISIT (OUTPATIENT)
Dept: ORTHOPEDICS | Facility: CLINIC | Age: 66
End: 2021-01-26
Payer: MEDICARE

## 2021-01-26 VITALS
HEIGHT: 63 IN | SYSTOLIC BLOOD PRESSURE: 127 MMHG | DIASTOLIC BLOOD PRESSURE: 78 MMHG | WEIGHT: 200 LBS | BODY MASS INDEX: 35.44 KG/M2 | HEART RATE: 66 BPM

## 2021-01-26 DIAGNOSIS — M75.41 IMPINGEMENT SYNDROME OF RIGHT SHOULDER: ICD-10-CM

## 2021-01-26 DIAGNOSIS — Z98.890 HISTORY OF CARPAL TUNNEL RELEASE: ICD-10-CM

## 2021-01-26 DIAGNOSIS — M75.101 TEAR OF RIGHT ROTATOR CUFF, UNSPECIFIED TEAR EXTENT, UNSPECIFIED WHETHER TRAUMATIC: ICD-10-CM

## 2021-01-26 DIAGNOSIS — Z98.890 HISTORY OF THUMB SURGERY: Primary | ICD-10-CM

## 2021-01-26 PROCEDURE — 3008F BODY MASS INDEX DOCD: CPT | Mod: S$GLB,,, | Performed by: ORTHOPAEDIC SURGERY

## 2021-01-26 PROCEDURE — 99213 OFFICE O/P EST LOW 20 MIN: CPT | Mod: S$GLB,,, | Performed by: ORTHOPAEDIC SURGERY

## 2021-01-26 PROCEDURE — 99213 PR OFFICE/OUTPT VISIT, EST, LEVL III, 20-29 MIN: ICD-10-PCS | Mod: S$GLB,,, | Performed by: ORTHOPAEDIC SURGERY

## 2021-01-26 PROCEDURE — 1126F AMNT PAIN NOTED NONE PRSNT: CPT | Mod: S$GLB,,, | Performed by: ORTHOPAEDIC SURGERY

## 2021-01-26 PROCEDURE — 3008F PR BODY MASS INDEX (BMI) DOCUMENTED: ICD-10-PCS | Mod: S$GLB,,, | Performed by: ORTHOPAEDIC SURGERY

## 2021-01-26 PROCEDURE — 1126F PR PAIN SEVERITY QUANTIFIED, NO PAIN PRESENT: ICD-10-PCS | Mod: S$GLB,,, | Performed by: ORTHOPAEDIC SURGERY

## 2021-02-02 ENCOUNTER — HOSPITAL ENCOUNTER (OUTPATIENT)
Dept: RADIOLOGY | Facility: HOSPITAL | Age: 66
Discharge: HOME | End: 2021-02-02
Attending: ORTHOPAEDIC SURGERY
Payer: MEDICARE

## 2021-02-02 DIAGNOSIS — M75.41 IMPINGEMENT SYNDROME OF RIGHT SHOULDER: ICD-10-CM

## 2021-02-02 DIAGNOSIS — M75.101 TEAR OF RIGHT ROTATOR CUFF, UNSPECIFIED TEAR EXTENT, UNSPECIFIED WHETHER TRAUMATIC: ICD-10-CM

## 2021-02-02 PROCEDURE — 73221 MRI JOINT UPR EXTREM W/O DYE: CPT | Mod: TC,PO,RT

## 2021-02-09 ENCOUNTER — OFFICE VISIT (OUTPATIENT)
Dept: ORTHOPEDICS | Facility: CLINIC | Age: 66
End: 2021-02-09
Payer: MEDICARE

## 2021-02-09 VITALS
SYSTOLIC BLOOD PRESSURE: 126 MMHG | WEIGHT: 200 LBS | HEART RATE: 62 BPM | DIASTOLIC BLOOD PRESSURE: 82 MMHG | BODY MASS INDEX: 35.44 KG/M2 | HEIGHT: 63 IN

## 2021-02-09 DIAGNOSIS — M75.121 NONTRAUMATIC COMPLETE TEAR OF RIGHT ROTATOR CUFF: Primary | ICD-10-CM

## 2021-02-09 DIAGNOSIS — M19.011 ARTHRITIS OF RIGHT ACROMIOCLAVICULAR JOINT: ICD-10-CM

## 2021-02-09 DIAGNOSIS — M75.41 IMPINGEMENT SYNDROME OF RIGHT SHOULDER: ICD-10-CM

## 2021-02-09 PROCEDURE — 99213 PR OFFICE/OUTPT VISIT, EST, LEVL III, 20-29 MIN: ICD-10-PCS | Mod: S$GLB,,, | Performed by: ORTHOPAEDIC SURGERY

## 2021-02-09 PROCEDURE — 3008F PR BODY MASS INDEX (BMI) DOCUMENTED: ICD-10-PCS | Mod: S$GLB,,, | Performed by: ORTHOPAEDIC SURGERY

## 2021-02-09 PROCEDURE — 1125F AMNT PAIN NOTED PAIN PRSNT: CPT | Mod: S$GLB,,, | Performed by: ORTHOPAEDIC SURGERY

## 2021-02-09 PROCEDURE — 3008F BODY MASS INDEX DOCD: CPT | Mod: S$GLB,,, | Performed by: ORTHOPAEDIC SURGERY

## 2021-02-09 PROCEDURE — 99213 OFFICE O/P EST LOW 20 MIN: CPT | Mod: S$GLB,,, | Performed by: ORTHOPAEDIC SURGERY

## 2021-02-09 PROCEDURE — 1125F PR PAIN SEVERITY QUANTIFIED, PAIN PRESENT: ICD-10-PCS | Mod: S$GLB,,, | Performed by: ORTHOPAEDIC SURGERY

## 2021-02-10 DIAGNOSIS — Z01.818 PRE-OPERATIVE EXAM: ICD-10-CM

## 2021-02-10 DIAGNOSIS — M75.41 IMPINGEMENT SYNDROME OF RIGHT SHOULDER: ICD-10-CM

## 2021-02-10 DIAGNOSIS — M75.121 NONTRAUMATIC COMPLETE TEAR OF RIGHT ROTATOR CUFF: Primary | ICD-10-CM

## 2021-02-10 DIAGNOSIS — M19.011 ARTHRITIS OF RIGHT ACROMIOCLAVICULAR JOINT: ICD-10-CM

## 2021-02-10 DIAGNOSIS — Z03.818 ENCOUNTER FOR OBSERVATION FOR SUSPECTED EXPOSURE TO OTHER BIOLOGICAL AGENTS RULED OUT: ICD-10-CM

## 2021-02-17 DIAGNOSIS — R73.09 ELEVATED GLUCOSE: ICD-10-CM

## 2021-02-18 RX ORDER — METFORMIN HYDROCHLORIDE 500 MG/1
500 TABLET ORAL 2 TIMES DAILY WITH MEALS
Qty: 90 TABLET | Refills: 1 | Status: SHIPPED | OUTPATIENT
Start: 2021-02-18 | End: 2021-03-03 | Stop reason: SDUPTHER

## 2021-03-03 ENCOUNTER — HOSPITAL ENCOUNTER (OUTPATIENT)
Dept: RADIOLOGY | Facility: HOSPITAL | Age: 66
Discharge: HOME | End: 2021-03-03
Attending: ORTHOPAEDIC SURGERY
Payer: MEDICARE

## 2021-03-03 ENCOUNTER — HOSPITAL ENCOUNTER (OUTPATIENT)
Dept: PREADMISSION TESTING | Facility: HOSPITAL | Age: 66
Discharge: HOME | End: 2021-03-03
Attending: ORTHOPAEDIC SURGERY
Payer: MEDICARE

## 2021-03-03 VITALS
SYSTOLIC BLOOD PRESSURE: 114 MMHG | BODY MASS INDEX: 36.72 KG/M2 | WEIGHT: 207.25 LBS | TEMPERATURE: 98 F | RESPIRATION RATE: 16 BRPM | HEART RATE: 67 BPM | DIASTOLIC BLOOD PRESSURE: 67 MMHG | HEIGHT: 63 IN | OXYGEN SATURATION: 96 %

## 2021-03-03 DIAGNOSIS — M75.121 NONTRAUMATIC COMPLETE TEAR OF RIGHT ROTATOR CUFF: ICD-10-CM

## 2021-03-03 DIAGNOSIS — M75.41 IMPINGEMENT SYNDROME OF RIGHT SHOULDER: ICD-10-CM

## 2021-03-03 DIAGNOSIS — R63.5 WEIGHT GAIN: ICD-10-CM

## 2021-03-03 DIAGNOSIS — R73.09 ELEVATED GLUCOSE: ICD-10-CM

## 2021-03-03 DIAGNOSIS — M19.011 ARTHRITIS OF RIGHT ACROMIOCLAVICULAR JOINT: ICD-10-CM

## 2021-03-03 PROCEDURE — 93010 EKG 12-LEAD: ICD-10-PCS | Mod: ,,, | Performed by: INTERNAL MEDICINE

## 2021-03-03 PROCEDURE — 93005 ELECTROCARDIOGRAM TRACING: CPT | Performed by: INTERNAL MEDICINE

## 2021-03-03 PROCEDURE — 71046 X-RAY EXAM CHEST 2 VIEWS: CPT | Mod: TC

## 2021-03-03 PROCEDURE — 93010 ELECTROCARDIOGRAM REPORT: CPT | Mod: ,,, | Performed by: INTERNAL MEDICINE

## 2021-03-03 RX ORDER — PHENTERMINE HYDROCHLORIDE 37.5 MG/1
37.5 TABLET ORAL
Qty: 90 TABLET | Refills: 0 | Status: SHIPPED | OUTPATIENT
Start: 2021-03-03 | End: 2021-06-01 | Stop reason: SDUPTHER

## 2021-03-03 RX ORDER — METFORMIN HYDROCHLORIDE 500 MG/1
500 TABLET ORAL 2 TIMES DAILY WITH MEALS
Qty: 90 TABLET | Refills: 1 | Status: SHIPPED | OUTPATIENT
Start: 2021-03-03 | End: 2021-06-01 | Stop reason: SDUPTHER

## 2021-03-03 RX ORDER — ATORVASTATIN CALCIUM 10 MG/1
10 TABLET, FILM COATED ORAL DAILY
COMMUNITY
End: 2021-04-05

## 2021-03-07 ENCOUNTER — LAB VISIT (OUTPATIENT)
Dept: PRIMARY CARE CLINIC | Facility: CLINIC | Age: 66
End: 2021-03-07
Payer: MEDICARE

## 2021-03-07 DIAGNOSIS — Z03.818 ENCOUNTER FOR OBSERVATION FOR SUSPECTED EXPOSURE TO OTHER BIOLOGICAL AGENTS RULED OUT: ICD-10-CM

## 2021-03-07 LAB — SARS-COV-2 RNA RESP QL NAA+PROBE: NOT DETECTED

## 2021-03-07 PROCEDURE — U0003 INFECTIOUS AGENT DETECTION BY NUCLEIC ACID (DNA OR RNA); SEVERE ACUTE RESPIRATORY SYNDROME CORONAVIRUS 2 (SARS-COV-2) (CORONAVIRUS DISEASE [COVID-19]), AMPLIFIED PROBE TECHNIQUE, MAKING USE OF HIGH THROUGHPUT TECHNOLOGIES AS DESCRIBED BY CMS-2020-01-R: HCPCS | Performed by: ORTHOPAEDIC SURGERY

## 2021-03-07 PROCEDURE — U0005 INFEC AGEN DETEC AMPLI PROBE: HCPCS | Performed by: ORTHOPAEDIC SURGERY

## 2021-03-10 ENCOUNTER — HOSPITAL ENCOUNTER (OUTPATIENT)
Facility: HOSPITAL | Age: 66
Discharge: HOME | End: 2021-03-10
Attending: ORTHOPAEDIC SURGERY | Admitting: ORTHOPAEDIC SURGERY
Payer: MEDICARE

## 2021-03-10 ENCOUNTER — ANESTHESIA EVENT (OUTPATIENT)
Dept: SURGERY | Facility: HOSPITAL | Age: 66
End: 2021-03-10
Payer: MEDICARE

## 2021-03-10 ENCOUNTER — ANESTHESIA (OUTPATIENT)
Dept: SURGERY | Facility: HOSPITAL | Age: 66
End: 2021-03-10
Payer: MEDICARE

## 2021-03-10 VITALS
BODY MASS INDEX: 36.68 KG/M2 | OXYGEN SATURATION: 99 % | DIASTOLIC BLOOD PRESSURE: 62 MMHG | RESPIRATION RATE: 12 BRPM | WEIGHT: 207 LBS | SYSTOLIC BLOOD PRESSURE: 124 MMHG | TEMPERATURE: 97 F | HEIGHT: 63 IN | HEART RATE: 55 BPM

## 2021-03-10 DIAGNOSIS — M75.41 IMPINGEMENT SYNDROME OF RIGHT SHOULDER: ICD-10-CM

## 2021-03-10 DIAGNOSIS — M19.011 ARTHRITIS OF RIGHT ACROMIOCLAVICULAR JOINT: ICD-10-CM

## 2021-03-10 DIAGNOSIS — M75.121 NONTRAUMATIC COMPLETE TEAR OF RIGHT ROTATOR CUFF: Primary | ICD-10-CM

## 2021-03-10 LAB — GLUCOSE SERPL-MCNC: 105 MG/DL (ref 70–110)

## 2021-03-10 PROCEDURE — 63600175 PHARM REV CODE 636 W HCPCS: Performed by: ANESTHESIOLOGY

## 2021-03-10 PROCEDURE — 36000711: Performed by: ORTHOPAEDIC SURGERY

## 2021-03-10 PROCEDURE — 36000710: Performed by: ORTHOPAEDIC SURGERY

## 2021-03-10 PROCEDURE — 27000284 HC CANNULA NASAL: Performed by: ANESTHESIOLOGY

## 2021-03-10 PROCEDURE — 27000656 HC EYE GOGGLES: Performed by: ANESTHESIOLOGY

## 2021-03-10 PROCEDURE — 27000653 HC CATH, IV CATHLN: Performed by: ANESTHESIOLOGY

## 2021-03-10 PROCEDURE — 76942 ECHO GUIDE FOR BIOPSY: CPT | Performed by: ANESTHESIOLOGY

## 2021-03-10 PROCEDURE — 25000003 PHARM REV CODE 250: Performed by: NURSE ANESTHETIST, CERTIFIED REGISTERED

## 2021-03-10 PROCEDURE — 71000015 HC POSTOP RECOV 1ST HR: Performed by: ORTHOPAEDIC SURGERY

## 2021-03-10 PROCEDURE — 63600175 PHARM REV CODE 636 W HCPCS: Performed by: NURSE ANESTHETIST, CERTIFIED REGISTERED

## 2021-03-10 PROCEDURE — 37000009 HC ANESTHESIA EA ADD 15 MINS: Performed by: ORTHOPAEDIC SURGERY

## 2021-03-10 PROCEDURE — 71000033 HC RECOVERY, INTIAL HOUR: Performed by: ORTHOPAEDIC SURGERY

## 2021-03-10 PROCEDURE — 27200651 HC AIRWAY, LMA: Performed by: ANESTHESIOLOGY

## 2021-03-10 PROCEDURE — 37000008 HC ANESTHESIA 1ST 15 MINUTES: Performed by: ORTHOPAEDIC SURGERY

## 2021-03-10 PROCEDURE — 27202105 HC BIS BILATERAL SENSOR: Performed by: ANESTHESIOLOGY

## 2021-03-10 PROCEDURE — C1713 ANCHOR/SCREW BN/BN,TIS/BN: HCPCS | Performed by: ORTHOPAEDIC SURGERY

## 2021-03-10 PROCEDURE — 27000671 HC TUBING MICROBORE EXT: Performed by: ANESTHESIOLOGY

## 2021-03-10 PROCEDURE — 27000080 OPTIME MED/SURG SUP & DEVICES GENERAL CLASSIFICATION: Performed by: ORTHOPAEDIC SURGERY

## 2021-03-10 PROCEDURE — 63600175 PHARM REV CODE 636 W HCPCS: Performed by: ORTHOPAEDIC SURGERY

## 2021-03-10 PROCEDURE — 25000003 PHARM REV CODE 250: Performed by: ORTHOPAEDIC SURGERY

## 2021-03-10 PROCEDURE — 25000003 PHARM REV CODE 250: Performed by: ANESTHESIOLOGY

## 2021-03-10 PROCEDURE — C9290 INJ, BUPIVACAINE LIPOSOME: HCPCS | Performed by: ANESTHESIOLOGY

## 2021-03-10 PROCEDURE — 27201107 HC STYLET, STANDARD: Performed by: ANESTHESIOLOGY

## 2021-03-10 PROCEDURE — 27201423 OPTIME MED/SURG SUP & DEVICES STERILE SUPPLY: Performed by: ORTHOPAEDIC SURGERY

## 2021-03-10 PROCEDURE — 27000657 HC HEADREST, PRONE: Performed by: ANESTHESIOLOGY

## 2021-03-10 PROCEDURE — 27000673 HC TUBING BLOOD Y: Performed by: ANESTHESIOLOGY

## 2021-03-10 PROCEDURE — 64415 NJX AA&/STRD BRCH PLXS IMG: CPT | Performed by: ANESTHESIOLOGY

## 2021-03-10 DEVICE — IMPLANTABLE DEVICE: Type: IMPLANTABLE DEVICE | Site: SHOULDER | Status: FUNCTIONAL

## 2021-03-10 RX ORDER — NEOSTIGMINE METHYLSULFATE 1 MG/ML
INJECTION, SOLUTION INTRAVENOUS
Status: DISCONTINUED | OUTPATIENT
Start: 2021-03-10 | End: 2021-03-10

## 2021-03-10 RX ORDER — LIDOCAINE HYDROCHLORIDE 20 MG/ML
JELLY TOPICAL
Status: DISCONTINUED | OUTPATIENT
Start: 2021-03-10 | End: 2021-03-10

## 2021-03-10 RX ORDER — ROCURONIUM BROMIDE 10 MG/ML
INJECTION, SOLUTION INTRAVENOUS
Status: DISCONTINUED | OUTPATIENT
Start: 2021-03-10 | End: 2021-03-10

## 2021-03-10 RX ORDER — ONDANSETRON 2 MG/ML
INJECTION INTRAMUSCULAR; INTRAVENOUS
Status: DISCONTINUED | OUTPATIENT
Start: 2021-03-10 | End: 2021-03-10

## 2021-03-10 RX ORDER — SODIUM CHLORIDE 0.9 G/100ML
IRRIGANT IRRIGATION
Status: DISCONTINUED | OUTPATIENT
Start: 2021-03-10 | End: 2021-03-10 | Stop reason: HOSPADM

## 2021-03-10 RX ORDER — LIDOCAINE HYDROCHLORIDE 20 MG/ML
INJECTION, SOLUTION EPIDURAL; INFILTRATION; INTRACAUDAL; PERINEURAL
Status: DISCONTINUED | OUTPATIENT
Start: 2021-03-10 | End: 2021-03-10

## 2021-03-10 RX ORDER — SUCCINYLCHOLINE CHLORIDE 20 MG/ML
INJECTION INTRAMUSCULAR; INTRAVENOUS
Status: DISCONTINUED | OUTPATIENT
Start: 2021-03-10 | End: 2021-03-10

## 2021-03-10 RX ORDER — BUPIVACAINE HCL/EPINEPHRINE 0.25-.0005
VIAL (ML) INJECTION
Status: DISCONTINUED | OUTPATIENT
Start: 2021-03-10 | End: 2021-03-10 | Stop reason: HOSPADM

## 2021-03-10 RX ORDER — EPINEPHRINE 1 MG/ML
INJECTION, SOLUTION INTRACARDIAC; INTRAMUSCULAR; INTRAVENOUS; SUBCUTANEOUS
Status: DISCONTINUED | OUTPATIENT
Start: 2021-03-10 | End: 2021-03-10 | Stop reason: HOSPADM

## 2021-03-10 RX ORDER — CEFAZOLIN SODIUM 2 G/50ML
2 SOLUTION INTRAVENOUS
Status: COMPLETED | OUTPATIENT
Start: 2021-03-10 | End: 2021-03-10

## 2021-03-10 RX ORDER — OXYCODONE AND ACETAMINOPHEN 7.5; 325 MG/1; MG/1
1 TABLET ORAL EVERY 4 HOURS PRN
Qty: 28 TABLET | Refills: 0 | Status: SHIPPED | OUTPATIENT
Start: 2021-03-10 | End: 2021-03-22

## 2021-03-10 RX ORDER — FAMOTIDINE 10 MG/ML
INJECTION INTRAVENOUS
Status: DISCONTINUED | OUTPATIENT
Start: 2021-03-10 | End: 2021-03-10

## 2021-03-10 RX ORDER — MIDAZOLAM HYDROCHLORIDE 1 MG/ML
INJECTION INTRAMUSCULAR; INTRAVENOUS
Status: DISCONTINUED | OUTPATIENT
Start: 2021-03-10 | End: 2021-03-10

## 2021-03-10 RX ORDER — PROPOFOL 10 MG/ML
VIAL (ML) INTRAVENOUS
Status: DISCONTINUED | OUTPATIENT
Start: 2021-03-10 | End: 2021-03-10

## 2021-03-10 RX ORDER — OXYCODONE AND ACETAMINOPHEN 7.5; 325 MG/1; MG/1
1 TABLET ORAL EVERY 4 HOURS PRN
Qty: 28 TABLET | Refills: 0
Start: 2021-03-10 | End: 2021-03-22

## 2021-03-10 RX ORDER — BUPIVACAINE HYDROCHLORIDE 5 MG/ML
INJECTION, SOLUTION EPIDURAL; INTRACAUDAL
Status: DISCONTINUED | OUTPATIENT
Start: 2021-03-10 | End: 2021-03-10

## 2021-03-10 RX ORDER — DEXAMETHASONE SODIUM PHOSPHATE 4 MG/ML
INJECTION, SOLUTION INTRA-ARTICULAR; INTRALESIONAL; INTRAMUSCULAR; INTRAVENOUS; SOFT TISSUE
Status: DISCONTINUED | OUTPATIENT
Start: 2021-03-10 | End: 2021-03-10

## 2021-03-10 RX ORDER — FENTANYL CITRATE 50 UG/ML
INJECTION, SOLUTION INTRAMUSCULAR; INTRAVENOUS
Status: DISCONTINUED | OUTPATIENT
Start: 2021-03-10 | End: 2021-03-10

## 2021-03-10 RX ORDER — SODIUM CHLORIDE, SODIUM LACTATE, POTASSIUM CHLORIDE, CALCIUM CHLORIDE 600; 310; 30; 20 MG/100ML; MG/100ML; MG/100ML; MG/100ML
INJECTION, SOLUTION INTRAVENOUS CONTINUOUS PRN
Status: DISCONTINUED | OUTPATIENT
Start: 2021-03-10 | End: 2021-03-10

## 2021-03-10 RX ADMIN — SODIUM CHLORIDE, SODIUM LACTATE, POTASSIUM CHLORIDE, AND CALCIUM CHLORIDE: .6; .31; .03; .02 INJECTION, SOLUTION INTRAVENOUS at 07:03

## 2021-03-10 RX ADMIN — LIDOCAINE HYDROCHLORIDE 2 ML: 20 JELLY TOPICAL at 09:03

## 2021-03-10 RX ADMIN — FENTANYL CITRATE 50 MCG: 50 INJECTION INTRAMUSCULAR; INTRAVENOUS at 08:03

## 2021-03-10 RX ADMIN — NEOSTIGMINE METHYLSULFATE 5 MG: 1 INJECTION INTRAVENOUS at 10:03

## 2021-03-10 RX ADMIN — MIDAZOLAM HYDROCHLORIDE 1 MG: 1 INJECTION, SOLUTION INTRAMUSCULAR; INTRAVENOUS at 08:03

## 2021-03-10 RX ADMIN — GLYCOPYRROLATE 0.6 MG: 0.2 INJECTION, SOLUTION INTRAMUSCULAR; INTRAVITREAL at 10:03

## 2021-03-10 RX ADMIN — BUPIVACAINE 10 ML: 13.3 INJECTION, SUSPENSION, LIPOSOMAL INFILTRATION at 08:03

## 2021-03-10 RX ADMIN — BUPIVACAINE HYDROCHLORIDE 10 ML: 5 INJECTION, SOLUTION EPIDURAL; INTRACAUDAL; PERINEURAL at 08:03

## 2021-03-10 RX ADMIN — SODIUM CHLORIDE, SODIUM LACTATE, POTASSIUM CHLORIDE, AND CALCIUM CHLORIDE: .6; .31; .03; .02 INJECTION, SOLUTION INTRAVENOUS at 10:03

## 2021-03-10 RX ADMIN — DEXAMETHASONE SODIUM PHOSPHATE 8 MG: 4 INJECTION, SOLUTION INTRAMUSCULAR; INTRAVENOUS at 09:03

## 2021-03-10 RX ADMIN — LIDOCAINE HYDROCHLORIDE 50 MG: 20 INJECTION, SOLUTION EPIDURAL; INFILTRATION; INTRACAUDAL; PERINEURAL at 09:03

## 2021-03-10 RX ADMIN — ROCURONIUM BROMIDE 20 MG: 10 INJECTION, SOLUTION INTRAVENOUS at 09:03

## 2021-03-10 RX ADMIN — PROPOFOL 130 MG: 10 INJECTION, EMULSION INTRAVENOUS at 09:03

## 2021-03-10 RX ADMIN — CEFAZOLIN SODIUM 2 G: 2 SOLUTION INTRAVENOUS at 09:03

## 2021-03-10 RX ADMIN — ONDANSETRON 4 MG: 2 INJECTION INTRAMUSCULAR; INTRAVENOUS at 09:03

## 2021-03-10 RX ADMIN — SUCCINYLCHOLINE CHLORIDE 140 MG: 20 INJECTION, SOLUTION INTRAMUSCULAR; INTRAVENOUS at 09:03

## 2021-03-10 RX ADMIN — FAMOTIDINE 20 MG: 10 INJECTION, SOLUTION INTRAVENOUS at 09:03

## 2021-03-10 RX ADMIN — ROCURONIUM BROMIDE 5 MG: 10 INJECTION, SOLUTION INTRAVENOUS at 09:03

## 2021-03-12 ENCOUNTER — TELEPHONE (OUTPATIENT)
Dept: ANESTHESIOLOGY | Facility: HOSPITAL | Age: 66
End: 2021-03-12

## 2021-03-13 ENCOUNTER — TELEPHONE (OUTPATIENT)
Dept: ANESTHESIOLOGY | Facility: HOSPITAL | Age: 66
End: 2021-03-13

## 2021-03-22 ENCOUNTER — OFFICE VISIT (OUTPATIENT)
Dept: ORTHOPEDICS | Facility: CLINIC | Age: 66
End: 2021-03-22
Payer: MEDICARE

## 2021-03-22 VITALS
HEIGHT: 63 IN | WEIGHT: 207 LBS | DIASTOLIC BLOOD PRESSURE: 62 MMHG | SYSTOLIC BLOOD PRESSURE: 118 MMHG | BODY MASS INDEX: 36.68 KG/M2

## 2021-03-22 DIAGNOSIS — Z98.890 STATUS POST RIGHT ROTATOR CUFF REPAIR: Primary | ICD-10-CM

## 2021-03-22 PROCEDURE — 99024 POSTOP FOLLOW-UP VISIT: CPT | Mod: S$GLB,,, | Performed by: PHYSICIAN ASSISTANT

## 2021-03-22 PROCEDURE — 1125F AMNT PAIN NOTED PAIN PRSNT: CPT | Mod: S$GLB,,, | Performed by: PHYSICIAN ASSISTANT

## 2021-03-22 PROCEDURE — 1125F PR PAIN SEVERITY QUANTIFIED, PAIN PRESENT: ICD-10-PCS | Mod: S$GLB,,, | Performed by: PHYSICIAN ASSISTANT

## 2021-03-22 PROCEDURE — 99024 PR POST-OP FOLLOW-UP VISIT: ICD-10-PCS | Mod: S$GLB,,, | Performed by: PHYSICIAN ASSISTANT

## 2021-03-22 PROCEDURE — 3008F PR BODY MASS INDEX (BMI) DOCUMENTED: ICD-10-PCS | Mod: S$GLB,,, | Performed by: PHYSICIAN ASSISTANT

## 2021-03-22 PROCEDURE — 3008F BODY MASS INDEX DOCD: CPT | Mod: S$GLB,,, | Performed by: PHYSICIAN ASSISTANT

## 2021-03-23 ENCOUNTER — TELEPHONE (OUTPATIENT)
Dept: ORTHOPEDICS | Facility: CLINIC | Age: 66
End: 2021-03-23

## 2021-03-23 DIAGNOSIS — Z98.890 STATUS POST RIGHT ROTATOR CUFF REPAIR: Primary | ICD-10-CM

## 2021-04-20 ENCOUNTER — OFFICE VISIT (OUTPATIENT)
Dept: ORTHOPEDICS | Facility: CLINIC | Age: 66
End: 2021-04-20
Payer: MEDICARE

## 2021-04-20 VITALS
WEIGHT: 207 LBS | DIASTOLIC BLOOD PRESSURE: 100 MMHG | HEART RATE: 79 BPM | BODY MASS INDEX: 36.68 KG/M2 | SYSTOLIC BLOOD PRESSURE: 162 MMHG | HEIGHT: 63 IN

## 2021-04-20 DIAGNOSIS — Z98.890 STATUS POST RIGHT ROTATOR CUFF REPAIR: Primary | ICD-10-CM

## 2021-04-20 PROCEDURE — 3008F PR BODY MASS INDEX (BMI) DOCUMENTED: ICD-10-PCS | Mod: S$GLB,,, | Performed by: ORTHOPAEDIC SURGERY

## 2021-04-20 PROCEDURE — 1125F AMNT PAIN NOTED PAIN PRSNT: CPT | Mod: S$GLB,,, | Performed by: ORTHOPAEDIC SURGERY

## 2021-04-20 PROCEDURE — 1125F PR PAIN SEVERITY QUANTIFIED, PAIN PRESENT: ICD-10-PCS | Mod: S$GLB,,, | Performed by: ORTHOPAEDIC SURGERY

## 2021-04-20 PROCEDURE — 99024 POSTOP FOLLOW-UP VISIT: CPT | Mod: S$GLB,,, | Performed by: ORTHOPAEDIC SURGERY

## 2021-04-20 PROCEDURE — 99024 PR POST-OP FOLLOW-UP VISIT: ICD-10-PCS | Mod: S$GLB,,, | Performed by: ORTHOPAEDIC SURGERY

## 2021-04-20 PROCEDURE — 3008F BODY MASS INDEX DOCD: CPT | Mod: S$GLB,,, | Performed by: ORTHOPAEDIC SURGERY

## 2021-06-01 ENCOUNTER — OFFICE VISIT (OUTPATIENT)
Dept: FAMILY MEDICINE | Facility: CLINIC | Age: 66
End: 2021-06-01
Payer: MEDICARE

## 2021-06-01 DIAGNOSIS — E78.2 MIXED HYPERLIPIDEMIA: ICD-10-CM

## 2021-06-01 DIAGNOSIS — R63.5 WEIGHT GAIN: ICD-10-CM

## 2021-06-01 DIAGNOSIS — R73.09 ELEVATED GLUCOSE: Primary | ICD-10-CM

## 2021-06-01 DIAGNOSIS — H93.19 TINNITUS, UNSPECIFIED LATERALITY: ICD-10-CM

## 2021-06-01 DIAGNOSIS — J30.89 ENVIRONMENTAL AND SEASONAL ALLERGIES: ICD-10-CM

## 2021-06-01 PROCEDURE — 99213 OFFICE O/P EST LOW 20 MIN: CPT | Mod: 95,,, | Performed by: INTERNAL MEDICINE

## 2021-06-01 PROCEDURE — 99213 PR OFFICE/OUTPT VISIT, EST, LEVL III, 20-29 MIN: ICD-10-PCS | Mod: 95,,, | Performed by: INTERNAL MEDICINE

## 2021-06-01 RX ORDER — MONTELUKAST SODIUM 10 MG/1
10 TABLET ORAL DAILY
Qty: 90 TABLET | Refills: 3 | Status: SHIPPED | OUTPATIENT
Start: 2021-06-01 | End: 2021-09-14 | Stop reason: SDUPTHER

## 2021-06-01 RX ORDER — METFORMIN HYDROCHLORIDE 500 MG/1
500 TABLET ORAL 2 TIMES DAILY WITH MEALS
Qty: 90 TABLET | Refills: 1 | Status: SHIPPED | OUTPATIENT
Start: 2021-06-01 | End: 2021-09-14 | Stop reason: SDUPTHER

## 2021-06-01 RX ORDER — PHENTERMINE HYDROCHLORIDE 37.5 MG/1
37.5 TABLET ORAL
Qty: 90 TABLET | Refills: 0 | Status: SHIPPED | OUTPATIENT
Start: 2021-06-01 | End: 2021-09-14 | Stop reason: SDUPTHER

## 2021-09-14 ENCOUNTER — TELEPHONE (OUTPATIENT)
Dept: FAMILY MEDICINE | Facility: CLINIC | Age: 66
End: 2021-09-14

## 2021-09-14 ENCOUNTER — PATIENT MESSAGE (OUTPATIENT)
Dept: FAMILY MEDICINE | Facility: CLINIC | Age: 66
End: 2021-09-14

## 2021-09-14 DIAGNOSIS — J30.89 ENVIRONMENTAL AND SEASONAL ALLERGIES: ICD-10-CM

## 2021-09-14 DIAGNOSIS — E78.00 PURE HYPERCHOLESTEROLEMIA: ICD-10-CM

## 2021-09-14 DIAGNOSIS — R73.09 ELEVATED GLUCOSE: ICD-10-CM

## 2021-09-14 DIAGNOSIS — R63.5 WEIGHT GAIN: ICD-10-CM

## 2021-09-14 DIAGNOSIS — R53.83 FATIGUE, UNSPECIFIED TYPE: ICD-10-CM

## 2021-09-14 DIAGNOSIS — R32 URINARY INCONTINENCE, UNSPECIFIED TYPE: ICD-10-CM

## 2021-09-14 DIAGNOSIS — N90.4 LICHEN SCLEROSUS OF FEMALE GENITALIA: Primary | ICD-10-CM

## 2021-09-14 RX ORDER — PHENTERMINE HYDROCHLORIDE 37.5 MG/1
37.5 TABLET ORAL
Qty: 30 TABLET | Refills: 0 | Status: SHIPPED | OUTPATIENT
Start: 2021-09-14 | End: 2021-09-14 | Stop reason: SDUPTHER

## 2021-09-14 RX ORDER — METFORMIN HYDROCHLORIDE 500 MG/1
500 TABLET ORAL 2 TIMES DAILY WITH MEALS
Qty: 90 TABLET | Refills: 1 | Status: SHIPPED | OUTPATIENT
Start: 2021-09-14 | End: 2021-09-21 | Stop reason: SDUPTHER

## 2021-09-14 RX ORDER — PHENTERMINE HYDROCHLORIDE 37.5 MG/1
37.5 TABLET ORAL
Qty: 90 TABLET | Refills: 0 | Status: SHIPPED | OUTPATIENT
Start: 2021-09-14 | End: 2021-09-14 | Stop reason: SDUPTHER

## 2021-09-14 RX ORDER — CYANOCOBALAMIN 1000 UG/ML
INJECTION, SOLUTION INTRAMUSCULAR; SUBCUTANEOUS
Qty: 10 ML | Refills: 1 | Status: SHIPPED | OUTPATIENT
Start: 2021-09-14 | End: 2021-09-16 | Stop reason: SDUPTHER

## 2021-09-14 RX ORDER — ROSUVASTATIN CALCIUM 5 MG/1
5 TABLET, COATED ORAL DAILY
Qty: 90 TABLET | Refills: 1 | Status: SHIPPED | OUTPATIENT
Start: 2021-09-14 | End: 2021-09-14 | Stop reason: SDUPTHER

## 2021-09-14 RX ORDER — MIRABEGRON 50 MG/1
50 TABLET, FILM COATED, EXTENDED RELEASE ORAL DAILY
Qty: 90 TABLET | Refills: 3 | Status: SHIPPED | OUTPATIENT
Start: 2021-09-14 | End: 2021-09-16 | Stop reason: SDUPTHER

## 2021-09-14 RX ORDER — PHENTERMINE HYDROCHLORIDE 37.5 MG/1
37.5 TABLET ORAL
Qty: 30 TABLET | Refills: 0 | Status: SHIPPED | OUTPATIENT
Start: 2021-09-14 | End: 2021-09-16 | Stop reason: SDUPTHER

## 2021-09-14 RX ORDER — CYANOCOBALAMIN 1000 UG/ML
INJECTION, SOLUTION INTRAMUSCULAR; SUBCUTANEOUS
Qty: 10 ML | Refills: 1 | Status: SHIPPED | OUTPATIENT
Start: 2021-09-14 | End: 2021-09-14 | Stop reason: SDUPTHER

## 2021-09-14 RX ORDER — METFORMIN HYDROCHLORIDE 500 MG/1
500 TABLET ORAL 2 TIMES DAILY WITH MEALS
Qty: 90 TABLET | Refills: 1 | Status: SHIPPED | OUTPATIENT
Start: 2021-09-14 | End: 2021-09-14 | Stop reason: SDUPTHER

## 2021-09-14 RX ORDER — MONTELUKAST SODIUM 10 MG/1
10 TABLET ORAL DAILY
Qty: 90 TABLET | Refills: 3 | Status: SHIPPED | OUTPATIENT
Start: 2021-09-14 | End: 2021-09-16 | Stop reason: SDUPTHER

## 2021-09-14 RX ORDER — MONTELUKAST SODIUM 10 MG/1
10 TABLET ORAL DAILY
Qty: 90 TABLET | Refills: 3 | Status: SHIPPED | OUTPATIENT
Start: 2021-09-14 | End: 2021-09-14 | Stop reason: SDUPTHER

## 2021-09-14 RX ORDER — MIRABEGRON 50 MG/1
50 TABLET, FILM COATED, EXTENDED RELEASE ORAL DAILY
Qty: 90 TABLET | Refills: 3 | Status: SHIPPED | OUTPATIENT
Start: 2021-09-14 | End: 2021-09-14 | Stop reason: SDUPTHER

## 2021-09-14 RX ORDER — LEVOCETIRIZINE DIHYDROCHLORIDE 5 MG/1
5 TABLET, FILM COATED ORAL NIGHTLY
Qty: 90 TABLET | Refills: 3 | Status: SHIPPED | OUTPATIENT
Start: 2021-09-14 | End: 2021-09-14 | Stop reason: SDUPTHER

## 2021-09-14 RX ORDER — ROSUVASTATIN CALCIUM 5 MG/1
5 TABLET, COATED ORAL DAILY
Qty: 90 TABLET | Refills: 1 | Status: SHIPPED | OUTPATIENT
Start: 2021-09-14 | End: 2021-09-16 | Stop reason: SDUPTHER

## 2021-09-14 RX ORDER — LEVOCETIRIZINE DIHYDROCHLORIDE 5 MG/1
5 TABLET, FILM COATED ORAL NIGHTLY
Qty: 90 TABLET | Refills: 3 | Status: SHIPPED | OUTPATIENT
Start: 2021-09-14 | End: 2021-09-16 | Stop reason: SDUPTHER

## 2021-09-14 RX ORDER — CLOBETASOL PROPIONATE 0.5 MG/G
OINTMENT TOPICAL 2 TIMES DAILY
Qty: 60 G | Refills: 2 | Status: SHIPPED | OUTPATIENT
Start: 2021-09-14 | End: 2022-06-28

## 2021-09-16 DIAGNOSIS — R32 URINARY INCONTINENCE, UNSPECIFIED TYPE: ICD-10-CM

## 2021-09-16 DIAGNOSIS — R73.09 ELEVATED GLUCOSE: ICD-10-CM

## 2021-09-16 DIAGNOSIS — E78.00 PURE HYPERCHOLESTEROLEMIA: ICD-10-CM

## 2021-09-16 DIAGNOSIS — R53.83 FATIGUE, UNSPECIFIED TYPE: ICD-10-CM

## 2021-09-16 DIAGNOSIS — J30.89 ENVIRONMENTAL AND SEASONAL ALLERGIES: ICD-10-CM

## 2021-09-16 DIAGNOSIS — R63.5 WEIGHT GAIN: ICD-10-CM

## 2021-09-17 RX ORDER — ROSUVASTATIN CALCIUM 5 MG/1
5 TABLET, COATED ORAL DAILY
Qty: 90 TABLET | Refills: 1 | Status: SHIPPED | OUTPATIENT
Start: 2021-09-17 | End: 2021-09-21

## 2021-09-17 RX ORDER — LEVOCETIRIZINE DIHYDROCHLORIDE 5 MG/1
5 TABLET, FILM COATED ORAL NIGHTLY
Qty: 90 TABLET | Refills: 3 | Status: SHIPPED | OUTPATIENT
Start: 2021-09-17 | End: 2022-09-15 | Stop reason: SDUPTHER

## 2021-09-17 RX ORDER — CYANOCOBALAMIN 1000 UG/ML
INJECTION, SOLUTION INTRAMUSCULAR; SUBCUTANEOUS
Qty: 10 ML | Refills: 1 | Status: SHIPPED | OUTPATIENT
Start: 2021-09-17 | End: 2021-12-14 | Stop reason: SDUPTHER

## 2021-09-17 RX ORDER — MIRABEGRON 50 MG/1
50 TABLET, FILM COATED, EXTENDED RELEASE ORAL DAILY
Qty: 90 TABLET | Refills: 3 | Status: SHIPPED | OUTPATIENT
Start: 2021-09-17 | End: 2021-12-14 | Stop reason: SDUPTHER

## 2021-09-17 RX ORDER — MONTELUKAST SODIUM 10 MG/1
10 TABLET ORAL DAILY
Qty: 90 TABLET | Refills: 3 | Status: SHIPPED | OUTPATIENT
Start: 2021-09-17 | End: 2021-11-23

## 2021-09-17 RX ORDER — PHENTERMINE HYDROCHLORIDE 37.5 MG/1
37.5 TABLET ORAL
Qty: 30 TABLET | Refills: 0 | Status: SHIPPED | OUTPATIENT
Start: 2021-09-17 | End: 2021-10-18

## 2021-09-21 ENCOUNTER — OFFICE VISIT (OUTPATIENT)
Dept: FAMILY MEDICINE | Facility: CLINIC | Age: 66
End: 2021-09-21
Payer: MEDICARE

## 2021-09-21 VITALS
BODY MASS INDEX: 35.08 KG/M2 | HEIGHT: 63 IN | OXYGEN SATURATION: 99 % | SYSTOLIC BLOOD PRESSURE: 134 MMHG | HEART RATE: 72 BPM | RESPIRATION RATE: 14 BRPM | DIASTOLIC BLOOD PRESSURE: 72 MMHG | WEIGHT: 198 LBS

## 2021-09-21 DIAGNOSIS — R73.09 ELEVATED GLUCOSE: ICD-10-CM

## 2021-09-21 DIAGNOSIS — E78.00 PURE HYPERCHOLESTEROLEMIA: ICD-10-CM

## 2021-09-21 DIAGNOSIS — E78.2 MIXED HYPERLIPIDEMIA: Primary | ICD-10-CM

## 2021-09-21 DIAGNOSIS — E04.1 THYROID NODULE: ICD-10-CM

## 2021-09-21 PROCEDURE — 3075F PR MOST RECENT SYSTOLIC BLOOD PRESS GE 130-139MM HG: ICD-10-PCS | Mod: S$GLB,,, | Performed by: INTERNAL MEDICINE

## 2021-09-21 PROCEDURE — 3078F PR MOST RECENT DIASTOLIC BLOOD PRESSURE < 80 MM HG: ICD-10-PCS | Mod: S$GLB,,, | Performed by: INTERNAL MEDICINE

## 2021-09-21 PROCEDURE — 99214 PR OFFICE/OUTPT VISIT, EST, LEVL IV, 30-39 MIN: ICD-10-PCS | Mod: S$GLB,,, | Performed by: INTERNAL MEDICINE

## 2021-09-21 PROCEDURE — 1160F RVW MEDS BY RX/DR IN RCRD: CPT | Mod: S$GLB,,, | Performed by: INTERNAL MEDICINE

## 2021-09-21 PROCEDURE — 3078F DIAST BP <80 MM HG: CPT | Mod: S$GLB,,, | Performed by: INTERNAL MEDICINE

## 2021-09-21 PROCEDURE — 3008F PR BODY MASS INDEX (BMI) DOCUMENTED: ICD-10-PCS | Mod: S$GLB,,, | Performed by: INTERNAL MEDICINE

## 2021-09-21 PROCEDURE — 3008F BODY MASS INDEX DOCD: CPT | Mod: S$GLB,,, | Performed by: INTERNAL MEDICINE

## 2021-09-21 PROCEDURE — 3075F SYST BP GE 130 - 139MM HG: CPT | Mod: S$GLB,,, | Performed by: INTERNAL MEDICINE

## 2021-09-21 PROCEDURE — 99214 OFFICE O/P EST MOD 30 MIN: CPT | Mod: S$GLB,,, | Performed by: INTERNAL MEDICINE

## 2021-09-21 PROCEDURE — 1160F PR REVIEW ALL MEDS BY PRESCRIBER/CLIN PHARMACIST DOCUMENTED: ICD-10-PCS | Mod: S$GLB,,, | Performed by: INTERNAL MEDICINE

## 2021-09-21 RX ORDER — ROSUVASTATIN CALCIUM 10 MG/1
10 TABLET, COATED ORAL DAILY
Qty: 90 TABLET | Refills: 1 | Status: CANCELLED | OUTPATIENT
Start: 2021-09-21

## 2021-09-21 RX ORDER — ROSUVASTATIN CALCIUM 40 MG/1
40 TABLET, COATED ORAL DAILY
Qty: 90 TABLET | Refills: 1 | Status: SHIPPED | OUTPATIENT
Start: 2021-09-21 | End: 2022-06-28 | Stop reason: SDUPTHER

## 2021-09-21 RX ORDER — METFORMIN HYDROCHLORIDE 500 MG/1
500 TABLET ORAL 2 TIMES DAILY WITH MEALS
Qty: 180 TABLET | Refills: 2 | Status: SHIPPED | OUTPATIENT
Start: 2021-09-21 | End: 2022-03-23 | Stop reason: SDUPTHER

## 2021-09-28 ENCOUNTER — HOSPITAL ENCOUNTER (OUTPATIENT)
Dept: RADIOLOGY | Facility: HOSPITAL | Age: 66
Discharge: HOME | End: 2021-09-28
Attending: INTERNAL MEDICINE
Payer: MEDICARE

## 2021-09-28 DIAGNOSIS — E04.1 THYROID NODULE: ICD-10-CM

## 2021-09-28 PROCEDURE — 76536 US EXAM OF HEAD AND NECK: CPT | Mod: TC,PO

## 2021-10-18 ENCOUNTER — LAB VISIT (OUTPATIENT)
Dept: LAB | Facility: HOSPITAL | Age: 66
End: 2021-10-18
Attending: INTERNAL MEDICINE
Payer: MEDICARE

## 2021-10-18 DIAGNOSIS — E78.2 MIXED HYPERLIPIDEMIA: ICD-10-CM

## 2021-10-18 DIAGNOSIS — E04.1 THYROID NODULE: ICD-10-CM

## 2021-10-18 DIAGNOSIS — R73.09 ELEVATED GLUCOSE: ICD-10-CM

## 2021-10-18 LAB
ANION GAP SERPL CALC-SCNC: 11 MMOL/L (ref 8–16)
BUN SERPL-MCNC: 18 MG/DL (ref 8–23)
CALCIUM SERPL-MCNC: 9.3 MG/DL (ref 8.7–10.5)
CHLORIDE SERPL-SCNC: 105 MMOL/L (ref 95–110)
CHOLEST SERPL-MCNC: 133 MG/DL (ref 120–199)
CHOLEST/HDLC SERPL: 2.8 {RATIO} (ref 2–5)
CO2 SERPL-SCNC: 26 MMOL/L (ref 23–29)
CREAT SERPL-MCNC: 0.7 MG/DL (ref 0.5–1.4)
EST. GFR  (AFRICAN AMERICAN): >60 ML/MIN/1.73 M^2
EST. GFR  (NON AFRICAN AMERICAN): >60 ML/MIN/1.73 M^2
GLUCOSE SERPL-MCNC: 113 MG/DL (ref 70–110)
HDLC SERPL-MCNC: 47 MG/DL (ref 40–75)
HDLC SERPL: 35.3 % (ref 20–50)
LDLC SERPL CALC-MCNC: 71.4 MG/DL (ref 63–159)
NONHDLC SERPL-MCNC: 86 MG/DL
POTASSIUM SERPL-SCNC: 4.2 MMOL/L (ref 3.5–5.1)
SODIUM SERPL-SCNC: 142 MMOL/L (ref 136–145)
TRIGL SERPL-MCNC: 73 MG/DL (ref 30–150)
TSH SERPL DL<=0.005 MIU/L-ACNC: 2.1 UIU/ML (ref 0.34–5.6)

## 2021-10-18 PROCEDURE — 83036 HEMOGLOBIN GLYCOSYLATED A1C: CPT | Performed by: INTERNAL MEDICINE

## 2021-10-18 PROCEDURE — 36415 COLL VENOUS BLD VENIPUNCTURE: CPT | Performed by: INTERNAL MEDICINE

## 2021-10-18 PROCEDURE — 80061 LIPID PANEL: CPT | Performed by: INTERNAL MEDICINE

## 2021-10-18 PROCEDURE — 80048 BASIC METABOLIC PNL TOTAL CA: CPT | Performed by: INTERNAL MEDICINE

## 2021-10-18 PROCEDURE — 84443 ASSAY THYROID STIM HORMONE: CPT | Performed by: INTERNAL MEDICINE

## 2021-10-19 LAB
ESTIMATED AVG GLUCOSE: 137 MG/DL (ref 68–131)
HBA1C MFR BLD: 6.4 % (ref 4.5–6.2)

## 2021-12-14 DIAGNOSIS — R32 URINARY INCONTINENCE, UNSPECIFIED TYPE: ICD-10-CM

## 2021-12-14 DIAGNOSIS — R53.83 FATIGUE, UNSPECIFIED TYPE: ICD-10-CM

## 2021-12-14 RX ORDER — CYANOCOBALAMIN 1000 UG/ML
INJECTION, SOLUTION INTRAMUSCULAR; SUBCUTANEOUS
Qty: 10 ML | Refills: 1 | Status: SHIPPED | OUTPATIENT
Start: 2021-12-14 | End: 2022-03-23 | Stop reason: SDUPTHER

## 2021-12-14 RX ORDER — MIRABEGRON 50 MG/1
50 TABLET, FILM COATED, EXTENDED RELEASE ORAL DAILY
Qty: 90 TABLET | Refills: 3 | Status: SHIPPED | OUTPATIENT
Start: 2021-12-14 | End: 2022-03-23

## 2022-02-24 ENCOUNTER — PATIENT MESSAGE (OUTPATIENT)
Dept: FAMILY MEDICINE | Facility: CLINIC | Age: 67
End: 2022-02-24
Payer: MEDICARE

## 2022-02-26 RX ORDER — OXYBUTYNIN CHLORIDE 15 MG/1
15 TABLET, EXTENDED RELEASE ORAL DAILY
Qty: 30 TABLET | Refills: 5 | Status: SHIPPED | OUTPATIENT
Start: 2022-02-26 | End: 2022-06-28 | Stop reason: SDUPTHER

## 2022-02-28 ENCOUNTER — PATIENT MESSAGE (OUTPATIENT)
Dept: FAMILY MEDICINE | Facility: CLINIC | Age: 67
End: 2022-02-28
Payer: MEDICARE

## 2022-02-28 DIAGNOSIS — R63.5 WEIGHT GAIN: ICD-10-CM

## 2022-02-28 RX ORDER — PHENTERMINE HYDROCHLORIDE 37.5 MG/1
37.5 TABLET ORAL EVERY MORNING
Qty: 30 TABLET | Refills: 0 | OUTPATIENT
Start: 2022-02-28

## 2022-03-20 NOTE — PROGRESS NOTES
"  SUBJECTIVE    Patient ID: Nalini Wooten is a 67 y.o. female.    Chief Complaint: Medication Refill and Follow-up    HPI     Patient in for follow-up on meds and glucose-latest glucose 113 with A1C 6.4    Na 142 K 4.2 BUN 18 Cr .7 Chol 133 HDL 47 LDL 71 Trigs 47 TSH 2.1    Pt thinks she has UTI-yesterday pm she started with burning and urgency and only small dribbles-no back pain or fever-no noted blood in the urine-25 years since bladder suspension and she is having a hard time keeping urine in-rx helps but not with a full bladder-oxy does not last well-myrbetrique helps also    Weight-had lost to 192 then went on vacation and gained some weight-she is on keto diet    Patient says she is constantly trying to clear her throat-clearing throat makes it better-somwtimes food gets stuck and she has pain in the mid chest-    She has an area on the right submandibular space that bothers her-for 8 months she has noted it-no pruritis-just "irritating"      Last 3 sets of Vitals    Vitals - 1 value per visit 9/21/2021 3/23/2022 3/23/2022   SYSTOLIC 134 - 134   DIASTOLIC 72 - 74   Pulse 72 - 64   Temp - - 98.3   Resp 14 - 14   SPO2 99 - 99   Weight (lb) 198 - 203   Weight (kg) 89.812 - 92.08   Height 63 - 63   BMI (Calculated) 35.1 - 36   VISIT REPORT - - -   Pain Score  - 0 -   Some recent data might be hidden       Lab Visit on 10/18/2021   Component Date Value Ref Range Status    Sodium 10/18/2021 142  136 - 145 mmol/L Final    Potassium 10/18/2021 4.2  3.5 - 5.1 mmol/L Final    Chloride 10/18/2021 105  95 - 110 mmol/L Final    CO2 10/18/2021 26  23 - 29 mmol/L Final    Glucose 10/18/2021 113 (A) 70 - 110 mg/dL Final    BUN 10/18/2021 18  8 - 23 mg/dL Final    Creatinine 10/18/2021 0.7  0.5 - 1.4 mg/dL Final    Calcium 10/18/2021 9.3  8.7 - 10.5 mg/dL Final    Anion Gap 10/18/2021 11  8 - 16 mmol/L Final    eGFR if African American 10/18/2021 >60.0  >60 mL/min/1.73 m^2 Final    eGFR if non  " 10/18/2021 >60.0  >60 mL/min/1.73 m^2 Final    Hemoglobin A1C 10/18/2021 6.4 (A) 4.5 - 6.2 % Final    Estimated Avg Glucose 10/18/2021 137 (A) 68 - 131 mg/dL Final    Cholesterol 10/18/2021 133  120 - 199 mg/dL Final    Triglycerides 10/18/2021 73  30 - 150 mg/dL Final    HDL 10/18/2021 47  40 - 75 mg/dL Final    LDL Cholesterol 10/18/2021 71.4  63.0 - 159.0 mg/dL Final    HDL/Cholesterol Ratio 10/18/2021 35.3  20.0 - 50.0 % Final    Total Cholesterol/HDL Ratio 10/18/2021 2.8  2.0 - 5.0 Final    Non-HDL Cholesterol 10/18/2021 86  mg/dL Final    TSH 10/18/2021 2.100  0.340 - 5.600 uIU/mL Final       Past Medical History:   Diagnosis Date    Allergies     Bulging disc     lower back     Carpal tunnel syndrome     Degenerative disc disease     High cholesterol     Pinched nerve in neck      Past Surgical History:   Procedure Laterality Date    ARTHROSCOPIC REPAIR OF ROTATOR CUFF OF SHOULDER Right 3/10/2021    Procedure: REPAIR, ROTATOR CUFF, ARTHROSCOPIC;  Surgeon: Dustin Vanegas MD;  Location: [facility] OR;  Service: Orthopedics;  Laterality: Right;  arthrex notified    ARTHROSCOPY OF SHOULDER WITH DECOMPRESSION OF SUBACROMIAL SPACE Right 3/10/2021    Procedure: ARTHROSCOPY, SHOULDER, WITH SUBACROMIAL SPACE DECOMPRESSION;  Surgeon: Dustin Vanegas MD;  Location: [facility] OR;  Service: Orthopedics;  Laterality: Right;    BLADDER SUSPENSION  1990    DISTAL CLAVICLE EXCISION Right 3/10/2021    Procedure: EXCISION, CLAVICLE, DISTAL;  Surgeon: Dustin Vanegas MD;  Location: [facility] OR;  Service: Orthopedics;  Laterality: Right;    FOOT SURGERY Bilateral 2001    HAND SURGERY Left 2017    HYSTERECTOMY  1990    JOINT REPLACEMENT  2013    KNEE SURGERY Bilateral 2013    MANDIBLE FRACTURE SURGERY  1981    SHOULDER ARTHROSCOPY Left 02/27/2019    THYROIDECTOMY Left 05/13/2019        TONSILLECTOMY       Family History   Problem Relation Age of Onset    Arthritis Mother     Aneurysm Mother         Abdominal  Aneurysm     Arthritis Father     Diabetes Father     Hearing loss Father     Heart disease Father     Hypertension Father     Dementia Father     Diabetes Paternal Grandmother     Heart disease Paternal Grandfather     Leukemia Son     Crohn's disease Son     Ankylosing spondylitis Son     Other Son         avascular necrosis of pancho hips    Rheumatologic disease Son     Heart defect Son     Kidney failure Son     SIDS Maternal Aunt     Uterine cancer Maternal Aunt     Dementia Paternal Aunt     No Known Problems Sister     Heart disease Brother         stent placement    Heart disease Brother         stent placement    Aneurysm Brother         Abdominal Aneurysm    No Known Problems Sister        Marital Status:   Alcohol History:  reports no history of alcohol use.  Tobacco History:  reports that she has never smoked. She has never used smokeless tobacco.  Drug History:  reports no history of drug use.    Review of patient's allergies indicates:   Allergen Reactions    Estrogens Other (See Comments)     Mini stroke    Gabapentin Itching and Rash    Tetanus vaccines and toxoid Swelling and Other (See Comments)     Swelling at injection site, fever    Shingrix (pf)  [varicella-zoster [id number] (pf)] Diarrhea, Itching and Nausea And Vomiting       Current Outpatient Medications:     clobetasol 0.05% (TEMOVATE) 0.05 % Oint, Apply topically 2 (two) times daily., Disp: 60 g, Rfl: 2    levocetirizine (XYZAL) 5 MG tablet, Take 1 tablet (5 mg total) by mouth every evening., Disp: 90 tablet, Rfl: 3    montelukast (SINGULAIR) 10 mg tablet, TAKE 1 TABLET(10 MG) BY MOUTH EVERY DAY, Disp: 90 tablet, Rfl: 3    oxybutynin (DITROPAN XL) 15 MG TR24, Take 1 tablet (15 mg total) by mouth once daily., Disp: 30 tablet, Rfl: 5    rosuvastatin (CRESTOR) 40 MG Tab, Take 1 tablet (40 mg total) by mouth once daily., Disp: 90 tablet, Rfl: 1    traMADol (ULTRAM) 50 mg tablet, Take 1 tablet (50 mg total)  by mouth every 4 (four) hours as needed for Pain., Disp: 42 tablet, Rfl: 0    cyanocobalamin 1,000 mcg/mL injection, Inject every week 1 cc, Disp: 10 mL, Rfl: 1    metFORMIN (GLUCOPHAGE) 500 MG tablet, Take 1 tablet (500 mg total) by mouth 2 (two) times daily with meals., Disp: 180 tablet, Rfl: 2    oxybutynin (DITROPAN-XL) 10 MG 24 hr tablet, Take 1 tablet (10 mg total) by mouth once daily., Disp: 30 tablet, Rfl: 11    phentermine (ADIPEX-P) 37.5 mg tablet, Take 1 tablet (37.5 mg total) by mouth every morning., Disp: 30 tablet, Rfl: 0    Review of Systems   Constitutional: Negative for appetite change, chills, diaphoresis, fatigue, fever and unexpected weight change.   HENT: Negative for congestion, ear pain, hearing loss, nosebleeds, postnasal drip, sinus pressure, sinus pain, sneezing, sore throat, tinnitus, trouble swallowing and voice change.    Eyes: Negative for photophobia, pain, itching and visual disturbance.   Respiratory: Negative for apnea, cough, chest tightness, shortness of breath, wheezing and stridor.    Cardiovascular: Negative for chest pain, palpitations and leg swelling.   Gastrointestinal: Negative for abdominal distention, abdominal pain, blood in stool, constipation, diarrhea, nausea and vomiting.   Endocrine: Negative for cold intolerance, heat intolerance, polydipsia and polyuria.   Genitourinary: Positive for difficulty urinating. Negative for dyspareunia, dysuria, flank pain, frequency, hematuria, menstrual problem, pelvic pain, urgency, vaginal discharge and vaginal pain.   Musculoskeletal: Positive for arthralgias (upcoming foot surgery for right foot small toe). Negative for back pain, joint swelling, myalgias, neck pain and neck stiffness.   Skin: Negative for pallor.   Allergic/Immunologic: Negative for environmental allergies and food allergies.   Neurological: Negative for dizziness, tremors, speech difficulty, weakness, light-headedness and numbness.   Hematological: Does not  "bruise/bleed easily.   Psychiatric/Behavioral: Negative for agitation, confusion, decreased concentration, sleep disturbance and suicidal ideas. The patient is not nervous/anxious.           Objective:      Vitals:    03/23/22 1008   BP: 134/74   Pulse: 64   Resp: 14   Temp: 98.3 °F (36.8 °C)   SpO2: 99%   Weight: 92.1 kg (203 lb)   Height: 5' 3" (1.6 m)     Physical Exam  Constitutional:       General: She is not in acute distress.     Appearance: Normal appearance. She is obese. She is not ill-appearing, toxic-appearing or diaphoretic.   HENT:      Head: Normocephalic and atraumatic.   Eyes:      Extraocular Movements: Extraocular movements intact.      Conjunctiva/sclera: Conjunctivae normal.      Pupils: Pupils are equal, round, and reactive to light.   Neck:      Vascular: No carotid bruit.   Cardiovascular:      Rate and Rhythm: Normal rate and regular rhythm.      Pulses: Normal pulses.      Heart sounds: Normal heart sounds.   Pulmonary:      Effort: Pulmonary effort is normal.      Breath sounds: Normal breath sounds.   Abdominal:      General: Bowel sounds are normal.      Palpations: Abdomen is soft.      Tenderness: There is no abdominal tenderness.   Musculoskeletal:      Cervical back: Normal range of motion and neck supple.      Right lower leg: No edema.      Left lower leg: No edema.   Lymphadenopathy:      Cervical: No cervical adenopathy.   Skin:     General: Skin is warm and dry.      Capillary Refill: Capillary refill takes less than 2 seconds.   Neurological:      General: No focal deficit present.      Mental Status: She is alert and oriented to person, place, and time. Mental status is at baseline.   Psychiatric:         Mood and Affect: Mood normal.         Thought Content: Thought content normal.         Judgment: Judgment normal.           Assessment:       1. Mixed hyperlipidemia    2. Elevated glucose    3. Encounter for screening mammogram for breast cancer    4. Menopause    5. " Encounter for osteoporosis screening in asymptomatic postmenopausal patient    6. Need for hepatitis C screening test    7. Encounter for screening for HIV    8. Fatigue, unspecified type    9. Weight gain    10. BMI 35.0-35.9,adult    11. Urge incontinence of urine    12. Obesity (BMI 30-39.9)    13. Dysuria    14. Urge incontinence    15. Skin lesion of neck    16. Gastroesophageal reflux disease with esophagitis without hemorrhage         Plan:       Mixed hyperlipidemia    Elevated glucose  -     Hemoglobin A1C; Future; Expected date: 04/18/2022  -     metFORMIN (GLUCOPHAGE) 500 MG tablet; Take 1 tablet (500 mg total) by mouth 2 (two) times daily with meals.  Dispense: 180 tablet; Refill: 2    Encounter for screening mammogram for breast cancer  -     Mammo Digital Screening Bilat; Future; Expected date: 03/24/2022    Menopause  -     DXA Bone Density Spine And Hip; Future; Expected date: 03/24/2022    Encounter for osteoporosis screening in asymptomatic postmenopausal patient  -     DXA Bone Density Spine And Hip; Future; Expected date: 03/24/2022    Need for hepatitis C screening test  -     Hepatitis C Antibody; Future; Expected date: 04/18/2022    Encounter for screening for HIV  -     HIV 1/2 Ag/Ab (4th Gen); Future; Expected date: 04/18/2022    Fatigue, unspecified type  -     cyanocobalamin 1,000 mcg/mL injection; Inject every week 1 cc  Dispense: 10 mL; Refill: 1    Weight gain  -     phentermine (ADIPEX-P) 37.5 mg tablet; Take 1 tablet (37.5 mg total) by mouth every morning.  Dispense: 30 tablet; Refill: 0    BMI 35.0-35.9,adult  -     phentermine (ADIPEX-P) 37.5 mg tablet; Take 1 tablet (37.5 mg total) by mouth every morning.  Dispense: 30 tablet; Refill: 0    Urge incontinence of urine  -     oxybutynin (DITROPAN-XL) 10 MG 24 hr tablet; Take 1 tablet (10 mg total) by mouth once daily.  Dispense: 30 tablet; Refill: 11    Obesity (BMI 30-39.9)  -     phentermine (ADIPEX-P) 37.5 mg tablet; Take 1 tablet  (37.5 mg total) by mouth every morning.  Dispense: 30 tablet; Refill: 0    Dysuria  -     Urinalysis Microscopic; Future; Expected date: 03/23/2022  -     Urine culture; Future; Expected date: 03/23/2022    Urge incontinence  -     oxybutynin (DITROPAN-XL) 10 MG 24 hr tablet; Take 1 tablet (10 mg total) by mouth once daily.  Dispense: 30 tablet; Refill: 11    Skin lesion of neck  -     Ambulatory referral/consult to Dermatology; Future; Expected date: 03/30/2022    Gastroesophageal reflux disease with esophagitis without hemorrhage  -     Ambulatory referral/consult to Gastroenterology; Future; Expected date: 03/30/2022      No follow-ups on file.        3/23/2022 Katherine Ferguson M.D.

## 2022-03-23 ENCOUNTER — LAB VISIT (OUTPATIENT)
Dept: LAB | Facility: HOSPITAL | Age: 67
End: 2022-03-23
Attending: INTERNAL MEDICINE
Payer: MEDICARE

## 2022-03-23 ENCOUNTER — OFFICE VISIT (OUTPATIENT)
Dept: FAMILY MEDICINE | Facility: CLINIC | Age: 67
End: 2022-03-23
Payer: MEDICARE

## 2022-03-23 ENCOUNTER — PATIENT MESSAGE (OUTPATIENT)
Dept: FAMILY MEDICINE | Facility: CLINIC | Age: 67
End: 2022-03-23

## 2022-03-23 VITALS
SYSTOLIC BLOOD PRESSURE: 134 MMHG | BODY MASS INDEX: 35.97 KG/M2 | HEIGHT: 63 IN | OXYGEN SATURATION: 99 % | HEART RATE: 64 BPM | WEIGHT: 203 LBS | TEMPERATURE: 98 F | RESPIRATION RATE: 14 BRPM | DIASTOLIC BLOOD PRESSURE: 74 MMHG

## 2022-03-23 DIAGNOSIS — N39.41 URGE INCONTINENCE OF URINE: ICD-10-CM

## 2022-03-23 DIAGNOSIS — K21.00 GASTROESOPHAGEAL REFLUX DISEASE WITH ESOPHAGITIS WITHOUT HEMORRHAGE: ICD-10-CM

## 2022-03-23 DIAGNOSIS — R53.83 FATIGUE, UNSPECIFIED TYPE: ICD-10-CM

## 2022-03-23 DIAGNOSIS — Z78.0 MENOPAUSE: ICD-10-CM

## 2022-03-23 DIAGNOSIS — R30.0 DYSURIA: ICD-10-CM

## 2022-03-23 DIAGNOSIS — L98.9 SKIN LESION OF NECK: ICD-10-CM

## 2022-03-23 DIAGNOSIS — R73.09 ELEVATED GLUCOSE: ICD-10-CM

## 2022-03-23 DIAGNOSIS — Z12.31 ENCOUNTER FOR SCREENING MAMMOGRAM FOR BREAST CANCER: ICD-10-CM

## 2022-03-23 DIAGNOSIS — Z11.4 ENCOUNTER FOR SCREENING FOR HIV: ICD-10-CM

## 2022-03-23 DIAGNOSIS — R63.5 WEIGHT GAIN: ICD-10-CM

## 2022-03-23 DIAGNOSIS — E66.9 OBESITY (BMI 30-39.9): ICD-10-CM

## 2022-03-23 DIAGNOSIS — Z78.0 ENCOUNTER FOR OSTEOPOROSIS SCREENING IN ASYMPTOMATIC POSTMENOPAUSAL PATIENT: ICD-10-CM

## 2022-03-23 DIAGNOSIS — Z13.820 ENCOUNTER FOR OSTEOPOROSIS SCREENING IN ASYMPTOMATIC POSTMENOPAUSAL PATIENT: ICD-10-CM

## 2022-03-23 DIAGNOSIS — Z11.59 NEED FOR HEPATITIS C SCREENING TEST: ICD-10-CM

## 2022-03-23 DIAGNOSIS — N39.41 URGE INCONTINENCE: ICD-10-CM

## 2022-03-23 DIAGNOSIS — E78.2 MIXED HYPERLIPIDEMIA: Primary | ICD-10-CM

## 2022-03-23 LAB
BACTERIA #/AREA URNS HPF: ABNORMAL /HPF
CAOX CRY URNS QL MICRO: ABNORMAL
HYALINE CASTS #/AREA URNS LPF: 33 /LPF
MICROSCOPIC COMMENT: ABNORMAL
NON-SQ EPI CELLS #/AREA URNS HPF: 1 /HPF
RBC #/AREA URNS HPF: 2 /HPF (ref 0–4)
SQUAMOUS #/AREA URNS HPF: 11 /HPF
WBC #/AREA URNS HPF: >100 /HPF (ref 0–5)
WBC CLUMPS URNS QL MICRO: ABNORMAL

## 2022-03-23 PROCEDURE — 3075F PR MOST RECENT SYSTOLIC BLOOD PRESS GE 130-139MM HG: ICD-10-PCS | Mod: S$GLB,,, | Performed by: INTERNAL MEDICINE

## 2022-03-23 PROCEDURE — 87186 SC STD MICRODIL/AGAR DIL: CPT | Performed by: INTERNAL MEDICINE

## 2022-03-23 PROCEDURE — 3075F SYST BP GE 130 - 139MM HG: CPT | Mod: S$GLB,,, | Performed by: INTERNAL MEDICINE

## 2022-03-23 PROCEDURE — 3288F PR FALLS RISK ASSESSMENT DOCUMENTED: ICD-10-PCS | Mod: S$GLB,,, | Performed by: INTERNAL MEDICINE

## 2022-03-23 PROCEDURE — 1101F PT FALLS ASSESS-DOCD LE1/YR: CPT | Mod: S$GLB,,, | Performed by: INTERNAL MEDICINE

## 2022-03-23 PROCEDURE — 81001 URINALYSIS AUTO W/SCOPE: CPT | Performed by: INTERNAL MEDICINE

## 2022-03-23 PROCEDURE — 1160F RVW MEDS BY RX/DR IN RCRD: CPT | Mod: S$GLB,,, | Performed by: INTERNAL MEDICINE

## 2022-03-23 PROCEDURE — 1101F PR PT FALLS ASSESS DOC 0-1 FALLS W/OUT INJ PAST YR: ICD-10-PCS | Mod: S$GLB,,, | Performed by: INTERNAL MEDICINE

## 2022-03-23 PROCEDURE — 3288F FALL RISK ASSESSMENT DOCD: CPT | Mod: S$GLB,,, | Performed by: INTERNAL MEDICINE

## 2022-03-23 PROCEDURE — 99214 OFFICE O/P EST MOD 30 MIN: CPT | Mod: S$GLB,,, | Performed by: INTERNAL MEDICINE

## 2022-03-23 PROCEDURE — 3008F PR BODY MASS INDEX (BMI) DOCUMENTED: ICD-10-PCS | Mod: S$GLB,,, | Performed by: INTERNAL MEDICINE

## 2022-03-23 PROCEDURE — 3008F BODY MASS INDEX DOCD: CPT | Mod: S$GLB,,, | Performed by: INTERNAL MEDICINE

## 2022-03-23 PROCEDURE — 3078F DIAST BP <80 MM HG: CPT | Mod: S$GLB,,, | Performed by: INTERNAL MEDICINE

## 2022-03-23 PROCEDURE — 87086 URINE CULTURE/COLONY COUNT: CPT | Performed by: INTERNAL MEDICINE

## 2022-03-23 PROCEDURE — 1160F PR REVIEW ALL MEDS BY PRESCRIBER/CLIN PHARMACIST DOCUMENTED: ICD-10-PCS | Mod: S$GLB,,, | Performed by: INTERNAL MEDICINE

## 2022-03-23 PROCEDURE — 3078F PR MOST RECENT DIASTOLIC BLOOD PRESSURE < 80 MM HG: ICD-10-PCS | Mod: S$GLB,,, | Performed by: INTERNAL MEDICINE

## 2022-03-23 PROCEDURE — 87077 CULTURE AEROBIC IDENTIFY: CPT | Performed by: INTERNAL MEDICINE

## 2022-03-23 PROCEDURE — 99214 PR OFFICE/OUTPT VISIT, EST, LEVL IV, 30-39 MIN: ICD-10-PCS | Mod: S$GLB,,, | Performed by: INTERNAL MEDICINE

## 2022-03-23 RX ORDER — CYANOCOBALAMIN 1000 UG/ML
INJECTION, SOLUTION INTRAMUSCULAR; SUBCUTANEOUS
Qty: 10 ML | Refills: 1 | Status: SHIPPED | OUTPATIENT
Start: 2022-03-23 | End: 2022-09-15 | Stop reason: SDUPTHER

## 2022-03-23 RX ORDER — PHENTERMINE HYDROCHLORIDE 37.5 MG/1
37.5 TABLET ORAL EVERY MORNING
Qty: 30 TABLET | Refills: 0 | Status: SHIPPED | OUTPATIENT
Start: 2022-03-23 | End: 2022-04-25

## 2022-03-23 RX ORDER — METFORMIN HYDROCHLORIDE 500 MG/1
500 TABLET ORAL 2 TIMES DAILY WITH MEALS
Qty: 180 TABLET | Refills: 2 | Status: SHIPPED | OUTPATIENT
Start: 2022-03-23 | End: 2022-09-15 | Stop reason: SDUPTHER

## 2022-03-23 RX ORDER — CIPROFLOXACIN 500 MG/1
500 TABLET ORAL 2 TIMES DAILY
Qty: 20 TABLET | Refills: 0 | Status: SHIPPED | OUTPATIENT
Start: 2022-03-23 | End: 2022-05-03

## 2022-03-23 RX ORDER — OXYBUTYNIN CHLORIDE 10 MG/1
10 TABLET, EXTENDED RELEASE ORAL DAILY
Qty: 30 TABLET | Refills: 11 | Status: SHIPPED | OUTPATIENT
Start: 2022-03-23 | End: 2022-06-28 | Stop reason: SDUPTHER

## 2022-03-25 LAB — BACTERIA UR CULT: ABNORMAL

## 2022-05-03 ENCOUNTER — OFFICE VISIT (OUTPATIENT)
Dept: FAMILY MEDICINE | Facility: CLINIC | Age: 67
End: 2022-05-03
Payer: MEDICARE

## 2022-05-03 ENCOUNTER — TELEPHONE (OUTPATIENT)
Dept: FAMILY MEDICINE | Facility: CLINIC | Age: 67
End: 2022-05-03

## 2022-05-03 VITALS
BODY MASS INDEX: 35.79 KG/M2 | HEART RATE: 71 BPM | SYSTOLIC BLOOD PRESSURE: 136 MMHG | DIASTOLIC BLOOD PRESSURE: 70 MMHG | HEIGHT: 63 IN | WEIGHT: 202 LBS | TEMPERATURE: 98 F | OXYGEN SATURATION: 98 %

## 2022-05-03 DIAGNOSIS — L03.811 CELLULITIS OF HEAD EXCEPT FACE: Primary | ICD-10-CM

## 2022-05-03 DIAGNOSIS — T78.40XA ALLERGIC REACTION, INITIAL ENCOUNTER: ICD-10-CM

## 2022-05-03 DIAGNOSIS — L29.9 ITCHING: ICD-10-CM

## 2022-05-03 PROCEDURE — 3078F DIAST BP <80 MM HG: CPT | Mod: CPTII,S$GLB,, | Performed by: NURSE PRACTITIONER

## 2022-05-03 PROCEDURE — 1159F MED LIST DOCD IN RCRD: CPT | Mod: CPTII,S$GLB,, | Performed by: NURSE PRACTITIONER

## 2022-05-03 PROCEDURE — 3008F BODY MASS INDEX DOCD: CPT | Mod: CPTII,S$GLB,, | Performed by: NURSE PRACTITIONER

## 2022-05-03 PROCEDURE — 1101F PT FALLS ASSESS-DOCD LE1/YR: CPT | Mod: CPTII,S$GLB,, | Performed by: NURSE PRACTITIONER

## 2022-05-03 PROCEDURE — 3288F FALL RISK ASSESSMENT DOCD: CPT | Mod: CPTII,S$GLB,, | Performed by: NURSE PRACTITIONER

## 2022-05-03 PROCEDURE — 99213 PR OFFICE/OUTPT VISIT, EST, LEVL III, 20-29 MIN: ICD-10-PCS | Mod: S$GLB,,, | Performed by: NURSE PRACTITIONER

## 2022-05-03 PROCEDURE — 3075F SYST BP GE 130 - 139MM HG: CPT | Mod: CPTII,S$GLB,, | Performed by: NURSE PRACTITIONER

## 2022-05-03 PROCEDURE — 3008F PR BODY MASS INDEX (BMI) DOCUMENTED: ICD-10-PCS | Mod: CPTII,S$GLB,, | Performed by: NURSE PRACTITIONER

## 2022-05-03 PROCEDURE — 1159F PR MEDICATION LIST DOCUMENTED IN MEDICAL RECORD: ICD-10-PCS | Mod: CPTII,S$GLB,, | Performed by: NURSE PRACTITIONER

## 2022-05-03 PROCEDURE — 99213 OFFICE O/P EST LOW 20 MIN: CPT | Mod: S$GLB,,, | Performed by: NURSE PRACTITIONER

## 2022-05-03 PROCEDURE — 3288F PR FALLS RISK ASSESSMENT DOCUMENTED: ICD-10-PCS | Mod: CPTII,S$GLB,, | Performed by: NURSE PRACTITIONER

## 2022-05-03 PROCEDURE — 1101F PR PT FALLS ASSESS DOC 0-1 FALLS W/OUT INJ PAST YR: ICD-10-PCS | Mod: CPTII,S$GLB,, | Performed by: NURSE PRACTITIONER

## 2022-05-03 PROCEDURE — 3078F PR MOST RECENT DIASTOLIC BLOOD PRESSURE < 80 MM HG: ICD-10-PCS | Mod: CPTII,S$GLB,, | Performed by: NURSE PRACTITIONER

## 2022-05-03 PROCEDURE — 1160F RVW MEDS BY RX/DR IN RCRD: CPT | Mod: CPTII,S$GLB,, | Performed by: NURSE PRACTITIONER

## 2022-05-03 PROCEDURE — 1160F PR REVIEW ALL MEDS BY PRESCRIBER/CLIN PHARMACIST DOCUMENTED: ICD-10-PCS | Mod: CPTII,S$GLB,, | Performed by: NURSE PRACTITIONER

## 2022-05-03 PROCEDURE — 3075F PR MOST RECENT SYSTOLIC BLOOD PRESS GE 130-139MM HG: ICD-10-PCS | Mod: CPTII,S$GLB,, | Performed by: NURSE PRACTITIONER

## 2022-05-03 RX ORDER — HYDROXYZINE HYDROCHLORIDE 25 MG/1
25 TABLET, FILM COATED ORAL 3 TIMES DAILY PRN
Qty: 30 TABLET | Refills: 1 | Status: SHIPPED | OUTPATIENT
Start: 2022-05-03 | End: 2022-09-15 | Stop reason: SDUPTHER

## 2022-05-03 RX ORDER — OXYCODONE AND ACETAMINOPHEN 10; 325 MG/1; MG/1
1 TABLET ORAL 4 TIMES DAILY PRN
COMMUNITY
Start: 2022-05-02 | End: 2022-06-28

## 2022-05-03 RX ORDER — METHYLPREDNISOLONE 4 MG/1
TABLET ORAL
Qty: 1 EACH | Refills: 0 | Status: SHIPPED | OUTPATIENT
Start: 2022-05-03 | End: 2022-05-24

## 2022-05-03 RX ORDER — DOXYCYCLINE HYCLATE 100 MG
100 TABLET ORAL 2 TIMES DAILY
Qty: 20 TABLET | Refills: 0 | Status: SHIPPED | OUTPATIENT
Start: 2022-05-03 | End: 2022-06-28

## 2022-05-03 NOTE — PROGRESS NOTES
Subjective:       Patient ID: Nalini Wooten is a 67 y.o. female.    Chief Complaint: Insect Bite and Edema (Swelling head eye and nose)    Patient is a new patient to me and established patient of Dr. Ferguson. Patient presents today with 2 day history of insect bite on the scalp with swelling and itching of the face. Patient suspects the bite/sting is from a spider while sleeping but did not see a spider after the bite occurred. Patient has been taking benadryl which has been somewhat helpful for the itching. Facial edema and eye swelling have progressed. Patient denies SOB or difficulty swallowing/eating.  Patient is obese with a BMI of 35.78    Insect Bite  This is a chronic problem. The current episode started in the past 7 days. The problem occurs constantly. The problem has been gradually worsening. Associated symptoms include a rash. Pertinent negatives include no abdominal pain, anorexia, change in bowel habit, chest pain, congestion, coughing, fever, headaches, myalgias, nausea, neck pain, sore throat, visual change or vomiting. The symptoms are aggravated by exertion and bending. She has tried rest, position changes and relaxation (benadryl) for the symptoms. The treatment provided mild relief.   Edema  This is a new problem. The current episode started in the past 7 days. The problem occurs daily. The problem has been gradually worsening. Associated symptoms include a rash. Pertinent negatives include no abdominal pain, anorexia, change in bowel habit, chest pain, congestion, coughing, fever, headaches, myalgias, nausea, neck pain, sore throat, visual change or vomiting. The symptoms are aggravated by exertion and bending. She has tried rest, position changes and relaxation (benadryl) for the symptoms. The treatment provided mild relief.     Review of Systems   Constitutional: Positive for activity change. Negative for appetite change and fever.        Obese   HENT: Negative for congestion, ear discharge,  ear pain, sore throat, trouble swallowing and voice change.    Eyes: Negative for photophobia, pain, discharge and visual disturbance.   Respiratory: Negative for cough, chest tightness and shortness of breath.    Cardiovascular: Negative for chest pain.   Gastrointestinal: Negative for abdominal pain, anorexia, change in bowel habit, constipation, diarrhea, nausea and vomiting.   Endocrine: Negative for cold intolerance and heat intolerance.   Genitourinary: Negative for difficulty urinating and dysuria.   Musculoskeletal: Negative for gait problem, myalgias and neck pain.   Skin: Positive for color change and rash.   Allergic/Immunologic: Negative for immunocompromised state.   Neurological: Negative for speech difficulty and headaches.   Psychiatric/Behavioral: Negative for confusion, self-injury and suicidal ideas.       Past Medical History:   Diagnosis Date    Allergies     Bulging disc     lower back     Carpal tunnel syndrome     Degenerative disc disease     High cholesterol     Pinched nerve in neck       Past Surgical History:   Procedure Laterality Date    ARTHROSCOPIC REPAIR OF ROTATOR CUFF OF SHOULDER Right 3/10/2021    Procedure: REPAIR, ROTATOR CUFF, ARTHROSCOPIC;  Surgeon: Dustin Vanegas MD;  Location: [facility] OR;  Service: Orthopedics;  Laterality: Right;  [facility]rex notified    ARTHROSCOPY OF SHOULDER WITH DECOMPRESSION OF SUBACROMIAL SPACE Right 3/10/2021    Procedure: ARTHROSCOPY, SHOULDER, WITH SUBACROMIAL SPACE DECOMPRESSION;  Surgeon: Dustin Vanegas MD;  Location: [facility] OR;  Service: Orthopedics;  Laterality: Right;    BLADDER SUSPENSION  1990    DISTAL CLAVICLE EXCISION Right 3/10/2021    Procedure: EXCISION, CLAVICLE, DISTAL;  Surgeon: Dustin Vanegas MD;  Location: [facility] OR;  Service: Orthopedics;  Laterality: Right;    FOOT SURGERY Bilateral 2001    HAND SURGERY Left 2017    HYSTERECTOMY  1990    JOINT REPLACEMENT  2013    KNEE SURGERY Bilateral 2013    MANDIBLE FRACTURE SURGERY  1981     SHOULDER ARTHROSCOPY Left 02/27/2019    THYROIDECTOMY Left 05/13/2019        TONSILLECTOMY         Family History   Problem Relation Age of Onset    Arthritis Mother     Aneurysm Mother         Abdominal Aneurysm     Arthritis Father     Diabetes Father     Hearing loss Father     Heart disease Father     Hypertension Father     Dementia Father     Diabetes Paternal Grandmother     Heart disease Paternal Grandfather     Leukemia Son     Crohn's disease Son     Ankylosing spondylitis Son     Other Son         avascular necrosis of pancho hips    Rheumatologic disease Son     Heart defect Son     Kidney failure Son     SIDS Maternal Aunt     Uterine cancer Maternal Aunt     Dementia Paternal Aunt     No Known Problems Sister     Heart disease Brother         stent placement    Heart disease Brother         stent placement    Aneurysm Brother         Abdominal Aneurysm    No Known Problems Sister        Social History     Socioeconomic History    Marital status:     Number of children: 3   Tobacco Use    Smoking status: Never Smoker    Smokeless tobacco: Never Used   Substance and Sexual Activity    Alcohol use: Never     Alcohol/week: 0.0 standard drinks    Drug use: No    Sexual activity: Yes     Birth control/protection: None     Social Determinants of Health     Financial Resource Strain: Low Risk     Difficulty of Paying Living Expenses: Not hard at all   Food Insecurity: No Food Insecurity    Worried About Running Out of Food in the Last Year: Never true    Ran Out of Food in the Last Year: Never true   Transportation Needs: No Transportation Needs    Lack of Transportation (Medical): No    Lack of Transportation (Non-Medical): No   Physical Activity: Insufficiently Active    Days of Exercise per Week: 4 days    Minutes of Exercise per Session: 20 min   Stress: Stress Concern Present    Feeling of Stress : To some extent   Social Connections: Unknown     Frequency of Communication with Friends and Family: More than three times a week    Frequency of Social Gatherings with Friends and Family: Twice a week    Active Member of Clubs or Organizations: Yes    Attends Club or Organization Meetings: More than 4 times per year    Marital Status:        Current Outpatient Medications   Medication Sig Dispense Refill    clobetasol 0.05% (TEMOVATE) 0.05 % Oint Apply topically 2 (two) times daily. 60 g 2    cyanocobalamin 1,000 mcg/mL injection Inject every week 1 cc 10 mL 1    levocetirizine (XYZAL) 5 MG tablet Take 1 tablet (5 mg total) by mouth every evening. 90 tablet 3    metFORMIN (GLUCOPHAGE) 500 MG tablet Take 1 tablet (500 mg total) by mouth 2 (two) times daily with meals. 180 tablet 2    montelukast (SINGULAIR) 10 mg tablet TAKE 1 TABLET(10 MG) BY MOUTH EVERY DAY 90 tablet 3    oxybutynin (DITROPAN XL) 15 MG TR24 Take 1 tablet (15 mg total) by mouth once daily. 30 tablet 5    oxybutynin (DITROPAN-XL) 10 MG 24 hr tablet Take 1 tablet (10 mg total) by mouth once daily. 30 tablet 11    rosuvastatin (CRESTOR) 40 MG Tab Take 1 tablet (40 mg total) by mouth once daily. 90 tablet 1    traMADol (ULTRAM) 50 mg tablet Take 1 tablet (50 mg total) by mouth every 4 (four) hours as needed for Pain. 42 tablet 0    doxycycline (VIBRA-TABS) 100 MG tablet Take 1 tablet (100 mg total) by mouth 2 (two) times daily. 20 tablet 0    hydrOXYzine HCL (ATARAX) 25 MG tablet Take 1 tablet (25 mg total) by mouth 3 (three) times daily as needed for Itching. 30 tablet 1    methylPREDNISolone (MEDROL DOSEPACK) 4 mg tablet use as directed 1 each 0    oxyCODONE-acetaminophen (PERCOCET)  mg per tablet Take 1 tablet by mouth 4 (four) times daily as needed.      phentermine (ADIPEX-P) 37.5 mg tablet TAKE 1 TABLET(37.5 MG) BY MOUTH EVERY MORNING (Patient not taking: Reported on 5/3/2022) 30 tablet 0     No current facility-administered medications for this visit.  "      Review of patient's allergies indicates:   Allergen Reactions    Estrogens Other (See Comments)     Mini stroke    Gabapentin Itching and Rash    Tetanus vaccines and toxoid Swelling and Other (See Comments)     Swelling at injection site, fever    Shingrix (pf)  [[id number] (pf)] Diarrhea, Itching and Nausea And Vomiting     Objective:    HPI     Edema      Additional comments: Swelling head eye and nose          Last edited by Vania Bailon MA on 5/3/2022 10:53 AM. (History)      Blood pressure (!) 140/86, pulse 71, temperature 98.2 °F (36.8 °C), height 5' 3" (1.6 m), weight 91.6 kg (202 lb), SpO2 98 %. Body mass index is 35.78 kg/m².   Physical Exam  Vitals and nursing note reviewed.   Constitutional:       General: She is not in acute distress.     Appearance: Normal appearance. She is well-developed.   HENT:      Head: Normocephalic.        Comments: Area of the bite. Annular lesion without exudate. Erythema noted     Nose: Nose normal.      Mouth/Throat:      Mouth: Mucous membranes are moist.      Pharynx: No posterior oropharyngeal erythema.   Eyes:      General: Lids are normal. Lids are everted, no foreign bodies appreciated.         Right eye: No discharge.         Left eye: No discharge.      Conjunctiva/sclera: Conjunctivae normal.      Pupils: Pupils are equal, round, and reactive to light.      Right eye: Pupil is round and reactive.      Left eye: Pupil is round and reactive.      Comments: Swelling and mild edema noted to both eye lids. Right slightly more than the left   Neck:      Trachea: Trachea normal.   Cardiovascular:      Rate and Rhythm: Normal rate and regular rhythm.      Pulses: Normal pulses.      Heart sounds: Normal heart sounds, S1 normal and S2 normal.   Pulmonary:      Effort: Pulmonary effort is normal.      Breath sounds: Normal breath sounds. No decreased breath sounds or wheezing.   Chest:   Breasts:      Right: No supraclavicular adenopathy.      " Left: No supraclavicular adenopathy.       Abdominal:      General: Abdomen is flat. Bowel sounds are normal.      Palpations: Abdomen is soft. Abdomen is not rigid.      Tenderness: There is no guarding.   Musculoskeletal:         General: Normal range of motion.      Cervical back: Normal range of motion and neck supple. No muscular tenderness.   Lymphadenopathy:      Head:      Right side of head: No submental, submandibular, tonsillar, preauricular, posterior auricular or occipital adenopathy.      Left side of head: No submental, submandibular, tonsillar, preauricular, posterior auricular or occipital adenopathy.      Cervical: No cervical adenopathy.      Right cervical: No superficial, deep or posterior cervical adenopathy.     Left cervical: No superficial, deep or posterior cervical adenopathy.      Upper Body:      Right upper body: No supraclavicular adenopathy.      Left upper body: No supraclavicular adenopathy.   Skin:     General: Skin is warm and dry.      Capillary Refill: Capillary refill takes less than 2 seconds.   Neurological:      General: No focal deficit present.      Mental Status: She is alert and oriented to person, place, and time.   Psychiatric:         Mood and Affect: Mood normal.         Behavior: Behavior normal. Behavior is cooperative.         Thought Content: Thought content normal.         Judgment: Judgment normal.             Assessment:       1. Cellulitis of head except face    2. Allergic reaction, initial encounter    3. Itching        Plan:       Nalini was seen today for insect bite and edema.    Diagnoses and all orders for this visit:    Cellulitis of head except face  -     doxycycline (VIBRA-TABS) 100 MG tablet; Take 1 tablet (100 mg total) by mouth 2 (two) times daily.    Allergic reaction, initial encounter  -     methylPREDNISolone (MEDROL DOSEPACK) 4 mg tablet; use as directed    Itching  -     hydrOXYzine HCL (ATARAX) 25 MG tablet; Take 1 tablet (25 mg total) by  mouth 3 (three) times daily as needed for Itching.  -     methylPREDNISolone (MEDROL DOSEPACK) 4 mg tablet; use as directed       Follow up if not improving in 3-5 days

## 2022-05-03 NOTE — TELEPHONE ENCOUNTER
The following medication needs a prior authorization:     Medication Name: doxycycline    Dosage: 100 mg    Frequency: 2 times daily    Directions for use: Take 1 tablet by mouth 2 times daily    Diagnosis: cellulitis of head except face    Is the request for a reauthorization? no    Is the patient currently stable on therapy?     Please list all therapeutic alternatives previously used with start/end dates and outcome:

## 2022-05-13 ENCOUNTER — TELEPHONE (OUTPATIENT)
Dept: FAMILY MEDICINE | Facility: CLINIC | Age: 67
End: 2022-05-13

## 2022-05-13 NOTE — TELEPHONE ENCOUNTER
The following medication needs a prior authorization:     Medication Name: Hydroxyzine       Dosage: 25 mg    Frequency: 3 times day    Directions for use: one tablet 3 times a day as needed for itching    Diagnosis: cellulitis of head    Is the request for a reauthorization? no    Is the patient currently stable on therapy? yes    Please list all therapeutic alternatives previously used with start/end dates and outcome:  Xyzal 5 mg 3/11/2019 to present stable  Hydroxyzine 25 mg acute itching

## 2022-06-01 ENCOUNTER — HOSPITAL ENCOUNTER (OUTPATIENT)
Dept: RADIOLOGY | Facility: HOSPITAL | Age: 67
Discharge: HOME | End: 2022-06-01
Attending: INTERNAL MEDICINE
Payer: MEDICARE

## 2022-06-01 DIAGNOSIS — Z12.31 ENCOUNTER FOR SCREENING MAMMOGRAM FOR BREAST CANCER: ICD-10-CM

## 2022-06-01 DIAGNOSIS — Z78.0 MENOPAUSE: ICD-10-CM

## 2022-06-01 DIAGNOSIS — Z13.820 ENCOUNTER FOR OSTEOPOROSIS SCREENING IN ASYMPTOMATIC POSTMENOPAUSAL PATIENT: ICD-10-CM

## 2022-06-01 DIAGNOSIS — Z78.0 ENCOUNTER FOR OSTEOPOROSIS SCREENING IN ASYMPTOMATIC POSTMENOPAUSAL PATIENT: ICD-10-CM

## 2022-06-01 PROCEDURE — 77063 BREAST TOMOSYNTHESIS BI: CPT | Mod: TC,PO

## 2022-06-01 PROCEDURE — 77080 DXA BONE DENSITY AXIAL: CPT | Mod: TC,PO

## 2022-06-28 ENCOUNTER — OFFICE VISIT (OUTPATIENT)
Dept: FAMILY MEDICINE | Facility: CLINIC | Age: 67
End: 2022-06-28
Payer: MEDICARE

## 2022-06-28 ENCOUNTER — LAB VISIT (OUTPATIENT)
Dept: LAB | Facility: HOSPITAL | Age: 67
End: 2022-06-28
Attending: INTERNAL MEDICINE
Payer: MEDICARE

## 2022-06-28 VITALS
BODY MASS INDEX: 33.66 KG/M2 | OXYGEN SATURATION: 100 % | RESPIRATION RATE: 16 BRPM | DIASTOLIC BLOOD PRESSURE: 72 MMHG | HEIGHT: 63 IN | TEMPERATURE: 99 F | HEART RATE: 66 BPM | WEIGHT: 190 LBS | SYSTOLIC BLOOD PRESSURE: 128 MMHG

## 2022-06-28 DIAGNOSIS — R73.03 PRE-DIABETES: ICD-10-CM

## 2022-06-28 DIAGNOSIS — R73.09 ELEVATED GLUCOSE: ICD-10-CM

## 2022-06-28 DIAGNOSIS — E03.9 ACQUIRED HYPOTHYROIDISM: ICD-10-CM

## 2022-06-28 DIAGNOSIS — Z11.4 ENCOUNTER FOR SCREENING FOR HIV: ICD-10-CM

## 2022-06-28 DIAGNOSIS — T50.905A WEIGHT LOSS DUE TO MEDICATION: Primary | ICD-10-CM

## 2022-06-28 DIAGNOSIS — Z11.59 NEED FOR HEPATITIS C SCREENING TEST: ICD-10-CM

## 2022-06-28 DIAGNOSIS — E78.00 PURE HYPERCHOLESTEROLEMIA: ICD-10-CM

## 2022-06-28 DIAGNOSIS — N39.41 URGE INCONTINENCE: ICD-10-CM

## 2022-06-28 DIAGNOSIS — N39.41 URGE INCONTINENCE OF URINE: ICD-10-CM

## 2022-06-28 DIAGNOSIS — R63.4 WEIGHT LOSS DUE TO MEDICATION: Primary | ICD-10-CM

## 2022-06-28 DIAGNOSIS — E66.9 OBESITY (BMI 30-39.9): ICD-10-CM

## 2022-06-28 LAB
ANION GAP SERPL CALC-SCNC: 7 MMOL/L (ref 8–16)
BUN SERPL-MCNC: 20 MG/DL (ref 8–23)
CALCIUM SERPL-MCNC: 9.1 MG/DL (ref 8.7–10.5)
CHLORIDE SERPL-SCNC: 105 MMOL/L (ref 95–110)
CO2 SERPL-SCNC: 27 MMOL/L (ref 23–29)
CREAT SERPL-MCNC: 0.8 MG/DL (ref 0.5–1.4)
EST. GFR  (AFRICAN AMERICAN): >60 ML/MIN/1.73 M^2
EST. GFR  (NON AFRICAN AMERICAN): >60 ML/MIN/1.73 M^2
ESTIMATED AVG GLUCOSE: 128 MG/DL (ref 68–131)
ESTIMATED AVG GLUCOSE: 128 MG/DL (ref 68–131)
GLUCOSE SERPL-MCNC: 94 MG/DL (ref 70–110)
HBA1C MFR BLD: 6.1 % (ref 4.5–6.2)
HBA1C MFR BLD: 6.1 % (ref 4.5–6.2)
POTASSIUM SERPL-SCNC: 4.3 MMOL/L (ref 3.5–5.1)
SODIUM SERPL-SCNC: 139 MMOL/L (ref 136–145)
TSH SERPL DL<=0.005 MIU/L-ACNC: 2.5 UIU/ML (ref 0.34–5.6)

## 2022-06-28 PROCEDURE — 99214 OFFICE O/P EST MOD 30 MIN: CPT | Mod: S$GLB,,, | Performed by: INTERNAL MEDICINE

## 2022-06-28 PROCEDURE — 1160F PR REVIEW ALL MEDS BY PRESCRIBER/CLIN PHARMACIST DOCUMENTED: ICD-10-PCS | Mod: CPTII,S$GLB,, | Performed by: INTERNAL MEDICINE

## 2022-06-28 PROCEDURE — 1160F RVW MEDS BY RX/DR IN RCRD: CPT | Mod: CPTII,S$GLB,, | Performed by: INTERNAL MEDICINE

## 2022-06-28 PROCEDURE — 36415 COLL VENOUS BLD VENIPUNCTURE: CPT | Performed by: INTERNAL MEDICINE

## 2022-06-28 PROCEDURE — 3288F FALL RISK ASSESSMENT DOCD: CPT | Mod: CPTII,S$GLB,, | Performed by: INTERNAL MEDICINE

## 2022-06-28 PROCEDURE — 99214 PR OFFICE/OUTPT VISIT, EST, LEVL IV, 30-39 MIN: ICD-10-PCS | Mod: S$GLB,,, | Performed by: INTERNAL MEDICINE

## 2022-06-28 PROCEDURE — 3008F BODY MASS INDEX DOCD: CPT | Mod: CPTII,S$GLB,, | Performed by: INTERNAL MEDICINE

## 2022-06-28 PROCEDURE — 3288F PR FALLS RISK ASSESSMENT DOCUMENTED: ICD-10-PCS | Mod: CPTII,S$GLB,, | Performed by: INTERNAL MEDICINE

## 2022-06-28 PROCEDURE — 1101F PR PT FALLS ASSESS DOC 0-1 FALLS W/OUT INJ PAST YR: ICD-10-PCS | Mod: CPTII,S$GLB,, | Performed by: INTERNAL MEDICINE

## 2022-06-28 PROCEDURE — 83036 HEMOGLOBIN GLYCOSYLATED A1C: CPT | Performed by: INTERNAL MEDICINE

## 2022-06-28 PROCEDURE — 1159F MED LIST DOCD IN RCRD: CPT | Mod: CPTII,S$GLB,, | Performed by: INTERNAL MEDICINE

## 2022-06-28 PROCEDURE — 84443 ASSAY THYROID STIM HORMONE: CPT | Performed by: INTERNAL MEDICINE

## 2022-06-28 PROCEDURE — 1159F PR MEDICATION LIST DOCUMENTED IN MEDICAL RECORD: ICD-10-PCS | Mod: CPTII,S$GLB,, | Performed by: INTERNAL MEDICINE

## 2022-06-28 PROCEDURE — 3008F PR BODY MASS INDEX (BMI) DOCUMENTED: ICD-10-PCS | Mod: CPTII,S$GLB,, | Performed by: INTERNAL MEDICINE

## 2022-06-28 PROCEDURE — 80048 BASIC METABOLIC PNL TOTAL CA: CPT | Performed by: INTERNAL MEDICINE

## 2022-06-28 PROCEDURE — 87389 HIV-1 AG W/HIV-1&-2 AB AG IA: CPT | Performed by: INTERNAL MEDICINE

## 2022-06-28 PROCEDURE — 86803 HEPATITIS C AB TEST: CPT | Performed by: INTERNAL MEDICINE

## 2022-06-28 PROCEDURE — 1101F PT FALLS ASSESS-DOCD LE1/YR: CPT | Mod: CPTII,S$GLB,, | Performed by: INTERNAL MEDICINE

## 2022-06-28 RX ORDER — PHENTERMINE HYDROCHLORIDE 37.5 MG/1
TABLET ORAL
Qty: 90 TABLET | Refills: 0 | Status: SHIPPED | OUTPATIENT
Start: 2022-06-28 | End: 2022-09-15 | Stop reason: SDUPTHER

## 2022-06-28 RX ORDER — OXYBUTYNIN CHLORIDE 10 MG/1
10 TABLET, EXTENDED RELEASE ORAL DAILY
Qty: 90 TABLET | Refills: 2 | Status: SHIPPED | OUTPATIENT
Start: 2022-06-28 | End: 2023-01-18

## 2022-06-28 RX ORDER — OXYBUTYNIN CHLORIDE 15 MG/1
15 TABLET, EXTENDED RELEASE ORAL DAILY
Qty: 90 TABLET | Refills: 2 | Status: SHIPPED | OUTPATIENT
Start: 2022-06-28 | End: 2023-01-18

## 2022-06-28 RX ORDER — ROSUVASTATIN CALCIUM 40 MG/1
40 TABLET, COATED ORAL DAILY
Qty: 90 TABLET | Refills: 1 | Status: SHIPPED | OUTPATIENT
Start: 2022-06-28 | End: 2022-09-15 | Stop reason: SDUPTHER

## 2022-06-28 NOTE — PROGRESS NOTES
SUBJECTIVE:    Patient ID: Nalini Wooten is a 67 y.o. female.    Chief Complaint: Weight Check, Medication Refill, and Follow-up    HPI     Patient in for weight follow-up, last OV weight 202 pounds, today;s weight. Reports 12 lbs weight loss since previous visit. Exercising more, walking everyday and is on low carb/keto diet.  Having better urinary frequency with ditropan medication.   S/P hammer toe and bunionectomy to right foot.     Lab Visit on 03/23/2022   Component Date Value Ref Range Status    RBC, UA 03/23/2022 2  0 - 4 /hpf Final    WBC, UA 03/23/2022 >100 (A) 0 - 5 /hpf Final    WBC Clumps, UA 03/23/2022 Moderate (A) None-Rare Final    Bacteria 03/23/2022 Many (A) None-Occ /hpf Final    Squam Epithel, UA 03/23/2022 11  /hpf Final    Non-Squam Epith 03/23/2022 1 (A) <1/hpf /hpf Final    Hyaline Casts, UA 03/23/2022 33 (A) 0-1/lpf /lpf Final    Ca Oxalate Padmini, UA 03/23/2022 Moderate  None-Moderate Final    Microscopic Comment 03/23/2022 SEE COMMENT   Final    Urine Culture, Routine 03/23/2022  (A)  Final                    Value:ESCHERICHIA COLI  >100,000 cfu/ml     Lab Visit on 10/18/2021   Component Date Value Ref Range Status    Sodium 10/18/2021 142  136 - 145 mmol/L Final    Potassium 10/18/2021 4.2  3.5 - 5.1 mmol/L Final    Chloride 10/18/2021 105  95 - 110 mmol/L Final    CO2 10/18/2021 26  23 - 29 mmol/L Final    Glucose 10/18/2021 113 (A) 70 - 110 mg/dL Final    BUN 10/18/2021 18  8 - 23 mg/dL Final    Creatinine 10/18/2021 0.7  0.5 - 1.4 mg/dL Final    Calcium 10/18/2021 9.3  8.7 - 10.5 mg/dL Final    Anion Gap 10/18/2021 11  8 - 16 mmol/L Final    eGFR if African American 10/18/2021 >60.0  >60 mL/min/1.73 m^2 Final    eGFR if non African American 10/18/2021 >60.0  >60 mL/min/1.73 m^2 Final    Hemoglobin A1C 10/18/2021 6.4 (A) 4.5 - 6.2 % Final    Estimated Avg Glucose 10/18/2021 137 (A) 68 - 131 mg/dL Final    Cholesterol 10/18/2021 133  120 - 199 mg/dL Final     Triglycerides 10/18/2021 73  30 - 150 mg/dL Final    HDL 10/18/2021 47  40 - 75 mg/dL Final    LDL Cholesterol 10/18/2021 71.4  63.0 - 159.0 mg/dL Final    HDL/Cholesterol Ratio 10/18/2021 35.3  20.0 - 50.0 % Final    Total Cholesterol/HDL Ratio 10/18/2021 2.8  2.0 - 5.0 Final    Non-HDL Cholesterol 10/18/2021 86  mg/dL Final    TSH 10/18/2021 2.100  0.340 - 5.600 uIU/mL Final       Past Medical History:   Diagnosis Date    Allergies     Bulging disc     lower back     Carpal tunnel syndrome     Degenerative disc disease     High cholesterol     Pinched nerve in neck      Past Surgical History:   Procedure Laterality Date    ARTHROSCOPIC REPAIR OF ROTATOR CUFF OF SHOULDER Right 3/10/2021    Procedure: REPAIR, ROTATOR CUFF, ARTHROSCOPIC;  Surgeon: Dustin Vanegas MD;  Location: [facility];  Service: Orthopedics;  Laterality: Right;  [facility]rex notified    ARTHROSCOPY OF SHOULDER WITH DECOMPRESSION OF SUBACROMIAL SPACE Right 3/10/2021    Procedure: ARTHROSCOPY, SHOULDER, WITH SUBACROMIAL SPACE DECOMPRESSION;  Surgeon: Dustin Vanegas MD;  Location: [facility];  Service: Orthopedics;  Laterality: Right;    BLADDER SUSPENSION  1990    DISTAL CLAVICLE EXCISION Right 3/10/2021    Procedure: EXCISION, CLAVICLE, DISTAL;  Surgeon: Dustin Vanegas MD;  Location: [facility];  Service: Orthopedics;  Laterality: Right;    FOOT SURGERY Bilateral 2001    FOOT SURGERY Right 05/13/2022    HAND SURGERY Left 2017    HYSTERECTOMY  1990    JOINT REPLACEMENT  2013    KNEE SURGERY Bilateral 2013    MANDIBLE FRACTURE SURGERY  1981    SHOULDER ARTHROSCOPY Left 02/27/2019    THYROIDECTOMY Left 05/13/2019        TONSILLECTOMY       Family History   Problem Relation Age of Onset    Arthritis Mother     Aneurysm Mother         Abdominal Aneurysm     Arthritis Father     Diabetes Father     Hearing loss Father     Heart disease Father     Hypertension Father     Dementia Father     Diabetes Paternal Grandmother      Heart disease Paternal Grandfather     Leukemia Son     Crohn's disease Son     Ankylosing spondylitis Son     Other Son         avascular necrosis of pancho hips    Rheumatologic disease Son     Heart defect Son     Kidney failure Son     SIDS Maternal Aunt     Uterine cancer Maternal Aunt     Dementia Paternal Aunt     No Known Problems Sister     Heart disease Brother         stent placement    Heart disease Brother         stent placement    Aneurysm Brother         Abdominal Aneurysm    No Known Problems Sister        Marital Status:   Alcohol History:  reports no history of alcohol use.  Tobacco History:  reports that she has never smoked. She has never used smokeless tobacco.  Drug History:  reports no history of drug use.    Review of patient's allergies indicates:   Allergen Reactions    Estrogens Other (See Comments)     Mini stroke    Gabapentin Itching and Rash    Tetanus vaccines and toxoid Swelling and Other (See Comments)     Swelling at injection site, fever    Shingrix (pf)  [[id number] [id number] (pf)] Diarrhea, Itching and Nausea And Vomiting       Current Outpatient Medications:     cyanocobalamin 1,000 mcg/mL injection, Inject every week 1 cc, Disp: 10 mL, Rfl: 1    hydrOXYzine HCL (ATARAX) 25 MG tablet, Take 1 tablet (25 mg total) by mouth 3 (three) times daily as needed for Itching., Disp: 30 tablet, Rfl: 1    levocetirizine (XYZAL) 5 MG tablet, Take 1 tablet (5 mg total) by mouth every evening., Disp: 90 tablet, Rfl: 3    metFORMIN (GLUCOPHAGE) 500 MG tablet, Take 1 tablet (500 mg total) by mouth 2 (two) times daily with meals., Disp: 180 tablet, Rfl: 2    montelukast (SINGULAIR) 10 mg tablet, TAKE 1 TABLET(10 MG) BY MOUTH EVERY DAY, Disp: 90 tablet, Rfl: 3    traMADol (ULTRAM) 50 mg tablet, Take 1 tablet (50 mg total) by mouth every 4 (four) hours as needed for Pain., Disp: 42 tablet, Rfl: 0    oxybutynin (DITROPAN XL) 15 MG TR24, Take 1 tablet (15 mg total)  by mouth once daily., Disp: 90 tablet, Rfl: 2    oxybutynin (DITROPAN-XL) 10 MG 24 hr tablet, Take 1 tablet (10 mg total) by mouth once daily., Disp: 90 tablet, Rfl: 2    phentermine (ADIPEX-P) 37.5 mg tablet, TAKE 1 TABLET(37.5 MG) BY MOUTH EVERY MORNING, Disp: 90 tablet, Rfl: 0    rosuvastatin (CRESTOR) 40 MG Tab, Take 1 tablet (40 mg total) by mouth once daily., Disp: 90 tablet, Rfl: 1    Review of Systems   Constitutional: Positive for activity change. Negative for appetite change, chills, fatigue, fever and unexpected weight change.   HENT: Positive for hearing loss and tinnitus. Negative for congestion, ear pain, postnasal drip, rhinorrhea, sinus pain, sneezing, sore throat and trouble swallowing.    Eyes: Positive for visual disturbance. Negative for pain and discharge.   Respiratory: Negative for cough, choking, chest tightness, shortness of breath and wheezing.    Cardiovascular: Negative for chest pain, palpitations and leg swelling.   Gastrointestinal: Negative for abdominal distention, abdominal pain, blood in stool, constipation, diarrhea, nausea and vomiting.   Endocrine: Negative for cold intolerance, heat intolerance, polydipsia and polyuria.   Genitourinary: Negative for difficulty urinating, dysuria, frequency, hematuria, menstrual problem, pelvic pain and urgency.   Musculoskeletal: Negative for arthralgias, back pain, joint swelling and neck pain.   Skin: Negative for pallor and rash.   Allergic/Immunologic: Negative for environmental allergies and food allergies.   Neurological: Negative for dizziness, tremors, weakness, numbness and headaches.   Hematological: Does not bruise/bleed easily.   Psychiatric/Behavioral: Negative for agitation, confusion, dysphoric mood, sleep disturbance and suicidal ideas. The patient is not nervous/anxious.           Objective:      Last 3 sets of Vitals    Vitals - 1 value per visit 5/3/2022 6/28/2022 6/28/2022   SYSTOLIC 136 - 128   DIASTOLIC 70 - 72   Pulse  - - 66   Temp - - 98.5   Resp - - 16   SPO2 - - 100   Weight (lb) - - 190   Weight (kg) - - 86.183   Height - - 63   BMI (Calculated) - - 33.7   VISIT REPORT - - -   Pain Score  - 0 -   Some recent data might be hidden       Physical Exam  Vitals and nursing note reviewed.   Constitutional:       Appearance: She is well-developed.   HENT:      Head: Normocephalic and atraumatic.      Nose: Nose normal.      Mouth/Throat:      Mouth: Mucous membranes are moist.   Eyes:      General: Lids are normal.      Conjunctiva/sclera: Conjunctivae normal.      Pupils:      Right eye: Pupil is round and reactive.      Left eye: Pupil is round and reactive.   Neck:      Thyroid: No thyromegaly.      Vascular: No carotid bruit or JVD.      Trachea: Trachea normal.   Cardiovascular:      Rate and Rhythm: Normal rate and regular rhythm.      Pulses: Normal pulses.      Heart sounds: Normal heart sounds, S1 normal and S2 normal. No murmur heard.    No friction rub. No gallop.   Pulmonary:      Effort: Pulmonary effort is normal. No respiratory distress.      Breath sounds: Normal breath sounds. No wheezing or rales.   Abdominal:      General: Bowel sounds are normal.      Palpations: Abdomen is soft. Abdomen is not rigid. There is no mass.      Tenderness: There is no abdominal tenderness. There is no guarding.   Musculoskeletal:         General: Normal range of motion.      Cervical back: Normal range of motion and neck supple.   Skin:     General: Skin is warm and dry.      Capillary Refill: Capillary refill takes less than 2 seconds.      Findings: No lesion or rash.      Nails: There is no clubbing.   Neurological:      Mental Status: She is alert and oriented to person, place, and time.   Psychiatric:         Behavior: Behavior normal. Behavior is cooperative.         Thought Content: Thought content normal.         Judgment: Judgment normal.       Protective Sensation (w/ 10 gram monofilament):  Right: Intact  Left:  Intact    Visual Inspection:  Normal -  Bilateral    Pedal Pulses:   Right: Present  Left: Present    Posterior tibialis:   Right:Present  Left: Present        Assessment:       1. Pre-diabetes    2. Weight gain    3. BMI 35.0-35.9,adult    4. Obesity (BMI 30-39.9)    5. Pure hypercholesterolemia    6. Urge incontinence of urine    7. Urge incontinence         Plan:       Weight loss due to medication        -      phentermine (ADIPEX-P) 37.5 mg tablet; TAKE 1 TABLET(37.5 MG) BY MOUTH EVERY MORNING  Dispense: 90 tablet; Refill: 0    Pre-diabetes  -     Basic Metabolic Panel; Future; Expected date: 06/28/2022  -     Hemoglobin A1C; Future; Expected date: 06/28/2022    Acquired hypothyroidism  -     TSH; Future; Expected date: 06/28/2022    Pure hypercholesterolemia  -     rosuvastatin (CRESTOR) 40 MG Tab; Take 1 tablet (40 mg total) by mouth once daily.  Dispense: 90 tablet; Refill: 1    Urge incontinence of urine  -     oxybutynin (DITROPAN XL) 15 MG TR24; Take 1 tablet (15 mg total) by mouth once daily.  Dispense: 90 tablet; Refill: 2  -     oxybutynin (DITROPAN-XL) 10 MG 24 hr tablet; Take 1 tablet (10 mg total) by mouth once daily.  Dispense: 90 tablet; Refill: 2    Urge incontinence  -     oxybutynin (DITROPAN XL) 15 MG TR24; Take 1 tablet (15 mg total) by mouth once daily.  Dispense: 90 tablet; Refill: 2  -     oxybutynin (DITROPAN-XL) 10 MG 24 hr tablet; Take 1 tablet (10 mg total) by mouth once daily.  Dispense: 90 tablet; Refill: 2    Obesity (BMI 30-39.9)  -     phentermine (ADIPEX-P) 37.5 mg tablet; TAKE 1 TABLET(37.5 MG) BY MOUTH EVERY MORNING  Dispense: 90 tablet; Refill: 0      Follow up in about 6 months (around 12/28/2022) for FOLLOW-UP STATUS, FOLLOW UP MEDICATIONS.        6/28/2022 Katherine Ferguson M.D.

## 2022-06-29 ENCOUNTER — PATIENT MESSAGE (OUTPATIENT)
Dept: FAMILY MEDICINE | Facility: CLINIC | Age: 67
End: 2022-06-29

## 2022-06-29 LAB
HCV AB S/CO SERPL IA: 0.2 S/CO RATIO (ref 0–0.9)
HIV 1+2 AB+HIV1 P24 AG SERPL QL IA: NON REACTIVE

## 2022-08-14 NOTE — PROGRESS NOTES
SUBJECTIVE:    Patient ID: Nalini Wooten is a 67 y.o. female.    Chief Complaint: Cough and Sore Throat    HPI     Patient in for cough    Patient states 5 days ago she had scratchy throat-two days later pain started-no fever-lost voice-saw Urgent Care who gave her cough med-coughing green thick mucous-takes breath away to get mucous out-was given doxycycline without effect-pain on swallowing-throat very sore-no generalized pains-right ear pain started yesterday some chest tightness now-bad sinus drip-no temp-COVID tested at least twice-all neg so far-no def known COVID exposure-still wears mask when she goes out-worried about giving COVID or what she has to her son-short of breath with cough-cant sleep due to cough-hx of childhood astma but none in years-usually gets bronchitis in the winter    This am she woke up with erythema on face -the redness covers the nose-recent increased allergies to thing she has been exposed to before    She also complains of vaginal dryness with intercourse-    Last 3 sets of Vitals    Vitals - 1 value per visit 6/28/2022 8/15/2022 8/15/2022   SYSTOLIC 128 - 138   DIASTOLIC 72 - 82   Pulse 66 - 70   Temp 98.5 - 98.7   Resp 16 - 16   SPO2 100 - 98   Weight (lb) 190 - 184   Weight (kg) 86.183 - 83.462   Height 63 - 63   BMI (Calculated) 33.7 - 32.6   VISIT REPORT - - -   Pain Score  - 0 -   Some recent data might be hidden       Office Visit on 08/15/2022   Component Date Value Ref Range Status    POC Rapid COVID 08/15/2022 Negative  Negative Final     Acceptable 08/15/2022 Yes   Final   Lab Visit on 06/28/2022   Component Date Value Ref Range Status    Hemoglobin A1C 06/28/2022 6.1  4.5 - 6.2 % Final    Estimated Avg Glucose 06/28/2022 128  68 - 131 mg/dL Final    Hepatitis C Ab 06/28/2022 0.2  0.0 - 0.9 s/co ratio Final    HIV Screen 4th Generation wRfx 06/28/2022 Non Reactive  Non Reactive Final    TSH 06/28/2022 2.500  0.340 - 5.600 uIU/mL Final    Sodium  06/28/2022 139  136 - 145 mmol/L Final    Potassium 06/28/2022 4.3  3.5 - 5.1 mmol/L Final    Chloride 06/28/2022 105  95 - 110 mmol/L Final    CO2 06/28/2022 27  23 - 29 mmol/L Final    Glucose 06/28/2022 94  70 - 110 mg/dL Final    BUN 06/28/2022 20  8 - 23 mg/dL Final    Creatinine 06/28/2022 0.8  0.5 - 1.4 mg/dL Final    Calcium 06/28/2022 9.1  8.7 - 10.5 mg/dL Final    Anion Gap 06/28/2022 7 (A) 8 - 16 mmol/L Final    eGFR if African American 06/28/2022 >60.0  >60 mL/min/1.73 m^2 Final    eGFR if non African American 06/28/2022 >60.0  >60 mL/min/1.73 m^2 Final    Hemoglobin A1C 06/28/2022 6.1  4.5 - 6.2 % Final    Estimated Avg Glucose 06/28/2022 128  68 - 131 mg/dL Final   Lab Visit on 03/23/2022   Component Date Value Ref Range Status    RBC, UA 03/23/2022 2  0 - 4 /hpf Final    WBC, UA 03/23/2022 >100 (A) 0 - 5 /hpf Final    WBC Clumps, UA 03/23/2022 Moderate (A) None-Rare Final    Bacteria 03/23/2022 Many (A) None-Occ /hpf Final    Squam Epithel, UA 03/23/2022 11  /hpf Final    Non-Squam Epith 03/23/2022 1 (A) <1/hpf /hpf Final    Hyaline Casts, UA 03/23/2022 33 (A) 0-1/lpf /lpf Final    Ca Oxalate Padmini, UA 03/23/2022 Moderate  None-Moderate Final    Microscopic Comment 03/23/2022 SEE COMMENT   Final    Urine Culture, Routine 03/23/2022  (A)  Final                    Value:ESCHERICHIA COLI  >100,000 cfu/ml     Lab Visit on 10/18/2021   Component Date Value Ref Range Status    Sodium 10/18/2021 142  136 - 145 mmol/L Final    Potassium 10/18/2021 4.2  3.5 - 5.1 mmol/L Final    Chloride 10/18/2021 105  95 - 110 mmol/L Final    CO2 10/18/2021 26  23 - 29 mmol/L Final    Glucose 10/18/2021 113 (A) 70 - 110 mg/dL Final    BUN 10/18/2021 18  8 - 23 mg/dL Final    Creatinine 10/18/2021 0.7  0.5 - 1.4 mg/dL Final    Calcium 10/18/2021 9.3  8.7 - 10.5 mg/dL Final    Anion Gap 10/18/2021 11  8 - 16 mmol/L Final    eGFR if African American 10/18/2021 >60.0  >60 mL/min/1.73 m^2 Final     eGFR if non African American 10/18/2021 >60.0  >60 mL/min/1.73 m^2 Final    Hemoglobin A1C 10/18/2021 6.4 (A) 4.5 - 6.2 % Final    Estimated Avg Glucose 10/18/2021 137 (A) 68 - 131 mg/dL Final    Cholesterol 10/18/2021 133  120 - 199 mg/dL Final    Triglycerides 10/18/2021 73  30 - 150 mg/dL Final    HDL 10/18/2021 47  40 - 75 mg/dL Final    LDL Cholesterol 10/18/2021 71.4  63.0 - 159.0 mg/dL Final    HDL/Cholesterol Ratio 10/18/2021 35.3  20.0 - 50.0 % Final    Total Cholesterol/HDL Ratio 10/18/2021 2.8  2.0 - 5.0 Final    Non-HDL Cholesterol 10/18/2021 86  mg/dL Final    TSH 10/18/2021 2.100  0.340 - 5.600 uIU/mL Final       Past Medical History:   Diagnosis Date    Allergies     Bulging disc     lower back     Carpal tunnel syndrome     Degenerative disc disease     High cholesterol     Pinched nerve in neck      Past Surgical History:   Procedure Laterality Date    ARTHROSCOPIC REPAIR OF ROTATOR CUFF OF SHOULDER Right 3/10/2021    Procedure: REPAIR, ROTATOR CUFF, ARTHROSCOPIC;  Surgeon: Dustin Vaengas MD;  Location: [facility] OR;  Service: Orthopedics;  Laterality: Right;  arthrex notified    ARTHROSCOPY OF SHOULDER WITH DECOMPRESSION OF SUBACROMIAL SPACE Right 3/10/2021    Procedure: ARTHROSCOPY, SHOULDER, WITH SUBACROMIAL SPACE DECOMPRESSION;  Surgeon: Dustin Vanegas MD;  Location: [facility] OR;  Service: Orthopedics;  Laterality: Right;    BLADDER SUSPENSION  1990    DISTAL CLAVICLE EXCISION Right 3/10/2021    Procedure: EXCISION, CLAVICLE, DISTAL;  Surgeon: Dustin Vanegas MD;  Location: [facility] OR;  Service: Orthopedics;  Laterality: Right;    FOOT SURGERY Bilateral 2001    FOOT SURGERY Right 05/13/2022    HAND SURGERY Left 2017    HYSTERECTOMY  1990    JOINT REPLACEMENT  2013    KNEE SURGERY Bilateral 2013    MANDIBLE FRACTURE SURGERY  1981    SHOULDER ARTHROSCOPY Left 02/27/2019    THYROIDECTOMY Left 05/13/2019        TONSILLECTOMY       Family History   Problem Relation Age of  Onset    Arthritis Mother     Aneurysm Mother         Abdominal Aneurysm     Arthritis Father     Diabetes Father     Hearing loss Father     Heart disease Father     Hypertension Father     Dementia Father     Diabetes Paternal Grandmother     Heart disease Paternal Grandfather     Leukemia Son     Crohn's disease Son     Ankylosing spondylitis Son     Other Son         avascular necrosis of pancho hips    Rheumatologic disease Son     Heart defect Son     Kidney failure Son     SIDS Maternal Aunt     Uterine cancer Maternal Aunt     Dementia Paternal Aunt     No Known Problems Sister     Heart disease Brother         stent placement    Heart disease Brother         stent placement    Aneurysm Brother         Abdominal Aneurysm    No Known Problems Sister        Marital Status:   Alcohol History:  reports no history of alcohol use.  Tobacco History:  reports that she has never smoked. She has never used smokeless tobacco.  Drug History:  reports no history of drug use.    Review of patient's allergies indicates:   Allergen Reactions    Estrogens Other (See Comments)     Mini stroke    Gabapentin Itching and Rash    Tetanus vaccines and toxoid Swelling and Other (See Comments)     Swelling at injection site, fever    Shingrix (pf)  [varicella-zoster [id number] (pf)] Diarrhea, Itching and Nausea And Vomiting       Current Outpatient Medications:     cyanocobalamin 1,000 mcg/mL injection, Inject every week 1 cc, Disp: 10 mL, Rfl: 1    doxycycline (MONODOX) 100 MG capsule, Take 100 mg by mouth every 12 (twelve) hours., Disp: , Rfl:     guaiFENesin-codeine 100-10 mg/5 ml (TUSSI-ORGANIDIN NR)  mg/5 mL syrup, Take 5-10 mLs by mouth every 4 to 6 hours as needed., Disp: , Rfl:     hydrOXYzine HCL (ATARAX) 25 MG tablet, Take 1 tablet (25 mg total) by mouth 3 (three) times daily as needed for Itching., Disp: 30 tablet, Rfl: 1    levocetirizine (XYZAL) 5 MG tablet, Take 1 tablet (5 mg  total) by mouth every evening., Disp: 90 tablet, Rfl: 3    metFORMIN (GLUCOPHAGE) 500 MG tablet, Take 1 tablet (500 mg total) by mouth 2 (two) times daily with meals., Disp: 180 tablet, Rfl: 2    montelukast (SINGULAIR) 10 mg tablet, TAKE 1 TABLET(10 MG) BY MOUTH EVERY DAY, Disp: 90 tablet, Rfl: 3    oxybutynin (DITROPAN XL) 15 MG TR24, Take 1 tablet (15 mg total) by mouth once daily., Disp: 90 tablet, Rfl: 2    oxybutynin (DITROPAN-XL) 10 MG 24 hr tablet, Take 1 tablet (10 mg total) by mouth once daily., Disp: 90 tablet, Rfl: 2    phentermine (ADIPEX-P) 37.5 mg tablet, TAKE 1 TABLET(37.5 MG) BY MOUTH EVERY MORNING, Disp: 90 tablet, Rfl: 0    rosuvastatin (CRESTOR) 40 MG Tab, Take 1 tablet (40 mg total) by mouth once daily., Disp: 90 tablet, Rfl: 1    traMADol (ULTRAM) 50 mg tablet, Take 1 tablet (50 mg total) by mouth every 4 (four) hours as needed for Pain., Disp: 42 tablet, Rfl: 0    albuterol (VENTOLIN HFA) 90 mcg/actuation inhaler, Inhale 2 puffs into the lungs every 6 (six) hours as needed for Wheezing. Rescue, Disp: 6.7 g, Rfl: 0    albuterol-ipratropium (DUO-NEB) 2.5 mg-0.5 mg/3 mL nebulizer solution, Take 3 mLs by nebulization every 6 (six) hours as needed for Wheezing. Rescue, Disp: 75 mL, Rfl: 0    amoxicillin-clavulanate 875-125mg (AUGMENTIN) 875-125 mg per tablet, Take 1 tablet by mouth 2 (two) times daily., Disp: 20 tablet, Rfl: 0    LIDOcaine HCl 2% (XYLOCAINE) 2 % Soln, by Mucous Membrane route every 6 (six) hours. One tsp every four hours as needed-DO NOT SWALLOW, Disp: 100 mL, Rfl: 0  No current facility-administered medications for this visit.    Review of Systems   Constitutional: Positive for appetite change (none the last two days). Negative for chills, diaphoresis, fatigue, fever and unexpected weight change.   HENT: Positive for congestion, ear pain, sore throat and trouble swallowing. Negative for drooling, ear discharge, hearing loss, nosebleeds, postnasal drip, sinus pressure,  sinus pain, sneezing, tinnitus and voice change.    Eyes: Negative for photophobia, pain, itching and visual disturbance.   Respiratory: Positive for cough, shortness of breath and stridor. Negative for apnea, chest tightness and wheezing.    Cardiovascular: Negative for chest pain, palpitations and leg swelling.   Gastrointestinal: Negative for abdominal distention, abdominal pain, blood in stool, constipation, diarrhea, nausea and vomiting.   Endocrine: Negative for cold intolerance, heat intolerance, polydipsia and polyuria.   Genitourinary: Negative for difficulty urinating, dyspareunia, dysuria, flank pain, frequency, hematuria, menstrual problem, pelvic pain, urgency, vaginal discharge and vaginal pain.   Musculoskeletal: Negative for arthralgias, back pain, joint swelling, myalgias, neck pain and neck stiffness.   Skin: Negative for pallor.   Allergic/Immunologic: Negative for environmental allergies and food allergies.   Neurological: Negative for dizziness, tremors, speech difficulty, weakness, light-headedness, numbness and headaches.   Hematological: Does not bruise/bleed easily.   Psychiatric/Behavioral: Positive for sleep disturbance. Negative for agitation, confusion, decreased concentration and suicidal ideas. The patient is not nervous/anxious.           Objective:      Last 3 sets of Vitals    Vitals - 1 value per visit 6/28/2022 8/15/2022 8/15/2022   SYSTOLIC 128 - 138   DIASTOLIC 72 - 82   Pulse 66 - 70   Temp 98.5 - 98.7   Resp 16 - 16   SPO2 100 - 98   Weight (lb) 190 - 184   Weight (kg) 86.183 - 83.462   Height 63 - 63   BMI (Calculated) 33.7 - 32.6   VISIT REPORT - - -   Pain Score  - 0 -   Some recent data might be hidden       Physical Exam  Constitutional:       Appearance: She is obese. She is ill-appearing.      Comments: Very hoarse-cough sounds very wet   HENT:      Head: Normocephalic and atraumatic.      Right Ear: Ear canal and external ear normal.      Left Ear: Tympanic membrane,  ear canal and external ear normal.      Ears:      Comments: Minimal fluid behind right TM     Nose: Congestion present.      Mouth/Throat:      Mouth: Mucous membranes are dry.      Pharynx: Posterior oropharyngeal erythema (uvula) present.   Eyes:      Extraocular Movements: Extraocular movements intact.      Pupils: Pupils are equal, round, and reactive to light.   Cardiovascular:      Rate and Rhythm: Normal rate and regular rhythm.      Pulses: Normal pulses.      Heart sounds: Normal heart sounds.   Pulmonary:      Breath sounds: Rhonchi (anterior and post lung fields) present.   Abdominal:      General: Bowel sounds are normal.      Palpations: Abdomen is soft.   Musculoskeletal:      Cervical back: Tenderness (high right anterior cervical node) present.      Right lower leg: No edema.      Left lower leg: No edema.   Skin:     General: Skin is warm and dry.      Capillary Refill: Capillary refill takes less than 2 seconds.   Neurological:      General: No focal deficit present.      Mental Status: She is alert and oriented to person, place, and time.   Psychiatric:         Mood and Affect: Mood normal.         Thought Content: Thought content normal.           Assessment:       1. Subacute pansinusitis    2. Bronchitis    3. Non-recurrent acute serous otitis media of right ear    4. Multiple thyroid nodules    5. Lipid screening    6. Malar rash    7. Vaginal dryness         Plan:       Subacute pansinusitis    Bronchitis  -     POCT COVID-19 Rapid Screening    Non-recurrent acute serous otitis media of right ear  -     POCT COVID-19 Rapid Screening    Multiple thyroid nodules  -     US Thyroid; Future; Expected date: 09/14/2022  -     POCT COVID-19 Rapid Screening    Lipid screening  -     POCT COVID-19 Rapid Screening    Malar rash  -     EMANI Screen w/Reflex; Future; Expected date: 08/15/2022    Vaginal dryness  -     Ambulatory referral/consult to Obstetrics / Gynecology; Future; Expected date:  08/22/2022    Other orders  -     albuterol-ipratropium (DUO-NEB) 2.5 mg-0.5 mg/3 mL nebulizer solution; Take 3 mLs by nebulization every 6 (six) hours as needed for Wheezing. Rescue  Dispense: 75 mL; Refill: 0  -     methylPREDNISolone sod suc(PF) injection 125 mg  -     cefTRIAXone injection 1 g  -     amoxicillin-clavulanate 875-125mg (AUGMENTIN) 875-125 mg per tablet; Take 1 tablet by mouth 2 (two) times daily.  Dispense: 20 tablet; Refill: 0  -     albuterol (VENTOLIN HFA) 90 mcg/actuation inhaler; Inhale 2 puffs into the lungs every 6 (six) hours as needed for Wheezing. Rescue  Dispense: 6.7 g; Refill: 0  -     LIDOcaine HCl 2% (XYLOCAINE) 2 % Soln; by Mucous Membrane route every 6 (six) hours. One tsp every four hours as needed-DO NOT SWALLOW  Dispense: 100 mL; Refill: 0      Follow up in about 1 week (around 8/22/2022) for FOLLOW UP LABS, FOLLOW UP MEDICATIONS, FOLLOW-UP STATUS.        8/15/2022 Katherine Ferguson M.D.

## 2022-08-15 ENCOUNTER — OFFICE VISIT (OUTPATIENT)
Dept: FAMILY MEDICINE | Facility: CLINIC | Age: 67
End: 2022-08-15
Payer: MEDICARE

## 2022-08-15 VITALS
SYSTOLIC BLOOD PRESSURE: 138 MMHG | DIASTOLIC BLOOD PRESSURE: 82 MMHG | OXYGEN SATURATION: 98 % | HEART RATE: 70 BPM | TEMPERATURE: 99 F | WEIGHT: 184 LBS | BODY MASS INDEX: 32.6 KG/M2 | RESPIRATION RATE: 16 BRPM | HEIGHT: 63 IN

## 2022-08-15 DIAGNOSIS — H65.01 NON-RECURRENT ACUTE SEROUS OTITIS MEDIA OF RIGHT EAR: ICD-10-CM

## 2022-08-15 DIAGNOSIS — N89.8 VAGINAL DRYNESS: ICD-10-CM

## 2022-08-15 DIAGNOSIS — J40 BRONCHITIS: ICD-10-CM

## 2022-08-15 DIAGNOSIS — E04.2 MULTIPLE THYROID NODULES: ICD-10-CM

## 2022-08-15 DIAGNOSIS — J01.40 SUBACUTE PANSINUSITIS: Primary | ICD-10-CM

## 2022-08-15 DIAGNOSIS — Z13.220 LIPID SCREENING: ICD-10-CM

## 2022-08-15 DIAGNOSIS — R21 MALAR RASH: ICD-10-CM

## 2022-08-15 LAB
CTP QC/QA: YES
SARS-COV-2 RDRP RESP QL NAA+PROBE: NEGATIVE

## 2022-08-15 PROCEDURE — 99214 OFFICE O/P EST MOD 30 MIN: CPT | Mod: 25,S$GLB,, | Performed by: INTERNAL MEDICINE

## 2022-08-15 PROCEDURE — 99214 PR OFFICE/OUTPT VISIT, EST, LEVL IV, 30-39 MIN: ICD-10-PCS | Mod: 25,S$GLB,, | Performed by: INTERNAL MEDICINE

## 2022-08-15 PROCEDURE — 3288F FALL RISK ASSESSMENT DOCD: CPT | Mod: CPTII,S$GLB,, | Performed by: INTERNAL MEDICINE

## 2022-08-15 PROCEDURE — 1101F PT FALLS ASSESS-DOCD LE1/YR: CPT | Mod: CPTII,S$GLB,, | Performed by: INTERNAL MEDICINE

## 2022-08-15 PROCEDURE — 1160F RVW MEDS BY RX/DR IN RCRD: CPT | Mod: CPTII,S$GLB,, | Performed by: INTERNAL MEDICINE

## 2022-08-15 PROCEDURE — 1160F PR REVIEW ALL MEDS BY PRESCRIBER/CLIN PHARMACIST DOCUMENTED: ICD-10-PCS | Mod: CPTII,S$GLB,, | Performed by: INTERNAL MEDICINE

## 2022-08-15 PROCEDURE — U0002: ICD-10-PCS | Mod: QW,S$GLB,, | Performed by: INTERNAL MEDICINE

## 2022-08-15 PROCEDURE — 3288F PR FALLS RISK ASSESSMENT DOCUMENTED: ICD-10-PCS | Mod: CPTII,S$GLB,, | Performed by: INTERNAL MEDICINE

## 2022-08-15 PROCEDURE — 94640 PR INHAL RX, AIRWAY OBST/DX SPUTUM INDUCT: ICD-10-PCS | Mod: 59,S$GLB,, | Performed by: INTERNAL MEDICINE

## 2022-08-15 PROCEDURE — U0002 COVID-19 LAB TEST NON-CDC: HCPCS | Mod: QW,S$GLB,, | Performed by: INTERNAL MEDICINE

## 2022-08-15 PROCEDURE — 1101F PR PT FALLS ASSESS DOC 0-1 FALLS W/OUT INJ PAST YR: ICD-10-PCS | Mod: CPTII,S$GLB,, | Performed by: INTERNAL MEDICINE

## 2022-08-15 PROCEDURE — 3008F PR BODY MASS INDEX (BMI) DOCUMENTED: ICD-10-PCS | Mod: CPTII,S$GLB,, | Performed by: INTERNAL MEDICINE

## 2022-08-15 PROCEDURE — 94640 AIRWAY INHALATION TREATMENT: CPT | Mod: 59,S$GLB,, | Performed by: INTERNAL MEDICINE

## 2022-08-15 PROCEDURE — 1159F MED LIST DOCD IN RCRD: CPT | Mod: CPTII,S$GLB,, | Performed by: INTERNAL MEDICINE

## 2022-08-15 PROCEDURE — 96372 PR INJECTION,THERAP/PROPH/DIAG2ST, IM OR SUBCUT: ICD-10-PCS | Mod: S$GLB,,, | Performed by: INTERNAL MEDICINE

## 2022-08-15 PROCEDURE — 3008F BODY MASS INDEX DOCD: CPT | Mod: CPTII,S$GLB,, | Performed by: INTERNAL MEDICINE

## 2022-08-15 PROCEDURE — 1159F PR MEDICATION LIST DOCUMENTED IN MEDICAL RECORD: ICD-10-PCS | Mod: CPTII,S$GLB,, | Performed by: INTERNAL MEDICINE

## 2022-08-15 PROCEDURE — 96372 THER/PROPH/DIAG INJ SC/IM: CPT | Mod: S$GLB,,, | Performed by: INTERNAL MEDICINE

## 2022-08-15 RX ORDER — AMOXICILLIN AND CLAVULANATE POTASSIUM 875; 125 MG/1; MG/1
1 TABLET, FILM COATED ORAL 2 TIMES DAILY
Qty: 20 TABLET | Refills: 0 | Status: SHIPPED | OUTPATIENT
Start: 2022-08-15 | End: 2022-08-31 | Stop reason: ALTCHOICE

## 2022-08-15 RX ORDER — LIDOCAINE HYDROCHLORIDE 20 MG/ML
SOLUTION OROPHARYNGEAL EVERY 6 HOURS
Qty: 100 ML | Refills: 0 | Status: SHIPPED | OUTPATIENT
Start: 2022-08-15 | End: 2023-01-18

## 2022-08-15 RX ORDER — CODEINE PHOSPHATE AND GUAIFENESIN 10; 100 MG/5ML; MG/5ML
5-10 SOLUTION ORAL
COMMUNITY
Start: 2022-08-12 | End: 2022-08-22 | Stop reason: SDUPTHER

## 2022-08-15 RX ORDER — CEFTRIAXONE 1 G/1
1 INJECTION, POWDER, FOR SOLUTION INTRAMUSCULAR; INTRAVENOUS
Status: COMPLETED | OUTPATIENT
Start: 2022-08-15 | End: 2022-08-15

## 2022-08-15 RX ORDER — DOXYCYCLINE 100 MG/1
100 CAPSULE ORAL EVERY 12 HOURS
COMMUNITY
Start: 2022-08-12 | End: 2022-08-31 | Stop reason: ALTCHOICE

## 2022-08-15 RX ORDER — IPRATROPIUM BROMIDE AND ALBUTEROL SULFATE 2.5; .5 MG/3ML; MG/3ML
3 SOLUTION RESPIRATORY (INHALATION) EVERY 6 HOURS PRN
Qty: 75 ML | Refills: 0 | Status: SHIPPED | OUTPATIENT
Start: 2022-08-15 | End: 2022-08-15

## 2022-08-15 RX ORDER — IPRATROPIUM BROMIDE AND ALBUTEROL SULFATE 2.5; .5 MG/3ML; MG/3ML
3 SOLUTION RESPIRATORY (INHALATION)
Status: COMPLETED | OUTPATIENT
Start: 2022-08-15 | End: 2022-08-15

## 2022-08-15 RX ORDER — ALBUTEROL SULFATE 90 UG/1
2 AEROSOL, METERED RESPIRATORY (INHALATION) EVERY 6 HOURS PRN
Qty: 6.7 G | Refills: 0 | Status: SHIPPED | OUTPATIENT
Start: 2022-08-15 | End: 2023-01-18

## 2022-08-15 RX ADMIN — IPRATROPIUM BROMIDE AND ALBUTEROL SULFATE 3 ML: 2.5; .5 SOLUTION RESPIRATORY (INHALATION) at 04:08

## 2022-08-15 RX ADMIN — CEFTRIAXONE 1 G: 1 INJECTION, POWDER, FOR SOLUTION INTRAMUSCULAR; INTRAVENOUS at 09:08

## 2022-08-16 ENCOUNTER — TELEPHONE (OUTPATIENT)
Dept: FAMILY MEDICINE | Facility: CLINIC | Age: 67
End: 2022-08-16

## 2022-08-16 ENCOUNTER — PATIENT MESSAGE (OUTPATIENT)
Dept: FAMILY MEDICINE | Facility: CLINIC | Age: 67
End: 2022-08-16

## 2022-08-16 DIAGNOSIS — J40 BRONCHITIS: Primary | ICD-10-CM

## 2022-08-16 NOTE — TELEPHONE ENCOUNTER
Needs nebulizer prescription sent to [facility] fax number [phone]

## 2022-08-22 ENCOUNTER — HOSPITAL ENCOUNTER (OUTPATIENT)
Dept: RADIOLOGY | Facility: HOSPITAL | Age: 67
Discharge: HOME | End: 2022-08-22
Attending: NURSE PRACTITIONER
Payer: MEDICARE

## 2022-08-22 ENCOUNTER — OFFICE VISIT (OUTPATIENT)
Dept: FAMILY MEDICINE | Facility: CLINIC | Age: 67
End: 2022-08-22
Payer: MEDICARE

## 2022-08-22 ENCOUNTER — PATIENT MESSAGE (OUTPATIENT)
Dept: FAMILY MEDICINE | Facility: CLINIC | Age: 67
End: 2022-08-22

## 2022-08-22 VITALS
OXYGEN SATURATION: 97 % | BODY MASS INDEX: 32.37 KG/M2 | HEIGHT: 63 IN | DIASTOLIC BLOOD PRESSURE: 70 MMHG | WEIGHT: 182.69 LBS | HEART RATE: 90 BPM | SYSTOLIC BLOOD PRESSURE: 116 MMHG

## 2022-08-22 DIAGNOSIS — J40 BRONCHITIS: Primary | ICD-10-CM

## 2022-08-22 DIAGNOSIS — J40 BRONCHITIS: ICD-10-CM

## 2022-08-22 DIAGNOSIS — J01.40 SUBACUTE PANSINUSITIS: ICD-10-CM

## 2022-08-22 PROCEDURE — 3008F PR BODY MASS INDEX (BMI) DOCUMENTED: ICD-10-PCS | Mod: CPTII,S$GLB,, | Performed by: NURSE PRACTITIONER

## 2022-08-22 PROCEDURE — 3074F SYST BP LT 130 MM HG: CPT | Mod: CPTII,S$GLB,, | Performed by: NURSE PRACTITIONER

## 2022-08-22 PROCEDURE — 1160F PR REVIEW ALL MEDS BY PRESCRIBER/CLIN PHARMACIST DOCUMENTED: ICD-10-PCS | Mod: CPTII,S$GLB,, | Performed by: NURSE PRACTITIONER

## 2022-08-22 PROCEDURE — 3044F HG A1C LEVEL LT 7.0%: CPT | Mod: CPTII,S$GLB,, | Performed by: NURSE PRACTITIONER

## 2022-08-22 PROCEDURE — 99213 OFFICE O/P EST LOW 20 MIN: CPT | Mod: S$GLB,,, | Performed by: NURSE PRACTITIONER

## 2022-08-22 PROCEDURE — 3044F PR MOST RECENT HEMOGLOBIN A1C LEVEL <7.0%: ICD-10-PCS | Mod: CPTII,S$GLB,, | Performed by: NURSE PRACTITIONER

## 2022-08-22 PROCEDURE — 3288F FALL RISK ASSESSMENT DOCD: CPT | Mod: CPTII,S$GLB,, | Performed by: NURSE PRACTITIONER

## 2022-08-22 PROCEDURE — 1101F PR PT FALLS ASSESS DOC 0-1 FALLS W/OUT INJ PAST YR: ICD-10-PCS | Mod: CPTII,S$GLB,, | Performed by: NURSE PRACTITIONER

## 2022-08-22 PROCEDURE — 1159F PR MEDICATION LIST DOCUMENTED IN MEDICAL RECORD: ICD-10-PCS | Mod: CPTII,S$GLB,, | Performed by: NURSE PRACTITIONER

## 2022-08-22 PROCEDURE — 3288F PR FALLS RISK ASSESSMENT DOCUMENTED: ICD-10-PCS | Mod: CPTII,S$GLB,, | Performed by: NURSE PRACTITIONER

## 2022-08-22 PROCEDURE — 99213 PR OFFICE/OUTPT VISIT, EST, LEVL III, 20-29 MIN: ICD-10-PCS | Mod: S$GLB,,, | Performed by: NURSE PRACTITIONER

## 2022-08-22 PROCEDURE — 3008F BODY MASS INDEX DOCD: CPT | Mod: CPTII,S$GLB,, | Performed by: NURSE PRACTITIONER

## 2022-08-22 PROCEDURE — 1160F RVW MEDS BY RX/DR IN RCRD: CPT | Mod: CPTII,S$GLB,, | Performed by: NURSE PRACTITIONER

## 2022-08-22 PROCEDURE — 3074F PR MOST RECENT SYSTOLIC BLOOD PRESSURE < 130 MM HG: ICD-10-PCS | Mod: CPTII,S$GLB,, | Performed by: NURSE PRACTITIONER

## 2022-08-22 PROCEDURE — 71046 X-RAY EXAM CHEST 2 VIEWS: CPT | Mod: TC

## 2022-08-22 PROCEDURE — 3078F PR MOST RECENT DIASTOLIC BLOOD PRESSURE < 80 MM HG: ICD-10-PCS | Mod: CPTII,S$GLB,, | Performed by: NURSE PRACTITIONER

## 2022-08-22 PROCEDURE — 3078F DIAST BP <80 MM HG: CPT | Mod: CPTII,S$GLB,, | Performed by: NURSE PRACTITIONER

## 2022-08-22 PROCEDURE — 1159F MED LIST DOCD IN RCRD: CPT | Mod: CPTII,S$GLB,, | Performed by: NURSE PRACTITIONER

## 2022-08-22 PROCEDURE — 1101F PT FALLS ASSESS-DOCD LE1/YR: CPT | Mod: CPTII,S$GLB,, | Performed by: NURSE PRACTITIONER

## 2022-08-22 RX ORDER — IPRATROPIUM BROMIDE AND ALBUTEROL SULFATE 2.5; .5 MG/3ML; MG/3ML
SOLUTION RESPIRATORY (INHALATION) EVERY 6 HOURS PRN
COMMUNITY
Start: 2022-08-15 | End: 2023-01-18

## 2022-08-22 RX ORDER — AZITHROMYCIN 250 MG/1
TABLET, FILM COATED ORAL
Qty: 6 TABLET | Refills: 0 | Status: SHIPPED | OUTPATIENT
Start: 2022-08-22 | End: 2022-08-27

## 2022-08-22 RX ORDER — PREDNISONE 20 MG/1
TABLET ORAL
Qty: 25 TABLET | Refills: 0 | Status: SHIPPED | OUTPATIENT
Start: 2022-08-22 | End: 2022-09-05

## 2022-08-22 NOTE — PROGRESS NOTES
SUBJECTIVE:      Patient ID: Nalini Wooten is a 67 y.o. female.    Chief Complaint: Cough (Hard breathing, cough, wheezing, tested last week and yesterday both were negative for COVID )    Pt of Dr. Ferguson, known to this provider, presents for sick visit - states she is 13-14 days with productive coughing/wheezing/chest tightness/insomnia, only issue resolved since last visit with Dr. Ferguson 8/15 is ST, Covid remains negative    Cough  This is a recurrent problem. The current episode started 1 to 4 weeks ago. The problem has been rapidly worsening. The problem occurs every few minutes. The cough is productive of purulent sputum. Associated symptoms include nasal congestion, postnasal drip, shortness of breath and wheezing. Pertinent negatives include no chest pain, chills, ear congestion, ear pain, fever, headaches, heartburn, hemoptysis, myalgias, rash, rhinorrhea, sore throat, sweats or weight loss. The symptoms are aggravated by lying down. She has tried a beta-agonist inhaler, body position changes, leukotriene antagonists, prescription cough suppressant and rest (ABX, steroid shot) for the symptoms. The treatment provided mild relief. Her past medical history is significant for bronchitis and environmental allergies. There is no history of asthma, bronchiectasis, COPD, emphysema or pneumonia.       Past Surgical History:   Procedure Laterality Date    ARTHROSCOPIC REPAIR OF ROTATOR CUFF OF SHOULDER Right 3/10/2021    Procedure: REPAIR, ROTATOR CUFF, ARTHROSCOPIC;  Surgeon: Dustin Vanegas MD;  Location: [facility] OR;  Service: Orthopedics;  Laterality: Right;  arthrex notified    ARTHROSCOPY OF SHOULDER WITH DECOMPRESSION OF SUBACROMIAL SPACE Right 3/10/2021    Procedure: ARTHROSCOPY, SHOULDER, WITH SUBACROMIAL SPACE DECOMPRESSION;  Surgeon: Dustin Vanegas MD;  Location: [facility] OR;  Service: Orthopedics;  Laterality: Right;    BLADDER SUSPENSION  1990    DISTAL CLAVICLE EXCISION Right 3/10/2021    Procedure:  EXCISION, CLAVICLE, DISTAL;  Surgeon: Dustin Vanegas MD;  Location: [facility];  Service: Orthopedics;  Laterality: Right;    FOOT SURGERY Bilateral 2001    FOOT SURGERY Right 05/13/2022    HAND SURGERY Left 2017    HYSTERECTOMY  1990    JOINT REPLACEMENT  2013    KNEE SURGERY Bilateral 2013    MANDIBLE FRACTURE SURGERY  1981    SHOULDER ARTHROSCOPY Left 02/27/2019    THYROIDECTOMY Left 05/13/2019        TONSILLECTOMY       Family History   Problem Relation Age of Onset    Arthritis Mother     Aneurysm Mother         Abdominal Aneurysm     Arthritis Father     Diabetes Father     Hearing loss Father     Heart disease Father     Hypertension Father     Dementia Father     Diabetes Paternal Grandmother     Heart disease Paternal Grandfather     Leukemia Son     Crohn's disease Son     Ankylosing spondylitis Son     Other Son         avascular necrosis of pancho hips    Rheumatologic disease Son     Heart defect Son     Kidney failure Son     SIDS Maternal Aunt     Uterine cancer Maternal Aunt     Dementia Paternal Aunt     No Known Problems Sister     Heart disease Brother         stent placement    Heart disease Brother         stent placement    Aneurysm Brother         Abdominal Aneurysm    No Known Problems Sister       Social History     Socioeconomic History    Marital status:     Number of children: 3   Tobacco Use    Smoking status: Never Smoker    Smokeless tobacco: Never Used   Substance and Sexual Activity    Alcohol use: Never     Alcohol/week: 0.0 standard drinks    Drug use: No    Sexual activity: Yes     Birth control/protection: None     Social Determinants of Health     Financial Resource Strain: Low Risk     Difficulty of Paying Living Expenses: Not hard at all   Food Insecurity: No Food Insecurity    Worried About Running Out of Food in the Last Year: Never true    Ran Out of Food in the Last Year: Never true   Transportation Needs: No  Transportation Needs    Lack of Transportation (Medical): No    Lack of Transportation (Non-Medical): No   Physical Activity: Unknown    Days of Exercise per Week: Patient refused    Minutes of Exercise per Session: 30 min   Stress: Unknown    Feeling of Stress : Patient refused   Social Connections: Unknown    Frequency of Communication with Friends and Family: More than three times a week    Frequency of Social Gatherings with Friends and Family: More than three times a week    Active Member of Clubs or Organizations: Patient refused    Attends Club or Organization Meetings: More than 4 times per year    Marital Status:    Housing Stability: Low Risk     Unable to Pay for Housing in the Last Year: No    Number of Places Lived in the Last Year: 1    Unstable Housing in the Last Year: No     Current Outpatient Medications   Medication Sig Dispense Refill    albuterol (VENTOLIN HFA) 90 mcg/actuation inhaler Inhale 2 puffs into the lungs every 6 (six) hours as needed for Wheezing. Rescue 6.7 g 0    albuterol-ipratropium (DUO-NEB) 2.5 mg-0.5 mg/3 mL nebulizer solution Take by nebulization every 6 (six) hours as needed.      amoxicillin-clavulanate 875-125mg (AUGMENTIN) 875-125 mg per tablet Take 1 tablet by mouth 2 (two) times daily. 20 tablet 0    cyanocobalamin 1,000 mcg/mL injection Inject every week 1 cc 10 mL 1    doxycycline (MONODOX) 100 MG capsule Take 100 mg by mouth every 12 (twelve) hours.      guaiFENesin-codeine 100-10 mg/5 ml (TUSSI-ORGANIDIN NR)  mg/5 mL syrup Take 5-10 mLs by mouth every 4 to 6 hours as needed.      hydrOXYzine HCL (ATARAX) 25 MG tablet Take 1 tablet (25 mg total) by mouth 3 (three) times daily as needed for Itching. 30 tablet 1    levocetirizine (XYZAL) 5 MG tablet Take 1 tablet (5 mg total) by mouth every evening. 90 tablet 3    LIDOcaine HCl 2% (XYLOCAINE) 2 % Soln by Mucous Membrane route every 6 (six) hours. One tsp every four hours as needed-DO  NOT SWALLOW 100 mL 0    metFORMIN (GLUCOPHAGE) 500 MG tablet Take 1 tablet (500 mg total) by mouth 2 (two) times daily with meals. 180 tablet 2    montelukast (SINGULAIR) 10 mg tablet TAKE 1 TABLET(10 MG) BY MOUTH EVERY DAY 90 tablet 3    oxybutynin (DITROPAN XL) 15 MG TR24 Take 1 tablet (15 mg total) by mouth once daily. 90 tablet 2    oxybutynin (DITROPAN-XL) 10 MG 24 hr tablet Take 1 tablet (10 mg total) by mouth once daily. 90 tablet 2    phentermine (ADIPEX-P) 37.5 mg tablet TAKE 1 TABLET(37.5 MG) BY MOUTH EVERY MORNING 90 tablet 0    rosuvastatin (CRESTOR) 40 MG Tab Take 1 tablet (40 mg total) by mouth once daily. 90 tablet 1    traMADol (ULTRAM) 50 mg tablet Take 1 tablet (50 mg total) by mouth every 4 (four) hours as needed for Pain. 42 tablet 0    azithromycin (Z-LISA) 250 MG tablet Take 2 tablets by mouth on day 1; Take 1 tablet by mouth on days 2-5 6 tablet 0    predniSONE (DELTASONE) 20 MG tablet Take 3 tablets (60 mg total) by mouth once daily for 4 days, THEN 2 tablets (40 mg total) once daily for 4 days, THEN 1 tablet (20 mg total) once daily for 4 days, THEN 0.5 tablets (10 mg total) once daily for 2 days. 25 tablet 0     No current facility-administered medications for this visit.     Review of patient's allergies indicates:   Allergen Reactions    Estrogens Other (See Comments)     Mini stroke    Gabapentin Itching and Rash    Tetanus vaccines and toxoid Swelling and Other (See Comments)     Swelling at injection site, fever    Shingrix (pf)  [[id number] [id number] (pf)] Diarrhea, Itching and Nausea And Vomiting      Past Medical History:   Diagnosis Date    Allergies     Bulging disc     lower back     Carpal tunnel syndrome     Degenerative disc disease     High cholesterol     Pinched nerve in neck      Past Surgical History:   Procedure Laterality Date    ARTHROSCOPIC REPAIR OF ROTATOR CUFF OF SHOULDER Right 3/10/2021    Procedure: REPAIR, ROTATOR CUFF, ARTHROSCOPIC;   Surgeon: Dustin Vanegas MD;  Location: [facility];  Service: Orthopedics;  Laterality: Right;  arthrex notified    ARTHROSCOPY OF SHOULDER WITH DECOMPRESSION OF SUBACROMIAL SPACE Right 3/10/2021    Procedure: ARTHROSCOPY, SHOULDER, WITH SUBACROMIAL SPACE DECOMPRESSION;  Surgeon: Dustin Vanegas MD;  Location: [facility] OR;  Service: Orthopedics;  Laterality: Right;    BLADDER SUSPENSION  1990    DISTAL CLAVICLE EXCISION Right 3/10/2021    Procedure: EXCISION, CLAVICLE, DISTAL;  Surgeon: Dustin Vanegas MD;  Location: [facility] OR;  Service: Orthopedics;  Laterality: Right;    FOOT SURGERY Bilateral 2001    FOOT SURGERY Right 05/13/2022    HAND SURGERY Left 2017    HYSTERECTOMY  1990    JOINT REPLACEMENT  2013    KNEE SURGERY Bilateral 2013    MANDIBLE FRACTURE SURGERY  1981    SHOULDER ARTHROSCOPY Left 02/27/2019    THYROIDECTOMY Left 05/13/2019        TONSILLECTOMY         Review of Systems   Constitutional: Positive for activity change, appetite change and fatigue. Negative for chills, fever, unexpected weight change and weight loss.   HENT: Positive for congestion, postnasal drip and trouble swallowing (d/t mucus). Negative for ear pain, hearing loss, rhinorrhea, sinus pressure, sinus pain, sneezing and sore throat.    Eyes: Negative for photophobia and pain.   Respiratory: Positive for cough, chest tightness, shortness of breath and wheezing. Negative for hemoptysis.    Cardiovascular: Negative for chest pain, palpitations and leg swelling.   Gastrointestinal: Negative for abdominal distention, abdominal pain, blood in stool, constipation, diarrhea, heartburn, nausea and vomiting.   Endocrine: Negative for cold intolerance, heat intolerance, polydipsia and polyuria.   Genitourinary: Negative for difficulty urinating, dysuria, flank pain, frequency, hematuria, pelvic pain and urgency.   Musculoskeletal: Negative for arthralgias, back pain, joint swelling, myalgias and neck pain.   Skin: Negative for pallor  "and rash.   Allergic/Immunologic: Positive for environmental allergies. Negative for food allergies.   Neurological: Negative for dizziness, weakness, light-headedness, numbness and headaches.   Hematological: Does not bruise/bleed easily.   Psychiatric/Behavioral: Positive for sleep disturbance. Negative for agitation, confusion and decreased concentration. The patient is not nervous/anxious.       OBJECTIVE:      Vitals:    08/22/22 1125   BP: 116/70   BP Location: Right arm   Patient Position: Sitting   BP Method: Medium (Manual)   Pulse: 90   SpO2: 97%   Weight: 82.9 kg (182 lb 11.2 oz)   Height: 5' 3" (1.6 m)     Physical Exam  Vitals and nursing note reviewed.   Constitutional:       General: She is not in acute distress.     Appearance: Normal appearance. She is well-developed. She is obese. She is ill-appearing.   HENT:      Head: Normocephalic and atraumatic.      Right Ear: Hearing normal.      Left Ear: Hearing normal.      Nose: Nose normal. No rhinorrhea.      Mouth/Throat:      Lips: Pink.      Mouth: Mucous membranes are moist. No oral lesions.      Tongue: No lesions.      Palate: No lesions.      Pharynx: Uvula midline. Posterior oropharyngeal erythema present.   Eyes:      General: Lids are normal.         Right eye: No discharge.         Left eye: No discharge.      Conjunctiva/sclera: Conjunctivae normal.      Right eye: Right conjunctiva is not injected.      Left eye: Left conjunctiva is not injected.      Pupils: Pupils are equal, round, and reactive to light. Pupils are equal.      Right eye: Pupil is round and reactive.      Left eye: Pupil is round and reactive.   Neck:      Thyroid: No thyromegaly.      Vascular: No JVD.      Trachea: Trachea normal. No tracheal deviation.   Cardiovascular:      Rate and Rhythm: Normal rate and regular rhythm.      Pulses:           Radial pulses are 2+ on the right side and 2+ on the left side.      Heart sounds: Normal heart sounds. No murmur heard.    " No friction rub. No gallop.   Pulmonary:      Effort: Pulmonary effort is normal. No respiratory distress.      Breath sounds: Decreased air movement present. No stridor. Examination of the right-lower field reveals wheezing. Examination of the left-lower field reveals wheezing. Decreased breath sounds and wheezing present. No rhonchi or rales.   Abdominal:      General: Bowel sounds are normal. There is no distension.      Palpations: Abdomen is soft. Abdomen is not rigid.      Tenderness: There is no abdominal tenderness. There is no guarding.   Musculoskeletal:         General: Normal range of motion.      Cervical back: Normal range of motion and neck supple.   Lymphadenopathy:      Head:      Left side of head: Submandibular adenopathy present.      Cervical: No cervical adenopathy.   Skin:     General: Skin is warm and dry.      Capillary Refill: Capillary refill takes less than 2 seconds.      Coloration: Skin is not pale.      Findings: No lesion or rash.   Neurological:      Mental Status: She is alert and oriented to person, place, and time.      Motor: No atrophy.      Coordination: Coordination normal.      Gait: Gait normal.   Psychiatric:         Attention and Perception: She is attentive.         Speech: Speech normal.         Behavior: Behavior normal.         Thought Content: Thought content normal.         Judgment: Judgment normal.        Assessment:       1. Bronchitis        Plan:       Bronchitis  -     X-Ray Chest PA And Lateral; Future; Expected date: 08/22/2022  -     predniSONE (DELTASONE) 20 MG tablet; Take 3 tablets (60 mg total) by mouth once daily for 4 days, THEN 2 tablets (40 mg total) once daily for 4 days, THEN 1 tablet (20 mg total) once daily for 4 days, THEN 0.5 tablets (10 mg total) once daily for 2 days.  Dispense: 25 tablet; Refill: 0  -     azithromycin (Z-LISA) 250 MG tablet; Take 2 tablets by mouth on day 1; Take 1 tablet by mouth on days 2-5  Dispense: 6 tablet; Refill:  0  - stop taking augmentin and switch to azithromycin  - will notify Dr. Ferguson of today's visit  - will call pt with results of imaging study        Follow up if symptoms worsen or fail to improve.      8/22/2022 CHICO Blackwood, FNP-C

## 2022-08-23 RX ORDER — CODEINE PHOSPHATE AND GUAIFENESIN 10; 100 MG/5ML; MG/5ML
5-10 SOLUTION ORAL
Qty: 118 ML | Refills: 0 | Status: SHIPPED | OUTPATIENT
Start: 2022-08-23 | End: 2022-09-15 | Stop reason: ALTCHOICE

## 2022-08-24 ENCOUNTER — PATIENT MESSAGE (OUTPATIENT)
Dept: FAMILY MEDICINE | Facility: CLINIC | Age: 67
End: 2022-08-24

## 2022-08-30 DIAGNOSIS — B37.9 ANTIBIOTIC-INDUCED YEAST INFECTION: Primary | ICD-10-CM

## 2022-08-30 DIAGNOSIS — T36.95XA ANTIBIOTIC-INDUCED YEAST INFECTION: Primary | ICD-10-CM

## 2022-08-30 RX ORDER — FLUCONAZOLE 150 MG/1
150 TABLET ORAL DAILY
Qty: 1 TABLET | Refills: 0 | Status: SHIPPED | OUTPATIENT
Start: 2022-08-30 | End: 2022-08-31

## 2022-08-30 NOTE — TELEPHONE ENCOUNTER
Patient called has been on antibiotics.   She is complaining of yeast infection.    Diflucan pended

## 2022-08-31 ENCOUNTER — OFFICE VISIT (OUTPATIENT)
Dept: FAMILY MEDICINE | Facility: CLINIC | Age: 67
End: 2022-08-31
Payer: MEDICARE

## 2022-08-31 VITALS
HEIGHT: 63 IN | BODY MASS INDEX: 32.07 KG/M2 | OXYGEN SATURATION: 98 % | RESPIRATION RATE: 16 BRPM | DIASTOLIC BLOOD PRESSURE: 76 MMHG | TEMPERATURE: 98 F | WEIGHT: 181 LBS | HEART RATE: 74 BPM | SYSTOLIC BLOOD PRESSURE: 122 MMHG

## 2022-08-31 DIAGNOSIS — J04.0 LARYNGITIS: ICD-10-CM

## 2022-08-31 DIAGNOSIS — J40 BRONCHITIS: Primary | ICD-10-CM

## 2022-08-31 PROCEDURE — 1159F PR MEDICATION LIST DOCUMENTED IN MEDICAL RECORD: ICD-10-PCS | Mod: CPTII,S$GLB,, | Performed by: INTERNAL MEDICINE

## 2022-08-31 PROCEDURE — 99213 PR OFFICE/OUTPT VISIT, EST, LEVL III, 20-29 MIN: ICD-10-PCS | Mod: S$GLB,,, | Performed by: INTERNAL MEDICINE

## 2022-08-31 PROCEDURE — 3044F PR MOST RECENT HEMOGLOBIN A1C LEVEL <7.0%: ICD-10-PCS | Mod: CPTII,S$GLB,, | Performed by: INTERNAL MEDICINE

## 2022-08-31 PROCEDURE — 3074F SYST BP LT 130 MM HG: CPT | Mod: CPTII,S$GLB,, | Performed by: INTERNAL MEDICINE

## 2022-08-31 PROCEDURE — 3008F BODY MASS INDEX DOCD: CPT | Mod: CPTII,S$GLB,, | Performed by: INTERNAL MEDICINE

## 2022-08-31 PROCEDURE — 3074F PR MOST RECENT SYSTOLIC BLOOD PRESSURE < 130 MM HG: ICD-10-PCS | Mod: CPTII,S$GLB,, | Performed by: INTERNAL MEDICINE

## 2022-08-31 PROCEDURE — 1160F RVW MEDS BY RX/DR IN RCRD: CPT | Mod: CPTII,S$GLB,, | Performed by: INTERNAL MEDICINE

## 2022-08-31 PROCEDURE — 99213 OFFICE O/P EST LOW 20 MIN: CPT | Mod: S$GLB,,, | Performed by: INTERNAL MEDICINE

## 2022-08-31 PROCEDURE — 1160F PR REVIEW ALL MEDS BY PRESCRIBER/CLIN PHARMACIST DOCUMENTED: ICD-10-PCS | Mod: CPTII,S$GLB,, | Performed by: INTERNAL MEDICINE

## 2022-08-31 PROCEDURE — 3044F HG A1C LEVEL LT 7.0%: CPT | Mod: CPTII,S$GLB,, | Performed by: INTERNAL MEDICINE

## 2022-08-31 PROCEDURE — 3078F PR MOST RECENT DIASTOLIC BLOOD PRESSURE < 80 MM HG: ICD-10-PCS | Mod: CPTII,S$GLB,, | Performed by: INTERNAL MEDICINE

## 2022-08-31 PROCEDURE — 1159F MED LIST DOCD IN RCRD: CPT | Mod: CPTII,S$GLB,, | Performed by: INTERNAL MEDICINE

## 2022-08-31 PROCEDURE — 3078F DIAST BP <80 MM HG: CPT | Mod: CPTII,S$GLB,, | Performed by: INTERNAL MEDICINE

## 2022-08-31 PROCEDURE — 3008F PR BODY MASS INDEX (BMI) DOCUMENTED: ICD-10-PCS | Mod: CPTII,S$GLB,, | Performed by: INTERNAL MEDICINE

## 2022-08-31 RX ORDER — AMOXICILLIN AND CLAVULANATE POTASSIUM 875; 125 MG/1; MG/1
1 TABLET, FILM COATED ORAL 2 TIMES DAILY
Qty: 20 TABLET | Refills: 0 | Status: SHIPPED | OUTPATIENT
Start: 2022-08-31 | End: 2022-09-15 | Stop reason: ALTCHOICE

## 2022-08-31 NOTE — PROGRESS NOTES
SUBJECTIVE:    Patient ID: Nalini Wooten is a 67 y.o. female.    Chief Complaint: Bronchitis and Follow-up    HPI    In for follow-up bronchitis - better-the throat is better- cough is less severe-phlegm is more sticky than solid-cough remains-she is short of breath wit trying to talk -no SOB with activity-now congestion is in the throat-cough disturbs the throat-cough med makes it easier to sleep at night-she is completing prednisone in two days-very hoarse      Office Visit on 08/15/2022   Component Date Value Ref Range Status    POC Rapid COVID 08/15/2022 Negative  Negative Final     Acceptable 08/15/2022 Yes   Final   Lab Visit on 06/28/2022   Component Date Value Ref Range Status    Hemoglobin A1C 06/28/2022 6.1  4.5 - 6.2 % Final    Estimated Avg Glucose 06/28/2022 128  68 - 131 mg/dL Final    Hepatitis C Ab 06/28/2022 0.2  0.0 - 0.9 s/co ratio Final    HIV Screen 4th Generation wRfx 06/28/2022 Non Reactive  Non Reactive Final    TSH 06/28/2022 2.500  0.340 - 5.600 uIU/mL Final    Sodium 06/28/2022 139  136 - 145 mmol/L Final    Potassium 06/28/2022 4.3  3.5 - 5.1 mmol/L Final    Chloride 06/28/2022 105  95 - 110 mmol/L Final    CO2 06/28/2022 27  23 - 29 mmol/L Final    Glucose 06/28/2022 94  70 - 110 mg/dL Final    BUN 06/28/2022 20  8 - 23 mg/dL Final    Creatinine 06/28/2022 0.8  0.5 - 1.4 mg/dL Final    Calcium 06/28/2022 9.1  8.7 - 10.5 mg/dL Final    Anion Gap 06/28/2022 7 (L)  8 - 16 mmol/L Final    eGFR if African American 06/28/2022 >60.0  >60 mL/min/1.73 m^2 Final    eGFR if non African American 06/28/2022 >60.0  >60 mL/min/1.73 m^2 Final    Hemoglobin A1C 06/28/2022 6.1  4.5 - 6.2 % Final    Estimated Avg Glucose 06/28/2022 128  68 - 131 mg/dL Final   Lab Visit on 03/23/2022   Component Date Value Ref Range Status    RBC, UA 03/23/2022 2  0 - 4 /hpf Final    WBC, UA 03/23/2022 >100 (H)  0 - 5 /hpf Final    WBC Clumps, UA 03/23/2022 Moderate (A)  None-Rare  Final    Bacteria 03/23/2022 Many (A)  None-Occ /hpf Final    Squam Epithel, UA 03/23/2022 11  /hpf Final    Non-Squam Epith 03/23/2022 1 (A)  <1/hpf /hpf Final    Hyaline Casts, UA 03/23/2022 33 (A)  0-1/lpf /lpf Final    Ca Oxalate Padmini, UA 03/23/2022 Moderate  None-Moderate Final    Microscopic Comment 03/23/2022 SEE COMMENT   Final    Urine Culture, Routine 03/23/2022  (A)   Final                    Value:ESCHERICHIA COLI  >100,000 cfu/ml     Lab Visit on 10/18/2021   Component Date Value Ref Range Status    Sodium 10/18/2021 142  136 - 145 mmol/L Final    Potassium 10/18/2021 4.2  3.5 - 5.1 mmol/L Final    Chloride 10/18/2021 105  95 - 110 mmol/L Final    CO2 10/18/2021 26  23 - 29 mmol/L Final    Glucose 10/18/2021 113 (H)  70 - 110 mg/dL Final    BUN 10/18/2021 18  8 - 23 mg/dL Final    Creatinine 10/18/2021 0.7  0.5 - 1.4 mg/dL Final    Calcium 10/18/2021 9.3  8.7 - 10.5 mg/dL Final    Anion Gap 10/18/2021 11  8 - 16 mmol/L Final    eGFR if African American 10/18/2021 >60.0  >60 mL/min/1.73 m^2 Final    eGFR if non African American 10/18/2021 >60.0  >60 mL/min/1.73 m^2 Final    Hemoglobin A1C 10/18/2021 6.4 (H)  4.5 - 6.2 % Final    Estimated Avg Glucose 10/18/2021 137 (H)  68 - 131 mg/dL Final    Cholesterol 10/18/2021 133  120 - 199 mg/dL Final    Triglycerides 10/18/2021 73  30 - 150 mg/dL Final    HDL 10/18/2021 47  40 - 75 mg/dL Final    LDL Cholesterol 10/18/2021 71.4  63.0 - 159.0 mg/dL Final    HDL/Cholesterol Ratio 10/18/2021 35.3  20.0 - 50.0 % Final    Total Cholesterol/HDL Ratio 10/18/2021 2.8  2.0 - 5.0 Final    Non-HDL Cholesterol 10/18/2021 86  mg/dL Final    TSH 10/18/2021 2.100  0.340 - 5.600 uIU/mL Final       Past Medical History:   Diagnosis Date    Allergies     Bulging disc     lower back     Carpal tunnel syndrome     Degenerative disc disease     High cholesterol     Pinched nerve in neck      Past Surgical History:   Procedure Laterality Date     ARTHROSCOPIC REPAIR OF ROTATOR CUFF OF SHOULDER Right 3/10/2021    Procedure: REPAIR, ROTATOR CUFF, ARTHROSCOPIC;  Surgeon: Dustin Vanegas MD;  Location: [facility] OR;  Service: Orthopedics;  Laterality: Right;  arthrex notified    ARTHROSCOPY OF SHOULDER WITH DECOMPRESSION OF SUBACROMIAL SPACE Right 3/10/2021    Procedure: ARTHROSCOPY, SHOULDER, WITH SUBACROMIAL SPACE DECOMPRESSION;  Surgeon: Dustin Vanegas MD;  Location: [facility] OR;  Service: Orthopedics;  Laterality: Right;    BLADDER SUSPENSION  1990    DISTAL CLAVICLE EXCISION Right 3/10/2021    Procedure: EXCISION, CLAVICLE, DISTAL;  Surgeon: Dustin Vanegas MD;  Location: [facility] OR;  Service: Orthopedics;  Laterality: Right;    FOOT SURGERY Bilateral 2001    FOOT SURGERY Right 05/13/2022    HAND SURGERY Left 2017    HYSTERECTOMY  1990    JOINT REPLACEMENT  2013    KNEE SURGERY Bilateral 2013    MANDIBLE FRACTURE SURGERY  1981    SHOULDER ARTHROSCOPY Left 02/27/2019    THYROIDECTOMY Left 05/13/2019        TONSILLECTOMY       Family History   Problem Relation Age of Onset    Arthritis Mother     Aneurysm Mother         Abdominal Aneurysm     Arthritis Father     Diabetes Father     Hearing loss Father     Heart disease Father     Hypertension Father     Dementia Father     Diabetes Paternal Grandmother     Heart disease Paternal Grandfather     Leukemia Son     Crohn's disease Son     Ankylosing spondylitis Son     Other Son         avascular necrosis of pancho hips    Rheumatologic disease Son     Heart defect Son     Kidney failure Son     SIDS Maternal Aunt     Uterine cancer Maternal Aunt     Dementia Paternal Aunt     No Known Problems Sister     Heart disease Brother         stent placement    Heart disease Brother         stent placement    Aneurysm Brother         Abdominal Aneurysm    No Known Problems Sister        Marital Status:   Alcohol History:  reports no history of alcohol use.  Tobacco History:  reports  that she has never smoked. She has never used smokeless tobacco.  Drug History:  reports no history of drug use.    Review of patient's allergies indicates:   Allergen Reactions    Estrogens Other (See Comments)     Mini stroke    Gabapentin Itching and Rash    Tetanus vaccines and toxoid Swelling and Other (See Comments)     Swelling at injection site, fever    Shingrix (pf)  [varicella-zoster ge-as01b (pf)] Diarrhea, Itching and Nausea And Vomiting       Current Outpatient Medications:     albuterol (VENTOLIN HFA) 90 mcg/actuation inhaler, Inhale 2 puffs into the lungs every 6 (six) hours as needed for Wheezing. Rescue, Disp: 6.7 g, Rfl: 0    albuterol-ipratropium (DUO-NEB) 2.5 mg-0.5 mg/3 mL nebulizer solution, Take by nebulization every 6 (six) hours as needed., Disp: , Rfl:     cyanocobalamin 1,000 mcg/mL injection, Inject every week 1 cc, Disp: 10 mL, Rfl: 1    fluconazole (DIFLUCAN) 150 MG Tab, Take 1 tablet (150 mg total) by mouth once daily. for 1 day, Disp: 1 tablet, Rfl: 0    guaiFENesin-codeine 100-10 mg/5 ml (TUSSI-ORGANIDIN NR)  mg/5 mL syrup, Take 5-10 mLs by mouth every 4 to 6 hours as needed., Disp: 118 mL, Rfl: 0    levocetirizine (XYZAL) 5 MG tablet, Take 1 tablet (5 mg total) by mouth every evening., Disp: 90 tablet, Rfl: 3    LIDOcaine HCl 2% (XYLOCAINE) 2 % Soln, by Mucous Membrane route every 6 (six) hours. One tsp every four hours as needed-DO NOT SWALLOW, Disp: 100 mL, Rfl: 0    metFORMIN (GLUCOPHAGE) 500 MG tablet, Take 1 tablet (500 mg total) by mouth 2 (two) times daily with meals., Disp: 180 tablet, Rfl: 2    montelukast (SINGULAIR) 10 mg tablet, TAKE 1 TABLET(10 MG) BY MOUTH EVERY DAY, Disp: 90 tablet, Rfl: 3    oxybutynin (DITROPAN XL) 15 MG TR24, Take 1 tablet (15 mg total) by mouth once daily. (Patient taking differently: Take 15 mg by mouth once daily. In am), Disp: 90 tablet, Rfl: 2    oxybutynin (DITROPAN-XL) 10 MG 24 hr tablet, Take 1 tablet (10 mg total) by  mouth once daily. (Patient taking differently: Take 10 mg by mouth once daily. At night), Disp: 90 tablet, Rfl: 2    phentermine (ADIPEX-P) 37.5 mg tablet, TAKE 1 TABLET(37.5 MG) BY MOUTH EVERY MORNING, Disp: 90 tablet, Rfl: 0    predniSONE (DELTASONE) 20 MG tablet, Take 3 tablets (60 mg total) by mouth once daily for 4 days, THEN 2 tablets (40 mg total) once daily for 4 days, THEN 1 tablet (20 mg total) once daily for 4 days, THEN 0.5 tablets (10 mg total) once daily for 2 days., Disp: 25 tablet, Rfl: 0    rosuvastatin (CRESTOR) 40 MG Tab, Take 1 tablet (40 mg total) by mouth once daily., Disp: 90 tablet, Rfl: 1    traMADol (ULTRAM) 50 mg tablet, Take 1 tablet (50 mg total) by mouth every 4 (four) hours as needed for Pain., Disp: 42 tablet, Rfl: 0    hydrOXYzine HCL (ATARAX) 25 MG tablet, Take 1 tablet (25 mg total) by mouth 3 (three) times daily as needed for Itching. (Patient not taking: Reported on 8/31/2022), Disp: 30 tablet, Rfl: 1    Review of Systems   Constitutional:  Positive for fatigue (with activity). Negative for activity change, appetite change, chills, fever and unexpected weight change.   HENT:  Positive for tinnitus. Negative for congestion, ear pain, hearing loss, postnasal drip, sinus pain, sneezing, sore throat and trouble swallowing.    Eyes:  Negative for pain, discharge and visual disturbance.   Respiratory:  Positive for shortness of breath (HPI). Negative for cough, choking and wheezing.    Cardiovascular:  Negative for chest pain, palpitations and leg swelling.   Gastrointestinal:  Negative for abdominal distention, abdominal pain, blood in stool, constipation, diarrhea, nausea and vomiting.   Endocrine: Negative for cold intolerance and heat intolerance.   Genitourinary:  Negative for difficulty urinating, dysuria, frequency, pelvic pain and urgency.   Musculoskeletal:  Negative for back pain, joint swelling and neck pain.   Skin:  Negative for pallor and rash.    Allergic/Immunologic: Negative for environmental allergies and food allergies.   Neurological:  Negative for dizziness, tremors, weakness, numbness and headaches.   Hematological:  Does not bruise/bleed easily.   Psychiatric/Behavioral:  Negative for agitation, confusion, dysphoric mood, sleep disturbance and suicidal ideas. The patient is not nervous/anxious.         Objective:      Vitals    Vitals - 1 value per visit 8/15/2022 8/22/2022 8/22/2022 8/31/2022 8/31/2022   SYSTOLIC 138 - 116 - 122   DIASTOLIC 82 - 70 - 76   Pulse 70 - 90 - 74   Temp 98.7 - - - 98.3   Resp 16 - - - 16   SPO2 98 - 97 - 98   Weight (lb) 184 - 182.7 - 181   Weight (kg) 83.462 - 82.872 - 82.101   Height 63 - 63 - 63   BMI (Calculated) 32.6 - 32.4 - 32.1   VISIT REPORT - - - - -   Pain Score  - 0 - 2 -   Some recent data might be hidden       Physical Exam  Constitutional:       Appearance: She is obese.   HENT:      Head: Normocephalic and atraumatic.      Mouth/Throat:      Mouth: Mucous membranes are dry.      Comments: Very hoarse  Eyes:      Extraocular Movements: Extraocular movements intact.      Conjunctiva/sclera: Conjunctivae normal.      Pupils: Pupils are equal, round, and reactive to light.   Cardiovascular:      Rate and Rhythm: Normal rate and regular rhythm.      Pulses: Normal pulses.      Heart sounds: Normal heart sounds.   Pulmonary:      Effort: Pulmonary effort is normal.      Breath sounds: Rhonchi (high in chest) present.      Comments: Very deep cough from deep in throat  Musculoskeletal:      Cervical back: Normal range of motion and neck supple. No tenderness.      Right lower leg: No edema.      Left lower leg: No edema.   Lymphadenopathy:      Cervical: No cervical adenopathy.   Skin:     General: Skin is warm and dry.   Neurological:      Mental Status: She is alert.   Psychiatric:         Mood and Affect: Mood normal.         Thought Content: Thought content normal.         Assessment:       No diagnosis  found.     Plan:       There are no diagnoses linked to this encounter.  No follow-ups on file.        8/31/2022 Katherine Ferguson M.D.

## 2022-09-14 ENCOUNTER — HOSPITAL ENCOUNTER (OUTPATIENT)
Dept: RADIOLOGY | Facility: HOSPITAL | Age: 67
Discharge: HOME | End: 2022-09-14
Attending: INTERNAL MEDICINE
Payer: MEDICARE

## 2022-09-14 DIAGNOSIS — E04.2 MULTIPLE THYROID NODULES: ICD-10-CM

## 2022-09-14 PROCEDURE — 76536 US EXAM OF HEAD AND NECK: CPT | Mod: TC,PO

## 2022-09-15 ENCOUNTER — OFFICE VISIT (OUTPATIENT)
Dept: FAMILY MEDICINE | Facility: CLINIC | Age: 67
End: 2022-09-15
Payer: MEDICARE

## 2022-09-15 VITALS
BODY MASS INDEX: 31.54 KG/M2 | DIASTOLIC BLOOD PRESSURE: 80 MMHG | WEIGHT: 178 LBS | SYSTOLIC BLOOD PRESSURE: 124 MMHG | TEMPERATURE: 99 F | HEART RATE: 68 BPM | HEIGHT: 63 IN | OXYGEN SATURATION: 96 % | RESPIRATION RATE: 16 BRPM

## 2022-09-15 DIAGNOSIS — E78.00 PURE HYPERCHOLESTEROLEMIA: ICD-10-CM

## 2022-09-15 DIAGNOSIS — R73.09 ELEVATED GLUCOSE: ICD-10-CM

## 2022-09-15 DIAGNOSIS — L29.9 ITCHING: ICD-10-CM

## 2022-09-15 DIAGNOSIS — R53.83 FATIGUE, UNSPECIFIED TYPE: ICD-10-CM

## 2022-09-15 DIAGNOSIS — T63.481A INSECT STINGS, ACCIDENTAL OR UNINTENTIONAL, INITIAL ENCOUNTER: ICD-10-CM

## 2022-09-15 DIAGNOSIS — J30.89 ENVIRONMENTAL AND SEASONAL ALLERGIES: ICD-10-CM

## 2022-09-15 DIAGNOSIS — E66.9 OBESITY (BMI 30-39.9): ICD-10-CM

## 2022-09-15 DIAGNOSIS — J40 BRONCHITIS: Primary | ICD-10-CM

## 2022-09-15 PROCEDURE — 3079F PR MOST RECENT DIASTOLIC BLOOD PRESSURE 80-89 MM HG: ICD-10-PCS | Mod: CPTII,S$GLB,, | Performed by: INTERNAL MEDICINE

## 2022-09-15 PROCEDURE — 1159F PR MEDICATION LIST DOCUMENTED IN MEDICAL RECORD: ICD-10-PCS | Mod: CPTII,S$GLB,, | Performed by: INTERNAL MEDICINE

## 2022-09-15 PROCEDURE — 3008F PR BODY MASS INDEX (BMI) DOCUMENTED: ICD-10-PCS | Mod: CPTII,S$GLB,, | Performed by: INTERNAL MEDICINE

## 2022-09-15 PROCEDURE — 3074F PR MOST RECENT SYSTOLIC BLOOD PRESSURE < 130 MM HG: ICD-10-PCS | Mod: CPTII,S$GLB,, | Performed by: INTERNAL MEDICINE

## 2022-09-15 PROCEDURE — 99214 OFFICE O/P EST MOD 30 MIN: CPT | Mod: S$GLB,,, | Performed by: INTERNAL MEDICINE

## 2022-09-15 PROCEDURE — 3044F PR MOST RECENT HEMOGLOBIN A1C LEVEL <7.0%: ICD-10-PCS | Mod: CPTII,S$GLB,, | Performed by: INTERNAL MEDICINE

## 2022-09-15 PROCEDURE — 3044F HG A1C LEVEL LT 7.0%: CPT | Mod: CPTII,S$GLB,, | Performed by: INTERNAL MEDICINE

## 2022-09-15 PROCEDURE — 1159F MED LIST DOCD IN RCRD: CPT | Mod: CPTII,S$GLB,, | Performed by: INTERNAL MEDICINE

## 2022-09-15 PROCEDURE — 3079F DIAST BP 80-89 MM HG: CPT | Mod: CPTII,S$GLB,, | Performed by: INTERNAL MEDICINE

## 2022-09-15 PROCEDURE — 1160F PR REVIEW ALL MEDS BY PRESCRIBER/CLIN PHARMACIST DOCUMENTED: ICD-10-PCS | Mod: CPTII,S$GLB,, | Performed by: INTERNAL MEDICINE

## 2022-09-15 PROCEDURE — 3008F BODY MASS INDEX DOCD: CPT | Mod: CPTII,S$GLB,, | Performed by: INTERNAL MEDICINE

## 2022-09-15 PROCEDURE — 99214 PR OFFICE/OUTPT VISIT, EST, LEVL IV, 30-39 MIN: ICD-10-PCS | Mod: S$GLB,,, | Performed by: INTERNAL MEDICINE

## 2022-09-15 PROCEDURE — 1160F RVW MEDS BY RX/DR IN RCRD: CPT | Mod: CPTII,S$GLB,, | Performed by: INTERNAL MEDICINE

## 2022-09-15 PROCEDURE — 3074F SYST BP LT 130 MM HG: CPT | Mod: CPTII,S$GLB,, | Performed by: INTERNAL MEDICINE

## 2022-09-15 RX ORDER — PHENTERMINE HYDROCHLORIDE 37.5 MG/1
TABLET ORAL
Qty: 90 TABLET | Refills: 0 | Status: SHIPPED | OUTPATIENT
Start: 2022-09-15 | End: 2023-01-18 | Stop reason: SDUPTHER

## 2022-09-15 RX ORDER — METFORMIN HYDROCHLORIDE 500 MG/1
500 TABLET ORAL 2 TIMES DAILY WITH MEALS
Qty: 180 TABLET | Refills: 2 | Status: SHIPPED | OUTPATIENT
Start: 2022-09-15 | End: 2023-04-10 | Stop reason: SDUPTHER

## 2022-09-15 RX ORDER — HYDROXYZINE HYDROCHLORIDE 25 MG/1
25 TABLET, FILM COATED ORAL 3 TIMES DAILY PRN
Qty: 90 TABLET | Refills: 2 | Status: SHIPPED | OUTPATIENT
Start: 2022-09-15 | End: 2023-01-18

## 2022-09-15 RX ORDER — DOXYCYCLINE HYCLATE 100 MG
100 TABLET ORAL 2 TIMES DAILY
Qty: 14 TABLET | Refills: 0 | Status: SHIPPED | OUTPATIENT
Start: 2022-09-15 | End: 2022-09-25

## 2022-09-15 RX ORDER — CYANOCOBALAMIN 1000 UG/ML
INJECTION, SOLUTION INTRAMUSCULAR; SUBCUTANEOUS
Qty: 10 ML | Refills: 1 | Status: SHIPPED | OUTPATIENT
Start: 2022-09-15 | End: 2023-10-26 | Stop reason: SDUPTHER

## 2022-09-15 RX ORDER — ROSUVASTATIN CALCIUM 40 MG/1
40 TABLET, COATED ORAL DAILY
Qty: 90 TABLET | Refills: 1 | Status: SHIPPED | OUTPATIENT
Start: 2022-09-15 | End: 2023-01-10 | Stop reason: SDUPTHER

## 2022-09-15 RX ORDER — LEVOCETIRIZINE DIHYDROCHLORIDE 5 MG/1
5 TABLET, FILM COATED ORAL NIGHTLY
Qty: 90 TABLET | Refills: 3 | Status: SHIPPED | OUTPATIENT
Start: 2022-09-15 | End: 2023-01-10 | Stop reason: SDUPTHER

## 2022-09-15 RX ORDER — MONTELUKAST SODIUM 10 MG/1
10 TABLET ORAL DAILY
Qty: 90 TABLET | Refills: 3 | Status: SHIPPED | OUTPATIENT
Start: 2022-09-15 | End: 2023-01-10 | Stop reason: SDUPTHER

## 2022-09-15 NOTE — PROGRESS NOTES
SUBJECTIVE:    Patient ID: Nalini Wooten is a 67 y.o. female.    Chief Complaint: Bronchitis and Follow-up    HPI    Follow-up bronchitis-less cough but still some periodic cough-thick whitish grey mucous-no more shortness of breath-is able to work outside now    Fresh insect bite to upper right chest as of two days ago    Office Visit on 08/15/2022   Component Date Value Ref Range Status    POC Rapid COVID 08/15/2022 Negative  Negative Final     Acceptable 08/15/2022 Yes   Final   Lab Visit on 06/28/2022   Component Date Value Ref Range Status    Hemoglobin A1C 06/28/2022 6.1  4.5 - 6.2 % Final    Estimated Avg Glucose 06/28/2022 128  68 - 131 mg/dL Final    Hepatitis C Ab 06/28/2022 0.2  0.0 - 0.9 s/co ratio Final    HIV Screen 4th Generation wRfx 06/28/2022 Non Reactive  Non Reactive Final    TSH 06/28/2022 2.500  0.340 - 5.600 uIU/mL Final    Sodium 06/28/2022 139  136 - 145 mmol/L Final    Potassium 06/28/2022 4.3  3.5 - 5.1 mmol/L Final    Chloride 06/28/2022 105  95 - 110 mmol/L Final    CO2 06/28/2022 27  23 - 29 mmol/L Final    Glucose 06/28/2022 94  70 - 110 mg/dL Final    BUN 06/28/2022 20  8 - 23 mg/dL Final    Creatinine 06/28/2022 0.8  0.5 - 1.4 mg/dL Final    Calcium 06/28/2022 9.1  8.7 - 10.5 mg/dL Final    Anion Gap 06/28/2022 7 (L)  8 - 16 mmol/L Final    eGFR if African American 06/28/2022 >60.0  >60 mL/min/1.73 m^2 Final    eGFR if non African American 06/28/2022 >60.0  >60 mL/min/1.73 m^2 Final    Hemoglobin A1C 06/28/2022 6.1  4.5 - 6.2 % Final    Estimated Avg Glucose 06/28/2022 128  68 - 131 mg/dL Final   Lab Visit on 03/23/2022   Component Date Value Ref Range Status    RBC, UA 03/23/2022 2  0 - 4 /hpf Final    WBC, UA 03/23/2022 >100 (H)  0 - 5 /hpf Final    WBC Clumps, UA 03/23/2022 Moderate (A)  None-Rare Final    Bacteria 03/23/2022 Many (A)  None-Occ /hpf Final    Squam Epithel, UA 03/23/2022 11  /hpf Final    Non-Squam Epith 03/23/2022 1 (A)   <1/hpf /hpf Final    Hyaline Casts, UA 03/23/2022 33 (A)  0-1/lpf /lpf Final    Ca Oxalate Padmini, UA 03/23/2022 Moderate  None-Moderate Final    Microscopic Comment 03/23/2022 SEE COMMENT   Final    Urine Culture, Routine 03/23/2022  (A)   Final                    Value:ESCHERICHIA COLI  >100,000 cfu/ml     Lab Visit on 10/18/2021   Component Date Value Ref Range Status    Sodium 10/18/2021 142  136 - 145 mmol/L Final    Potassium 10/18/2021 4.2  3.5 - 5.1 mmol/L Final    Chloride 10/18/2021 105  95 - 110 mmol/L Final    CO2 10/18/2021 26  23 - 29 mmol/L Final    Glucose 10/18/2021 113 (H)  70 - 110 mg/dL Final    BUN 10/18/2021 18  8 - 23 mg/dL Final    Creatinine 10/18/2021 0.7  0.5 - 1.4 mg/dL Final    Calcium 10/18/2021 9.3  8.7 - 10.5 mg/dL Final    Anion Gap 10/18/2021 11  8 - 16 mmol/L Final    eGFR if African American 10/18/2021 >60.0  >60 mL/min/1.73 m^2 Final    eGFR if non African American 10/18/2021 >60.0  >60 mL/min/1.73 m^2 Final    Hemoglobin A1C 10/18/2021 6.4 (H)  4.5 - 6.2 % Final    Estimated Avg Glucose 10/18/2021 137 (H)  68 - 131 mg/dL Final    Cholesterol 10/18/2021 133  120 - 199 mg/dL Final    Triglycerides 10/18/2021 73  30 - 150 mg/dL Final    HDL 10/18/2021 47  40 - 75 mg/dL Final    LDL Cholesterol 10/18/2021 71.4  63.0 - 159.0 mg/dL Final    HDL/Cholesterol Ratio 10/18/2021 35.3  20.0 - 50.0 % Final    Total Cholesterol/HDL Ratio 10/18/2021 2.8  2.0 - 5.0 Final    Non-HDL Cholesterol 10/18/2021 86  mg/dL Final    TSH 10/18/2021 2.100  0.340 - 5.600 uIU/mL Final       Past Medical History:   Diagnosis Date    Allergies     Bulging disc     lower back     Carpal tunnel syndrome     Degenerative disc disease     High cholesterol     Pinched nerve in neck      Past Surgical History:   Procedure Laterality Date    ARTHROSCOPIC REPAIR OF ROTATOR CUFF OF SHOULDER Right 3/10/2021    Procedure: REPAIR, ROTATOR CUFF, ARTHROSCOPIC;  Surgeon: Dustin Vanegas MD;   Location: [facility] OR;  Service: Orthopedics;  Laterality: Right;  arthrex notified    ARTHROSCOPY OF SHOULDER WITH DECOMPRESSION OF SUBACROMIAL SPACE Right 3/10/2021    Procedure: ARTHROSCOPY, SHOULDER, WITH SUBACROMIAL SPACE DECOMPRESSION;  Surgeon: Dustin Vanegas MD;  Location: [facility] OR;  Service: Orthopedics;  Laterality: Right;    BLADDER SUSPENSION  1990    DISTAL CLAVICLE EXCISION Right 3/10/2021    Procedure: EXCISION, CLAVICLE, DISTAL;  Surgeon: Dustin Vanegas MD;  Location: [facility] OR;  Service: Orthopedics;  Laterality: Right;    FOOT SURGERY Bilateral 2001    FOOT SURGERY Right 05/13/2022    HAND SURGERY Left 2017    HYSTERECTOMY  1990    JOINT REPLACEMENT  2013    KNEE SURGERY Bilateral 2013    MANDIBLE FRACTURE SURGERY  1981    SHOULDER ARTHROSCOPY Left 02/27/2019    THYROIDECTOMY Left 05/13/2019        TONSILLECTOMY       Family History   Problem Relation Age of Onset    Arthritis Mother     Aneurysm Mother         Abdominal Aneurysm     Arthritis Father     Diabetes Father     Hearing loss Father     Heart disease Father     Hypertension Father     Dementia Father     Diabetes Paternal Grandmother     Heart disease Paternal Grandfather     Leukemia Son     Crohn's disease Son     Ankylosing spondylitis Son     Other Son         avascular necrosis of pancho hips    Rheumatologic disease Son     Heart defect Son     Kidney failure Son     SIDS Maternal Aunt     Uterine cancer Maternal Aunt     Dementia Paternal Aunt     No Known Problems Sister     Heart disease Brother         stent placement    Heart disease Brother         stent placement    Aneurysm Brother         Abdominal Aneurysm    No Known Problems Sister        Marital Status:   Alcohol History:  reports no history of alcohol use.  Tobacco History:  reports that she has never smoked. She has never used smokeless tobacco.  Drug History:  reports no history of drug use.    Review of patient's  allergies indicates:   Allergen Reactions    Estrogens Other (See Comments)     Mini stroke    Gabapentin Itching and Rash    Tetanus vaccines and toxoid Swelling and Other (See Comments)     Swelling at injection site, fever    Shingrix (pf)  [varicella-zoster ge-as01b (pf)] Diarrhea, Itching and Nausea And Vomiting       Current Outpatient Medications:     albuterol (VENTOLIN HFA) 90 mcg/actuation inhaler, Inhale 2 puffs into the lungs every 6 (six) hours as needed for Wheezing. Rescue, Disp: 6.7 g, Rfl: 0    albuterol-ipratropium (DUO-NEB) 2.5 mg-0.5 mg/3 mL nebulizer solution, Take by nebulization every 6 (six) hours as needed., Disp: , Rfl:     cyanocobalamin 1,000 mcg/mL injection, Inject every week 1 cc, Disp: 10 mL, Rfl: 1    hydrOXYzine HCL (ATARAX) 25 MG tablet, Take 1 tablet (25 mg total) by mouth 3 (three) times daily as needed for Itching., Disp: 30 tablet, Rfl: 1    levocetirizine (XYZAL) 5 MG tablet, Take 1 tablet (5 mg total) by mouth every evening., Disp: 90 tablet, Rfl: 3    LIDOcaine HCl 2% (XYLOCAINE) 2 % Soln, by Mucous Membrane route every 6 (six) hours. One tsp every four hours as needed-DO NOT SWALLOW, Disp: 100 mL, Rfl: 0    metFORMIN (GLUCOPHAGE) 500 MG tablet, Take 1 tablet (500 mg total) by mouth 2 (two) times daily with meals., Disp: 180 tablet, Rfl: 2    mometasone (ASMANEX TWISTHALER) 220 mcg/ actuation (30) inhaler, Inhale 2 puffs into the lungs once daily. Controller, Disp: 1 each, Rfl: 0    montelukast (SINGULAIR) 10 mg tablet, TAKE 1 TABLET(10 MG) BY MOUTH EVERY DAY, Disp: 90 tablet, Rfl: 3    oxybutynin (DITROPAN XL) 15 MG TR24, Take 1 tablet (15 mg total) by mouth once daily. (Patient taking differently: Take 15 mg by mouth once daily. In am), Disp: 90 tablet, Rfl: 2    oxybutynin (DITROPAN-XL) 10 MG 24 hr tablet, Take 1 tablet (10 mg total) by mouth once daily. (Patient taking differently: Take 10 mg by mouth once daily. At night), Disp: 90 tablet, Rfl: 2     phentermine (ADIPEX-P) 37.5 mg tablet, TAKE 1 TABLET(37.5 MG) BY MOUTH EVERY MORNING, Disp: 90 tablet, Rfl: 0    rosuvastatin (CRESTOR) 40 MG Tab, Take 1 tablet (40 mg total) by mouth once daily., Disp: 90 tablet, Rfl: 1    traMADol (ULTRAM) 50 mg tablet, Take 1 tablet (50 mg total) by mouth every 4 (four) hours as needed for Pain., Disp: 42 tablet, Rfl: 0    Review of Systems   Constitutional:  Negative for activity change, appetite change, chills, fatigue, fever and unexpected weight change.   HENT:  Positive for voice change. Negative for congestion, ear pain, hearing loss, postnasal drip, sinus pain, sneezing, sore throat, tinnitus and trouble swallowing.    Eyes:  Negative for pain, discharge and visual disturbance.   Respiratory:  Positive for cough. Negative for choking, shortness of breath and wheezing.    Cardiovascular:  Negative for chest pain, palpitations and leg swelling.   Gastrointestinal:  Negative for abdominal distention, abdominal pain, blood in stool, constipation, diarrhea, nausea and vomiting.   Endocrine: Negative for cold intolerance and heat intolerance.   Genitourinary:  Negative for difficulty urinating, dysuria, frequency, pelvic pain and urgency.   Musculoskeletal:  Negative for back pain, joint swelling and neck pain.   Skin:  Positive for wound. Negative for pallor and rash.   Allergic/Immunologic: Negative for environmental allergies and food allergies.   Neurological:  Negative for dizziness, tremors, weakness, numbness and headaches.   Hematological:  Does not bruise/bleed easily.   Psychiatric/Behavioral:  Negative for agitation, confusion, dysphoric mood, sleep disturbance and suicidal ideas. The patient is not nervous/anxious.         Objective:      Vitals    Vitals - 1 value per visit 8/22/2022 8/31/2022 8/31/2022 9/15/2022 9/15/2022   SYSTOLIC 116 - 122 - 124   DIASTOLIC 70 - 76 - 80   Pulse 90 - 74 - 68   Temp - - 98.3 - 99   Resp - - 16 - 16   SPO2 97 - 98 - 96   Weight  (lb) 182.7 - 181 - 178   Weight (kg) 82.872 - 82.101 - 80.74   Height 63 - 63 - 63   BMI (Calculated) 32.4 - 32.1 - 31.5   VISIT REPORT - - - - -   Pain Score  - 2 - 0 -   Some recent data might be hidden       Physical Exam  Constitutional:       Appearance: Normal appearance.      Comments: Obvious weight loss ( 25 pounds)   HENT:      Head: Normocephalic and atraumatic.   Eyes:      Extraocular Movements: Extraocular movements intact.      Pupils: Pupils are equal, round, and reactive to light.   Cardiovascular:      Rate and Rhythm: Normal rate and regular rhythm.   Pulmonary:      Effort: Pulmonary effort is normal.      Breath sounds: Normal breath sounds.   Skin:     Findings: Erythema present.      Comments: Upper right chest with erythema and induration upper lateral aspect of erythema-area warm   Neurological:      Mental Status: She is alert.         Assessment:       1. Itching    2. Fatigue, unspecified type    3. Environmental and seasonal allergies    4. Elevated glucose    5. Obesity (BMI 30-39.9)    6. Pure hypercholesterolemia         Plan:       Itching    Fatigue, unspecified type    Environmental and seasonal allergies    Elevated glucose    Obesity (BMI 30-39.9)    Pure hypercholesterolemia    No follow-ups on file.        9/15/2022 Katherine Ferguson M.D.

## 2022-09-20 ENCOUNTER — PATIENT MESSAGE (OUTPATIENT)
Dept: FAMILY MEDICINE | Facility: CLINIC | Age: 67
End: 2022-09-20

## 2022-09-21 ENCOUNTER — TELEPHONE (OUTPATIENT)
Dept: PHARMACY | Facility: HOSPITAL | Age: 67
End: 2022-09-21
Payer: MEDICARE

## 2022-09-21 DIAGNOSIS — U07.1 COVID-19: Primary | ICD-10-CM

## 2022-09-21 DIAGNOSIS — U07.1 COVID: Primary | ICD-10-CM

## 2022-09-21 NOTE — TELEPHONE ENCOUNTER
Per [facility] policy, this patient was triaged for conversion to the appropriate treatment (as outlined in the policy and standing order). This patient was evaluated for drug interactions based on the current medication list, and no contraindications to Paxlovid were present. Patient met criteria for conversion to the first line treatment, Paxlovid (under standing orders) and was offered a prescription for Paxlovid (dosed appropriately by eGFR). Patient accepted. Patient was instructed to hold crestor and oxybutynin while taking paxlovid.

## 2022-10-03 ENCOUNTER — PATIENT MESSAGE (OUTPATIENT)
Dept: FAMILY MEDICINE | Facility: CLINIC | Age: 67
End: 2022-10-03

## 2023-01-10 ENCOUNTER — PATIENT MESSAGE (OUTPATIENT)
Dept: FAMILY MEDICINE | Facility: CLINIC | Age: 68
End: 2023-01-10

## 2023-01-16 NOTE — PROGRESS NOTES
SUBJECTIVE:    Patient ID: Nalini Wooten is a 67 y.o. female.    Chief Complaint: No chief complaint on file.    HPI    In for med refills    Office Visit on 08/15/2022   Component Date Value Ref Range Status    POC Rapid COVID 08/15/2022 Negative  Negative Final     Acceptable 08/15/2022 Yes   Final   Lab Visit on 06/28/2022   Component Date Value Ref Range Status    Hemoglobin A1C 06/28/2022 6.1  4.5 - 6.2 % Final    Estimated Avg Glucose 06/28/2022 128  68 - 131 mg/dL Final    Hepatitis C Ab 06/28/2022 0.2  0.0 - 0.9 s/co ratio Final    HIV Screen 4th Generation wRfx 06/28/2022 Non Reactive  Non Reactive Final    TSH 06/28/2022 2.500  0.340 - 5.600 uIU/mL Final    Sodium 06/28/2022 139  136 - 145 mmol/L Final    Potassium 06/28/2022 4.3  3.5 - 5.1 mmol/L Final    Chloride 06/28/2022 105  95 - 110 mmol/L Final    CO2 06/28/2022 27  23 - 29 mmol/L Final    Glucose 06/28/2022 94  70 - 110 mg/dL Final    BUN 06/28/2022 20  8 - 23 mg/dL Final    Creatinine 06/28/2022 0.8  0.5 - 1.4 mg/dL Final    Calcium 06/28/2022 9.1  8.7 - 10.5 mg/dL Final    Anion Gap 06/28/2022 7 (L)  8 - 16 mmol/L Final    eGFR if African American 06/28/2022 >60.0  >60 mL/min/1.73 m^2 Final    eGFR if non African American 06/28/2022 >60.0  >60 mL/min/1.73 m^2 Final    Hemoglobin A1C 06/28/2022 6.1  4.5 - 6.2 % Final    Estimated Avg Glucose 06/28/2022 128  68 - 131 mg/dL Final   Lab Visit on 03/23/2022   Component Date Value Ref Range Status    RBC, UA 03/23/2022 2  0 - 4 /hpf Final    WBC, UA 03/23/2022 >100 (H)  0 - 5 /hpf Final    WBC Clumps, UA 03/23/2022 Moderate (A)  None-Rare Final    Bacteria 03/23/2022 Many (A)  None-Occ /hpf Final    Squam Epithel, UA 03/23/2022 11  /hpf Final    Non-Squam Epith 03/23/2022 1 (A)  <1/hpf /hpf Final    Hyaline Casts, UA 03/23/2022 33 (A)  0-1/lpf /lpf Final    Ca Oxalate Padmini, UA 03/23/2022 Moderate  None-Moderate Final    Microscopic Comment 03/23/2022 SEE COMMENT   Final    Urine  Culture, Routine 03/23/2022  (A)   Final                    Value:ESCHERICHIA COLI  >100,000 cfu/ml         Past Medical History:   Diagnosis Date    Allergies     Bulging disc     lower back     Carpal tunnel syndrome     Degenerative disc disease     High cholesterol     Pinched nerve in neck      Past Surgical History:   Procedure Laterality Date    ARTHROSCOPIC REPAIR OF ROTATOR CUFF OF SHOULDER Right 3/10/2021    Procedure: REPAIR, ROTATOR CUFF, ARTHROSCOPIC;  Surgeon: Dustin Vanegas MD;  Location: [facility];  Service: Orthopedics;  Laterality: Right;  arthrex notified    ARTHROSCOPY OF SHOULDER WITH DECOMPRESSION OF SUBACROMIAL SPACE Right 3/10/2021    Procedure: ARTHROSCOPY, SHOULDER, WITH SUBACROMIAL SPACE DECOMPRESSION;  Surgeon: Dustin Vanegas MD;  Location: [facility] OR;  Service: Orthopedics;  Laterality: Right;    BLADDER SUSPENSION  1990    DISTAL CLAVICLE EXCISION Right 3/10/2021    Procedure: EXCISION, CLAVICLE, DISTAL;  Surgeon: Dustin Vanegas MD;  Location: [facility];  Service: Orthopedics;  Laterality: Right;    FOOT SURGERY Bilateral 2001    FOOT SURGERY Right 05/13/2022    HAND SURGERY Left 2017    HYSTERECTOMY  1990    JOINT REPLACEMENT  2013    KNEE SURGERY Bilateral 2013    MANDIBLE FRACTURE SURGERY  1981    SHOULDER ARTHROSCOPY Left 02/27/2019    THYROIDECTOMY Left 05/13/2019        TONSILLECTOMY       Family History   Problem Relation Age of Onset    Arthritis Mother     Aneurysm Mother         Abdominal Aneurysm     Arthritis Father     Diabetes Father     Hearing loss Father     Heart disease Father     Hypertension Father     Dementia Father     Diabetes Paternal Grandmother     Heart disease Paternal Grandfather     Leukemia Son     Crohn's disease Son     Ankylosing spondylitis Son     Other Son         avascular necrosis of pancho hips    Rheumatologic disease Son     Heart defect Son     Kidney failure Son     SIDS Maternal Aunt     Uterine cancer Maternal Aunt     Dementia Paternal Aunt      No Known Problems Sister     Heart disease Brother         stent placement    Heart disease Brother         stent placement    Aneurysm Brother         Abdominal Aneurysm    No Known Problems Sister        Marital Status:   Alcohol History:  reports no history of alcohol use.  Tobacco History:  reports that she has never smoked. She has never used smokeless tobacco.  Drug History:  reports no history of drug use.    Review of patient's allergies indicates:   Allergen Reactions    Estrogens Other (See Comments)     Mini stroke    Gabapentin Itching and Rash    Tetanus vaccines and toxoid Swelling and Other (See Comments)     Swelling at injection site, fever    Shingrix (pf)  [varicella-zoster ge-as01b (pf)] Diarrhea, Itching and Nausea And Vomiting       Current Outpatient Medications:     albuterol (VENTOLIN HFA) 90 mcg/actuation inhaler, Inhale 2 puffs into the lungs every 6 (six) hours as needed for Wheezing. Rescue, Disp: 6.7 g, Rfl: 0    albuterol-ipratropium (DUO-NEB) 2.5 mg-0.5 mg/3 mL nebulizer solution, Take by nebulization every 6 (six) hours as needed., Disp: , Rfl:     cyanocobalamin 1,000 mcg/mL injection, Inject every week 1 cc, Disp: 10 mL, Rfl: 1    hydrOXYzine HCL (ATARAX) 25 MG tablet, Take 1 tablet (25 mg total) by mouth 3 (three) times daily as needed for Itching., Disp: 90 tablet, Rfl: 2    levocetirizine (XYZAL) 5 MG tablet, Take 1 tablet (5 mg total) by mouth every evening., Disp: 30 tablet, Rfl: 0    LIDOcaine HCl 2% (XYLOCAINE) 2 % Soln, by Mucous Membrane route every 6 (six) hours. One tsp every four hours as needed-DO NOT SWALLOW, Disp: 100 mL, Rfl: 0    metFORMIN (GLUCOPHAGE) 500 MG tablet, Take 1 tablet (500 mg total) by mouth 2 (two) times daily with meals., Disp: 180 tablet, Rfl: 2    mometasone (ASMANEX TWISTHALER) 220 mcg/ actuation (30) inhaler, Inhale 2 puffs into the lungs once daily. Controller, Disp: 1 each, Rfl: 0    montelukast (SINGULAIR) 10 mg tablet, Take 1  tablet (10 mg total) by mouth once daily., Disp: 30 tablet, Rfl: 0    oxybutynin (DITROPAN XL) 15 MG TR24, Take 1 tablet (15 mg total) by mouth once daily. (Patient taking differently: Take 15 mg by mouth once daily. In am), Disp: 90 tablet, Rfl: 2    oxybutynin (DITROPAN-XL) 10 MG 24 hr tablet, Take 1 tablet (10 mg total) by mouth once daily. (Patient taking differently: Take 10 mg by mouth once daily. At night), Disp: 90 tablet, Rfl: 2    phentermine (ADIPEX-P) 37.5 mg tablet, TAKE 1 TABLET(37.5 MG) BY MOUTH EVERY MORNING, Disp: 90 tablet, Rfl: 0    rosuvastatin (CRESTOR) 40 MG Tab, Take 1 tablet (40 mg total) by mouth once daily., Disp: 30 tablet, Rfl: 0    traMADol (ULTRAM) 50 mg tablet, Take 1 tablet (50 mg total) by mouth every 4 (four) hours as needed for Pain., Disp: 42 tablet, Rfl: 0    Review of Systems       Objective:      Vitals    Vitals - 1 value per visit 8/22/2022 8/31/2022 8/31/2022 9/15/2022 9/15/2022   SYSTOLIC 116 - 122 - 124   DIASTOLIC 70 - 76 - 80   Pulse 90 - 74 - 68   Temp - - 98.3 - 99   Resp - - 16 - 16   SPO2 97 - 98 - 96   Weight (lb) 182.7 - 181 - 178   Weight (kg) 82.872 - 82.101 - 80.74   Height 63 - 63 - 63   BMI (Calculated) 32.4 - 32.1 - 31.5   VISIT REPORT - - - - -   Pain Score  - 2 - 0 -   Some recent data might be hidden       Physical Exam      Assessment:       No diagnosis found.     Plan:       There are no diagnoses linked to this encounter.  No follow-ups on file.        1/16/2023 Katherine Ferguson M.D.

## 2023-01-18 ENCOUNTER — OFFICE VISIT (OUTPATIENT)
Dept: FAMILY MEDICINE | Facility: CLINIC | Age: 68
End: 2023-01-18
Payer: MEDICARE

## 2023-01-18 DIAGNOSIS — T50.905A WEIGHT LOSS DUE TO MEDICATION: ICD-10-CM

## 2023-01-18 DIAGNOSIS — R63.4 WEIGHT LOSS DUE TO MEDICATION: ICD-10-CM

## 2023-01-18 DIAGNOSIS — E66.9 OBESITY (BMI 30-39.9): ICD-10-CM

## 2023-01-18 DIAGNOSIS — R35.0 FREQUENCY OF URINATION: Primary | ICD-10-CM

## 2023-01-18 PROCEDURE — 1160F RVW MEDS BY RX/DR IN RCRD: CPT | Mod: CPTII,95,, | Performed by: INTERNAL MEDICINE

## 2023-01-18 PROCEDURE — 99213 OFFICE O/P EST LOW 20 MIN: CPT | Mod: 95,,, | Performed by: INTERNAL MEDICINE

## 2023-01-18 PROCEDURE — 99213 PR OFFICE/OUTPT VISIT, EST, LEVL III, 20-29 MIN: ICD-10-PCS | Mod: 95,,, | Performed by: INTERNAL MEDICINE

## 2023-01-18 PROCEDURE — 1159F PR MEDICATION LIST DOCUMENTED IN MEDICAL RECORD: ICD-10-PCS | Mod: CPTII,95,, | Performed by: INTERNAL MEDICINE

## 2023-01-18 PROCEDURE — 1159F MED LIST DOCD IN RCRD: CPT | Mod: CPTII,95,, | Performed by: INTERNAL MEDICINE

## 2023-01-18 PROCEDURE — 1160F PR REVIEW ALL MEDS BY PRESCRIBER/CLIN PHARMACIST DOCUMENTED: ICD-10-PCS | Mod: CPTII,95,, | Performed by: INTERNAL MEDICINE

## 2023-01-18 RX ORDER — PHENTERMINE HYDROCHLORIDE 37.5 MG/1
TABLET ORAL
Qty: 90 TABLET | Refills: 0 | Status: SHIPPED | OUTPATIENT
Start: 2023-01-18 | End: 2023-04-10 | Stop reason: SDUPTHER

## 2023-01-18 RX ORDER — TOLTERODINE 4 MG/1
4 CAPSULE, EXTENDED RELEASE ORAL DAILY
Qty: 90 CAPSULE | Refills: 1 | Status: SHIPPED | OUTPATIENT
Start: 2023-01-18 | End: 2023-04-10 | Stop reason: SDUPTHER

## 2023-01-18 NOTE — PROGRESS NOTES
Subjective:        The chief complaint leading to consultation is: med refills  [place]  Visit type: Virtual visit with synchronous audio/video or audio only  This was a video visit in lieu of in-person visit due to the coronavirus emergency. Patient acknowledged and consented to the video visit encounter.     HPI    .69  P63    Patient is calling for med refills-she has lost to 175 pr three pounds less than last OV-    She is on oxybutin 10 am and 5 in evening but insurance wont cover Detrol LA    She also wants to know about halving her statin    She is doing the Bio T and feels amazing    Office Visit on 08/15/2022   Component Date Value Ref Range Status    POC Rapid COVID 08/15/2022 Negative  Negative Final     Acceptable 08/15/2022 Yes   Final   Lab Visit on 06/28/2022   Component Date Value Ref Range Status    Hemoglobin A1C 06/28/2022 6.1  4.5 - 6.2 % Final    Estimated Avg Glucose 06/28/2022 128  68 - 131 mg/dL Final    Hepatitis C Ab 06/28/2022 0.2  0.0 - 0.9 s/co ratio Final    HIV Screen 4th Generation wRfx 06/28/2022 Non Reactive  Non Reactive Final    TSH 06/28/2022 2.500  0.340 - 5.600 uIU/mL Final    Sodium 06/28/2022 139  136 - 145 mmol/L Final    Potassium 06/28/2022 4.3  3.5 - 5.1 mmol/L Final    Chloride 06/28/2022 105  95 - 110 mmol/L Final    CO2 06/28/2022 27  23 - 29 mmol/L Final    Glucose 06/28/2022 94  70 - 110 mg/dL Final    BUN 06/28/2022 20  8 - 23 mg/dL Final    Creatinine 06/28/2022 0.8  0.5 - 1.4 mg/dL Final    Calcium 06/28/2022 9.1  8.7 - 10.5 mg/dL Final    Anion Gap 06/28/2022 7 (L)  8 - 16 mmol/L Final    eGFR if African American 06/28/2022 >60.0  >60 mL/min/1.73 m^2 Final    eGFR if non African American 06/28/2022 >60.0  >60 mL/min/1.73 m^2 Final    Hemoglobin A1C 06/28/2022 6.1  4.5 - 6.2 % Final    Estimated Avg Glucose 06/28/2022 128  68 - 131 mg/dL Final   Lab Visit on 03/23/2022   Component Date Value Ref Range Status    RBC,  UA 03/23/2022 2  0 - 4 /hpf Final    WBC, UA 03/23/2022 >100 (H)  0 - 5 /hpf Final    WBC Clumps, UA 03/23/2022 Moderate (A)  None-Rare Final    Bacteria 03/23/2022 Many (A)  None-Occ /hpf Final    Squam Epithel, UA 03/23/2022 11  /hpf Final    Non-Squam Epith 03/23/2022 1 (A)  <1/hpf /hpf Final    Hyaline Casts, UA 03/23/2022 33 (A)  0-1/lpf /lpf Final    Ca Oxalate Padmini, UA 03/23/2022 Moderate  None-Moderate Final    Microscopic Comment 03/23/2022 SEE COMMENT   Final    Urine Culture, Routine 03/23/2022  (A)   Final                    Value:ESCHERICHIA COLI  >100,000 cfu/ml         Past Surgical History:   Procedure Laterality Date    ARTHROSCOPIC REPAIR OF ROTATOR CUFF OF SHOULDER Right 3/10/2021    Procedure: REPAIR, ROTATOR CUFF, ARTHROSCOPIC;  Surgeon: Dustin Vanegas MD;  Location: [facility];  Service: Orthopedics;  Laterality: Right;  arthrex notified    ARTHROSCOPY OF SHOULDER WITH DECOMPRESSION OF SUBACROMIAL SPACE Right 3/10/2021    Procedure: ARTHROSCOPY, SHOULDER, WITH SUBACROMIAL SPACE DECOMPRESSION;  Surgeon: Dustin Vanegas MD;  Location: [facility] OR;  Service: Orthopedics;  Laterality: Right;    BLADDER SUSPENSION  1990    DISTAL CLAVICLE EXCISION Right 3/10/2021    Procedure: EXCISION, CLAVICLE, DISTAL;  Surgeon: Dustin Vanegas MD;  Location: [facility];  Service: Orthopedics;  Laterality: Right;    FOOT SURGERY Bilateral 2001    FOOT SURGERY Right 05/13/2022    HAND SURGERY Left 2017    HYSTERECTOMY  1990    JOINT REPLACEMENT  2013    KNEE SURGERY Bilateral 2013    MANDIBLE FRACTURE SURGERY  1981    SHOULDER ARTHROSCOPY Left 02/27/2019    THYROIDECTOMY Left 05/13/2019        TONSILLECTOMY       Past Medical History:   Diagnosis Date    Allergies     Bulging disc     lower back     Carpal tunnel syndrome     Degenerative disc disease     High cholesterol     Pinched nerve in neck      Family History   Problem Relation Age of Onset    Arthritis Mother     Aneurysm Mother         Abdominal Aneurysm      Arthritis Father     Diabetes Father     Hearing loss Father     Heart disease Father     Hypertension Father     Dementia Father     Diabetes Paternal Grandmother     Heart disease Paternal Grandfather     Leukemia Son     Crohn's disease Son     Ankylosing spondylitis Son     Other Son         avascular necrosis of pancho hips    Rheumatologic disease Son     Heart defect Son     Kidney failure Son     SIDS Maternal Aunt     Uterine cancer Maternal Aunt     Dementia Paternal Aunt     No Known Problems Sister     Heart disease Brother         stent placement    Heart disease Brother         stent placement    Aneurysm Brother         Abdominal Aneurysm    No Known Problems Sister         Social History:   Marital Status:   Alcohol History:  reports no history of alcohol use.  Tobacco History:  reports that she has never smoked. She has never used smokeless tobacco.  Drug History:  reports no history of drug use.    Review of patient's allergies indicates:   Allergen Reactions    Estrogens Other (See Comments)     Mini stroke    Gabapentin Itching and Rash    Tetanus vaccines and toxoid Swelling and Other (See Comments)     Swelling at injection site, fever    Shingrix (pf)  [varicella-zoster ge-as01b (pf)] Diarrhea, Itching and Nausea And Vomiting       Current Outpatient Medications   Medication Sig Dispense Refill    albuterol (VENTOLIN HFA) 90 mcg/actuation inhaler Inhale 2 puffs into the lungs every 6 (six) hours as needed for Wheezing. Rescue 6.7 g 0    cyanocobalamin 1,000 mcg/mL injection Inject every week 1 cc 10 mL 1    levocetirizine (XYZAL) 5 MG tablet Take 1 tablet (5 mg total) by mouth every evening. 30 tablet 0    metFORMIN (GLUCOPHAGE) 500 MG tablet Take 1 tablet (500 mg total) by mouth 2 (two) times daily with meals. 180 tablet 2    mometasone (ASMANEX TWISTHALER) 220 mcg/ actuation (30) inhaler Inhale 2 puffs into the lungs once daily. Controller 1 each 0    montelukast (SINGULAIR) 10 mg tablet  Take 1 tablet (10 mg total) by mouth once daily. 30 tablet 0    oxybutynin (DITROPAN XL) 15 MG TR24 Take 1 tablet (15 mg total) by mouth once daily. (Patient taking differently: Take 15 mg by mouth once daily. In am) 90 tablet 2    oxybutynin (DITROPAN-XL) 10 MG 24 hr tablet Take 1 tablet (10 mg total) by mouth once daily. (Patient taking differently: Take 10 mg by mouth once daily. At night) 90 tablet 2    phentermine (ADIPEX-P) 37.5 mg tablet TAKE 1 TABLET(37.5 MG) BY MOUTH EVERY MORNING 90 tablet 0    rosuvastatin (CRESTOR) 40 MG Tab Take 1 tablet (40 mg total) by mouth once daily. 30 tablet 0    tolterodine (DETROL LA) 4 MG 24 hr capsule Take 1 capsule (4 mg total) by mouth once daily. 90 capsule 1    traMADol (ULTRAM) 50 mg tablet Take 1 tablet (50 mg total) by mouth every 4 (four) hours as needed for Pain. 42 tablet 0     No current facility-administered medications for this visit.       Review of Systems   Constitutional:  Negative for activity change, appetite change, chills, fatigue, fever and unexpected weight change.   HENT:  Positive for tinnitus. Negative for congestion, ear pain, hearing loss, postnasal drip, rhinorrhea, sinus pain, sneezing, sore throat and trouble swallowing.    Eyes:  Positive for visual disturbance (cataracts). Negative for pain and discharge.   Respiratory:  Negative for cough, choking, chest tightness, shortness of breath and wheezing.    Cardiovascular:  Negative for chest pain, palpitations and leg swelling.   Gastrointestinal:  Negative for abdominal distention, abdominal pain, blood in stool, constipation, diarrhea, nausea and vomiting.   Endocrine: Negative for cold intolerance, heat intolerance, polydipsia and polyuria.   Genitourinary:  Negative for difficulty urinating, dysuria, frequency, hematuria, menstrual problem, pelvic pain and urgency.   Musculoskeletal:  Negative for arthralgias, back pain, joint swelling and neck pain.   Skin:  Negative for pallor and rash.    Allergic/Immunologic: Negative for environmental allergies and food allergies.   Neurological:  Negative for dizziness, tremors, weakness, numbness and headaches.   Hematological:  Does not bruise/bleed easily.   Psychiatric/Behavioral:  Positive for sleep disturbance. Negative for agitation, confusion, dysphoric mood and suicidal ideas. The patient is not nervous/anxious.        Objective:        Physical Exam:   Physical Exam         Assessment:       1. Frequency of urination    2. Obesity (BMI 30-39.9)    3. Weight loss due to medication      Frequency of urination    Obesity (BMI 30-39.9)    Weight loss due to medication    -     tolterodine (DETROL LA) 4 MG 24 hr capsule; Take 1 capsule (4 mg total) by mouth once daily.  Dispense: 90 capsule; Refill: 1  -     phentermine (ADIPEX-P) 37.5 mg tablet; TAKE 1 TABLET(37.5 MG) BY MOUTH EVERY MORNING  Dispense: 90 tablet; Refill: 0         This note was created using [employer] voice recognition software that occasionally misinterprets phrases or words.

## 2023-03-01 ENCOUNTER — TELEPHONE (OUTPATIENT)
Dept: OPHTHALMOLOGY | Facility: CLINIC | Age: 68
End: 2023-03-01
Payer: MEDICARE

## 2023-04-13 ENCOUNTER — OFFICE VISIT (OUTPATIENT)
Dept: FAMILY MEDICINE | Facility: CLINIC | Age: 68
End: 2023-04-13
Payer: MEDICARE

## 2023-04-13 VITALS
WEIGHT: 183 LBS | DIASTOLIC BLOOD PRESSURE: 78 MMHG | HEART RATE: 68 BPM | TEMPERATURE: 98 F | RESPIRATION RATE: 14 BRPM | HEIGHT: 63 IN | OXYGEN SATURATION: 97 % | BODY MASS INDEX: 32.43 KG/M2 | SYSTOLIC BLOOD PRESSURE: 132 MMHG

## 2023-04-13 DIAGNOSIS — G89.29 CHRONIC MIDLINE LOW BACK PAIN WITHOUT SCIATICA: ICD-10-CM

## 2023-04-13 DIAGNOSIS — Z78.0 POSTMENOPAUSAL: ICD-10-CM

## 2023-04-13 DIAGNOSIS — Z12.31 ENCOUNTER FOR SCREENING MAMMOGRAM FOR BREAST CANCER: ICD-10-CM

## 2023-04-13 DIAGNOSIS — E03.9 ACQUIRED HYPOTHYROIDISM: ICD-10-CM

## 2023-04-13 DIAGNOSIS — N32.81 OAB (OVERACTIVE BLADDER): ICD-10-CM

## 2023-04-13 DIAGNOSIS — R73.03 PREDIABETES: Primary | ICD-10-CM

## 2023-04-13 DIAGNOSIS — M51.36 DDD (DEGENERATIVE DISC DISEASE), LUMBAR: ICD-10-CM

## 2023-04-13 DIAGNOSIS — M54.50 CHRONIC MIDLINE LOW BACK PAIN WITHOUT SCIATICA: ICD-10-CM

## 2023-04-13 LAB — HBA1C MFR BLD: 6.2 %

## 2023-04-13 PROCEDURE — 3288F FALL RISK ASSESSMENT DOCD: CPT | Mod: CPTII,S$GLB,, | Performed by: INTERNAL MEDICINE

## 2023-04-13 PROCEDURE — 3008F BODY MASS INDEX DOCD: CPT | Mod: CPTII,S$GLB,, | Performed by: INTERNAL MEDICINE

## 2023-04-13 PROCEDURE — 1101F PT FALLS ASSESS-DOCD LE1/YR: CPT | Mod: CPTII,S$GLB,, | Performed by: INTERNAL MEDICINE

## 2023-04-13 PROCEDURE — 1160F RVW MEDS BY RX/DR IN RCRD: CPT | Mod: CPTII,S$GLB,, | Performed by: INTERNAL MEDICINE

## 2023-04-13 PROCEDURE — 1159F PR MEDICATION LIST DOCUMENTED IN MEDICAL RECORD: ICD-10-PCS | Mod: CPTII,S$GLB,, | Performed by: INTERNAL MEDICINE

## 2023-04-13 PROCEDURE — 1126F PR PAIN SEVERITY QUANTIFIED, NO PAIN PRESENT: ICD-10-PCS | Mod: CPTII,S$GLB,, | Performed by: INTERNAL MEDICINE

## 2023-04-13 PROCEDURE — 99214 OFFICE O/P EST MOD 30 MIN: CPT | Mod: S$GLB,,, | Performed by: INTERNAL MEDICINE

## 2023-04-13 PROCEDURE — 3075F SYST BP GE 130 - 139MM HG: CPT | Mod: CPTII,S$GLB,, | Performed by: INTERNAL MEDICINE

## 2023-04-13 PROCEDURE — 3078F DIAST BP <80 MM HG: CPT | Mod: CPTII,S$GLB,, | Performed by: INTERNAL MEDICINE

## 2023-04-13 PROCEDURE — 83036 POCT HEMOGLOBIN A1C: ICD-10-PCS | Mod: QW,S$GLB,, | Performed by: INTERNAL MEDICINE

## 2023-04-13 PROCEDURE — 3288F PR FALLS RISK ASSESSMENT DOCUMENTED: ICD-10-PCS | Mod: CPTII,S$GLB,, | Performed by: INTERNAL MEDICINE

## 2023-04-13 PROCEDURE — 3008F PR BODY MASS INDEX (BMI) DOCUMENTED: ICD-10-PCS | Mod: CPTII,S$GLB,, | Performed by: INTERNAL MEDICINE

## 2023-04-13 PROCEDURE — 83036 HEMOGLOBIN GLYCOSYLATED A1C: CPT | Mod: QW,S$GLB,, | Performed by: INTERNAL MEDICINE

## 2023-04-13 PROCEDURE — 1126F AMNT PAIN NOTED NONE PRSNT: CPT | Mod: CPTII,S$GLB,, | Performed by: INTERNAL MEDICINE

## 2023-04-13 PROCEDURE — 1160F PR REVIEW ALL MEDS BY PRESCRIBER/CLIN PHARMACIST DOCUMENTED: ICD-10-PCS | Mod: CPTII,S$GLB,, | Performed by: INTERNAL MEDICINE

## 2023-04-13 PROCEDURE — 1101F PR PT FALLS ASSESS DOC 0-1 FALLS W/OUT INJ PAST YR: ICD-10-PCS | Mod: CPTII,S$GLB,, | Performed by: INTERNAL MEDICINE

## 2023-04-13 PROCEDURE — 99214 PR OFFICE/OUTPT VISIT, EST, LEVL IV, 30-39 MIN: ICD-10-PCS | Mod: S$GLB,,, | Performed by: INTERNAL MEDICINE

## 2023-04-13 PROCEDURE — 3078F PR MOST RECENT DIASTOLIC BLOOD PRESSURE < 80 MM HG: ICD-10-PCS | Mod: CPTII,S$GLB,, | Performed by: INTERNAL MEDICINE

## 2023-04-13 PROCEDURE — 1159F MED LIST DOCD IN RCRD: CPT | Mod: CPTII,S$GLB,, | Performed by: INTERNAL MEDICINE

## 2023-04-13 PROCEDURE — 3075F PR MOST RECENT SYSTOLIC BLOOD PRESS GE 130-139MM HG: ICD-10-PCS | Mod: CPTII,S$GLB,, | Performed by: INTERNAL MEDICINE

## 2023-04-13 RX ORDER — TRAMADOL HYDROCHLORIDE 50 MG/1
50 TABLET ORAL EVERY 4 HOURS PRN
Qty: 30 TABLET | Refills: 0 | Status: SHIPPED | OUTPATIENT
Start: 2023-04-13 | End: 2023-10-26 | Stop reason: SDUPTHER

## 2023-04-13 NOTE — PROGRESS NOTES
SUBJECTIVE:    Patient ID: Nalini Wooten is a 68 y.o. female.    Chief Complaint: Medication Refill and Follow-up      Patient returns for recheck-her labs aren't due until June    She is feeling very well, much better on the hormone pellets-estrogen and testosterone    Prediabetes-last A1C 6.1 on metformin , due today-has been on a keto diet -is slowly losing weight from 242, now 183-she has no sx associated with elevated blood sugar     OAB-detrol working slightly better than oxybutinin    Hypothyroidism-euthyroid at this point    Back pain- lower mid back-aggravated by activity and she has been digging in the back yard trying to repair sprinkler system tree company destroyed taking out some trees-hx epidurals and two rhizotomies in past    Office Visit on 08/15/2022   Component Date Value Ref Range Status    POC Rapid COVID 08/15/2022 Negative  Negative Final     Acceptable 08/15/2022 Yes   Final   Lab Visit on 06/28/2022   Component Date Value Ref Range Status    Hemoglobin A1C 06/28/2022 6.1  4.5 - 6.2 % Final    Estimated Avg Glucose 06/28/2022 128  68 - 131 mg/dL Final    Hepatitis C Ab 06/28/2022 0.2  0.0 - 0.9 s/co ratio Final    HIV Screen 4th Generation wRfx 06/28/2022 Non Reactive  Non Reactive Final    TSH 06/28/2022 2.500  0.340 - 5.600 uIU/mL Final    Sodium 06/28/2022 139  136 - 145 mmol/L Final    Potassium 06/28/2022 4.3  3.5 - 5.1 mmol/L Final    Chloride 06/28/2022 105  95 - 110 mmol/L Final    CO2 06/28/2022 27  23 - 29 mmol/L Final    Glucose 06/28/2022 94  70 - 110 mg/dL Final    BUN 06/28/2022 20  8 - 23 mg/dL Final    Creatinine 06/28/2022 0.8  0.5 - 1.4 mg/dL Final    Calcium 06/28/2022 9.1  8.7 - 10.5 mg/dL Final    Anion Gap 06/28/2022 7 (L)  8 - 16 mmol/L Final    eGFR if African American 06/28/2022 >60.0  >60 mL/min/1.73 m^2 Final    eGFR if non African American 06/28/2022 >60.0  >60 mL/min/1.73 m^2 Final    Hemoglobin A1C 06/28/2022 6.1  4.5 - 6.2 % Final    Estimated  Avg Glucose 06/28/2022 128  68 - 131 mg/dL Final       Past Medical History:   Diagnosis Date    Allergies     Bulging disc     lower back     Carpal tunnel syndrome     Degenerative disc disease     High cholesterol     Pinched nerve in neck      Past Surgical History:   Procedure Laterality Date    ARTHROSCOPIC REPAIR OF ROTATOR CUFF OF SHOULDER Right 3/10/2021    Procedure: REPAIR, ROTATOR CUFF, ARTHROSCOPIC;  Surgeon: Dustin Vanegas MD;  Location: [facility];  Service: Orthopedics;  Laterality: Right;  arthrex notified    ARTHROSCOPY OF SHOULDER WITH DECOMPRESSION OF SUBACROMIAL SPACE Right 3/10/2021    Procedure: ARTHROSCOPY, SHOULDER, WITH SUBACROMIAL SPACE DECOMPRESSION;  Surgeon: Dustin Vanegas MD;  Location: [facility];  Service: Orthopedics;  Laterality: Right;    BLADDER SUSPENSION  1990    DISTAL CLAVICLE EXCISION Right 3/10/2021    Procedure: EXCISION, CLAVICLE, DISTAL;  Surgeon: Dustin Vanegas MD;  Location: [facility];  Service: Orthopedics;  Laterality: Right;    FOOT SURGERY Bilateral 2001    FOOT SURGERY Right 05/13/2022    HAND SURGERY Left 2017    HYSTERECTOMY  1990    JOINT REPLACEMENT  2013    KNEE SURGERY Bilateral 2013    MANDIBLE FRACTURE SURGERY  1981    SHOULDER ARTHROSCOPY Left 02/27/2019    THYROIDECTOMY Left 05/13/2019        TONSILLECTOMY       Family History   Problem Relation Age of Onset    Arthritis Mother     Aneurysm Mother         Abdominal Aneurysm     Arthritis Father     Diabetes Father     Hearing loss Father     Heart disease Father     Hypertension Father     Dementia Father     Diabetes Paternal Grandmother     Heart disease Paternal Grandfather     Leukemia Son     Crohn's disease Son     Ankylosing spondylitis Son     Other Son         avascular necrosis of pancho hips    Rheumatologic disease Son     Heart defect Son     Kidney failure Son     SIDS Maternal Aunt     Uterine cancer Maternal Aunt     Dementia Paternal Aunt     No Known Problems Sister     Heart disease  Brother         stent placement    Heart disease Brother         stent placement    Aneurysm Brother         Abdominal Aneurysm    No Known Problems Sister        Marital Status:   Alcohol History:  reports no history of alcohol use.  Tobacco History:  reports that she has never smoked. She has never used smokeless tobacco.  Drug History:  reports no history of drug use.    Review of patient's allergies indicates:   Allergen Reactions    Estrogens Other (See Comments)     Mini stroke    Gabapentin Itching and Rash    Tetanus vaccines and toxoid Swelling and Other (See Comments)     Swelling at injection site, fever    Shingrix (pf)  [varicella-zoster [id number] (pf)] Diarrhea, Itching and Nausea And Vomiting       Current Outpatient Medications:     cyanocobalamin 1,000 mcg/mL injection, Inject every week 1 cc, Disp: 10 mL, Rfl: 1    levocetirizine (XYZAL) 5 MG tablet, Take 1 tablet (5 mg total) by mouth every evening., Disp: 30 tablet, Rfl: 0    metFORMIN (GLUCOPHAGE) 500 MG tablet, Take 1 tablet (500 mg total) by mouth 2 (two) times daily with meals., Disp: 180 tablet, Rfl: 2    phentermine (ADIPEX-P) 37.5 mg tablet, TAKE 1 TABLET(37.5 MG) BY MOUTH EVERY MORNING, Disp: 90 tablet, Rfl: 0    rosuvastatin (CRESTOR) 40 MG Tab, Take 1 tablet (40 mg total) by mouth once daily., Disp: 30 tablet, Rfl: 0    tolterodine (DETROL LA) 4 MG 24 hr capsule, Take 1 capsule (4 mg total) by mouth once daily., Disp: 90 capsule, Rfl: 1    traMADol (ULTRAM) 50 mg tablet, Take 1 tablet (50 mg total) by mouth every 4 (four) hours as needed for Pain., Disp: 42 tablet, Rfl: 0    mometasone (ASMANEX TWISTHALER) 220 mcg/ actuation (30) inhaler, Inhale 2 puffs into the lungs once daily. Controller (Patient not taking: Reported on 4/13/2023), Disp: 1 each, Rfl: 0    Review of Systems   Constitutional:  Positive for activity change (increased). Negative for appetite change, chills, diaphoresis, fatigue, fever and unexpected weight change.    HENT:  Negative for congestion, ear pain, hearing loss, nosebleeds, postnasal drip, sinus pressure, sinus pain, sneezing, sore throat, tinnitus, trouble swallowing and voice change.    Eyes:  Negative for photophobia, pain, itching and visual disturbance.   Respiratory:  Negative for apnea, cough, chest tightness, shortness of breath, wheezing and stridor.    Cardiovascular:  Negative for chest pain, palpitations and leg swelling.   Gastrointestinal:  Negative for abdominal distention, abdominal pain, blood in stool, constipation, diarrhea, nausea and vomiting.   Endocrine: Negative for cold intolerance, heat intolerance, polydipsia and polyuria.   Genitourinary:  Negative for difficulty urinating, dyspareunia, dysuria, flank pain, frequency, hematuria, menstrual problem, pelvic pain, urgency, vaginal discharge and vaginal pain.        HPI   Musculoskeletal:  Positive for arthralgias (occ in hands). Negative for back pain, joint swelling, myalgias, neck pain and neck stiffness.   Skin:  Negative for pallor.   Allergic/Immunologic: Negative for environmental allergies and food allergies.        Stopped singulair-taking just xyzal   Neurological:  Negative for dizziness, tremors, speech difficulty, weakness, light-headedness and numbness.   Hematological:  Does not bruise/bleed easily.   Psychiatric/Behavioral:  Negative for agitation, confusion, decreased concentration, sleep disturbance and suicidal ideas. The patient is not nervous/anxious.         Objective:      Vitals:    04/13/23 1031   BP: 132/78   Pulse: 68   Resp: 14   Temp: 97.9 °F (36.6 °C)       Physical Exam  Vitals and nursing note reviewed.   Constitutional:       General: She is not in acute distress.     Appearance: She is obese. She is not ill-appearing.   HENT:      Head: Normocephalic and atraumatic.   Eyes:      Extraocular Movements: Extraocular movements intact.      Conjunctiva/sclera: Conjunctivae normal.      Pupils: Pupils are equal,  round, and reactive to light.   Neck:      Vascular: No carotid bruit.   Cardiovascular:      Rate and Rhythm: Normal rate and regular rhythm.      Pulses: Normal pulses.      Heart sounds: Normal heart sounds.   Pulmonary:      Effort: Pulmonary effort is normal.      Breath sounds: Normal breath sounds.   Abdominal:      General: Bowel sounds are normal.      Palpations: Abdomen is soft.   Musculoskeletal:      Cervical back: Normal range of motion and neck supple.      Right lower leg: No edema.      Left lower leg: No edema.   Lymphadenopathy:      Cervical: No cervical adenopathy.   Skin:     General: Skin is warm and dry.      Capillary Refill: Capillary refill takes less than 2 seconds.   Neurological:      General: No focal deficit present.      Mental Status: She is alert and oriented to person, place, and time.      Gait: Gait normal.      Deep Tendon Reflexes: Reflexes normal.   Psychiatric:         Mood and Affect: Mood normal.         Behavior: Behavior normal.         Thought Content: Thought content normal.         Assessment:       1. Prediabetes    2. OAB (overactive bladder)    3. Acquired hypothyroidism    4. Postmenopausal    5. Encounter for screening mammogram for breast cancer         Plan:       Prediabetes  -     POCT HEMOGLOBIN A1C  Prediabetes is a warning sign that you're at risk for getting type 2 diabetes. It means that your blood sugar is higher than it should be. But it's not high enough to be diabetes.  The food you eat naturally turns into sugar. Your body uses the sugar for energy. Normally, an organ called the pancreas makes insulin. And insulin allows the sugar in your blood to get into your body's cells. But sometimes the body can't use insulin the right way. So the sugar stays in your blood instead. This is called insulin resistance. The buildup of sugar in your blood means you have prediabetes.  The good news is that you may be able to prevent or delay diabetes. Making small  lifestyle changes, like getting active and changing your eating habits, may help you get your blood sugar back to normal.  You can reduce this risk by reducing or eliminating sweets from your diet, using wheat bread/pasta, limiting potato intake, and walking at least 30 minutes per day.  We will do follow bloodwork at your next appointment.    -Prediabetes is a risk factor for a disease (diabetes) and not a disease.  -A1C 5.7 to 6.4 % / -125 /impaired glucose tolerance.  -You are at risk of progression from prediabetes to diabetes.  -Patient counseled on lifestyle changes to achieve 7% weight loss and moderate-intensity physical activity of at least 150 minutes per week.       OAB (overactive bladder)        - controlled on present regimen so continue    Acquired hypothyroidism        -     continue present regimen as pt is euthyroid    Postmenopausal        -     continue per GYN    Encounter for screening mammogram for breast cancer  -     Mammo Digital Screening Bilat; Future; Expected date: 06/03/2023      Follow up in about 6 months (around 10/13/2023) for FOLLOW UP LABS, FOLLOW UP MEDICATIONS.        4/13/2023 Katherine Ferguson M.D.

## 2023-04-24 ENCOUNTER — OFFICE VISIT (OUTPATIENT)
Dept: OPHTHALMOLOGY | Facility: CLINIC | Age: 68
End: 2023-04-24
Payer: MEDICARE

## 2023-04-24 DIAGNOSIS — H25.13 NUCLEAR SCLEROTIC CATARACT, BILATERAL: Primary | ICD-10-CM

## 2023-04-24 DIAGNOSIS — H04.123 DRY EYE SYNDROME, BILATERAL: ICD-10-CM

## 2023-04-24 DIAGNOSIS — E11.9 DIABETES MELLITUS TYPE 2 WITHOUT RETINOPATHY: ICD-10-CM

## 2023-04-24 PROCEDURE — 2023F PR DILATED RETINAL EXAM W/O EVID OF RETINOPATHY: ICD-10-PCS | Mod: CPTII,S$GLB,, | Performed by: OPHTHALMOLOGY

## 2023-04-24 PROCEDURE — 1101F PT FALLS ASSESS-DOCD LE1/YR: CPT | Mod: CPTII,S$GLB,, | Performed by: OPHTHALMOLOGY

## 2023-04-24 PROCEDURE — 99999 PR PBB SHADOW E&M-EST. PATIENT-LVL II: CPT | Mod: PBBFAC,,, | Performed by: OPHTHALMOLOGY

## 2023-04-24 PROCEDURE — 99204 PR OFFICE/OUTPT VISIT, NEW, LEVL IV, 45-59 MIN: ICD-10-PCS | Mod: S$GLB,,, | Performed by: OPHTHALMOLOGY

## 2023-04-24 PROCEDURE — 1126F PR PAIN SEVERITY QUANTIFIED, NO PAIN PRESENT: ICD-10-PCS | Mod: CPTII,S$GLB,, | Performed by: OPHTHALMOLOGY

## 2023-04-24 PROCEDURE — 1101F PR PT FALLS ASSESS DOC 0-1 FALLS W/OUT INJ PAST YR: ICD-10-PCS | Mod: CPTII,S$GLB,, | Performed by: OPHTHALMOLOGY

## 2023-04-24 PROCEDURE — 1160F RVW MEDS BY RX/DR IN RCRD: CPT | Mod: CPTII,S$GLB,, | Performed by: OPHTHALMOLOGY

## 2023-04-24 PROCEDURE — 1159F PR MEDICATION LIST DOCUMENTED IN MEDICAL RECORD: ICD-10-PCS | Mod: CPTII,S$GLB,, | Performed by: OPHTHALMOLOGY

## 2023-04-24 PROCEDURE — 3044F HG A1C LEVEL LT 7.0%: CPT | Mod: CPTII,S$GLB,, | Performed by: OPHTHALMOLOGY

## 2023-04-24 PROCEDURE — 1160F PR REVIEW ALL MEDS BY PRESCRIBER/CLIN PHARMACIST DOCUMENTED: ICD-10-PCS | Mod: CPTII,S$GLB,, | Performed by: OPHTHALMOLOGY

## 2023-04-24 PROCEDURE — 3288F FALL RISK ASSESSMENT DOCD: CPT | Mod: CPTII,S$GLB,, | Performed by: OPHTHALMOLOGY

## 2023-04-24 PROCEDURE — 2023F DILAT RTA XM W/O RTNOPTHY: CPT | Mod: CPTII,S$GLB,, | Performed by: OPHTHALMOLOGY

## 2023-04-24 PROCEDURE — 99204 OFFICE O/P NEW MOD 45 MIN: CPT | Mod: S$GLB,,, | Performed by: OPHTHALMOLOGY

## 2023-04-24 PROCEDURE — 1126F AMNT PAIN NOTED NONE PRSNT: CPT | Mod: CPTII,S$GLB,, | Performed by: OPHTHALMOLOGY

## 2023-04-24 PROCEDURE — 3288F PR FALLS RISK ASSESSMENT DOCUMENTED: ICD-10-PCS | Mod: CPTII,S$GLB,, | Performed by: OPHTHALMOLOGY

## 2023-04-24 PROCEDURE — 1159F MED LIST DOCD IN RCRD: CPT | Mod: CPTII,S$GLB,, | Performed by: OPHTHALMOLOGY

## 2023-04-24 PROCEDURE — 3044F PR MOST RECENT HEMOGLOBIN A1C LEVEL <7.0%: ICD-10-PCS | Mod: CPTII,S$GLB,, | Performed by: OPHTHALMOLOGY

## 2023-04-24 PROCEDURE — 99999 PR PBB SHADOW E&M-EST. PATIENT-LVL II: ICD-10-PCS | Mod: PBBFAC,,, | Performed by: OPHTHALMOLOGY

## 2023-04-24 NOTE — PROGRESS NOTES
HPI    New pt here for CE. Pt states wears Contact Lens. Mono Vision OD for   distance. States takes out lens daily. Denies pain/ Fol/ floaters. Pt   states was told last year that she had cataracts. Feels like vision has   gotten worse.     Tears OU Qam w/ relief.     Hemoglobin A1C       Date                     Value               Ref Range             Status                06/28/2022               6.1                 4.5 - 6.2 %           Final                Last edited by Antonieta Coronado on 4/24/2023  1:10 PM.            Assessment /Plan     For exam results, see Encounter Report.    Nuclear sclerotic cataract, bilateral    Diabetes mellitus type 2 without retinopathy    Dry eye syndrome, bilateral      1. Nuclear sclerotic cataract, bilateral  Patient with visually significant cataract impacting abiliity ADLs (reading, driving, PM driving, glare).  Discussed options, R & B, expectations, patient voices good understanding and wishes to proceed with procedure. Patient will likely benefit from sx.    Schedule CEIOL OD then OS  Monovision, dist OD, near OS    Pt needs to be out of CL for 2 weeks prior to IOL measurements    2. Diabetes mellitus type 2 without retinopathy  Diabetes without retinopathy, discussed with patient importance of glucose control and follow up.  Patient voices understanding.    3. Dry eye syndrome, bilateral  Increase artificial tears up to QID

## 2023-06-05 ENCOUNTER — HOSPITAL ENCOUNTER (OUTPATIENT)
Dept: RADIOLOGY | Facility: HOSPITAL | Age: 68
Discharge: HOME | End: 2023-06-05
Attending: INTERNAL MEDICINE
Payer: MEDICARE

## 2023-06-05 DIAGNOSIS — Z12.31 ENCOUNTER FOR SCREENING MAMMOGRAM FOR BREAST CANCER: ICD-10-CM

## 2023-06-05 PROCEDURE — 77067 SCR MAMMO BI INCL CAD: CPT | Mod: TC,PO

## 2023-06-08 ENCOUNTER — OFFICE VISIT (OUTPATIENT)
Dept: OPHTHALMOLOGY | Facility: CLINIC | Age: 68
End: 2023-06-08
Payer: MEDICARE

## 2023-06-08 ENCOUNTER — OFFICE VISIT (OUTPATIENT)
Dept: FAMILY MEDICINE | Facility: CLINIC | Age: 68
End: 2023-06-08
Payer: MEDICARE

## 2023-06-08 VITALS
HEIGHT: 63 IN | RESPIRATION RATE: 20 BRPM | DIASTOLIC BLOOD PRESSURE: 70 MMHG | SYSTOLIC BLOOD PRESSURE: 130 MMHG | TEMPERATURE: 98 F | WEIGHT: 181.69 LBS | BODY MASS INDEX: 32.19 KG/M2 | HEART RATE: 72 BPM

## 2023-06-08 DIAGNOSIS — Z01.818 PREOPERATIVE CLEARANCE: Primary | ICD-10-CM

## 2023-06-08 DIAGNOSIS — H25.13 NUCLEAR SCLEROTIC CATARACT, BILATERAL: Primary | ICD-10-CM

## 2023-06-08 PROCEDURE — 92136 IOL MASTER - OD - RIGHT EYE: ICD-10-PCS | Mod: RT,S$GLB,, | Performed by: OPHTHALMOLOGY

## 2023-06-08 PROCEDURE — 3044F PR MOST RECENT HEMOGLOBIN A1C LEVEL <7.0%: ICD-10-PCS | Mod: CPTII,S$GLB,, | Performed by: OPHTHALMOLOGY

## 2023-06-08 PROCEDURE — 1126F PR PAIN SEVERITY QUANTIFIED, NO PAIN PRESENT: ICD-10-PCS | Mod: CPTII,S$GLB,, | Performed by: OPHTHALMOLOGY

## 2023-06-08 PROCEDURE — 99213 OFFICE O/P EST LOW 20 MIN: CPT | Mod: S$GLB,,, | Performed by: NURSE PRACTITIONER

## 2023-06-08 PROCEDURE — 99213 PR OFFICE/OUTPT VISIT, EST, LEVL III, 20-29 MIN: ICD-10-PCS | Mod: S$GLB,,, | Performed by: NURSE PRACTITIONER

## 2023-06-08 PROCEDURE — 1101F PT FALLS ASSESS-DOCD LE1/YR: CPT | Mod: CPTII,S$GLB,, | Performed by: OPHTHALMOLOGY

## 2023-06-08 PROCEDURE — 99999 PR PBB SHADOW E&M-EST. PATIENT-LVL III: CPT | Mod: PBBFAC,,, | Performed by: OPHTHALMOLOGY

## 2023-06-08 PROCEDURE — 3288F PR FALLS RISK ASSESSMENT DOCUMENTED: ICD-10-PCS | Mod: CPTII,S$GLB,, | Performed by: OPHTHALMOLOGY

## 2023-06-08 PROCEDURE — 1159F PR MEDICATION LIST DOCUMENTED IN MEDICAL RECORD: ICD-10-PCS | Mod: CPTII,S$GLB,, | Performed by: NURSE PRACTITIONER

## 2023-06-08 PROCEDURE — 1126F PR PAIN SEVERITY QUANTIFIED, NO PAIN PRESENT: ICD-10-PCS | Mod: CPTII,S$GLB,, | Performed by: NURSE PRACTITIONER

## 2023-06-08 PROCEDURE — 1159F MED LIST DOCD IN RCRD: CPT | Mod: CPTII,S$GLB,, | Performed by: OPHTHALMOLOGY

## 2023-06-08 PROCEDURE — 3008F PR BODY MASS INDEX (BMI) DOCUMENTED: ICD-10-PCS | Mod: CPTII,S$GLB,, | Performed by: NURSE PRACTITIONER

## 2023-06-08 PROCEDURE — 99214 PR OFFICE/OUTPT VISIT, EST, LEVL IV, 30-39 MIN: ICD-10-PCS | Mod: S$GLB,,, | Performed by: OPHTHALMOLOGY

## 2023-06-08 PROCEDURE — 3078F DIAST BP <80 MM HG: CPT | Mod: CPTII,S$GLB,, | Performed by: NURSE PRACTITIONER

## 2023-06-08 PROCEDURE — 3044F PR MOST RECENT HEMOGLOBIN A1C LEVEL <7.0%: ICD-10-PCS | Mod: CPTII,S$GLB,, | Performed by: NURSE PRACTITIONER

## 2023-06-08 PROCEDURE — 1126F AMNT PAIN NOTED NONE PRSNT: CPT | Mod: CPTII,S$GLB,, | Performed by: NURSE PRACTITIONER

## 2023-06-08 PROCEDURE — 3288F FALL RISK ASSESSMENT DOCD: CPT | Mod: CPTII,S$GLB,, | Performed by: OPHTHALMOLOGY

## 2023-06-08 PROCEDURE — 1159F MED LIST DOCD IN RCRD: CPT | Mod: CPTII,S$GLB,, | Performed by: NURSE PRACTITIONER

## 2023-06-08 PROCEDURE — 1160F PR REVIEW ALL MEDS BY PRESCRIBER/CLIN PHARMACIST DOCUMENTED: ICD-10-PCS | Mod: CPTII,S$GLB,, | Performed by: OPHTHALMOLOGY

## 2023-06-08 PROCEDURE — 3008F BODY MASS INDEX DOCD: CPT | Mod: CPTII,S$GLB,, | Performed by: NURSE PRACTITIONER

## 2023-06-08 PROCEDURE — 1101F PT FALLS ASSESS-DOCD LE1/YR: CPT | Mod: CPTII,S$GLB,, | Performed by: NURSE PRACTITIONER

## 2023-06-08 PROCEDURE — 1101F PR PT FALLS ASSESS DOC 0-1 FALLS W/OUT INJ PAST YR: ICD-10-PCS | Mod: CPTII,S$GLB,, | Performed by: NURSE PRACTITIONER

## 2023-06-08 PROCEDURE — 99214 OFFICE O/P EST MOD 30 MIN: CPT | Mod: S$GLB,,, | Performed by: OPHTHALMOLOGY

## 2023-06-08 PROCEDURE — 3044F HG A1C LEVEL LT 7.0%: CPT | Mod: CPTII,S$GLB,, | Performed by: OPHTHALMOLOGY

## 2023-06-08 PROCEDURE — 3075F PR MOST RECENT SYSTOLIC BLOOD PRESS GE 130-139MM HG: ICD-10-PCS | Mod: CPTII,S$GLB,, | Performed by: NURSE PRACTITIONER

## 2023-06-08 PROCEDURE — 99999 PR PBB SHADOW E&M-EST. PATIENT-LVL III: ICD-10-PCS | Mod: PBBFAC,,, | Performed by: OPHTHALMOLOGY

## 2023-06-08 PROCEDURE — 3075F SYST BP GE 130 - 139MM HG: CPT | Mod: CPTII,S$GLB,, | Performed by: NURSE PRACTITIONER

## 2023-06-08 PROCEDURE — 1160F RVW MEDS BY RX/DR IN RCRD: CPT | Mod: CPTII,S$GLB,, | Performed by: OPHTHALMOLOGY

## 2023-06-08 PROCEDURE — 92136 OPHTHALMIC BIOMETRY: CPT | Mod: RT,S$GLB,, | Performed by: OPHTHALMOLOGY

## 2023-06-08 PROCEDURE — 1101F PR PT FALLS ASSESS DOC 0-1 FALLS W/OUT INJ PAST YR: ICD-10-PCS | Mod: CPTII,S$GLB,, | Performed by: OPHTHALMOLOGY

## 2023-06-08 PROCEDURE — 3288F PR FALLS RISK ASSESSMENT DOCUMENTED: ICD-10-PCS | Mod: CPTII,S$GLB,, | Performed by: NURSE PRACTITIONER

## 2023-06-08 PROCEDURE — 1159F PR MEDICATION LIST DOCUMENTED IN MEDICAL RECORD: ICD-10-PCS | Mod: CPTII,S$GLB,, | Performed by: OPHTHALMOLOGY

## 2023-06-08 PROCEDURE — 3288F FALL RISK ASSESSMENT DOCD: CPT | Mod: CPTII,S$GLB,, | Performed by: NURSE PRACTITIONER

## 2023-06-08 PROCEDURE — 3044F HG A1C LEVEL LT 7.0%: CPT | Mod: CPTII,S$GLB,, | Performed by: NURSE PRACTITIONER

## 2023-06-08 PROCEDURE — 1126F AMNT PAIN NOTED NONE PRSNT: CPT | Mod: CPTII,S$GLB,, | Performed by: OPHTHALMOLOGY

## 2023-06-08 PROCEDURE — 3078F PR MOST RECENT DIASTOLIC BLOOD PRESSURE < 80 MM HG: ICD-10-PCS | Mod: CPTII,S$GLB,, | Performed by: NURSE PRACTITIONER

## 2023-06-08 RX ORDER — MOXIFLOXACIN 5 MG/ML
1 SOLUTION/ DROPS OPHTHALMIC 4 TIMES DAILY
Qty: 3 ML | Refills: 0 | Status: SHIPPED | OUTPATIENT
Start: 2023-06-08 | End: 2023-06-29 | Stop reason: ALTCHOICE

## 2023-06-08 RX ORDER — PHENYLEPHRINE HYDROCHLORIDE 25 MG/ML
1 SOLUTION/ DROPS OPHTHALMIC
Status: CANCELLED | OUTPATIENT
Start: 2023-06-08

## 2023-06-08 RX ORDER — PROPARACAINE HYDROCHLORIDE 5 MG/ML
1 SOLUTION/ DROPS OPHTHALMIC
Status: CANCELLED | OUTPATIENT
Start: 2023-06-08

## 2023-06-08 RX ORDER — SODIUM CHLORIDE 9 MG/ML
INJECTION, SOLUTION INTRAVENOUS CONTINUOUS
Status: CANCELLED | OUTPATIENT
Start: 2023-06-08

## 2023-06-08 RX ORDER — TROPICAMIDE 10 MG/ML
1 SOLUTION/ DROPS OPHTHALMIC
Status: CANCELLED | OUTPATIENT
Start: 2023-06-08

## 2023-06-08 RX ORDER — PREDNISOLONE ACETATE 10 MG/ML
1 SUSPENSION/ DROPS OPHTHALMIC 4 TIMES DAILY
Qty: 5 ML | Refills: 2 | Status: SHIPPED | OUTPATIENT
Start: 2023-06-08 | End: 2023-07-20 | Stop reason: ALTCHOICE

## 2023-06-08 RX ORDER — KETOROLAC TROMETHAMINE 5 MG/ML
1 SOLUTION OPHTHALMIC 4 TIMES DAILY
Qty: 5 ML | Refills: 2 | Status: SHIPPED | OUTPATIENT
Start: 2023-06-08 | End: 2023-07-20 | Stop reason: ALTCHOICE

## 2023-06-08 RX ORDER — PHENYLEPHRINE HYDROCHLORIDE 100 MG/ML
1 SOLUTION/ DROPS OPHTHALMIC
Status: CANCELLED | OUTPATIENT
Start: 2023-06-08

## 2023-06-08 NOTE — LETTER
June 8, 2023          No Recipients             [facility]  [address]  [place] [address]  Phone: [phone]  Fax: [phone]   Patient: Nalini Wooten   MR Number: [id number]   YOB: 1955   Date of Visit: 6/8/2023     Dear Dr. Shearer    Mrs. Nalini Wooten was seen in our office on 6/8/2023 for preoperative clearance. She is doing well and she should be a good candidate for cataract removal from our perspective. She is cleared to proceed with surgery as scheduled. Any preoperative workup will be at your discretion.     Sincerely,          SNEHA Reinoso

## 2023-06-08 NOTE — PROGRESS NOTES
Chief Complaint   Patient presents with    Pre-op Exam     69 yo female her for surgery clearance. Pt scheduled for cataract procedure on 06/21/2023 right eye and 07/19/2023 left eye performed by Dr. Shawn Bueno. KM        Subjective:     Nalini Wooten is a 68 y.o. female who presents to the office today for a preoperative consultation at the request of surgeon Dr. Shearer who plans on performing Right cataract removal on 6/21/23 and left cataract removal on 7/19/23 on June 23. This consultation is requested for the specific conditions prompting preoperative evaluation (i.e. because of potential affect on operative risk): Low . Planned anesthesia: local. The patient has the following known anesthesia issues:  No problems with anesthesia in the past . Patients bleeding risk: no recent abnormal bleeding, no remote history of abnormal bleeding, and no use of Ca-channel blockers. Patient does not have objections to receiving blood products if needed.    Procedure Risk: low  Recent Procedures: none  Recent infections: none    Heart Disease:  Angina history No  MI>6month No If yes then 5 points  MI<6 months No If yes then 10 points  Unstable Angina No If yes then 10 points  Class III No If yes then 10 points  Class IV No If yes then 20 points  Revasc <5years No and asymptomatic No  If symptomatic then needs restudy.  Pulmonary Edema history No If yes then 5 points  Pulmonary Edema <7days No If yes then 10 points  Critical Aortic Stenosis No If yes then 20 points  Abnormal EKG No  PACs No If yes 5 points        General poor health No If yes then 5 points  Age > 70 No If yes then 10 points  CVA No  HTN No  COPD No Consider PFTs prior to thoracotomy  Liver Failure/Alcohol withdrawal No  Anemia No    If any yes in categories greater than or equal to 10 points marked yes, procedure should be delayed, angiography and risk-factor modification should be considered. If the presence of other clinical predictors then stress test  "prior to procedure.   Points total 0   0-15 = low, 15-30 = intermediate, 31+ = high    The following portions of the patient's history were reviewed and updated as appropriate: allergies, current medications, past family history, past medical history, past social history, past surgical history, and problem list.    Review of Systems  A comprehensive review of systems was negative.     Objective:       /70 (BP Location: Right arm, Patient Position: Sitting)   Pulse 72   Temp 97.9 °F (36.6 °C) (Oral)   Resp 20   Ht 5' 3" (1.6 m)   Wt 82.4 kg (181 lb 11.2 oz)   BMI 32.19 kg/m²     General Appearance:    Alert, cooperative, no distress, appears stated age   Head:    Normocephalic, without obvious abnormality, atraumatic   Eyes:    PERRL, conjunctiva/corneas clear, EOM's intact, fundi     benign, both eyes   Ears:    Normal TM's and external ear canals, both ears   Nose:   Nares normal, septum midline, mucosa normal, no drainage    or sinus tenderness   Throat:   Lips, mucosa, and tongue normal; teeth and gums normal   Neck:   Supple, symmetrical, trachea midline, no adenopathy;     thyroid:  no enlargement/tenderness/nodules; no carotid    bruit or JVD   Back:     Symmetric, no curvature, ROM normal, no CVA tenderness   Lungs:     Clear to auscultation bilaterally, respirations unlabored   Chest Wall:    No tenderness or deformity    Heart:    Regular rate and rhythm, S1 and S2 normal, no murmur, rub   or gallop   Breast Exam:    No tenderness, masses, or nipple abnormality   Abdomen:     Soft, non-tender, bowel sounds active all four quadrants,     no masses, no organomegaly   Genitalia:    Normal female without lesion, discharge or tenderness   Rectal:    Normal tone, no masses or tenderness;    guaiac negative stool   Extremities:   Extremities normal, atraumatic, no cyanosis or edema   Pulses:   2+ and symmetric all extremities   Skin:   Skin color, texture, turgor normal, no rashes or lesions   Lymph " nodes:   Cervical, supraclavicular, and axillary nodes normal   Neurologic:   CNII-XII intact, normal strength, sensation and reflexes     throughout       Cardiographics  ECG: normal sinus rhythm, no blocks or conduction defects, no ischemic changes  Echocardiogram: not done    Imaging  Chest x-ray:  not done       Lab Review   If large blood loss possible during surgery then CBC pre-op  not applicable     Assessment:       68 y.o. female with planned surgery as above.    Known risk factors for perioperative complications: None    Difficulty with intubation is not anticipated.    Cardiac Risk Estimation: low     Plan:     1. Preoperative workup as follows none.   2. Medications reviewed. Change in medication regimen before surgery: none, continue medication regimen including morning of surgery, with sip of water.   8. No follow-ups on file.      As with all procedures, there is inherent risk of multiple complications including bleeding,  infectious, cardiac, and pulmonary.  Please stop Aspirin and NSAIDS one week prior to surgery.    All smokers should quit smoking eight or more weeks prior to procedure to minimize complications associated with smoking. Reduction or cessation of smoking for less than four to eight weeks before surgery is of questionable benefit, and has actually been shown in some studies to result in higher complication rates.  Alcohol should be used in moderation.  Any pt with cardiopulmonary disease may warrant repeat examination prior to procedure along with directions for deep breathing exercises and incentive spirometry.    Patients at high risk for complications usually warrant cardiology consultation and possibly angiography. Cardiac stress testing should be performed in patients at intermediate risk and with poor functional capacity or who are undergoing high-risk procedures, such as vascular surgery. For patients with minor clinical predictors, only patients who have poor functional  capacity and are undergoing a high-risk procedure require stress testing. Patients with positive stress test results warrant cardiology consultation before proceeding with surgery.  Predisposing risk factors for pulmonary complications include cough, dyspnea, smoking, a history of lung disease, obesity and abdominal or thoracic surgery.    Any pts > 70 years old may be at risk for delirium or cardiac complications.  Furthermore, diabetic patients may be at risk for infection or prolonged healing.      Cyn Renee  06/08/2023 10:49 AM  Answers submitted by the patient for this visit:  Review of Systems Questionnaire (Submitted on 6/7/2023)  activity change: No  unexpected weight change: No  neck pain: No  hearing loss: No  rhinorrhea: No  trouble swallowing: No  eye discharge: No  visual disturbance: No  chest tightness: No  wheezing: No  chest pain: No  palpitations: No  blood in stool: No  constipation: No  vomiting: No  diarrhea: No  polydipsia: No  polyuria: No  difficulty urinating: No  hematuria: No  menstrual problem: No  dysuria: No  joint swelling: No  arthralgias: No  headaches: No  weakness: No  confusion: No  dysphoric mood: No

## 2023-06-20 ENCOUNTER — ANESTHESIA EVENT (OUTPATIENT)
Dept: SURGERY | Facility: AMBULARY SURGERY CENTER | Age: 68
End: 2023-06-20
Payer: MEDICARE

## 2023-06-20 RX ORDER — METOCLOPRAMIDE HYDROCHLORIDE 5 MG/ML
10 INJECTION INTRAMUSCULAR; INTRAVENOUS EVERY 10 MIN PRN
Status: CANCELLED | OUTPATIENT
Start: 2023-06-20

## 2023-06-21 ENCOUNTER — ANESTHESIA (OUTPATIENT)
Dept: SURGERY | Facility: AMBULARY SURGERY CENTER | Age: 68
End: 2023-06-21
Payer: MEDICARE

## 2023-06-21 ENCOUNTER — HOSPITAL ENCOUNTER (OUTPATIENT)
Facility: AMBULARY SURGERY CENTER | Age: 68
Discharge: HOME | End: 2023-06-21
Attending: OPHTHALMOLOGY | Admitting: OPHTHALMOLOGY
Payer: MEDICARE

## 2023-06-21 DIAGNOSIS — H25.13 NUCLEAR SCLEROTIC CATARACT, BILATERAL: ICD-10-CM

## 2023-06-21 PROCEDURE — D9220A PRA ANESTHESIA: Mod: ANES,,, | Performed by: ANESTHESIOLOGY

## 2023-06-21 PROCEDURE — D9220A PRA ANESTHESIA: Mod: CRNA,,, | Performed by: NURSE ANESTHETIST, CERTIFIED REGISTERED

## 2023-06-21 PROCEDURE — D9220A PRA ANESTHESIA: ICD-10-PCS | Mod: ANES,,, | Performed by: ANESTHESIOLOGY

## 2023-06-21 PROCEDURE — 66984 PR REMOVAL, CATARACT, W/INSRT INTRAOC LENS, W/O ENDO CYCLO: ICD-10-PCS | Mod: RT,,, | Performed by: OPHTHALMOLOGY

## 2023-06-21 PROCEDURE — 66984 XCAPSL CTRC RMVL W/O ECP: CPT | Mod: RT | Performed by: OPHTHALMOLOGY

## 2023-06-21 PROCEDURE — D9220A PRA ANESTHESIA: ICD-10-PCS | Mod: CRNA,,, | Performed by: NURSE ANESTHETIST, CERTIFIED REGISTERED

## 2023-06-21 PROCEDURE — 66984 XCAPSL CTRC RMVL W/O ECP: CPT | Mod: RT,,, | Performed by: OPHTHALMOLOGY

## 2023-06-21 DEVICE — TECNIS SMPLCTY TECNIS 1PC CLR MONO 20.5D
Type: IMPLANTABLE DEVICE | Site: EYE | Status: FUNCTIONAL
Brand: TECNIS SIMPLICITY

## 2023-06-21 RX ORDER — EPINEPHRINE 1 MG/ML
INJECTION, SOLUTION, CONCENTRATE INTRAVENOUS
Status: DISCONTINUED | OUTPATIENT
Start: 2023-06-21 | End: 2023-06-21 | Stop reason: HOSPADM

## 2023-06-21 RX ORDER — ONDANSETRON 2 MG/ML
INJECTION INTRAMUSCULAR; INTRAVENOUS
Status: DISCONTINUED | OUTPATIENT
Start: 2023-06-21 | End: 2023-06-21

## 2023-06-21 RX ORDER — PROPARACAINE HYDROCHLORIDE 5 MG/ML
1 SOLUTION/ DROPS OPHTHALMIC
Status: DISCONTINUED | OUTPATIENT
Start: 2023-06-21 | End: 2023-06-21 | Stop reason: HOSPADM

## 2023-06-21 RX ORDER — LIDOCAINE HYDROCHLORIDE 10 MG/ML
1 INJECTION, SOLUTION EPIDURAL; INFILTRATION; INTRACAUDAL; PERINEURAL ONCE
Status: COMPLETED | OUTPATIENT
Start: 2023-06-21 | End: 2023-06-21

## 2023-06-21 RX ORDER — TETRACAINE HYDROCHLORIDE 5 MG/ML
SOLUTION OPHTHALMIC
Status: DISCONTINUED | OUTPATIENT
Start: 2023-06-21 | End: 2023-06-21 | Stop reason: HOSPADM

## 2023-06-21 RX ORDER — MIDAZOLAM HYDROCHLORIDE 1 MG/ML
INJECTION INTRAMUSCULAR; INTRAVENOUS
Status: DISCONTINUED | OUTPATIENT
Start: 2023-06-21 | End: 2023-06-21

## 2023-06-21 RX ORDER — PHENYLEPHRINE HYDROCHLORIDE 25 MG/ML
1 SOLUTION/ DROPS OPHTHALMIC
Status: DISCONTINUED | OUTPATIENT
Start: 2023-06-21 | End: 2023-06-21 | Stop reason: HOSPADM

## 2023-06-21 RX ORDER — SODIUM CHLORIDE 9 MG/ML
INJECTION, SOLUTION INTRAVENOUS CONTINUOUS
Status: DISCONTINUED | OUTPATIENT
Start: 2023-06-21 | End: 2023-06-21 | Stop reason: HOSPADM

## 2023-06-21 RX ORDER — SODIUM CHLORIDE, SODIUM LACTATE, POTASSIUM CHLORIDE, CALCIUM CHLORIDE 600; 310; 30; 20 MG/100ML; MG/100ML; MG/100ML; MG/100ML
INJECTION, SOLUTION INTRAVENOUS CONTINUOUS
Status: DISCONTINUED | OUTPATIENT
Start: 2023-06-21 | End: 2023-06-21 | Stop reason: HOSPADM

## 2023-06-21 RX ORDER — TROPICAMIDE 10 MG/ML
1 SOLUTION/ DROPS OPHTHALMIC
Status: DISCONTINUED | OUTPATIENT
Start: 2023-06-21 | End: 2023-06-21 | Stop reason: HOSPADM

## 2023-06-21 RX ORDER — PHENYLEPHRINE HYDROCHLORIDE 100 MG/ML
1 SOLUTION/ DROPS OPHTHALMIC
Status: DISCONTINUED | OUTPATIENT
Start: 2023-06-21 | End: 2023-06-21 | Stop reason: HOSPADM

## 2023-06-21 RX ORDER — LIDOCAINE HYDROCHLORIDE 40 MG/ML
INJECTION, SOLUTION RETROBULBAR
Status: DISCONTINUED | OUTPATIENT
Start: 2023-06-21 | End: 2023-06-21 | Stop reason: HOSPADM

## 2023-06-21 RX ORDER — MOXIFLOXACIN 5 MG/ML
SOLUTION/ DROPS OPHTHALMIC
Status: DISCONTINUED | OUTPATIENT
Start: 2023-06-21 | End: 2023-06-21 | Stop reason: HOSPADM

## 2023-06-21 RX ADMIN — PROPARACAINE HYDROCHLORIDE 1 DROP: 5 SOLUTION/ DROPS OPHTHALMIC at 09:06

## 2023-06-21 RX ADMIN — ONDANSETRON 4 MG: 2 INJECTION INTRAMUSCULAR; INTRAVENOUS at 09:06

## 2023-06-21 RX ADMIN — LIDOCAINE HYDROCHLORIDE 0.2 ML: 10 INJECTION, SOLUTION EPIDURAL; INFILTRATION; INTRACAUDAL; PERINEURAL at 09:06

## 2023-06-21 RX ADMIN — TROPICAMIDE 1 DROP: 10 SOLUTION/ DROPS OPHTHALMIC at 09:06

## 2023-06-21 RX ADMIN — PHENYLEPHRINE HYDROCHLORIDE 1 DROP: 25 SOLUTION/ DROPS OPHTHALMIC at 09:06

## 2023-06-21 RX ADMIN — MIDAZOLAM HYDROCHLORIDE 2 MG: 1 INJECTION INTRAMUSCULAR; INTRAVENOUS at 09:06

## 2023-06-21 RX ADMIN — SODIUM CHLORIDE, SODIUM LACTATE, POTASSIUM CHLORIDE, CALCIUM CHLORIDE: 600; 310; 30; 20 INJECTION, SOLUTION INTRAVENOUS at 09:06

## 2023-06-21 NOTE — ANESTHESIA PREPROCEDURE EVALUATION
06/21/2023  Nalini Wooten is a 68 y.o., female.      Patient Active Problem List   Diagnosis    Acquired hypothyroidism    Overactive bladder    History of stroke without residual deficits    Lichen sclerosus of female genitalia    Environmental and seasonal allergies    High cholesterol    BMI 38.0-38.9,adult    Low vitamin B12 level    DDD (degenerative disc disease), lumbar    Thyroid nodule    Obesity    Weight gain    Facial rash    RLS (restless legs syndrome)    Postmenopausal    Nontraumatic complete tear of right rotator cuff    COVID       Past Surgical History:   Procedure Laterality Date    ARTHROSCOPIC REPAIR OF ROTATOR CUFF OF SHOULDER Right 3/10/2021    Procedure: REPAIR, ROTATOR CUFF, ARTHROSCOPIC;  Surgeon: Dustin Vanegas MD;  Location: [facility];  Service: Orthopedics;  Laterality: Right;  arthrex notified    ARTHROSCOPY OF SHOULDER WITH DECOMPRESSION OF SUBACROMIAL SPACE Right 3/10/2021    Procedure: ARTHROSCOPY, SHOULDER, WITH SUBACROMIAL SPACE DECOMPRESSION;  Surgeon: Dustin Vanegas MD;  Location: [facility];  Service: Orthopedics;  Laterality: Right;    BLADDER SUSPENSION  1990    DISTAL CLAVICLE EXCISION Right 3/10/2021    Procedure: EXCISION, CLAVICLE, DISTAL;  Surgeon: Dustin Vanegas MD;  Location: [facility] OR;  Service: Orthopedics;  Laterality: Right;    FOOT SURGERY Bilateral 2001    FOOT SURGERY Right 05/13/2022    HAND SURGERY Left 2017    HYSTERECTOMY  1990    JOINT REPLACEMENT  2013    KNEE SURGERY Bilateral 2013    MANDIBLE FRACTURE SURGERY  1981    SHOULDER ARTHROSCOPY Left 02/27/2019    THYROIDECTOMY Left 05/13/2019        TONSILLECTOMY          Tobacco Use:  The patient  reports that she has never smoked. She has never used smokeless tobacco.     Results for orders placed or performed during the hospital encounter of 03/03/21   EKG 12-lead     Collection Time: 03/03/21 11:09 AM    Narrative    Test Reason : M75.121,M19.011,M75.41,    Vent. Rate : 062 BPM     Atrial Rate : 062 BPM     P-R Int : 182 ms          QRS Dur : 088 ms      QT Int : 416 ms       P-R-T Axes : 064 074 109 degrees     QTc Int : 422 ms    Normal sinus rhythm  Normal ECG  No previous ECGs available  Confirmed by Gautam Luo MD ([id number]) on 3/7/2021 2:56:39 PM    Referred By: S FINGER           Confirmed By:Gautam Luo MD             Lab Results   Component Value Date    WBC 6.48 03/03/2021    HGB 13.4 03/03/2021    HCT 42.6 03/03/2021    MCV 86 03/03/2021     03/03/2021     BMP  Lab Results   Component Value Date     06/28/2022    K 4.3 06/28/2022     06/28/2022    CO2 27 06/28/2022    BUN 20 06/28/2022    CREATININE 0.8 06/28/2022    CALCIUM 9.1 06/28/2022    ANIONGAP 7 (L) 06/28/2022    GLU 94 06/28/2022     (H) 10/18/2021     (H) 03/03/2021       No results found for this or any previous visit.            Pre-op Assessment    I have reviewed the Patient Summary Reports.    I have reviewed the Nursing Notes. I have reviewed the NPO Status.   I have reviewed the Medications.     Review of Systems  Anesthesia Hx:  No problems with previous Anesthesia  Denies Family Hx of Anesthesia complications.   Denies Personal Hx of Anesthesia complications.   Social:  Non-Smoker, Former Smoker    Hematology/Oncology:  Hematology Normal   Oncology Normal     EENT/Dental:EENT/Dental Normal   Cardiovascular:   Denies Hypertension.  Denies MI.  Denies CAD.    Denies CABG/stent.   Denies Angina. hyperlipidemia ECG has been reviewed.    Pulmonary:  Pulmonary Normal  Denies COPD.  Denies Asthma.  Denies Recent URI.    Renal/:  Renal/ Normal  Denies Chronic Renal Disease.     Hepatic/GI:  Hepatic/GI Normal  Denies GERD. Denies Liver Disease.    Musculoskeletal:   Arthritis (hands, knees s/p TKA)   Spine Disorders: lumbar    Neurological:   Denies TIA. CVA, no residual  symptoms Neuromuscular Disease, Denies Seizures.    Endocrine:   Diabetes Hypothyroidism    Psych:  Psychiatric Normal  Denies Psychiatric History.          Physical Exam  General:  Well nourished, Obesity, Cooperative, Oriented and Alert      Airway/Jaw/Neck:  Airway Findings: Mouth Opening: Normal   Tongue: Normal   General Airway Assessment: Adult Oropharynx Findings: Normal Mallampati: II  Improves to II with phonation.  TM Distance: Normal, at least 6 cm   Jaw/Neck Findings:  Neck ROM: Normal ROM   Neck Findings: Normal     Dental:  Dental Findings: Intact     Chest/Lungs:  Chest/Lungs Findings: Clear to auscultation, Normal Respiratory Rate      Heart/Vascular:  Heart Findings: Rate: Normal  Rhythm: Regular Rhythm  Sounds: Normal  Heart murmur: negative    Abdomen:  Abdomen Findings:     Musculoskeletal:  Musculoskeletal Findings:    Skin:  Skin Findings:     Mental Status:  Mental Status Findings:  Alert and Oriented         Anesthesia Plan  Type of Anesthesia, risks & benefits discussed:  Anesthesia Type:  MAC    Patient's Preference:   Plan Factors:          Intra-op Monitoring Plan: standard ASA monitors  Intra-op Monitoring Plan Comments:   Post Op Pain Control Plan: peripheral nerve block  Post Op Pain Control Plan Comments:     Induction:   IV  Beta Blocker:  Patient is not currently on a Beta-Blocker (No further documentation required).       Informed Consent: Informed consent signed with the Patient and all parties understand the risks and agree with anesthesia plan.  All questions answered.  Anesthesia consent signed with patient.  ASA Score: 3     Day of Surgery Review of History & Physical:        Anesthesia Plan Notes: GETA.  Pepcid, Decadron, zofran for PONV prophylaxis.  Single shot interscalene nerve block using Exparel as the local anesthetic for postoperative pain management.          Ready For Surgery From Anesthesia Perspective.           Physical Exam  General: Well nourished, Obesity,  Cooperative, Oriented and Alert    Airway:  Mallampati: II / II  Mouth Opening: Normal  TM Distance: Normal, at least 6 cm  Tongue: Normal  Neck ROM: Normal ROM    Dental:  Intact    Chest/Lungs:  Clear to auscultation, Normal Respiratory Rate    Heart:  Rate: Normal  Rhythm: Regular Rhythm  Sounds: Normal          Anesthesia Plan  Type of Anesthesia, risks & benefits discussed:    Anesthesia Type: MAC  Intra-op Monitoring Plan: standard ASA monitors  Post Op Pain Control Plan: peripheral nerve block  Induction:  IV  Informed Consent: Informed consent signed with the Patient and all parties understand the risks and agree with anesthesia plan.  All questions answered.   ASA Score: 3  Anesthesia Plan Notes: GETA.  Pepcid, Decadron, zofran for PONV prophylaxis.  Single shot interscalene nerve block using Exparel as the local anesthetic for postoperative pain management.      Ready For Surgery From Anesthesia Perspective.       .

## 2023-06-21 NOTE — TRANSFER OF CARE
"Anesthesia Transfer of Care Note    Patient: Nalini Wooten    Procedure(s) Performed: Procedure(s) (LRB):  CEIOL OD (Right)    Patient location: PACU    Anesthesia Type: MAC    Transport from OR: Transported from OR on room air with adequate spontaneous ventilation    Post pain: adequate analgesia    Post assessment: no apparent anesthetic complications and tolerated procedure well    Post vital signs: stable    Level of consciousness: awake, alert and oriented    Nausea/Vomiting: no nausea/vomiting    Complications: none    Transfer of care protocol was followed      Last vitals:   Visit Vitals  BP (!) 164/76   Pulse 61   Temp 36.4 °C (97.5 °F)   Resp 20   Ht 5' 2.99" (1.6 m)   Wt 82.4 kg (181 lb 10.5 oz)   SpO2 98%   Breastfeeding No   BMI 32.19 kg/m²     "

## 2023-06-21 NOTE — ANESTHESIA POSTPROCEDURE EVALUATION
Anesthesia Post Evaluation    Patient: Nalini Wooten    Procedure(s) Performed: Procedure(s) (LRB):  CEIOL OD (Right)    Final Anesthesia Type: MAC      Patient location during evaluation: PACU  Patient participation: Yes- Able to Participate  Level of consciousness: awake and alert and oriented  Post-procedure vital signs: reviewed and stable  Pain management: adequate  Airway patency: patent    PONV status at discharge: No PONV  Anesthetic complications: no      Cardiovascular status: blood pressure returned to baseline  Respiratory status: unassisted, spontaneous ventilation and room air  Hydration status: euvolemic  Follow-up not needed.          Vitals Value Taken Time   /83 06/21/23 1030   Temp    06/21/23 1035   Pulse 63 06/21/23 1030   Resp 20 06/21/23 1030   SpO2 77 % 06/21/23 1030   Vitals shown include unvalidated device data.      No case tracking events are documented in the log.      Pain/Martinez Score: Martinez Score: 10 (6/21/2023 10:31 AM)

## 2023-06-21 NOTE — DISCHARGE SUMMARY
[facility]  Discharge Note  Short Stay    Procedure(s) (LRB):  CEIOL OD (Right)      OUTCOME: Patient tolerated treatment/procedure well without complication and is now ready for discharge.    DISPOSITION: Home or Self Care    FINAL DIAGNOSIS:  cataract    FOLLOWUP: In clinic    DISCHARGE INSTRUCTIONS:  No discharge procedures on file.     TIME SPENT ON DISCHARGE: 5 minutes

## 2023-06-21 NOTE — OP NOTE
Operative Date:  06/21/2023    Discharge Date:  06/21/2023    Report Title: Operative Note    SURGEON: Shawn Shearer MD    ASSISTANT: None    PREOPERATIVE DIAGNOSIS: Age-related nuclear cataract, Right Eye    POSTOPERATIVE DIAGNOSIS: same    PROCEDURE PERFORMED: Phacoemulsification of the cataract with posterior chamber intraocular lens Right Eye    IMPLANTS: DCBOO 20.5    ANESTHESIA:  Topical with MAC    COMPLICATIONS: None    ESTIMATED BLOOD LOSS: Minimal    PROCEDURE: The patient was brought to the operating room, time out was performed and implant checked.  The patient was given light sedation, and topical anesthesia was instilled in the right eye.  The right eye was prepped and draped in the usual fashion for eye surgery and lid speculum used to retract the eyelid. The eyelashes were secluded within the drape.  A paracentesis was made superiorly with a sideport blade. Epishugarcaine was injected in the anterior chamber and dispersive viscoelastic was injected into the anterior chamber. A temporal corneal incision was made with a steel keratome. A cystitome was used to initiate a continuous curvilinear capsulorrhexis and completed using the capsulorrhexis forceps. Hydrodissection of the lens nucleus was performed using balanced salt solution (BSS) on hydrodissection cannula. The lens nucleus was removed using phacoemulsification in the modified stop and chop technique. The lens cortex was removed using the irrigation/aspiration handpiece. The capsular bag was filled with viscoelastic, and the intraocular lens was injected into the capsular bag under direct visualization. Viscoelastic was removed using the irrigation/aspiration handpiece. The wounds were hydrated until watertight.    10-0 nylon was placed at temporal incision.      The wounds were rechecked and no leakage was noted.  The speculum was removed. Topical antibiotic was applied to the eye and shield was placed over the eye. The patient tolerated the  procedure well and left the operating room in good condition.

## 2023-06-21 NOTE — H&P
History    Chief complaint:  Painless progressive vision loss right Eye    Present Ilness/Diagnosis: Visually significant cataract, right Eye    ROS: +Eyes, otherwise no significant changes    Past Medical History: refer to chart    Family History/Social History: refer to chart    Allergies:   Review of patient's allergies indicates:   Allergen Reactions    Estrogens Other (See Comments)     Mini stroke    Gabapentin Itching and Rash    Tetanus vaccines and toxoid Swelling and Other (See Comments)     Swelling at injection site, fever    Shingrix (pf)  [varicella-zoster ge-as01b (pf)] Diarrhea, Itching and Nausea And Vomiting       Current Medications: see medcard      Physical Exam    BP: Vital signs stable  General: No apparent distress  HEENT: cataract  Lungs: adequate respirations  Heart: + pulses  Abdomen: soft  Rectal/pelvic: deferred    Labs: Labs Reviewed    Lab Results   Component Value Date    WBC 6.48 03/03/2021    HGB 13.4 03/03/2021    HCT 42.6 03/03/2021    MCV 86 03/03/2021     03/03/2021           CMP  Sodium   Date Value Ref Range Status   06/28/2022 139 136 - 145 mmol/L Final   04/30/2019 139 134 - 144 mmol/L      Potassium   Date Value Ref Range Status   06/28/2022 4.3 3.5 - 5.1 mmol/L Final     Chloride   Date Value Ref Range Status   06/28/2022 105 95 - 110 mmol/L Final   04/30/2019 102 98 - 110 mmol/L      CO2   Date Value Ref Range Status   06/28/2022 27 23 - 29 mmol/L Final     Glucose   Date Value Ref Range Status   06/28/2022 94 70 - 110 mg/dL Final   04/30/2019 103 (H) 70 - 99 mg/dL      BUN   Date Value Ref Range Status   06/28/2022 20 8 - 23 mg/dL Final     Creatinine   Date Value Ref Range Status   06/28/2022 0.8 0.5 - 1.4 mg/dL Final   05/14/2019 0.73 0.60 - 1.40 mg/dL      Calcium   Date Value Ref Range Status   06/28/2022 9.1 8.7 - 10.5 mg/dL Final     Total Protein   Date Value Ref Range Status   03/03/2021 6.9 6.0 - 8.4 g/dL Final     Albumin   Date Value Ref Range Status    03/03/2021 3.9 3.5 - 5.2 g/dL Final   02/19/2019 3.5 3.1 - 4.7 g/dL      Total Bilirubin   Date Value Ref Range Status   03/03/2021 0.8 0.1 - 1.0 mg/dL Final     Comment:     For infants and newborns, interpretation of results should be based  on gestational age, weight and in agreement with clinical  observations.    Premature Infant recommended reference ranges:  Up to 24 hours.............<8.0 mg/dL  Up to 48 hours............<12.0 mg/dL  3-5 days..................<15.0 mg/dL  6-29 days.................<15.0 mg/dL       Alkaline Phosphatase   Date Value Ref Range Status   03/03/2021 63 55 - 135 U/L Final     AST   Date Value Ref Range Status   03/03/2021 21 10 - 40 U/L Final     ALT   Date Value Ref Range Status   03/03/2021 21 10 - 44 U/L Final     Anion Gap   Date Value Ref Range Status   06/28/2022 7 (L) 8 - 16 mmol/L Final       The patient has been cleared for surgery in an ambulatory surgery facility.     Impression: Visually significant Cataract right Eye    Plan: Phacoemulsification with implantation of Intraocular lens right Eye

## 2023-06-22 ENCOUNTER — OFFICE VISIT (OUTPATIENT)
Dept: OPHTHALMOLOGY | Facility: CLINIC | Age: 68
End: 2023-06-22
Payer: MEDICARE

## 2023-06-22 ENCOUNTER — PATIENT MESSAGE (OUTPATIENT)
Dept: OPHTHALMOLOGY | Facility: CLINIC | Age: 68
End: 2023-06-22

## 2023-06-22 ENCOUNTER — TELEPHONE (OUTPATIENT)
Dept: OPHTHALMOLOGY | Facility: CLINIC | Age: 68
End: 2023-06-22
Payer: MEDICARE

## 2023-06-22 VITALS
SYSTOLIC BLOOD PRESSURE: 155 MMHG | HEIGHT: 63 IN | WEIGHT: 181.69 LBS | TEMPERATURE: 98 F | BODY MASS INDEX: 32.19 KG/M2 | DIASTOLIC BLOOD PRESSURE: 75 MMHG | OXYGEN SATURATION: 99 % | HEART RATE: 59 BPM | RESPIRATION RATE: 20 BRPM

## 2023-06-22 DIAGNOSIS — Z98.41 STATUS POST CATARACT SURGERY, RIGHT: Primary | ICD-10-CM

## 2023-06-22 PROCEDURE — 99024 POSTOP FOLLOW-UP VISIT: CPT | Mod: S$GLB,,, | Performed by: OPHTHALMOLOGY

## 2023-06-22 PROCEDURE — 1126F PR PAIN SEVERITY QUANTIFIED, NO PAIN PRESENT: ICD-10-PCS | Mod: CPTII,S$GLB,, | Performed by: OPHTHALMOLOGY

## 2023-06-22 PROCEDURE — 3044F HG A1C LEVEL LT 7.0%: CPT | Mod: CPTII,S$GLB,, | Performed by: OPHTHALMOLOGY

## 2023-06-22 PROCEDURE — 99999 PR PBB SHADOW E&M-EST. PATIENT-LVL III: ICD-10-PCS | Mod: PBBFAC,,, | Performed by: OPHTHALMOLOGY

## 2023-06-22 PROCEDURE — 3288F FALL RISK ASSESSMENT DOCD: CPT | Mod: CPTII,S$GLB,, | Performed by: OPHTHALMOLOGY

## 2023-06-22 PROCEDURE — 99024 PR POST-OP FOLLOW-UP VISIT: ICD-10-PCS | Mod: S$GLB,,, | Performed by: OPHTHALMOLOGY

## 2023-06-22 PROCEDURE — 1101F PR PT FALLS ASSESS DOC 0-1 FALLS W/OUT INJ PAST YR: ICD-10-PCS | Mod: CPTII,S$GLB,, | Performed by: OPHTHALMOLOGY

## 2023-06-22 PROCEDURE — 1126F AMNT PAIN NOTED NONE PRSNT: CPT | Mod: CPTII,S$GLB,, | Performed by: OPHTHALMOLOGY

## 2023-06-22 PROCEDURE — 3288F PR FALLS RISK ASSESSMENT DOCUMENTED: ICD-10-PCS | Mod: CPTII,S$GLB,, | Performed by: OPHTHALMOLOGY

## 2023-06-22 PROCEDURE — 1101F PT FALLS ASSESS-DOCD LE1/YR: CPT | Mod: CPTII,S$GLB,, | Performed by: OPHTHALMOLOGY

## 2023-06-22 PROCEDURE — 3044F PR MOST RECENT HEMOGLOBIN A1C LEVEL <7.0%: ICD-10-PCS | Mod: CPTII,S$GLB,, | Performed by: OPHTHALMOLOGY

## 2023-06-22 PROCEDURE — 1159F PR MEDICATION LIST DOCUMENTED IN MEDICAL RECORD: ICD-10-PCS | Mod: CPTII,S$GLB,, | Performed by: OPHTHALMOLOGY

## 2023-06-22 PROCEDURE — 99999 PR PBB SHADOW E&M-EST. PATIENT-LVL III: CPT | Mod: PBBFAC,,, | Performed by: OPHTHALMOLOGY

## 2023-06-22 PROCEDURE — 1159F MED LIST DOCD IN RCRD: CPT | Mod: CPTII,S$GLB,, | Performed by: OPHTHALMOLOGY

## 2023-06-22 NOTE — PROGRESS NOTES
HPI    1 day PO OD     Pt states no pain/ FOL/ floaters.     Keto/ PF/ moxi OD QID   Last edited by Antonieta Coronado on 6/22/2023 10:44 AM.            Assessment /Plan     For exam results, see Encounter Report.    Status post cataract surgery, right      Doing well  Post op precautions and instructions reviewed, sheet given  moxi QID  PF QID  Keto qdaily    F/u 1 week, suture removal, PW for OS

## 2023-06-26 ENCOUNTER — PATIENT MESSAGE (OUTPATIENT)
Dept: FAMILY MEDICINE | Facility: CLINIC | Age: 68
End: 2023-06-26

## 2023-06-27 DIAGNOSIS — R07.81 RIB PAIN ON RIGHT SIDE: Primary | ICD-10-CM

## 2023-06-28 ENCOUNTER — HOSPITAL ENCOUNTER (OUTPATIENT)
Dept: RADIOLOGY | Facility: HOSPITAL | Age: 68
Discharge: HOME | End: 2023-06-28
Attending: INTERNAL MEDICINE
Payer: MEDICARE

## 2023-06-28 DIAGNOSIS — R07.81 RIB PAIN ON RIGHT SIDE: ICD-10-CM

## 2023-06-28 PROCEDURE — 71100 X-RAY EXAM RIBS UNI 2 VIEWS: CPT | Mod: TC,PO,RT

## 2023-06-29 ENCOUNTER — OFFICE VISIT (OUTPATIENT)
Dept: OPHTHALMOLOGY | Facility: CLINIC | Age: 68
End: 2023-06-29
Payer: MEDICARE

## 2023-06-29 DIAGNOSIS — H25.12 AGE-RELATED NUCLEAR CATARACT, LEFT: ICD-10-CM

## 2023-06-29 DIAGNOSIS — Z98.41 STATUS POST CATARACT SURGERY, RIGHT: Primary | ICD-10-CM

## 2023-06-29 PROCEDURE — 1160F RVW MEDS BY RX/DR IN RCRD: CPT | Mod: CPTII,S$GLB,, | Performed by: OPHTHALMOLOGY

## 2023-06-29 PROCEDURE — 1101F PR PT FALLS ASSESS DOC 0-1 FALLS W/OUT INJ PAST YR: ICD-10-PCS | Mod: CPTII,S$GLB,, | Performed by: OPHTHALMOLOGY

## 2023-06-29 PROCEDURE — 99999 PR PBB SHADOW E&M-EST. PATIENT-LVL III: ICD-10-PCS | Mod: PBBFAC,,, | Performed by: OPHTHALMOLOGY

## 2023-06-29 PROCEDURE — 99024 PR POST-OP FOLLOW-UP VISIT: ICD-10-PCS | Mod: S$GLB,,, | Performed by: OPHTHALMOLOGY

## 2023-06-29 PROCEDURE — 1159F MED LIST DOCD IN RCRD: CPT | Mod: CPTII,S$GLB,, | Performed by: OPHTHALMOLOGY

## 2023-06-29 PROCEDURE — 1101F PT FALLS ASSESS-DOCD LE1/YR: CPT | Mod: CPTII,S$GLB,, | Performed by: OPHTHALMOLOGY

## 2023-06-29 PROCEDURE — 3288F PR FALLS RISK ASSESSMENT DOCUMENTED: ICD-10-PCS | Mod: CPTII,S$GLB,, | Performed by: OPHTHALMOLOGY

## 2023-06-29 PROCEDURE — 1160F PR REVIEW ALL MEDS BY PRESCRIBER/CLIN PHARMACIST DOCUMENTED: ICD-10-PCS | Mod: CPTII,S$GLB,, | Performed by: OPHTHALMOLOGY

## 2023-06-29 PROCEDURE — 1126F PR PAIN SEVERITY QUANTIFIED, NO PAIN PRESENT: ICD-10-PCS | Mod: CPTII,S$GLB,, | Performed by: OPHTHALMOLOGY

## 2023-06-29 PROCEDURE — 92136 IOL MASTER - OS - LEFT EYE: ICD-10-PCS | Mod: 26,LT,S$GLB, | Performed by: OPHTHALMOLOGY

## 2023-06-29 PROCEDURE — 3288F FALL RISK ASSESSMENT DOCD: CPT | Mod: CPTII,S$GLB,, | Performed by: OPHTHALMOLOGY

## 2023-06-29 PROCEDURE — 1126F AMNT PAIN NOTED NONE PRSNT: CPT | Mod: CPTII,S$GLB,, | Performed by: OPHTHALMOLOGY

## 2023-06-29 PROCEDURE — 92136 OPHTHALMIC BIOMETRY: CPT | Mod: 26,LT,S$GLB, | Performed by: OPHTHALMOLOGY

## 2023-06-29 PROCEDURE — 99024 POSTOP FOLLOW-UP VISIT: CPT | Mod: S$GLB,,, | Performed by: OPHTHALMOLOGY

## 2023-06-29 PROCEDURE — 3044F HG A1C LEVEL LT 7.0%: CPT | Mod: CPTII,S$GLB,, | Performed by: OPHTHALMOLOGY

## 2023-06-29 PROCEDURE — 3044F PR MOST RECENT HEMOGLOBIN A1C LEVEL <7.0%: ICD-10-PCS | Mod: CPTII,S$GLB,, | Performed by: OPHTHALMOLOGY

## 2023-06-29 PROCEDURE — 1159F PR MEDICATION LIST DOCUMENTED IN MEDICAL RECORD: ICD-10-PCS | Mod: CPTII,S$GLB,, | Performed by: OPHTHALMOLOGY

## 2023-06-29 PROCEDURE — 99999 PR PBB SHADOW E&M-EST. PATIENT-LVL III: CPT | Mod: PBBFAC,,, | Performed by: OPHTHALMOLOGY

## 2023-06-29 RX ORDER — PHENYLEPHRINE HYDROCHLORIDE 25 MG/ML
1 SOLUTION/ DROPS OPHTHALMIC
Status: CANCELLED | OUTPATIENT
Start: 2023-06-29

## 2023-06-29 RX ORDER — KETOROLAC TROMETHAMINE 5 MG/ML
1 SOLUTION OPHTHALMIC 4 TIMES DAILY
Qty: 5 ML | Refills: 2 | Status: SHIPPED | OUTPATIENT
Start: 2023-06-29 | End: 2023-08-17 | Stop reason: ALTCHOICE

## 2023-06-29 RX ORDER — PREDNISOLONE ACETATE 10 MG/ML
1 SUSPENSION/ DROPS OPHTHALMIC 4 TIMES DAILY
Qty: 5 ML | Refills: 2 | Status: SHIPPED | OUTPATIENT
Start: 2023-06-29 | End: 2023-08-17 | Stop reason: ALTCHOICE

## 2023-06-29 RX ORDER — PROPARACAINE HYDROCHLORIDE 5 MG/ML
1 SOLUTION/ DROPS OPHTHALMIC
Status: CANCELLED | OUTPATIENT
Start: 2023-06-29

## 2023-06-29 RX ORDER — SODIUM CHLORIDE 9 MG/ML
INJECTION, SOLUTION INTRAVENOUS CONTINUOUS
Status: CANCELLED | OUTPATIENT
Start: 2023-06-29

## 2023-06-29 RX ORDER — MOXIFLOXACIN 5 MG/ML
1 SOLUTION/ DROPS OPHTHALMIC 4 TIMES DAILY
Qty: 3 ML | Refills: 1 | Status: SHIPPED | OUTPATIENT
Start: 2023-06-29 | End: 2023-08-17 | Stop reason: ALTCHOICE

## 2023-06-29 RX ORDER — TROPICAMIDE 10 MG/ML
1 SOLUTION/ DROPS OPHTHALMIC
Status: CANCELLED | OUTPATIENT
Start: 2023-06-29

## 2023-06-29 NOTE — PROGRESS NOTES
Subjective:       Patient ID: Nalini Wooten is a 68 y.o. female.    Chief Complaint: blurry vision left eye    Past Medical History:   Diagnosis Date    Allergies     Bulging disc     lower back     Carpal tunnel syndrome     Degenerative disc disease     High cholesterol     Pinched nerve in neck      Past Surgical History:   Procedure Laterality Date    ARTHROSCOPIC REPAIR OF ROTATOR CUFF OF SHOULDER Right 3/10/2021    Procedure: REPAIR, ROTATOR CUFF, ARTHROSCOPIC;  Surgeon: Dustin Vanegas MD;  Location: [facility] OR;  Service: Orthopedics;  Laterality: Right;  arthrex notified    ARTHROSCOPY OF SHOULDER WITH DECOMPRESSION OF SUBACROMIAL SPACE Right 3/10/2021    Procedure: ARTHROSCOPY, SHOULDER, WITH SUBACROMIAL SPACE DECOMPRESSION;  Surgeon: Dustin Vanegas MD;  Location: [facility] OR;  Service: Orthopedics;  Laterality: Right;    BLADDER SUSPENSION  1990    CATARACT EXTRACTION W/  INTRAOCULAR LENS IMPLANT Right 6/21/2023    Procedure: CEIOL OD;  Surgeon: Shawn Shearer MD;  Location: [facility] OR;  Service: Ophthalmology;  Laterality: Right;    DISTAL CLAVICLE EXCISION Right 3/10/2021    Procedure: EXCISION, CLAVICLE, DISTAL;  Surgeon: Dustin Vanegas MD;  Location: [facility] OR;  Service: Orthopedics;  Laterality: Right;    FOOT SURGERY Bilateral 2001    FOOT SURGERY Right 05/13/2022    HAND SURGERY Left 2017    HYSTERECTOMY  1990    JOINT REPLACEMENT  2013    KNEE SURGERY Bilateral 2013    MANDIBLE FRACTURE SURGERY  1981    SHOULDER ARTHROSCOPY Left 02/27/2019    THYROIDECTOMY Left 05/13/2019        TONSILLECTOMY       Family History   Problem Relation Age of Onset    Arthritis Mother     Aneurysm Mother         Abdominal Aneurysm     Arthritis Father     Diabetes Father     Hearing loss Father     Heart disease Father     Hypertension Father     Dementia Father     Diabetes Paternal Grandmother     Heart disease Paternal Grandfather     Leukemia Son     Crohn's disease Son     Ankylosing spondylitis Son     Other Son          avascular necrosis of pancho hips    Rheumatologic disease Son     Heart defect Son     Kidney failure Son     SIDS Maternal Aunt     Uterine cancer Maternal Aunt     Dementia Paternal Aunt     No Known Problems Sister     Heart disease Brother         stent placement    Heart disease Brother         stent placement    Aneurysm Brother         Abdominal Aneurysm    No Known Problems Sister      Social History     Socioeconomic History    Marital status:     Number of children: 3   Tobacco Use    Smoking status: Never    Smokeless tobacco: Never   Substance and Sexual Activity    Alcohol use: Never     Alcohol/week: 0.0 standard drinks    Drug use: No    Sexual activity: Yes     Birth control/protection: None     Social Determinants of Health     Financial Resource Strain: Low Risk     Difficulty of Paying Living Expenses: Not hard at all   Food Insecurity: No Food Insecurity    Worried About Running Out of Food in the Last Year: Never true    Ran Out of Food in the Last Year: Never true   Transportation Needs: No Transportation Needs    Lack of Transportation (Medical): No    Lack of Transportation (Non-Medical): No   Physical Activity: Sufficiently Active    Days of Exercise per Week: 5 days    Minutes of Exercise per Session: 30 min   Stress: No Stress Concern Present    Feeling of Stress : Not at all   Social Connections: Unknown    Frequency of Communication with Friends and Family: More than three times a week    Frequency of Social Gatherings with Friends and Family: More than three times a week    Active Member of Clubs or Organizations: Yes    Attends Club or Organization Meetings: More than 4 times per year    Marital Status:    Housing Stability: Low Risk     Unable to Pay for Housing in the Last Year: No    Number of Places Lived in the Last Year: 1    Unstable Housing in the Last Year: No       Current Outpatient Medications   Medication Sig Dispense Refill    cyanocobalamin 1,000 mcg/mL  injection Inject every week 1 cc 10 mL 1    ketorolac 0.5% (ACULAR) 0.5 % Drop Place 1 drop into the right eye 4 (four) times daily. Start 3 days before surgery. 5 mL 2    levocetirizine (XYZAL) 5 MG tablet Take 1 tablet (5 mg total) by mouth every evening. 30 tablet 0    metFORMIN (GLUCOPHAGE) 500 MG tablet Take 1 tablet (500 mg total) by mouth 2 (two) times daily with meals. 180 tablet 2    mometasone (ASMANEX TWISTHALER) 220 mcg/ actuation (30) inhaler Inhale 2 puffs into the lungs once daily. Controller 1 each 0    phentermine (ADIPEX-P) 37.5 mg tablet TAKE 1 TABLET(37.5 MG) BY MOUTH EVERY MORNING 90 tablet 0    prednisoLONE acetate (PRED FORTE) 1 % DrpS Place 1 drop into the right eye 4 (four) times daily. Start after surgery 5 mL 2    rosuvastatin (CRESTOR) 40 MG Tab Take 1 tablet (40 mg total) by mouth once daily. 30 tablet 0    tolterodine (DETROL LA) 4 MG 24 hr capsule Take 1 capsule (4 mg total) by mouth once daily. 90 capsule 1    traMADoL (ULTRAM) 50 mg tablet Take 1 tablet (50 mg total) by mouth every 4 (four) hours as needed for Pain. 30 tablet 0    ketorolac 0.5% (ACULAR) 0.5 % Drop Place 1 drop into the left eye 4 (four) times daily. Start 3 days before surgery. 5 mL 2    moxifloxacin (VIGAMOX) 0.5 % ophthalmic solution Place 1 drop into the left eye 4 (four) times daily. Start 1 day before cataract surgery 3 mL 1    prednisoLONE acetate (PRED FORTE) 1 % DrpS Place 1 drop into the left eye 4 (four) times daily. Start after cataract surgery. 5 mL 2     No current facility-administered medications for this visit.     Review of patient's allergies indicates:   Allergen Reactions    Estrogens Other (See Comments)     Mini stroke    Gabapentin Itching and Rash    Tetanus vaccines and toxoid Swelling and Other (See Comments)     Swelling at injection site, fever    Shingrix (pf)  [varicella-zoster ge-as01b (pf)] Diarrhea, Itching and Nausea And Vomiting       Review of Systems   All other systems reviewed  and are negative.    Objective:      There were no vitals filed for this visit.  Physical Exam  Eyes:      Pupils: Pupils are equal, round, and reactive to light.   Cardiovascular:      Rate and Rhythm: Normal rate.      Pulses: Normal pulses.   Pulmonary:      Effort: Pulmonary effort is normal.   Abdominal:      General: Abdomen is flat.   Neurological:      Mental Status: She is alert and oriented to person, place, and time.       Lab Review: CBC:   Lab Results   Component Value Date    WBC 6.48 03/03/2021    RBC 4.93 03/03/2021    HGB 13.4 03/03/2021    HCT 42.6 03/03/2021     03/03/2021     BMP:   Lab Results   Component Value Date    GLU 94 06/28/2022     (H) 04/30/2019     06/28/2022     04/30/2019    K 4.3 06/28/2022     06/28/2022     04/30/2019    CO2 27 06/28/2022    BUN 20 06/28/2022    CREATININE 0.8 06/28/2022    CREATININE 0.73 05/14/2019    CALCIUM 9.1 06/28/2022          Assessment:       1. Status post cataract surgery, right    2. Age-related nuclear cataract, left        Plan:       Low risk for low risk surgery, okay to proceed with cataract surgery left eye.

## 2023-06-29 NOTE — PROGRESS NOTES
HPI    Pt presents for post op OD and pre op OS     States OD is doing very well     PF QID  Keto qdaily     Last edited by Michelle Keys on 6/29/2023  9:14 AM.            Assessment /Plan     For exam results, see Encounter Report.    Status post cataract surgery, right    Age-related nuclear cataract, left      1. Status post cataract surgery, right  POW1 CEIOL  Doing well  Suture removed  D/c moxi after today  Continue PF QID with taper  Continue keto qdaily  Post op instructions reviewed    2. Age-related nuclear cataract, left  Patient with visually significant cataract impacting abiliity ADLs (reading, driving, PM driving, glare).  Discussed options, R & B, expectations, patient voices good understanding and wishes to proceed with procedure. Patient will likely benefit from sx and signed consent.    Proceed with CEIOL OS  Target -1.75 -2.00 (monovision)

## 2023-07-10 ENCOUNTER — PATIENT MESSAGE (OUTPATIENT)
Dept: SURGERY | Facility: HOSPITAL | Age: 68
End: 2023-07-10

## 2023-07-11 RX ORDER — PHENTERMINE HYDROCHLORIDE 37.5 MG/1
TABLET ORAL
Qty: 90 TABLET | OUTPATIENT
Start: 2023-07-11

## 2023-07-17 DIAGNOSIS — R31.0 GROSS HEMATURIA: Primary | ICD-10-CM

## 2023-07-18 ENCOUNTER — ANESTHESIA EVENT (OUTPATIENT)
Dept: SURGERY | Facility: HOSPITAL | Age: 68
End: 2023-07-18
Payer: MEDICARE

## 2023-07-19 ENCOUNTER — PATIENT MESSAGE (OUTPATIENT)
Dept: SURGERY | Facility: HOSPITAL | Age: 68
End: 2023-07-19

## 2023-07-19 ENCOUNTER — HOSPITAL ENCOUNTER (OUTPATIENT)
Facility: HOSPITAL | Age: 68
Discharge: HOME | End: 2023-07-19
Attending: OPHTHALMOLOGY | Admitting: OPHTHALMOLOGY
Payer: MEDICARE

## 2023-07-19 ENCOUNTER — ANESTHESIA (OUTPATIENT)
Dept: SURGERY | Facility: HOSPITAL | Age: 68
End: 2023-07-19
Payer: MEDICARE

## 2023-07-19 VITALS
HEART RATE: 60 BPM | HEIGHT: 63 IN | OXYGEN SATURATION: 100 % | SYSTOLIC BLOOD PRESSURE: 148 MMHG | RESPIRATION RATE: 18 BRPM | DIASTOLIC BLOOD PRESSURE: 71 MMHG | WEIGHT: 181 LBS | TEMPERATURE: 98 F | BODY MASS INDEX: 32.07 KG/M2

## 2023-07-19 DIAGNOSIS — H25.12 AGE-RELATED NUCLEAR CATARACT, LEFT: ICD-10-CM

## 2023-07-19 DIAGNOSIS — Z98.41 STATUS POST CATARACT SURGERY, RIGHT: ICD-10-CM

## 2023-07-19 PROCEDURE — 27201423 OPTIME MED/SURG SUP & DEVICES STERILE SUPPLY: Performed by: OPHTHALMOLOGY

## 2023-07-19 PROCEDURE — 25000003 PHARM REV CODE 250

## 2023-07-19 PROCEDURE — D9220A PRA ANESTHESIA: Mod: CRNA,,, | Performed by: NURSE ANESTHETIST, CERTIFIED REGISTERED

## 2023-07-19 PROCEDURE — 66984 XCAPSL CTRC RMVL W/O ECP: CPT | Mod: 79,LT,, | Performed by: OPHTHALMOLOGY

## 2023-07-19 PROCEDURE — 37000009 HC ANESTHESIA EA ADD 15 MINS: Performed by: OPHTHALMOLOGY

## 2023-07-19 PROCEDURE — D9220A PRA ANESTHESIA: ICD-10-PCS | Mod: ANES,,, | Performed by: ANESTHESIOLOGY

## 2023-07-19 PROCEDURE — 66984 PR REMOVAL, CATARACT, W/INSRT INTRAOC LENS, W/O ENDO CYCLO: ICD-10-PCS | Mod: 79,LT,, | Performed by: OPHTHALMOLOGY

## 2023-07-19 PROCEDURE — V2632 POST CHMBR INTRAOCULAR LENS: HCPCS | Performed by: OPHTHALMOLOGY

## 2023-07-19 PROCEDURE — 37000008 HC ANESTHESIA 1ST 15 MINUTES: Performed by: OPHTHALMOLOGY

## 2023-07-19 PROCEDURE — 25000003 PHARM REV CODE 250: Performed by: OPHTHALMOLOGY

## 2023-07-19 PROCEDURE — 25000003 PHARM REV CODE 250: Performed by: ANESTHESIOLOGY

## 2023-07-19 PROCEDURE — 63600175 PHARM REV CODE 636 W HCPCS: Performed by: ANESTHESIOLOGY

## 2023-07-19 PROCEDURE — 36000707: Performed by: OPHTHALMOLOGY

## 2023-07-19 PROCEDURE — D9220A PRA ANESTHESIA: Mod: ANES,,, | Performed by: ANESTHESIOLOGY

## 2023-07-19 PROCEDURE — 36000706: Performed by: OPHTHALMOLOGY

## 2023-07-19 PROCEDURE — 63600175 PHARM REV CODE 636 W HCPCS: Performed by: NURSE ANESTHETIST, CERTIFIED REGISTERED

## 2023-07-19 PROCEDURE — 71000033 HC RECOVERY, INTIAL HOUR: Performed by: OPHTHALMOLOGY

## 2023-07-19 PROCEDURE — 63600175 PHARM REV CODE 636 W HCPCS: Performed by: OPHTHALMOLOGY

## 2023-07-19 PROCEDURE — D9220A PRA ANESTHESIA: ICD-10-PCS | Mod: CRNA,,, | Performed by: NURSE ANESTHETIST, CERTIFIED REGISTERED

## 2023-07-19 DEVICE — LENS SMPLCTY TECNIS MONO 23.0D: Type: IMPLANTABLE DEVICE | Site: EYE | Status: FUNCTIONAL

## 2023-07-19 RX ORDER — TETRACAINE HYDROCHLORIDE 5 MG/ML
SOLUTION OPHTHALMIC
Status: DISCONTINUED | OUTPATIENT
Start: 2023-07-19 | End: 2023-07-19 | Stop reason: HOSPADM

## 2023-07-19 RX ORDER — MOXIFLOXACIN 5 MG/ML
SOLUTION/ DROPS OPHTHALMIC
Status: DISCONTINUED | OUTPATIENT
Start: 2023-07-19 | End: 2023-07-19 | Stop reason: HOSPADM

## 2023-07-19 RX ORDER — SODIUM CHLORIDE 9 MG/ML
INJECTION, SOLUTION INTRAVENOUS CONTINUOUS
Status: DISCONTINUED | OUTPATIENT
Start: 2023-07-19 | End: 2023-07-19 | Stop reason: HOSPADM

## 2023-07-19 RX ORDER — TETRACAINE HYDROCHLORIDE 5 MG/ML
SOLUTION OPHTHALMIC
Status: DISCONTINUED
Start: 2023-07-19 | End: 2023-07-19 | Stop reason: HOSPADM

## 2023-07-19 RX ORDER — LIDOCAINE HYDROCHLORIDE 40 MG/ML
INJECTION, SOLUTION RETROBULBAR
Status: DISCONTINUED | OUTPATIENT
Start: 2023-07-19 | End: 2023-07-19 | Stop reason: HOSPADM

## 2023-07-19 RX ORDER — LIDOCAINE HYDROCHLORIDE 40 MG/ML
INJECTION, SOLUTION RETROBULBAR
Status: DISCONTINUED
Start: 2023-07-19 | End: 2023-07-19 | Stop reason: HOSPADM

## 2023-07-19 RX ORDER — LIDOCAINE HYDROCHLORIDE 10 MG/ML
0.5 INJECTION, SOLUTION EPIDURAL; INFILTRATION; INTRACAUDAL; PERINEURAL ONCE
Status: COMPLETED | OUTPATIENT
Start: 2023-07-19 | End: 2023-07-19

## 2023-07-19 RX ORDER — SODIUM CHLORIDE, SODIUM LACTATE, POTASSIUM CHLORIDE, CALCIUM CHLORIDE 600; 310; 30; 20 MG/100ML; MG/100ML; MG/100ML; MG/100ML
INJECTION, SOLUTION INTRAVENOUS CONTINUOUS
Status: DISCONTINUED | OUTPATIENT
Start: 2023-07-19 | End: 2023-07-19 | Stop reason: HOSPADM

## 2023-07-19 RX ORDER — MOXIFLOXACIN 5 MG/ML
SOLUTION/ DROPS OPHTHALMIC
Status: DISCONTINUED
Start: 2023-07-19 | End: 2023-07-19 | Stop reason: HOSPADM

## 2023-07-19 RX ORDER — PROPARACAINE HYDROCHLORIDE 5 MG/ML
1 SOLUTION/ DROPS OPHTHALMIC
Status: DISCONTINUED | OUTPATIENT
Start: 2023-07-19 | End: 2023-07-19 | Stop reason: HOSPADM

## 2023-07-19 RX ORDER — ONDANSETRON 2 MG/ML
INJECTION INTRAMUSCULAR; INTRAVENOUS
Status: DISCONTINUED | OUTPATIENT
Start: 2023-07-19 | End: 2023-07-19

## 2023-07-19 RX ORDER — MIDAZOLAM HYDROCHLORIDE 1 MG/ML
INJECTION INTRAMUSCULAR; INTRAVENOUS
Status: DISCONTINUED | OUTPATIENT
Start: 2023-07-19 | End: 2023-07-19

## 2023-07-19 RX ORDER — TROPICAMIDE 10 MG/ML
1 SOLUTION/ DROPS OPHTHALMIC
Status: DISCONTINUED | OUTPATIENT
Start: 2023-07-19 | End: 2023-07-19 | Stop reason: HOSPADM

## 2023-07-19 RX ORDER — PHENTERMINE HYDROCHLORIDE 37.5 MG/1
TABLET ORAL
Qty: 30 TABLET | Refills: 0 | Status: SHIPPED | OUTPATIENT
Start: 2023-07-19 | End: 2023-10-16

## 2023-07-19 RX ORDER — EPINEPHRINE 1 MG/ML
INJECTION, SOLUTION, CONCENTRATE INTRAVENOUS
Status: DISCONTINUED | OUTPATIENT
Start: 2023-07-19 | End: 2023-07-19 | Stop reason: HOSPADM

## 2023-07-19 RX ORDER — PHENYLEPHRINE HYDROCHLORIDE 25 MG/ML
1 SOLUTION/ DROPS OPHTHALMIC
Status: DISCONTINUED | OUTPATIENT
Start: 2023-07-19 | End: 2023-07-19 | Stop reason: HOSPADM

## 2023-07-19 RX ORDER — EPINEPHRINE 1 MG/ML
INJECTION, SOLUTION, CONCENTRATE INTRAVENOUS
Status: DISCONTINUED
Start: 2023-07-19 | End: 2023-07-19 | Stop reason: HOSPADM

## 2023-07-19 RX ADMIN — TROPICAMIDE 1 DROP: 10 SOLUTION/ DROPS OPHTHALMIC at 06:07

## 2023-07-19 RX ADMIN — PROPARACAINE HYDROCHLORIDE 1 DROP: 5 SOLUTION/ DROPS OPHTHALMIC at 06:07

## 2023-07-19 RX ADMIN — LIDOCAINE HYDROCHLORIDE 0.2 ML: 10 INJECTION, SOLUTION EPIDURAL; INFILTRATION; INTRACAUDAL at 06:07

## 2023-07-19 RX ADMIN — PHENYLEPHRINE HYDROCHLORIDE 1 DROP: 25 SOLUTION/ DROPS OPHTHALMIC at 06:07

## 2023-07-19 RX ADMIN — ONDANSETRON 4 MG: 2 INJECTION, SOLUTION INTRAMUSCULAR; INTRAVENOUS at 07:07

## 2023-07-19 RX ADMIN — SODIUM CHLORIDE, POTASSIUM CHLORIDE, SODIUM LACTATE AND CALCIUM CHLORIDE: 600; 310; 30; 20 INJECTION, SOLUTION INTRAVENOUS at 06:07

## 2023-07-19 RX ADMIN — MIDAZOLAM HYDROCHLORIDE 2 MG: 1 INJECTION, SOLUTION INTRAMUSCULAR; INTRAVENOUS at 07:07

## 2023-07-19 NOTE — TRANSFER OF CARE
"Anesthesia Transfer of Care Note    Patient: Nalini Wooten    Procedure(s) Performed: Procedure(s) (LRB):  CEIOL OS (Left)    Patient location: PACU    Anesthesia Type: MAC    Transport from OR: Transported from OR on room air with adequate spontaneous ventilation    Post pain: adequate analgesia    Post assessment: no apparent anesthetic complications and tolerated procedure well    Post vital signs: stable    Level of consciousness: awake, alert and oriented    Nausea/Vomiting: no nausea/vomiting    Complications: none    Transfer of care protocol was followed      Last vitals:   Visit Vitals  BP (!) 160/72   Pulse 61   Temp 36.9 °C (98.4 °F) (Skin)   Resp 20   Ht 5' 3" (1.6 m)   Wt 82.1 kg (181 lb)   SpO2 100%   Breastfeeding No   BMI 32.06 kg/m²     "

## 2023-07-19 NOTE — PLAN OF CARE
Pt ready for discharge. D/C instructions reviewed with patient and her . Encouraged patient to attend f/u appt tomorrow with Dr. Shearer, pt has verbalized understanding.

## 2023-07-19 NOTE — DISCHARGE SUMMARY
[facility] - Periop Services  Discharge Note  Short Stay    Procedure(s) (LRB):  CEIOL OS (Left)      OUTCOME: Patient tolerated treatment/procedure well without complication and is now ready for discharge.    DISPOSITION: Home or Self Care    FINAL DIAGNOSIS:  cataract    FOLLOWUP: In clinic    DISCHARGE INSTRUCTIONS:  No discharge procedures on file.     TIME SPENT ON DISCHARGE: 5 minutes

## 2023-07-19 NOTE — H&P
History    Chief complaint:  Painless progressive vision loss left Eye    Present Ilness/Diagnosis: Visually significant cataract, left Eye    ROS: +Eyes, otherwise no significant changes    Past Medical History: refer to chart    Family History/Social History: refer to chart    Allergies:   Review of patient's allergies indicates:   Allergen Reactions    Estrogens Other (See Comments)     Mini stroke    Gabapentin Itching and Rash    Tetanus vaccines and toxoid Swelling and Other (See Comments)     Swelling at injection site, fever    Shingrix (pf)  [varicella-zoster ge-as01b (pf)] Diarrhea, Itching and Nausea And Vomiting       Current Medications: see medcard      Physical Exam    BP: Vital signs stable  General: No apparent distress  HEENT: cataract  Lungs: adequate respirations  Heart: + pulses  Abdomen: soft  Rectal/pelvic: deferred    Labs: Labs Reviewed    Lab Results   Component Value Date    WBC 6.48 03/03/2021    HGB 13.4 03/03/2021    HCT 42.6 03/03/2021    MCV 86 03/03/2021     03/03/2021           CMP  Sodium   Date Value Ref Range Status   06/28/2022 139 136 - 145 mmol/L Final   04/30/2019 139 134 - 144 mmol/L      Potassium   Date Value Ref Range Status   06/28/2022 4.3 3.5 - 5.1 mmol/L Final     Chloride   Date Value Ref Range Status   06/28/2022 105 95 - 110 mmol/L Final   04/30/2019 102 98 - 110 mmol/L      CO2   Date Value Ref Range Status   06/28/2022 27 23 - 29 mmol/L Final     Glucose   Date Value Ref Range Status   06/28/2022 94 70 - 110 mg/dL Final   04/30/2019 103 (H) 70 - 99 mg/dL      BUN   Date Value Ref Range Status   06/28/2022 20 8 - 23 mg/dL Final     Creatinine   Date Value Ref Range Status   06/28/2022 0.8 0.5 - 1.4 mg/dL Final   05/14/2019 0.73 0.60 - 1.40 mg/dL      Calcium   Date Value Ref Range Status   06/28/2022 9.1 8.7 - 10.5 mg/dL Final     Total Protein   Date Value Ref Range Status   03/03/2021 6.9 6.0 - 8.4 g/dL Final     Albumin   Date Value Ref Range Status    03/03/2021 3.9 3.5 - 5.2 g/dL Final   02/19/2019 3.5 3.1 - 4.7 g/dL      Total Bilirubin   Date Value Ref Range Status   03/03/2021 0.8 0.1 - 1.0 mg/dL Final     Comment:     For infants and newborns, interpretation of results should be based  on gestational age, weight and in agreement with clinical  observations.    Premature Infant recommended reference ranges:  Up to 24 hours.............<8.0 mg/dL  Up to 48 hours............<12.0 mg/dL  3-5 days..................<15.0 mg/dL  6-29 days.................<15.0 mg/dL       Alkaline Phosphatase   Date Value Ref Range Status   03/03/2021 63 55 - 135 U/L Final     AST   Date Value Ref Range Status   03/03/2021 21 10 - 40 U/L Final     ALT   Date Value Ref Range Status   03/03/2021 21 10 - 44 U/L Final     Anion Gap   Date Value Ref Range Status   06/28/2022 7 (L) 8 - 16 mmol/L Final       The patient has been cleared for surgery in an ambulatory surgery facility.     Impression: Visually significant Cataract left Eye    Plan: Phacoemulsification with implantation of Intraocular lens left Eye

## 2023-07-19 NOTE — ANESTHESIA POSTPROCEDURE EVALUATION
Anesthesia Post Evaluation    Patient: Nalini Wooten    Procedure(s) Performed: Procedure(s) (LRB):  CEIOL OS (Left)    Final Anesthesia Type: MAC      Patient location during evaluation: PACU  Patient participation: Yes- Able to Participate  Level of consciousness: awake and alert  Post-procedure vital signs: reviewed and stable  Pain management: adequate  Airway patency: patent    PONV status at discharge: No PONV  Anesthetic complications: no      Cardiovascular status: hemodynamically stable  Respiratory status: unassisted and room air  Hydration status: euvolemic  Follow-up not needed.          Vitals Value Taken Time   /71 07/19/23 0830   Temp 36.9 °C (98.4 °F) 07/19/23 0756   Pulse 60 07/19/23 0830   Resp 18 07/19/23 0756   SpO2 100 % 07/19/23 0830         Event Time   Out of Recovery 08:38:19         Pain/Martinez Score: Martinez Score: 10 (7/19/2023  8:30 AM)

## 2023-07-19 NOTE — OP NOTE
Operative Date:  07/19/2023    Discharge Date:  07/19/2023    Report Title: Operative Note    SURGEON: Shawn Shearer MD    ASSISTANT: None    PREOPERATIVE DIAGNOSIS: Age-related nuclear cataract,  Left Eye    POSTOPERATIVE DIAGNOSIS: same    PROCEDURE PERFORMED: Phacoemulsification of the cataract with posterior chamber intraocular lens Left Eye    IMPLANTS: DCBOO 23.0    ANESTHESIA:  Topical with MAC    COMPLICATIONS: None    ESTIMATED BLOOD LOSS: Minimal    PROCEDURE: The patient was brought to the operating room, time out was performed and implant checked.  The patient was given light sedation, and topical anesthesia was instilled in the left eye.  The left eye was prepped and draped in the usual fashion for eye surgery and lid speculum used to retract the eyelid. The eyelashes were secluded within the drape.  A paracentesis was made inferiorly with a sideport blade. Epishugarcaine was injected in the anterior chamber and dispersive viscoelastic was injected into the anterior chamber. A temporal corneal incision was made with a steel keratome. A cystitome was used to initiate a continuous curvilinear capsulorrhexis and completed using the capsulorrhexis forceps. Hydrodissection of the lens nucleus was performed using balanced salt solution (BSS) on hydrodissection cannula. The lens nucleus was removed using phacoemulsification in the modified stop and chop technique. The lens cortex was removed using the irrigation/aspiration handpiece. The capsular bag was filled with viscoelastic, and the intraocular lens was injected into the capsular bag under direct visualization. Viscoelastic was removed using the irrigation/aspiration handpiece. The wounds were hydrated until watertight.    The wounds were rechecked and no leakage was noted.  The speculum was removed. Topical antibiotic was applied to the eye and shield was placed over the eye. The patient tolerated the procedure well and left the operating room in good  condition.

## 2023-07-19 NOTE — ANESTHESIA PREPROCEDURE EVALUATION
07/19/2023  Nalini Wooten is a 68 y.o., female.      Pre-op Assessment    I have reviewed the Patient Summary Reports.     I have reviewed the Nursing Notes. I have reviewed the NPO Status.   I have reviewed the Medications.     Review of Systems  Anesthesia Hx:  No problems with previous Anesthesia    Social:  Non-Smoker    EENT/Dental:   Left eye cataract   Musculoskeletal:   Arthritis     Neurological:   Neuromuscular Disease,    Endocrine:   Hypothyroidism  Obesity / BMI > 30      Physical Exam  General: Well nourished, Cooperative, Alert and Oriented    Airway:  Mallampati: II   Mouth Opening: Normal  TM Distance: Normal  Tongue: Normal  Neck ROM: Normal ROM    Dental:  Intact    Chest/Lungs:  Normal Respiratory Rate    Heart:  Rate: Normal        Anesthesia Plan  Type of Anesthesia, risks & benefits discussed:    Anesthesia Type: MAC  Intra-op Monitoring Plan: Standard ASA Monitors  Post Op Pain Control Plan: multimodal analgesia  Induction:  IV  Informed Consent: Informed consent signed with the Patient and all parties understand the risks and agree with anesthesia plan.  All questions answered.   ASA Score: 2    Ready For Surgery From Anesthesia Perspective.     .

## 2023-07-20 ENCOUNTER — OFFICE VISIT (OUTPATIENT)
Dept: OPHTHALMOLOGY | Facility: CLINIC | Age: 68
End: 2023-07-20
Payer: MEDICARE

## 2023-07-20 DIAGNOSIS — Z98.42 STATUS POST CATARACT SURGERY, LEFT: Primary | ICD-10-CM

## 2023-07-20 PROCEDURE — 3288F PR FALLS RISK ASSESSMENT DOCUMENTED: ICD-10-PCS | Mod: CPTII,S$GLB,, | Performed by: OPHTHALMOLOGY

## 2023-07-20 PROCEDURE — 99999 PR PBB SHADOW E&M-EST. PATIENT-LVL III: ICD-10-PCS | Mod: PBBFAC,,, | Performed by: OPHTHALMOLOGY

## 2023-07-20 PROCEDURE — 99999 PR PBB SHADOW E&M-EST. PATIENT-LVL III: CPT | Mod: PBBFAC,,, | Performed by: OPHTHALMOLOGY

## 2023-07-20 PROCEDURE — 1160F PR REVIEW ALL MEDS BY PRESCRIBER/CLIN PHARMACIST DOCUMENTED: ICD-10-PCS | Mod: CPTII,S$GLB,, | Performed by: OPHTHALMOLOGY

## 2023-07-20 PROCEDURE — 3044F HG A1C LEVEL LT 7.0%: CPT | Mod: CPTII,S$GLB,, | Performed by: OPHTHALMOLOGY

## 2023-07-20 PROCEDURE — 1101F PR PT FALLS ASSESS DOC 0-1 FALLS W/OUT INJ PAST YR: ICD-10-PCS | Mod: CPTII,S$GLB,, | Performed by: OPHTHALMOLOGY

## 2023-07-20 PROCEDURE — 1126F AMNT PAIN NOTED NONE PRSNT: CPT | Mod: CPTII,S$GLB,, | Performed by: OPHTHALMOLOGY

## 2023-07-20 PROCEDURE — 1159F MED LIST DOCD IN RCRD: CPT | Mod: CPTII,S$GLB,, | Performed by: OPHTHALMOLOGY

## 2023-07-20 PROCEDURE — 3288F FALL RISK ASSESSMENT DOCD: CPT | Mod: CPTII,S$GLB,, | Performed by: OPHTHALMOLOGY

## 2023-07-20 PROCEDURE — 1101F PT FALLS ASSESS-DOCD LE1/YR: CPT | Mod: CPTII,S$GLB,, | Performed by: OPHTHALMOLOGY

## 2023-07-20 PROCEDURE — 1159F PR MEDICATION LIST DOCUMENTED IN MEDICAL RECORD: ICD-10-PCS | Mod: CPTII,S$GLB,, | Performed by: OPHTHALMOLOGY

## 2023-07-20 PROCEDURE — 1160F RVW MEDS BY RX/DR IN RCRD: CPT | Mod: CPTII,S$GLB,, | Performed by: OPHTHALMOLOGY

## 2023-07-20 PROCEDURE — 99024 POSTOP FOLLOW-UP VISIT: CPT | Mod: S$GLB,,, | Performed by: OPHTHALMOLOGY

## 2023-07-20 PROCEDURE — 1126F PR PAIN SEVERITY QUANTIFIED, NO PAIN PRESENT: ICD-10-PCS | Mod: CPTII,S$GLB,, | Performed by: OPHTHALMOLOGY

## 2023-07-20 PROCEDURE — 3044F PR MOST RECENT HEMOGLOBIN A1C LEVEL <7.0%: ICD-10-PCS | Mod: CPTII,S$GLB,, | Performed by: OPHTHALMOLOGY

## 2023-07-20 PROCEDURE — 99024 PR POST-OP FOLLOW-UP VISIT: ICD-10-PCS | Mod: S$GLB,,, | Performed by: OPHTHALMOLOGY

## 2023-07-20 NOTE — PROGRESS NOTES
HPI    1 day s/p phaco IOL OS done on 7/19/2023. OS- Near     Pt states denies pain/ FOL/ floaters.  Compliant with post op gtts.     PF OS QID   Keto OS QID  Moxi OS QID           Last edited by Antonieta Coronado on 7/20/2023  8:06 AM.            Assessment /Plan     For exam results, see Encounter Report.    Status post cataract surgery, left      Doing well  Post op precautions and instructions reviewed, sheet given  moxi QID  PF QID  Keto qdaily    F/u 1 month, refract PRN

## 2023-08-03 DIAGNOSIS — E78.00 PURE HYPERCHOLESTEROLEMIA: ICD-10-CM

## 2023-08-06 RX ORDER — ROSUVASTATIN CALCIUM 40 MG/1
40 TABLET, COATED ORAL DAILY
Qty: 30 TABLET | Refills: 0 | OUTPATIENT
Start: 2023-08-06 | End: 2023-10-26 | Stop reason: SDUPTHER

## 2023-08-17 ENCOUNTER — OFFICE VISIT (OUTPATIENT)
Dept: OPHTHALMOLOGY | Facility: CLINIC | Age: 68
End: 2023-08-17
Payer: MEDICARE

## 2023-08-17 DIAGNOSIS — Z98.42 STATUS POST CATARACT SURGERY, LEFT: Primary | ICD-10-CM

## 2023-08-17 PROCEDURE — 3288F PR FALLS RISK ASSESSMENT DOCUMENTED: ICD-10-PCS | Mod: CPTII,S$GLB,, | Performed by: OPHTHALMOLOGY

## 2023-08-17 PROCEDURE — 99024 POSTOP FOLLOW-UP VISIT: CPT | Mod: S$GLB,,, | Performed by: OPHTHALMOLOGY

## 2023-08-17 PROCEDURE — 1101F PR PT FALLS ASSESS DOC 0-1 FALLS W/OUT INJ PAST YR: ICD-10-PCS | Mod: CPTII,S$GLB,, | Performed by: OPHTHALMOLOGY

## 2023-08-17 PROCEDURE — 1160F RVW MEDS BY RX/DR IN RCRD: CPT | Mod: CPTII,S$GLB,, | Performed by: OPHTHALMOLOGY

## 2023-08-17 PROCEDURE — 99024 PR POST-OP FOLLOW-UP VISIT: ICD-10-PCS | Mod: S$GLB,,, | Performed by: OPHTHALMOLOGY

## 2023-08-17 PROCEDURE — 1126F AMNT PAIN NOTED NONE PRSNT: CPT | Mod: CPTII,S$GLB,, | Performed by: OPHTHALMOLOGY

## 2023-08-17 PROCEDURE — 1126F PR PAIN SEVERITY QUANTIFIED, NO PAIN PRESENT: ICD-10-PCS | Mod: CPTII,S$GLB,, | Performed by: OPHTHALMOLOGY

## 2023-08-17 PROCEDURE — 99999 PR PBB SHADOW E&M-EST. PATIENT-LVL II: ICD-10-PCS | Mod: PBBFAC,,, | Performed by: OPHTHALMOLOGY

## 2023-08-17 PROCEDURE — 3044F PR MOST RECENT HEMOGLOBIN A1C LEVEL <7.0%: ICD-10-PCS | Mod: CPTII,S$GLB,, | Performed by: OPHTHALMOLOGY

## 2023-08-17 PROCEDURE — 1101F PT FALLS ASSESS-DOCD LE1/YR: CPT | Mod: CPTII,S$GLB,, | Performed by: OPHTHALMOLOGY

## 2023-08-17 PROCEDURE — 1160F PR REVIEW ALL MEDS BY PRESCRIBER/CLIN PHARMACIST DOCUMENTED: ICD-10-PCS | Mod: CPTII,S$GLB,, | Performed by: OPHTHALMOLOGY

## 2023-08-17 PROCEDURE — 3288F FALL RISK ASSESSMENT DOCD: CPT | Mod: CPTII,S$GLB,, | Performed by: OPHTHALMOLOGY

## 2023-08-17 PROCEDURE — 1159F MED LIST DOCD IN RCRD: CPT | Mod: CPTII,S$GLB,, | Performed by: OPHTHALMOLOGY

## 2023-08-17 PROCEDURE — 3044F HG A1C LEVEL LT 7.0%: CPT | Mod: CPTII,S$GLB,, | Performed by: OPHTHALMOLOGY

## 2023-08-17 PROCEDURE — 99999 PR PBB SHADOW E&M-EST. PATIENT-LVL II: CPT | Mod: PBBFAC,,, | Performed by: OPHTHALMOLOGY

## 2023-08-17 PROCEDURE — 1159F PR MEDICATION LIST DOCUMENTED IN MEDICAL RECORD: ICD-10-PCS | Mod: CPTII,S$GLB,, | Performed by: OPHTHALMOLOGY

## 2023-08-17 NOTE — PROGRESS NOTES
HPI    Pt presents for one month post op PCIOL OU     States eyes are doing great.     Finished all ocular meds       Last edited by Michelle Keys on 8/17/2023 10:46 AM.            Assessment /Plan     For exam results, see Encounter Report.    Status post cataract surgery, left      POM1 OS, POM2 OD  Doing great  Monovision    Continue artificial tears regularly    F/u 1 year, routine exam  Optometry

## 2023-08-22 ENCOUNTER — PATIENT MESSAGE (OUTPATIENT)
Dept: OPHTHALMOLOGY | Facility: CLINIC | Age: 68
End: 2023-08-22
Payer: MEDICARE

## 2023-08-28 ENCOUNTER — OFFICE VISIT (OUTPATIENT)
Dept: ORTHOPEDICS | Facility: CLINIC | Age: 68
End: 2023-08-28
Payer: MEDICARE

## 2023-08-28 VITALS — BODY MASS INDEX: 31.89 KG/M2 | HEIGHT: 63 IN | WEIGHT: 180 LBS

## 2023-08-28 DIAGNOSIS — S93.401A SPRAIN OF RIGHT ANKLE, UNSPECIFIED LIGAMENT, INITIAL ENCOUNTER: Primary | ICD-10-CM

## 2023-08-28 PROCEDURE — 1125F AMNT PAIN NOTED PAIN PRSNT: CPT | Mod: CPTII,S$GLB,, | Performed by: PHYSICIAN ASSISTANT

## 2023-08-28 PROCEDURE — 3044F PR MOST RECENT HEMOGLOBIN A1C LEVEL <7.0%: ICD-10-PCS | Mod: CPTII,S$GLB,, | Performed by: PHYSICIAN ASSISTANT

## 2023-08-28 PROCEDURE — 1159F PR MEDICATION LIST DOCUMENTED IN MEDICAL RECORD: ICD-10-PCS | Mod: CPTII,S$GLB,, | Performed by: PHYSICIAN ASSISTANT

## 2023-08-28 PROCEDURE — 3044F HG A1C LEVEL LT 7.0%: CPT | Mod: CPTII,S$GLB,, | Performed by: PHYSICIAN ASSISTANT

## 2023-08-28 PROCEDURE — 99213 OFFICE O/P EST LOW 20 MIN: CPT | Mod: S$GLB,,, | Performed by: PHYSICIAN ASSISTANT

## 2023-08-28 PROCEDURE — 1160F RVW MEDS BY RX/DR IN RCRD: CPT | Mod: CPTII,S$GLB,, | Performed by: PHYSICIAN ASSISTANT

## 2023-08-28 PROCEDURE — 99213 PR OFFICE/OUTPT VISIT, EST, LEVL III, 20-29 MIN: ICD-10-PCS | Mod: S$GLB,,, | Performed by: PHYSICIAN ASSISTANT

## 2023-08-28 PROCEDURE — 3288F FALL RISK ASSESSMENT DOCD: CPT | Mod: CPTII,S$GLB,, | Performed by: PHYSICIAN ASSISTANT

## 2023-08-28 PROCEDURE — 1159F MED LIST DOCD IN RCRD: CPT | Mod: CPTII,S$GLB,, | Performed by: PHYSICIAN ASSISTANT

## 2023-08-28 PROCEDURE — 1101F PR PT FALLS ASSESS DOC 0-1 FALLS W/OUT INJ PAST YR: ICD-10-PCS | Mod: CPTII,S$GLB,, | Performed by: PHYSICIAN ASSISTANT

## 2023-08-28 PROCEDURE — 3288F PR FALLS RISK ASSESSMENT DOCUMENTED: ICD-10-PCS | Mod: CPTII,S$GLB,, | Performed by: PHYSICIAN ASSISTANT

## 2023-08-28 PROCEDURE — 3008F BODY MASS INDEX DOCD: CPT | Mod: CPTII,S$GLB,, | Performed by: PHYSICIAN ASSISTANT

## 2023-08-28 PROCEDURE — 1160F PR REVIEW ALL MEDS BY PRESCRIBER/CLIN PHARMACIST DOCUMENTED: ICD-10-PCS | Mod: CPTII,S$GLB,, | Performed by: PHYSICIAN ASSISTANT

## 2023-08-28 PROCEDURE — 1101F PT FALLS ASSESS-DOCD LE1/YR: CPT | Mod: CPTII,S$GLB,, | Performed by: PHYSICIAN ASSISTANT

## 2023-08-28 PROCEDURE — 1125F PR PAIN SEVERITY QUANTIFIED, PAIN PRESENT: ICD-10-PCS | Mod: CPTII,S$GLB,, | Performed by: PHYSICIAN ASSISTANT

## 2023-08-28 PROCEDURE — 3008F PR BODY MASS INDEX (BMI) DOCUMENTED: ICD-10-PCS | Mod: CPTII,S$GLB,, | Performed by: PHYSICIAN ASSISTANT

## 2023-08-28 NOTE — PROGRESS NOTES
[facility]  [address]  [place] [address]  Phone: [phone]   [phone]    Patient's PCP: Katherine Ferguson MD  Referring Provider: No ref. provider found    Subjective:      Chief Complaint:   Chief Complaint   Patient presents with    Right Ankle - Pain     Right ankle pain that started after she stepped into a hole in her grass and twisted her ankle. States that she felt a loud pop. Very painful to walk on       Past Medical History:   Diagnosis Date    Allergies     Bulging disc     lower back     Carpal tunnel syndrome     Degenerative disc disease     High cholesterol     Pinched nerve in neck        Past Surgical History:   Procedure Laterality Date    ARTHROSCOPIC REPAIR OF ROTATOR CUFF OF SHOULDER Right 3/10/2021    Procedure: REPAIR, ROTATOR CUFF, ARTHROSCOPIC;  Surgeon: Dustin Vanegas MD;  Location: [facility] OR;  Service: Orthopedics;  Laterality: Right;  arthrex notified    ARTHROSCOPY OF SHOULDER WITH DECOMPRESSION OF SUBACROMIAL SPACE Right 3/10/2021    Procedure: ARTHROSCOPY, SHOULDER, WITH SUBACROMIAL SPACE DECOMPRESSION;  Surgeon: Dustin Vanegas MD;  Location: [facility] OR;  Service: Orthopedics;  Laterality: Right;    BLADDER SUSPENSION  1990    CATARACT EXTRACTION W/  INTRAOCULAR LENS IMPLANT Right 6/21/2023    Procedure: CEIOL OD;  Surgeon: Shawn Shearer MD;  Location: [facility] OR;  Service: Ophthalmology;  Laterality: Right;    CATARACT EXTRACTION W/  INTRAOCULAR LENS IMPLANT Left 7/19/2023    Procedure: CEIOL OS;  Surgeon: Shawn Shearer MD;  Location: [facility] OR;  Service: Ophthalmology;  Laterality: Left;    DISTAL CLAVICLE EXCISION Right 3/10/2021    Procedure: EXCISION, CLAVICLE, DISTAL;  Surgeon: Dustin Vanegas MD;  Location: [facility] OR;  Service: Orthopedics;  Laterality: Right;    FOOT SURGERY Bilateral 2001    FOOT SURGERY Right 05/13/2022    HAND SURGERY Left 2017    HYSTERECTOMY  1990    JOINT REPLACEMENT  2013    KNEE SURGERY Bilateral 2013    MANDIBLE FRACTURE  SURGERY  1981    SHOULDER ARTHROSCOPY Left 02/27/2019    THYROIDECTOMY Left 05/13/2019        TONSILLECTOMY         Current Outpatient Medications   Medication Sig    cyanocobalamin 1,000 mcg/mL injection Inject every week 1 cc    levocetirizine (XYZAL) 5 MG tablet Take 1 tablet (5 mg total) by mouth every evening.    metFORMIN (GLUCOPHAGE) 500 MG tablet Take 1 tablet (500 mg total) by mouth 2 (two) times daily with meals.    phentermine (ADIPEX-P) 37.5 mg tablet TAKE 1 TABLET(37.5 MG) BY MOUTH EVERY MORNING    tolterodine (DETROL LA) 4 MG 24 hr capsule Take 1 capsule (4 mg total) by mouth once daily.    traMADoL (ULTRAM) 50 mg tablet Take 1 tablet (50 mg total) by mouth every 4 (four) hours as needed for Pain.    mometasone (ASMANEX TWISTHALER) 220 mcg/ actuation (30) inhaler Inhale 2 puffs into the lungs once daily. Controller (Patient not taking: Reported on 8/28/2023)    rosuvastatin (CRESTOR) 40 MG Tab Take 1 tablet (40 mg total) by mouth once daily. (Patient not taking: Reported on 8/28/2023)     No current facility-administered medications for this visit.       Review of patient's allergies indicates:   Allergen Reactions    Estrogens Other (See Comments)     Mini stroke    Gabapentin Itching and Rash    Tetanus vaccines and toxoid Swelling and Other (See Comments)     Swelling at injection site, fever    Shingrix (pf)  [varicella-zoster ge-as01b (pf)] Diarrhea, Itching and Nausea And Vomiting       Family History   Problem Relation Age of Onset    Arthritis Mother     Aneurysm Mother         Abdominal Aneurysm     Arthritis Father     Diabetes Father     Hearing loss Father     Heart disease Father     Hypertension Father     Dementia Father     Diabetes Paternal Grandmother     Heart disease Paternal Grandfather     Leukemia Son     Crohn's disease Son     Ankylosing spondylitis Son     Other Son         avascular necrosis of pancho hips    Rheumatologic disease Son     Heart defect Son     Kidney  failure Son     SIDS Maternal Aunt     Uterine cancer Maternal Aunt     Dementia Paternal Aunt     No Known Problems Sister     Heart disease Brother         stent placement    Heart disease Brother         stent placement    Aneurysm Brother         Abdominal Aneurysm    No Known Problems Sister        Social History     Socioeconomic History    Marital status:     Number of children: 3   Tobacco Use    Smoking status: Never    Smokeless tobacco: Never   Substance and Sexual Activity    Alcohol use: Never     Alcohol/week: 0.0 standard drinks of alcohol    Drug use: No    Sexual activity: Yes     Birth control/protection: None     Social Determinants of Health     Financial Resource Strain: Low Risk  (6/7/2023)    Overall Financial Resource Strain (CARDIA)     Difficulty of Paying Living Expenses: Not hard at all   Food Insecurity: No Food Insecurity (6/7/2023)    Hunger Vital Sign     Worried About Running Out of Food in the Last Year: Never true     Ran Out of Food in the Last Year: Never true   Transportation Needs: No Transportation Needs (6/7/2023)    PRAPARE - Transportation     Lack of Transportation (Medical): No     Lack of Transportation (Non-Medical): No   Physical Activity: Sufficiently Active (6/7/2023)    Exercise Vital Sign     Days of Exercise per Week: 5 days     Minutes of Exercise per Session: 30 min   Stress: No Stress Concern Present (6/7/2023)    [employer] - Occupational Stress Questionnaire     Feeling of Stress : Not at all   Social Connections: Unknown (6/7/2023)    Social Connection and Isolation Panel [NHANES]     Frequency of Communication with Friends and Family: More than three times a week     Frequency of Social Gatherings with Friends and Family: More than three times a week     Active Member of Clubs or Organizations: Yes     Attends Club or Organization Meetings: More than 4 times per year     Marital Status:    Housing Stability: Low  Risk  (6/7/2023)    Housing Stability Vital Sign     Unable to Pay for Housing in the Last Year: No     Number of Places Lived in the Last Year: 1     Unstable Housing in the Last Year: No       History of present illness:  Nalini comes in today with a chief complaint ankle pain after an inversion injury about 4 days.  She has been able to weight bear on the ankle but is painful for her.  She does complain of soft tissue swelling as well.    Review of Systems:    Constitutional: Negative for chills, fever and weight loss.   HENT: Negative for congestion.    Eyes: Negative for discharge and redness.   Respiratory: Negative for cough and shortness of breath.    Cardiovascular: Negative for chest pain.   Gastrointestinal: Negative for nausea and vomiting.   Musculoskeletal: See HPI.   Skin: Negative for rash.   Neurological: Negative for headaches.   Endo/Heme/Allergies: Does not bruise/bleed easily.   Psychiatric/Behavioral: The patient is not nervous/anxious.    All other systems reviewed and are negative.       Objective:      Physical Examination:    Vital Signs:  There were no vitals filed for this visit.    Body mass index is 31.89 kg/m².    This a well-developed, well nourished patient in no acute distress.  They are alert and oriented and cooperative to examination.     Right ankle exam: Skin to right foot and ankle is intact.  No erythema or ecchymosis.  No signs or of infection.  She is neurovascularly intact throughout the right lower extremity.  Right calf is soft and nontender.  She does have some diffuse soft tissue swelling to the lateral aspect of the right ankle.  She is tender along the ATFL, CFL, and PTFL ligaments.  She is also slightly tender over the medial deltoids as well.  She can dorsiflex and plantar flex at the right ankle but it is painful for her.  Her right EHL is intact.  Capillary refill is brisk.  She can wiggle all toes.  She can weightbear as tolerated right lower  extremity.    Pertinent New Results:        XRAY Report / Interpretation:   Three views were taken of the right ankle today:  AP, lateral, and oblique views.  They reveal no acute fractures or dislocations.  Visualized soft tissues appear unremarkable.  She does have degenerative changes about the right ankle and midfoot.  She has multiple osteophytes extending from the medial and lateral malleolus consistent with chronic ankle injuries/sprains.      Assessment:       1. Sprain of right ankle, unspecified ligament, initial encounter      Plan:     Sprain of right ankle, unspecified ligament, initial encounter  -     X-Ray Ankle Complete Right  -     Ambulatory referral/consult to Physical/Occupational Therapy; Future; Expected date: 09/04/2023        Follow up in about 6 weeks (around 10/9/2023) for PT/OT.    We will place her in a lace-up corset for support.  She will wear this all times while up and about.  We will get her in physical therapy to help usher this to resolution.  We will check her back in 6 weeks to see how she is doing.        Josiah Foreman, JANICES, PA-C    This note was created using [employer] voice recognition software that occasionally misinterprets words or phrases.

## 2023-08-29 ENCOUNTER — OFFICE VISIT (OUTPATIENT)
Dept: OPTOMETRY | Facility: CLINIC | Age: 68
End: 2023-08-29
Payer: MEDICARE

## 2023-08-29 DIAGNOSIS — Z01.00 EXAMINATION OF EYES AND VISION: Primary | ICD-10-CM

## 2023-08-29 DIAGNOSIS — H52.7 REFRACTIVE ERROR: ICD-10-CM

## 2023-08-29 DIAGNOSIS — Z96.1 PSEUDOPHAKIA: ICD-10-CM

## 2023-08-29 PROCEDURE — 1126F AMNT PAIN NOTED NONE PRSNT: CPT | Mod: CPTII,S$GLB,, | Performed by: OPTOMETRIST

## 2023-08-29 PROCEDURE — 92015 DETERMINE REFRACTIVE STATE: CPT | Mod: S$GLB,,, | Performed by: OPTOMETRIST

## 2023-08-29 PROCEDURE — 92004 COMPRE OPH EXAM NEW PT 1/>: CPT | Mod: S$GLB,,, | Performed by: OPTOMETRIST

## 2023-08-29 PROCEDURE — 1126F PR PAIN SEVERITY QUANTIFIED, NO PAIN PRESENT: ICD-10-PCS | Mod: CPTII,S$GLB,, | Performed by: OPTOMETRIST

## 2023-08-29 PROCEDURE — 1160F PR REVIEW ALL MEDS BY PRESCRIBER/CLIN PHARMACIST DOCUMENTED: ICD-10-PCS | Mod: CPTII,S$GLB,, | Performed by: OPTOMETRIST

## 2023-08-29 PROCEDURE — 99999 PR PBB SHADOW E&M-EST. PATIENT-LVL II: ICD-10-PCS | Mod: PBBFAC,,, | Performed by: OPTOMETRIST

## 2023-08-29 PROCEDURE — 1159F MED LIST DOCD IN RCRD: CPT | Mod: CPTII,S$GLB,, | Performed by: OPTOMETRIST

## 2023-08-29 PROCEDURE — 1159F PR MEDICATION LIST DOCUMENTED IN MEDICAL RECORD: ICD-10-PCS | Mod: CPTII,S$GLB,, | Performed by: OPTOMETRIST

## 2023-08-29 PROCEDURE — 3044F HG A1C LEVEL LT 7.0%: CPT | Mod: CPTII,S$GLB,, | Performed by: OPTOMETRIST

## 2023-08-29 PROCEDURE — 92015 PR REFRACTION: ICD-10-PCS | Mod: S$GLB,,, | Performed by: OPTOMETRIST

## 2023-08-29 PROCEDURE — 99999 PR PBB SHADOW E&M-EST. PATIENT-LVL II: CPT | Mod: PBBFAC,,, | Performed by: OPTOMETRIST

## 2023-08-29 PROCEDURE — 1101F PR PT FALLS ASSESS DOC 0-1 FALLS W/OUT INJ PAST YR: ICD-10-PCS | Mod: CPTII,S$GLB,, | Performed by: OPTOMETRIST

## 2023-08-29 PROCEDURE — 92004 PR EYE EXAM, NEW PATIENT,COMPREHESV: ICD-10-PCS | Mod: S$GLB,,, | Performed by: OPTOMETRIST

## 2023-08-29 PROCEDURE — 1160F RVW MEDS BY RX/DR IN RCRD: CPT | Mod: CPTII,S$GLB,, | Performed by: OPTOMETRIST

## 2023-08-29 PROCEDURE — 3044F PR MOST RECENT HEMOGLOBIN A1C LEVEL <7.0%: ICD-10-PCS | Mod: CPTII,S$GLB,, | Performed by: OPTOMETRIST

## 2023-08-29 PROCEDURE — 2023F PR DILATED RETINAL EXAM W/O EVID OF RETINOPATHY: ICD-10-PCS | Mod: CPTII,S$GLB,, | Performed by: OPTOMETRIST

## 2023-08-29 PROCEDURE — 1101F PT FALLS ASSESS-DOCD LE1/YR: CPT | Mod: CPTII,S$GLB,, | Performed by: OPTOMETRIST

## 2023-08-29 PROCEDURE — 3288F FALL RISK ASSESSMENT DOCD: CPT | Mod: CPTII,S$GLB,, | Performed by: OPTOMETRIST

## 2023-08-29 PROCEDURE — 2023F DILAT RTA XM W/O RTNOPTHY: CPT | Mod: CPTII,S$GLB,, | Performed by: OPTOMETRIST

## 2023-08-29 PROCEDURE — 3288F PR FALLS RISK ASSESSMENT DOCUMENTED: ICD-10-PCS | Mod: CPTII,S$GLB,, | Performed by: OPTOMETRIST

## 2023-08-29 NOTE — PROGRESS NOTES
HPI    67 YO female presents today for a refraction. Patient states that she   needs a prescription for glasses for sewing. S/P CE w/IOL OU monovision.   Last edited by Nuzhat Hankins on 8/29/2023  1:51 PM.            Assessment /Plan     For exam results, see Encounter Report.    Examination of eyes and vision    Pseudophakia    Refractive error      1. Examination of eyes and vision    2. Pseudophakia  S/p cataract extraction for monovision  No PCO    3. Refractive error  Dispensed updated spectacle Rx. Discussed various spectacle lens options. Discussed adaptation period to new specs.   Ok to try otc readers -- OD +2.25 // OS +1.00

## 2023-10-16 ENCOUNTER — HOSPITAL ENCOUNTER (OUTPATIENT)
Facility: HOSPITAL | Age: 68
Discharge: HOME | End: 2023-10-17
Attending: EMERGENCY MEDICINE | Admitting: INTERNAL MEDICINE
Payer: MEDICARE

## 2023-10-16 ENCOUNTER — TELEPHONE (OUTPATIENT)
Dept: CARDIOLOGY | Facility: CLINIC | Age: 68
End: 2023-10-16
Payer: MEDICARE

## 2023-10-16 DIAGNOSIS — I25.119 CHEST PAIN DUE TO CORONARY ARTERY DISEASE: ICD-10-CM

## 2023-10-16 DIAGNOSIS — R07.9 CHEST PAIN: ICD-10-CM

## 2023-10-16 LAB
ALBUMIN SERPL BCP-MCNC: 4.2 G/DL (ref 3.5–5.2)
ALP SERPL-CCNC: 70 U/L (ref 55–135)
ALT SERPL W/O P-5'-P-CCNC: 17 U/L (ref 10–44)
ANION GAP SERPL CALC-SCNC: 5 MMOL/L (ref 8–16)
AST SERPL-CCNC: 19 U/L (ref 10–40)
BASOPHILS # BLD AUTO: 0.05 K/UL (ref 0–0.2)
BASOPHILS NFR BLD: 0.7 % (ref 0–1.9)
BILIRUB SERPL-MCNC: 0.3 MG/DL (ref 0.1–1)
BNP SERPL-MCNC: 57 PG/ML (ref 0–99)
BUN SERPL-MCNC: 14 MG/DL (ref 8–23)
CALCIUM SERPL-MCNC: 8.8 MG/DL (ref 8.7–10.5)
CHLORIDE SERPL-SCNC: 107 MMOL/L (ref 95–110)
CHOLEST SERPL-MCNC: 232 MG/DL (ref 120–199)
CHOLEST/HDLC SERPL: 6.3 {RATIO} (ref 2–5)
CO2 SERPL-SCNC: 26 MMOL/L (ref 23–29)
CREAT SERPL-MCNC: 0.8 MG/DL (ref 0.5–1.4)
DIFFERENTIAL METHOD: ABNORMAL
EOSINOPHIL # BLD AUTO: 0.2 K/UL (ref 0–0.5)
EOSINOPHIL NFR BLD: 2.8 % (ref 0–8)
ERYTHROCYTE [DISTWIDTH] IN BLOOD BY AUTOMATED COUNT: 13.3 % (ref 11.5–14.5)
EST. GFR  (NO RACE VARIABLE): >60 ML/MIN/1.73 M^2
GLUCOSE SERPL-MCNC: 168 MG/DL (ref 70–110)
GLUCOSE SERPL-MCNC: 81 MG/DL (ref 70–110)
GLUCOSE SERPL-MCNC: 94 MG/DL (ref 70–110)
HCT VFR BLD AUTO: 38 % (ref 37–48.5)
HDLC SERPL-MCNC: 37 MG/DL (ref 40–75)
HDLC SERPL: 15.9 % (ref 20–50)
HGB BLD-MCNC: 11.9 G/DL (ref 12–16)
IMM GRANULOCYTES # BLD AUTO: 0.02 K/UL (ref 0–0.04)
IMM GRANULOCYTES NFR BLD AUTO: 0.3 % (ref 0–0.5)
INR PPP: 0.9 (ref 0.8–1.2)
LDLC SERPL CALC-MCNC: 122.2 MG/DL (ref 63–159)
LYMPHOCYTES # BLD AUTO: 2.4 K/UL (ref 1–4.8)
LYMPHOCYTES NFR BLD: 31.4 % (ref 18–48)
MCH RBC QN AUTO: 27.1 PG (ref 27–31)
MCHC RBC AUTO-ENTMCNC: 31.3 G/DL (ref 32–36)
MCV RBC AUTO: 87 FL (ref 82–98)
MONOCYTES # BLD AUTO: 0.7 K/UL (ref 0.3–1)
MONOCYTES NFR BLD: 8.7 % (ref 4–15)
NEUTROPHILS # BLD AUTO: 4.2 K/UL (ref 1.8–7.7)
NEUTROPHILS NFR BLD: 56.1 % (ref 38–73)
NONHDLC SERPL-MCNC: 195 MG/DL
NRBC BLD-RTO: 0 /100 WBC
PLATELET # BLD AUTO: 312 K/UL (ref 150–450)
PMV BLD AUTO: 9.4 FL (ref 9.2–12.9)
POTASSIUM SERPL-SCNC: 4.1 MMOL/L (ref 3.5–5.1)
PROT SERPL-MCNC: 6.5 G/DL (ref 6–8.4)
PROTHROMBIN TIME: 10.3 SEC (ref 9–12.5)
RBC # BLD AUTO: 4.39 M/UL (ref 4–5.4)
SODIUM SERPL-SCNC: 138 MMOL/L (ref 136–145)
TRIGL SERPL-MCNC: 364 MG/DL (ref 30–150)
TROPONIN I SERPL HS-MCNC: 4.8 PG/ML (ref 0–14.9)
TROPONIN I SERPL HS-MCNC: 4.9 PG/ML (ref 0–14.9)
TROPONIN I SERPL HS-MCNC: 5.4 PG/ML (ref 0–14.9)
TROPONIN I SERPL HS-MCNC: 5.4 PG/ML (ref 0–14.9)
WBC # BLD AUTO: 7.55 K/UL (ref 3.9–12.7)

## 2023-10-16 PROCEDURE — 85610 PROTHROMBIN TIME: CPT | Performed by: NURSE PRACTITIONER

## 2023-10-16 PROCEDURE — 80061 LIPID PANEL: CPT | Performed by: INTERNAL MEDICINE

## 2023-10-16 PROCEDURE — G0378 HOSPITAL OBSERVATION PER HR: HCPCS

## 2023-10-16 PROCEDURE — 93005 ELECTROCARDIOGRAM TRACING: CPT | Performed by: GENERAL PRACTICE

## 2023-10-16 PROCEDURE — 84484 ASSAY OF TROPONIN QUANT: CPT | Mod: 91 | Performed by: INTERNAL MEDICINE

## 2023-10-16 PROCEDURE — 99285 EMERGENCY DEPT VISIT HI MDM: CPT | Mod: 25

## 2023-10-16 PROCEDURE — 82962 GLUCOSE BLOOD TEST: CPT

## 2023-10-16 PROCEDURE — 93010 ELECTROCARDIOGRAM REPORT: CPT | Mod: ,,, | Performed by: GENERAL PRACTICE

## 2023-10-16 PROCEDURE — 85025 COMPLETE CBC W/AUTO DIFF WBC: CPT | Performed by: NURSE PRACTITIONER

## 2023-10-16 PROCEDURE — 93010 EKG 12-LEAD: ICD-10-PCS | Mod: ,,, | Performed by: GENERAL PRACTICE

## 2023-10-16 PROCEDURE — 80053 COMPREHEN METABOLIC PANEL: CPT | Performed by: NURSE PRACTITIONER

## 2023-10-16 PROCEDURE — 36415 COLL VENOUS BLD VENIPUNCTURE: CPT | Performed by: INTERNAL MEDICINE

## 2023-10-16 PROCEDURE — 25000242 PHARM REV CODE 250 ALT 637 W/ HCPCS: Performed by: INTERNAL MEDICINE

## 2023-10-16 PROCEDURE — 83880 ASSAY OF NATRIURETIC PEPTIDE: CPT | Performed by: NURSE PRACTITIONER

## 2023-10-16 PROCEDURE — 93010 ELECTROCARDIOGRAM REPORT: CPT | Mod: 76,,, | Performed by: GENERAL PRACTICE

## 2023-10-16 PROCEDURE — 84484 ASSAY OF TROPONIN QUANT: CPT | Mod: 91 | Performed by: NURSE PRACTITIONER

## 2023-10-16 RX ORDER — NAPROXEN SODIUM 220 MG/1
81 TABLET, FILM COATED ORAL DAILY
Status: DISCONTINUED | OUTPATIENT
Start: 2023-10-17 | End: 2023-10-17 | Stop reason: HOSPADM

## 2023-10-16 RX ORDER — ACETAMINOPHEN 325 MG/1
650 TABLET ORAL EVERY 6 HOURS PRN
Status: DISCONTINUED | OUTPATIENT
Start: 2023-10-16 | End: 2023-10-17 | Stop reason: HOSPADM

## 2023-10-16 RX ORDER — NITROGLYCERIN 0.4 MG/1
0.4 TABLET SUBLINGUAL EVERY 5 MIN PRN
Status: DISCONTINUED | OUTPATIENT
Start: 2023-10-16 | End: 2023-10-17 | Stop reason: HOSPADM

## 2023-10-16 RX ORDER — ATORVASTATIN CALCIUM 40 MG/1
80 TABLET, FILM COATED ORAL NIGHTLY
Status: DISCONTINUED | OUTPATIENT
Start: 2023-10-17 | End: 2023-10-17 | Stop reason: HOSPADM

## 2023-10-16 RX ORDER — IBUPROFEN 200 MG
16 TABLET ORAL
Status: DISCONTINUED | OUTPATIENT
Start: 2023-10-16 | End: 2023-10-17 | Stop reason: HOSPADM

## 2023-10-16 RX ORDER — IBUPROFEN 200 MG
24 TABLET ORAL
Status: DISCONTINUED | OUTPATIENT
Start: 2023-10-16 | End: 2023-10-17 | Stop reason: HOSPADM

## 2023-10-16 RX ORDER — GLUCAGON 1 MG
1 KIT INJECTION
Status: DISCONTINUED | OUTPATIENT
Start: 2023-10-16 | End: 2023-10-17 | Stop reason: HOSPADM

## 2023-10-16 RX ORDER — INSULIN ASPART 100 [IU]/ML
0-5 INJECTION, SOLUTION INTRAVENOUS; SUBCUTANEOUS
Status: DISCONTINUED | OUTPATIENT
Start: 2023-10-16 | End: 2023-10-17 | Stop reason: HOSPADM

## 2023-10-16 RX ORDER — TALC
6 POWDER (GRAM) TOPICAL NIGHTLY PRN
Status: DISCONTINUED | OUTPATIENT
Start: 2023-10-16 | End: 2023-10-17 | Stop reason: HOSPADM

## 2023-10-16 RX ORDER — SODIUM CHLORIDE 0.9 % (FLUSH) 0.9 %
10 SYRINGE (ML) INJECTION
Status: DISCONTINUED | OUTPATIENT
Start: 2023-10-16 | End: 2023-10-17 | Stop reason: HOSPADM

## 2023-10-16 RX ADMIN — NITROGLYCERIN 0.4 MG: 0.4 TABLET SUBLINGUAL at 06:10

## 2023-10-16 NOTE — TELEPHONE ENCOUNTER
Katie called in stating shew was having chest pain with right shoulder and arm numbness. She also stated this has been happening and getting worse and due to the symptoms the patient explained to me I highly encouraged patient to go the ER for a evaluation because of the symptoms. Patient acknowledge and agreed to go .

## 2023-10-16 NOTE — ED PROVIDER NOTES
Encounter Date: 10/16/2023       History     Chief Complaint   Patient presents with    Chest Pain     68-year-old female presented emergency department with intermittent chest pains worsening over the past 3 weeks and pain is worse with exertion.  Denies fever or chills or nausea vomiting or abdominal pain or weakness or numbness or fever or chills.  Patient states the pain is severe and worse at times when she exerts herself  and feels like pressure and something sitting on her chest and rates it 8/10 at times however now at rest not having pain      Review of patient's allergies indicates:   Allergen Reactions    Estrogens Other (See Comments)     Mini stroke    Gabapentin Itching and Rash    Tetanus vaccines and toxoid Swelling and Other (See Comments)     Swelling at injection site, fever    Shingrix (pf)  [varicella-zoster ge-as01b (pf)] Diarrhea, Itching and Nausea And Vomiting     Past Medical History:   Diagnosis Date    Allergies     Bulging disc     lower back     Carpal tunnel syndrome     Degenerative disc disease     High cholesterol     Pinched nerve in neck      Past Surgical History:   Procedure Laterality Date    ARTHROSCOPIC REPAIR OF ROTATOR CUFF OF SHOULDER Right 3/10/2021    Procedure: REPAIR, ROTATOR CUFF, ARTHROSCOPIC;  Surgeon: Dustin Vanegas MD;  Location: [facility] OR;  Service: Orthopedics;  Laterality: Right;  arthrex notified    ARTHROSCOPY OF SHOULDER WITH DECOMPRESSION OF SUBACROMIAL SPACE Right 3/10/2021    Procedure: ARTHROSCOPY, SHOULDER, WITH SUBACROMIAL SPACE DECOMPRESSION;  Surgeon: Dustin Vanegas MD;  Location: [facility] OR;  Service: Orthopedics;  Laterality: Right;    BLADDER SUSPENSION  1990    CATARACT EXTRACTION W/  INTRAOCULAR LENS IMPLANT Right 6/21/2023    Procedure: CEIOL OD;  Surgeon: Shawn Shearer MD;  Location: [facility] OR;  Service: Ophthalmology;  Laterality: Right;    CATARACT EXTRACTION W/  INTRAOCULAR LENS IMPLANT Left 7/19/2023    Procedure: CEIOL OS;  Surgeon: Shawn PYLE  MD Janki;  [facility];  Service: Ophthalmology;  Laterality: Left;    DISTAL CLAVICLE EXCISION Right 3/10/2021    Procedure: EXCISION, CLAVICLE, DISTAL;  Surgeon: Dustin Vanegas MD;  Location: [facility] OR;  Service: Orthopedics;  Laterality: Right;    FOOT SURGERY Bilateral 2001    FOOT SURGERY Right 05/13/2022    HAND SURGERY Left 2017    HYSTERECTOMY  1990    JOINT REPLACEMENT  2013    KNEE SURGERY Bilateral 2013    MANDIBLE FRACTURE SURGERY  1981    SHOULDER ARTHROSCOPY Left 02/27/2019    THYROIDECTOMY Left 05/13/2019        TONSILLECTOMY       Family History   Problem Relation Age of Onset    Arthritis Mother     Aneurysm Mother         Abdominal Aneurysm     Arthritis Father     Diabetes Father     Hearing loss Father     Heart disease Father     Hypertension Father     Dementia Father     Diabetes Paternal Grandmother     Heart disease Paternal Grandfather     Leukemia Son     Crohn's disease Son     Ankylosing spondylitis Son     Other Son         avascular necrosis of pancho hips    Rheumatologic disease Son     Heart defect Son     Kidney failure Son     SIDS Maternal Aunt     Uterine cancer Maternal Aunt     Dementia Paternal Aunt     No Known Problems Sister     Heart disease Brother         stent placement    Heart disease Brother         stent placement    Aneurysm Brother         Abdominal Aneurysm    No Known Problems Sister      Social History     Tobacco Use    Smoking status: Never    Smokeless tobacco: Never   Substance Use Topics    Alcohol use: Never     Alcohol/week: 0.0 standard drinks of alcohol    Drug use: No     Review of Systems   Constitutional: Negative.    HENT: Negative.     Eyes: Negative.    Respiratory: Negative.     Cardiovascular:  Positive for chest pain.   Gastrointestinal: Negative.    Endocrine: Negative.    Genitourinary: Negative.    Musculoskeletal: Negative.    Skin: Negative.    Allergic/Immunologic: Negative.    Neurological: Negative.    Hematological:  Negative.    Psychiatric/Behavioral: Negative.     All other systems reviewed and are negative.      Physical Exam     Initial Vitals [10/16/23 1151]   BP Pulse Resp Temp SpO2   (!) 177/91 69 16 98.2 °F (36.8 °C) 98 %      MAP       --         Physical Exam    Nursing note and vitals reviewed.  Constitutional: She appears well-developed and well-nourished.   HENT:   Head: Normocephalic and atraumatic.   Nose: Nose normal.   Mouth/Throat: Oropharynx is clear and moist.   Eyes: Conjunctivae and EOM are normal. Pupils are equal, round, and reactive to light.   Neck: Neck supple. No thyromegaly present. No tracheal deviation present. No JVD present.   Normal range of motion.  Cardiovascular:  Normal rate, regular rhythm, normal heart sounds and intact distal pulses.           No murmur heard.  Pulmonary/Chest: Breath sounds normal. No stridor. No respiratory distress. She has no wheezes. She has no rales. She exhibits no tenderness.   Abdominal: Abdomen is soft. Bowel sounds are normal. She exhibits no distension. There is no abdominal tenderness.   Musculoskeletal:         General: No edema. Normal range of motion.      Cervical back: Normal range of motion and neck supple.     Neurological: She is alert and oriented to person, place, and time. She has normal strength. GCS score is 15. GCS eye subscore is 4. GCS verbal subscore is 5. GCS motor subscore is 6.   Skin: Skin is warm and dry. Capillary refill takes less than 2 seconds.   Psychiatric: She has a normal mood and affect. Thought content normal.         ED Course   Procedures  Labs Reviewed   CBC W/ AUTO DIFFERENTIAL - Abnormal; Notable for the following components:       Result Value    Hemoglobin 11.9 (*)     MCHC 31.3 (*)     All other components within normal limits   COMPREHENSIVE METABOLIC PANEL - Abnormal; Notable for the following components:    Anion Gap 5 (*)     All other components within normal limits   PROTIME-INR   B-TYPE NATRIURETIC PEPTIDE    TROPONIN I HIGH SENSITIVITY   TROPONIN I HIGH SENSITIVITY     EKG Readings: (Independently Interpreted)   Initial Reading: No STEMI. Rhythm: Normal Sinus Rhythm. Ectopy: No Ectopy.     ECG Results              EKG 12-lead (In process)  Result time 10/16/23 13:42:36      In process by Interface, Lab In [facility] (10/16/23 13:42:36)                   Narrative:    Test Reason : R07.9,    Vent. Rate : 057 BPM     Atrial Rate : 057 BPM     P-R Int : 188 ms          QRS Dur : 086 ms      QT Int : 424 ms       P-R-T Axes : 064 085 094 degrees     QTc Int : 412 ms    Sinus bradycardia  Otherwise normal ECG  When compared with ECG of 03-MAR-2021 11:09,  No significant change was found    Referred By: [email or web address]   SELF           Confirmed By:                                   Imaging Results              X-Ray Chest AP Portable (Final result)  Result time 10/16/23 12:57:35      Final result by Ratna Unger MD (10/16/23 12:57:35)                   Narrative:    XR CHEST 1 VIEW    CLINICAL HISTORY:  68 years Female chest pain    COMPARISON: 8/22/2022    FINDINGS: Cardiomediastinal silhouette is within normal limits. Lungs are normally expanded with no airspace consolidation. No pleural effusion or pneumothorax. No acute osseous abnormality.    IMPRESSION: No acute pulmonary process.    Electronically signed by:  Ratna Unger MD  10/16/2023 12:57 PM CDT Workstation: [id number]                                     Medications - No data to display  Medical Decision Making  68-year-old female with exertional chest pain.  Screening labs and cardiac workup reviewed.  Patient is pain-free at this time at rest.  Nitroglycerin placed as patient having intermittent chest pain and patient's pain resolved and patient blood pressure improved as well.  Screening cardiac workup done.  Patient has presentation consistent with unstable angina and given this Hospital Medicine consulted for evaluation for admission and further  management given patient's cardiac risk factors and age    Amount and/or Complexity of Data Reviewed  Labs: ordered. Decision-making details documented in ED Course.  Radiology: ordered. Decision-making details documented in ED Course.  ECG/medicine tests: ordered. Decision-making details documented in ED Course.  Discussion of management or test interpretation with external provider(s): Aspirin and nitroglycerin given for patient's chest pain in the emergency department    Risk  Decision regarding hospitalization.                               Clinical Impression:   Final diagnoses:  [R07.9] Chest pain        ED Disposition Condition    Observation                 Tj Zaldivar MD  10/16/23 0262

## 2023-10-16 NOTE — PLAN OF CARE
10/16/23 1510   FINCH Message   Medicare Outpatient and Observation Notification regarding financial responsibility Given to patient/caregiver;Explained to patient/caregiver;Signed/date by patient/caregiver   Date FINCH was signed 10/16/23   Time FINCH was signed 0310

## 2023-10-16 NOTE — PLAN OF CARE
[facility] - Emergency Dept  Initial Discharge Assessment       Primary Care Provider: Katherine Ferguson MD    Admission Diagnosis: Chest pain [R07.9]    Admission Date: 10/16/2023  Expected Discharge Date:     Pt is a 68-year-old female, who arrived from home with Chest pain. Chart reviewed and assessment completed with patient at bedside. Pt lives with , Brandin Wooten [phone]; Pt does not have a living will or advance directive. PCP last seen a couple of months ago. Pt denies Coumadin, Dialysis, DME, HH; and readmission into the hospital within the last thirty days. Pt is capable of performing ADLs without assistance. Pt drives to and from medical appointments. She takes all medicines as prescribed daily; pharmacy is [facility]. Pt's , Brandin Wooten will assist her with getting home safe at discharge. CM will continue to monitor        Transition of Care Barriers: None    Payor: [employer]ARE ADVANTAGE / Plan: [employer] MC[employer] ADV / Product Type: Medicare Advantage /     Extended Emergency Contact Information  Primary Emergency Contact: Brandin Wooten  Address: [id number] [place] [address] [facility]  Home Phone: [phone]  Mobile Phone: [phone]  Relation: Spouse   needed? No    Discharge Plan A: Home with family  Discharge Plan B: Home      [id number] - [place], [place] - [email or web address] [facility] [address]  [address]  [facility] [address]  Phone: [phone] Fax: [phone]      Initial Assessment (most recent)       Adult Discharge Assessment - 10/16/23 1503          Discharge Assessment    Assessment Type Discharge Planning Assessment     Confirmed/corrected address, phone number and insurance Yes     Confirmed Demographics Correct on Facesheet     Source of Information patient     When was your last doctors appointment? --   a couple of months ago    Does  patient/caregiver understand observation status Yes     Communicated KASSIE with patient/caregiver Date not available/Unable to determine     Reason For Admission Chest pain     People in Home spouse     Facility Arrived From: home     Do you expect to return to your current living situation? Yes     Do you have help at home or someone to help you manage your care at home? Yes     Who are your caregiver(s) and their phone number(s)? Brandin Wooten/spouse [phone]     Prior to hospitilization cognitive status: Alert/Oriented     Current cognitive status: Alert/Oriented     Home Accessibility not wheelchair accessible     Equipment Currently Used at Home none     Readmission within 30 days? No     Patient currently being followed by outpatient case management? No     Do you currently have service(s) that help you manage your care at home? No     Do you take prescription medications? Yes     Do you have prescription coverage? Yes     Coverage Payor:  [place] - [employer] MC[employer] ADV     Do you have any problems affording any of your prescribed medications? No     Is the patient taking medications as prescribed? yes     Who is going to help you get home at discharge? Brandin Wooten/spouse [phone]     How do you get to doctors appointments? car, drives self     Are you on dialysis? No     Do you take coumadin? No     DME Needed Upon Discharge  none     Discharge Plan discussed with: Patient;Spouse/sig other     Name(s) and Number(s) Brandin Wooten/spouse [phone]     Transition of Care Barriers None     Discharge Plan A Home with family     Discharge Plan B Home

## 2023-10-16 NOTE — NURSING
Nurses Note -- 4 Eyes      10/16/2023   6:49 PM      Skin assessed during: Admit      [x] No Altered Skin Integrity Present    []Prevention Measures Documented      [] Yes- Altered Skin Integrity Present or Discovered   [] LDA Added if Not in Epic (Describe Wound)   [] New Altered Skin Integrity was Present on Admit and Documented in LDA   [] Wound Image Taken    Wound Care Consulted? No    Attending Nurse:  Indy Gonzalez RN/Staff Member:   [id number]

## 2023-10-16 NOTE — FIRST PROVIDER EVALUATION
" Emergency Department TeleTriage Encounter Note      CHIEF COMPLAINT    Chief Complaint   Patient presents with    Chest Pain       VITAL SIGNS   Initial Vitals [10/16/23 1151]   BP Pulse Resp Temp SpO2   (!) 177/91 69 16 98.2 °F (36.8 °C) 98 %      MAP       --            ALLERGIES    Review of patient's allergies indicates:   Allergen Reactions    Estrogens Other (See Comments)     Mini stroke    Gabapentin Itching and Rash    Tetanus vaccines and toxoid Swelling and Other (See Comments)     Swelling at injection site, fever    Shingrix (pf)  [varicella-zoster ge-as01b (pf)] Diarrhea, Itching and Nausea And Vomiting       PROVIDER TRIAGE NOTE  This is a teletriage evaluation of a 68 y.o. female presenting to the ED complaining of CP "for a month" but worse since Saturday. Denies SOB. States also having pain in neck and shoulders.     Alert, no distress.     Initial orders will be placed and care will be transferred to an alternate provider when patient is roomed for a full evaluation. Any additional orders and the final disposition will be determined by that provider.         ORDERS  Labs Reviewed   CBC W/ AUTO DIFFERENTIAL   COMPREHENSIVE METABOLIC PANEL   B-TYPE NATRIURETIC PEPTIDE   TROPONIN I HIGH SENSITIVITY   TROPONIN I HIGH SENSITIVITY   PROTIME-INR       ED Orders (720h ago, onward)      Start Ordered     Status Ordering Provider    10/16/23 1354 10/16/23 1153  Troponin I High Sensitivity #2  Once Timed         Ordered CATARINADAEFREM THOMASON    10/16/23 1154 10/16/23 1153  Pulse Oximetry Continuous  Continuous         Ordered CATARINADAEFREM THOMASON    10/16/23 1154 10/16/23 1153  EKG 12-lead  Once         Ordered EFREM PENNY    10/16/23 1154 10/16/23 1153  CBC auto differential  STAT         Ordered CATARINADAREFREM    10/16/23 1154 10/16/23 1153  Comprehensive metabolic panel  STAT         Ordered EFREM PENNY    10/16/23 1154 10/16/23 1153  Brain natriuretic peptide  STAT         Ordered EFREM PENNY " RAJESH.    10/16/23 1154 10/16/23 1153  Troponin I High Sensitivity #1  STAT         Ordered EFREM PENNY.    10/16/23 1154 10/16/23 1153  Protime-INR  STAT         Ordered DARDAREFREM.    10/16/23 1154 10/16/23 1153  Saline lock IV  Once         Ordered CATARINADAEFREM THOMASON.    10/16/23 1154 10/16/23 1153  X-Ray Chest AP Portable  1 time imaging         Ordered EFREM PENNY.    10/16/23 1154 10/16/23 1153  Notify physician for persistent pain  Once         Ordered EFREM PENNY.    10/16/23 1154 10/16/23 1153  Cardiac Monitoring - Adult  Continuous        Comments: Notify Physician If:    Ordered EFREM PENNY              Virtual Visit Note: The provider triage portion of this emergency department evaluation and documentation was performed via [employer], a HIPAA-compliant telemedicine application, in concert with a tele-presenter in the room. A face to face patient evaluation with one of my colleagues will occur once the patient is placed in an emergency department room.      DISCLAIMER: This note was prepared with Vidly voice recognition transcription software. Garbled syntax, mangled pronouns, and other bizarre constructions may be attributed to that software system.

## 2023-10-16 NOTE — TELEPHONE ENCOUNTER
----- Message from Narda Magui sent at 10/16/2023  9:27 AM CDT -----  Contact: Self  Type:  Same Day Appointment Request    Caller is requesting a same day appointment.  Caller declined first available appointment listed below.      Name of Caller: Patient  When is the first available appointment?  N/A  Symptoms:  having chest pains, thought was hearburn, pain in center of her back, pain in R shoulder and numbness in fingers. Family history of heart disease, terrible headache.   Best Call Back Number:  [phone] cell or [phone] home  Additional Information:   Can we please call pt back ASAP to advise. Thank You.

## 2023-10-16 NOTE — H&P
[facility] Medicine   History & Physical   Patient Name: Nalini Wooten  MRN: [id number]  Admission Date: 10/16/2023 11:49 AM  Attending Physician: David Khan MD   Primary Care Provider: Katherine Ferguson MD  Face-to-Face encounter date: 10/16/2023    Patient information was obtained from patient, past medical records, ER physician, and ER records.     HISTORY OF PRESENT ILLNESS:     Nalini Wooten is a 68 y.o. White female   With PMH of pre-diabetes   who presents with chest pain.    Has constant pressure like pain for a week. Patient also get squeezing pain in the center and behind her shoulder blades intermittently that lasts less than a min. No SOB, dizziness, or nausea vomiting. Squeezing pain resolves spontaneously. No radiation. Strong family history of CAD in father and siblings.     ED work up shows normal EKG and troponin. Case discussed with ED physician. Old records reviewed. Patient admitted for observation.     REVIEW OF SYSTEMS:     All systems reviewed and are negative except as noted per above.    PAST MEDICAL HISTORY:     Past Medical History:   Diagnosis Date    Allergies     Bulging disc     lower back     Carpal tunnel syndrome     Degenerative disc disease     High cholesterol     Pinched nerve in neck        PAST SURGICAL HISTORY:     Past Surgical History:   Procedure Laterality Date    ARTHROSCOPIC REPAIR OF ROTATOR CUFF OF SHOULDER Right 3/10/2021    Procedure: REPAIR, ROTATOR CUFF, ARTHROSCOPIC;  Surgeon: Dustin Vanegas MD;  Location: [facility] OR;  Service: Orthopedics;  Laterality: Right;  arthrex notified    ARTHROSCOPY OF SHOULDER WITH DECOMPRESSION OF SUBACROMIAL SPACE Right 3/10/2021    Procedure: ARTHROSCOPY, SHOULDER, WITH SUBACROMIAL SPACE DECOMPRESSION;  Surgeon: Dustin Vanegas MD;  Location: [facility] OR;  Service: Orthopedics;  Laterality: Right;    BLADDER SUSPENSION  1990    CATARACT EXTRACTION W/  INTRAOCULAR LENS IMPLANT Right 6/21/2023    Procedure: CEIOL  OD;  Surgeon: Shawn Shearer MD;  Location: [facility] OR;  Service: Ophthalmology;  Laterality: Right;    CATARACT EXTRACTION W/  INTRAOCULAR LENS IMPLANT Left 7/19/2023    Procedure: CEIOL OS;  Surgeon: Shawn Shearer MD;  Location: [facility] OR;  Service: Ophthalmology;  Laterality: Left;    DISTAL CLAVICLE EXCISION Right 3/10/2021    Procedure: EXCISION, CLAVICLE, DISTAL;  Surgeon: Dustin Vanegas MD;  Location: [facility] OR;  Service: Orthopedics;  Laterality: Right;    FOOT SURGERY Bilateral 2001    FOOT SURGERY Right 05/13/2022    HAND SURGERY Left 2017    HYSTERECTOMY  1990    JOINT REPLACEMENT  2013    KNEE SURGERY Bilateral 2013    MANDIBLE FRACTURE SURGERY  1981    SHOULDER ARTHROSCOPY Left 02/27/2019    THYROIDECTOMY Left 05/13/2019        TONSILLECTOMY         ALLERGIES:   Estrogens, Gabapentin, Tetanus vaccines and toxoid, and Shingrix (pf)  [varicella-zoster [id number] (pf)]    FAMILY HISTORY:     Family History   Problem Relation Age of Onset    Arthritis Mother     Aneurysm Mother         Abdominal Aneurysm     Arthritis Father     Diabetes Father     Hearing loss Father     Heart disease Father     Hypertension Father     Dementia Father     Diabetes Paternal Grandmother     Heart disease Paternal Grandfather     Leukemia Son     Crohn's disease Son     Ankylosing spondylitis Son     Other Son         avascular necrosis of pancho hips    Rheumatologic disease Son     Heart defect Son     Kidney failure Son     SIDS Maternal Aunt     Uterine cancer Maternal Aunt     Dementia Paternal Aunt     No Known Problems Sister     Heart disease Brother         stent placement    Heart disease Brother         stent placement    Aneurysm Brother         Abdominal Aneurysm    No Known Problems Sister        SOCIAL HISTORY:     Social History     Tobacco Use    Smoking status: Never    Smokeless tobacco: Never   Substance Use Topics    Alcohol use: Never     Alcohol/week: 0.0 standard drinks of alcohol        Social  "History     Substance and Sexual Activity   Sexual Activity Yes    Birth control/protection: None        HOME MEDICATIONS:     Prior to Admission medications    Medication Sig Start Date End Date Taking? Authorizing Provider   cyanocobalamin 1,000 mcg/mL injection Inject every week 1 cc  Patient taking differently: Inject 1,000 mcg into the muscle every 7 days. Inject every week 1 cc 9/15/22  Yes Katherine Ferguson MD   metFORMIN (GLUCOPHAGE) 500 MG tablet Take 1 tablet (500 mg total) by mouth 2 (two) times daily with meals. 4/10/23 4/9/24 Yes Katherine Ferguson MD   tolterodine (DETROL LA) 4 MG 24 hr capsule Take 1 capsule (4 mg total) by mouth once daily. 4/10/23 4/9/24 Yes Katherine Ferguson MD   mometasone (ASMANEX TWISTHALER) 220 mcg/ actuation (30) inhaler Inhale 2 puffs into the lungs once daily. Controller  Patient not taking: Reported on 8/28/2023 8/31/22 8/31/23  Katherine Ferguson MD   rosuvastatin (CRESTOR) 40 MG Tab Take 1 tablet (40 mg total) by mouth once daily.  Patient not taking: Reported on 8/28/2023 8/6/23   Katherine Ferguson MD   traMADoL (ULTRAM) 50 mg tablet Take 1 tablet (50 mg total) by mouth every 4 (four) hours as needed for Pain. 4/13/23   Katherine Ferguson MD   levocetirizine (XYZAL) 5 MG tablet Take 1 tablet (5 mg total) by mouth every evening. 4/10/23 10/16/23  Katherine Ferguson MD   phentermine (ADIPEX-P) 37.5 mg tablet TAKE 1 TABLET(37.5 MG) BY MOUTH EVERY MORNING  Patient taking differently: Take 37.5 mg by mouth once daily. TAKE 1 TABLET(37.5 MG) BY MOUTH EVERY MORNING 7/19/23 10/16/23  Katherine Ferguson MD       PHYSICAL EXAM:     BP (!) 177/91   Pulse 69   Temp 98.2 °F (36.8 °C) (Oral)   Resp 16   Ht 5' 3" (1.6 m)   Wt 81.6 kg (180 lb)   SpO2 98%   BMI 31.89 kg/m²     Gen: Awake and alert, good historian, accompanied by spouse.  HEENT: Eyes with no icterus, not injected.  External ears with no lesions  Nares patent  Mouth moist mucous membranes, dentition is good  CV: Regular rate and " rhythm, no murmurs, no edema.  Capillary refill < 2 seconds.  Lungs:  Clear to auscultation bilaterally, no tachypnea or increased work of breathing.  Abdomen:  Soft with active bowel sounds, no tenderness to palpation, no distention.  Musculoskeletal:  Moves all extremities with good strength.  No clubbing.  Skin:  Warm and dry, no obvious wounds or rashes.    Neuro:  No focal deficits.  No numbness or tingling.  Psych:  Calm and cooperative, awake alert and oriented.    LABS AND IMAGING:     Labs Reviewed   CBC W/ AUTO DIFFERENTIAL - Abnormal; Notable for the following components:       Result Value    Hemoglobin 11.9 (*)     MCHC 31.3 (*)     All other components within normal limits   COMPREHENSIVE METABOLIC PANEL - Abnormal; Notable for the following components:    Anion Gap 5 (*)     All other components within normal limits   B-TYPE NATRIURETIC PEPTIDE   TROPONIN I HIGH SENSITIVITY   PROTIME-INR   TROPONIN I HIGH SENSITIVITY   POCT GLUCOSE MONITORING CONTINUOUS       X-Ray Chest AP Portable   Final Result          ASSESSMENT & PLAN:   Nalini Wooten is a 68 y.o. female admitted for    Active Hospital Problems    Diagnosis  POA    *Chest pain [R07.9]  Yes      Resolved Hospital Problems   No resolved problems to display.        Plan    - telemetry   - Added Lipitor 80 mg and aspirin (patient was taken off Crestor as her lipid panel was good)  - cycle troponin  - stress test AM, carb control diet now and NPO after mid night.   - sliding scale, holding metformin     Faisal Khan MD   [facility] Hospitalist  10/16/2023  3:08 PM

## 2023-10-16 NOTE — TELEPHONE ENCOUNTER
----- Message from Narda Magui sent at 10/16/2023  9:27 AM CDT -----  Contact: Self  Type:  Same Day Appointment Request    Caller is requesting a same day appointment.  Caller declined first available appointment listed below.      Name of Caller: Patient  When is the first available appointment?  N/A  Symptoms:  having chest pains, thought was hearburn, pain in center of her back, pain in R shoulder and numbness in fingers. Family history of heart disease, terrible headache.   Best Call Back Number:  [phone] cell or [phone] home  Additional Information:   Can we please call pt back ASAP to advise. Thank You.

## 2023-10-17 ENCOUNTER — HOSPITAL ENCOUNTER (OUTPATIENT)
Dept: RADIOLOGY | Facility: HOSPITAL | Age: 68
Discharge: HOME | End: 2023-10-17
Attending: INTERNAL MEDICINE | Admitting: INTERNAL MEDICINE
Payer: MEDICARE

## 2023-10-17 ENCOUNTER — CLINICAL SUPPORT (OUTPATIENT)
Dept: CARDIOLOGY | Facility: HOSPITAL | Age: 68
End: 2023-10-17
Attending: INTERNAL MEDICINE
Payer: MEDICARE

## 2023-10-17 VITALS
HEART RATE: 64 BPM | SYSTOLIC BLOOD PRESSURE: 123 MMHG | BODY MASS INDEX: 34.76 KG/M2 | RESPIRATION RATE: 20 BRPM | HEIGHT: 63 IN | OXYGEN SATURATION: 98 % | DIASTOLIC BLOOD PRESSURE: 72 MMHG | TEMPERATURE: 98 F | WEIGHT: 196.19 LBS

## 2023-10-17 LAB
ALBUMIN SERPL BCP-MCNC: 3.6 G/DL (ref 3.5–5.2)
ALP SERPL-CCNC: 65 U/L (ref 55–135)
ALT SERPL W/O P-5'-P-CCNC: 14 U/L (ref 10–44)
ANION GAP SERPL CALC-SCNC: 6 MMOL/L (ref 8–16)
AST SERPL-CCNC: 15 U/L (ref 10–40)
BASOPHILS # BLD AUTO: 0.06 K/UL (ref 0–0.2)
BASOPHILS NFR BLD: 0.8 % (ref 0–1.9)
BILIRUB SERPL-MCNC: 0.3 MG/DL (ref 0.1–1)
BUN SERPL-MCNC: 18 MG/DL (ref 8–23)
CALCIUM SERPL-MCNC: 8.4 MG/DL (ref 8.7–10.5)
CHLORIDE SERPL-SCNC: 108 MMOL/L (ref 95–110)
CO2 SERPL-SCNC: 25 MMOL/L (ref 23–29)
CREAT SERPL-MCNC: 0.8 MG/DL (ref 0.5–1.4)
CV PHARM DOSE: 0.4 MG
CV STRESS BASE HR: 61 BPM
DIASTOLIC BLOOD PRESSURE: 80 MMHG
DIFFERENTIAL METHOD: ABNORMAL
EOSINOPHIL # BLD AUTO: 0.3 K/UL (ref 0–0.5)
EOSINOPHIL NFR BLD: 4.1 % (ref 0–8)
ERYTHROCYTE [DISTWIDTH] IN BLOOD BY AUTOMATED COUNT: 13.3 % (ref 11.5–14.5)
EST. GFR  (NO RACE VARIABLE): >60 ML/MIN/1.73 M^2
GLUCOSE SERPL-MCNC: 199 MG/DL (ref 70–110)
GLUCOSE SERPL-MCNC: 93 MG/DL (ref 70–110)
HCT VFR BLD AUTO: 36.5 % (ref 37–48.5)
HGB BLD-MCNC: 11.3 G/DL (ref 12–16)
IMM GRANULOCYTES # BLD AUTO: 0.02 K/UL (ref 0–0.04)
IMM GRANULOCYTES NFR BLD AUTO: 0.3 % (ref 0–0.5)
LYMPHOCYTES # BLD AUTO: 2.7 K/UL (ref 1–4.8)
LYMPHOCYTES NFR BLD: 35 % (ref 18–48)
MCH RBC QN AUTO: 27 PG (ref 27–31)
MCHC RBC AUTO-ENTMCNC: 31 G/DL (ref 32–36)
MCV RBC AUTO: 87 FL (ref 82–98)
MONOCYTES # BLD AUTO: 0.6 K/UL (ref 0.3–1)
MONOCYTES NFR BLD: 8.3 % (ref 4–15)
NEUTROPHILS # BLD AUTO: 3.9 K/UL (ref 1.8–7.7)
NEUTROPHILS NFR BLD: 51.5 % (ref 38–73)
NRBC BLD-RTO: 0 /100 WBC
OHS CV CPX 1 MINUTE RECOVERY HEART RATE: 74 BPM
OHS CV CPX 85 PERCENT MAX PREDICTED HEART RATE MALE: 126
OHS CV CPX MAX PREDICTED HEART RATE: 148
OHS CV CPX PATIENT IS FEMALE: 1
OHS CV CPX PATIENT IS MALE: 0
OHS CV CPX PEAK DIASTOLIC BLOOD PRESSURE: 81 MMHG
OHS CV CPX PEAK HEAR RATE: 86 BPM
OHS CV CPX PEAK RATE PRESSURE PRODUCT: NORMAL
OHS CV CPX PEAK SYSTOLIC BLOOD PRESSURE: 150 MMHG
OHS CV CPX PERCENT MAX PREDICTED HEART RATE ACHIEVED: 58
OHS CV CPX RATE PRESSURE PRODUCT PRESENTING: 8540
PLATELET # BLD AUTO: 285 K/UL (ref 150–450)
PMV BLD AUTO: 9.5 FL (ref 9.2–12.9)
POTASSIUM SERPL-SCNC: 4.1 MMOL/L (ref 3.5–5.1)
PROT SERPL-MCNC: 5.8 G/DL (ref 6–8.4)
RBC # BLD AUTO: 4.19 M/UL (ref 4–5.4)
SODIUM SERPL-SCNC: 139 MMOL/L (ref 136–145)
SYSTOLIC BLOOD PRESSURE: 140 MMHG
WBC # BLD AUTO: 7.6 K/UL (ref 3.9–12.7)

## 2023-10-17 PROCEDURE — 78452 HT MUSCLE IMAGE SPECT MULT: CPT | Mod: TC

## 2023-10-17 PROCEDURE — 85025 COMPLETE CBC W/AUTO DIFF WBC: CPT | Performed by: INTERNAL MEDICINE

## 2023-10-17 PROCEDURE — 36415 COLL VENOUS BLD VENIPUNCTURE: CPT | Performed by: INTERNAL MEDICINE

## 2023-10-17 PROCEDURE — A9502 TC99M TETROFOSMIN: HCPCS

## 2023-10-17 PROCEDURE — 63600175 PHARM REV CODE 636 W HCPCS: Performed by: INTERNAL MEDICINE

## 2023-10-17 PROCEDURE — 93018 NUCLEAR STRESS TEST (CUPID ONLY): ICD-10-PCS | Mod: ,,, | Performed by: STUDENT IN AN ORGANIZED HEALTH CARE EDUCATION/TRAINING PROGRAM

## 2023-10-17 PROCEDURE — G0378 HOSPITAL OBSERVATION PER HR: HCPCS

## 2023-10-17 PROCEDURE — 25000003 PHARM REV CODE 250: Performed by: INTERNAL MEDICINE

## 2023-10-17 PROCEDURE — 93016 CV STRESS TEST SUPVJ ONLY: CPT | Mod: ,,, | Performed by: STUDENT IN AN ORGANIZED HEALTH CARE EDUCATION/TRAINING PROGRAM

## 2023-10-17 PROCEDURE — 93016 NUCLEAR STRESS TEST (CUPID ONLY): ICD-10-PCS | Mod: ,,, | Performed by: STUDENT IN AN ORGANIZED HEALTH CARE EDUCATION/TRAINING PROGRAM

## 2023-10-17 PROCEDURE — 93017 CV STRESS TEST TRACING ONLY: CPT

## 2023-10-17 PROCEDURE — 93018 CV STRESS TEST I&R ONLY: CPT | Mod: ,,, | Performed by: STUDENT IN AN ORGANIZED HEALTH CARE EDUCATION/TRAINING PROGRAM

## 2023-10-17 PROCEDURE — 80053 COMPREHEN METABOLIC PANEL: CPT | Performed by: INTERNAL MEDICINE

## 2023-10-17 RX ORDER — NAPROXEN SODIUM 220 MG/1
81 TABLET, FILM COATED ORAL DAILY
Qty: 30 TABLET | Refills: 0 | Status: SHIPPED | OUTPATIENT
Start: 2023-10-18 | End: 2024-03-05

## 2023-10-17 RX ORDER — ISOSORBIDE MONONITRATE 30 MG/1
30 TABLET, EXTENDED RELEASE ORAL DAILY
Qty: 30 TABLET | Refills: 0 | Status: SHIPPED | OUTPATIENT
Start: 2023-10-17 | End: 2023-11-15 | Stop reason: SDUPTHER

## 2023-10-17 RX ORDER — REGADENOSON 0.08 MG/ML
0.4 INJECTION, SOLUTION INTRAVENOUS
Status: COMPLETED | OUTPATIENT
Start: 2023-10-17 | End: 2023-10-17

## 2023-10-17 RX ADMIN — REGADENOSON 0.4 MG: 0.08 INJECTION, SOLUTION INTRAVENOUS at 08:10

## 2023-10-17 RX ADMIN — ASPIRIN 81 MG 81 MG: 81 TABLET ORAL at 09:10

## 2023-10-17 NOTE — PLAN OF CARE
Pcp hospital follow scheduled 8-14 days per smart scheduling rec  Pt is clear for dc from CM standpoint         10/17/23 1030   Final Note   Assessment Type Final Discharge Note   Anticipated Discharge Disposition Home   Hospital Resources/Appts/Education Provided Appointments scheduled and added to AVS

## 2023-10-17 NOTE — DISCHARGE SUMMARY
[facility] Medicine  Discharge Summary      Patient Name: Nalini Wooten  MRN: [id number]  [facility]: [id number]  Patient Class: OP- Observation  Admission Date: 10/16/2023  Hospital Length of Stay: 0 days  Discharge Date and Time:  10/17/2023 10:48 AM  Attending Physician: Faisal Khan MD   Discharging Provider: Faisal Khan MD  Primary Care Provider: Katherine Ferguson MD    Primary Care Team: Networked reference to record PCT     HPI:   Nalini Wooten is a 68 y.o. White female   With PMH of pre-diabetes   who presents with chest pain.     Has constant pressure like pain for a week. Patient also get squeezing pain in the center and behind her shoulder blades intermittently that lasts less than a min. No SOB, dizziness, or nausea vomiting. Squeezing pain resolves spontaneously. No radiation. Strong family history of CAD in father and siblings.      ED work up shows normal EKG and troponin. Case discussed with ED physician. Old records reviewed. Patient admitted for observation.     * No surgery found *      Hospital Course:   Patient admitted with chest pain. Troponin X 3 negative. Stress test negative. Patient did have relief after nitro, therefore added Imdur 30 mg daily. Aspirin 81 mg added and resumed Crestor. Symptoms resolved on discharge.        Goals of Care Treatment Preferences:  Code Status: Full Code    Gen: Awake and alert, good historian, accompanied by spouse.  HEENT: Eyes with no icterus, not injected.  External ears with no lesions  Nares patent  Mouth moist mucous membranes, dentition is good  CV: Regular rate and rhythm, no murmurs, no edema.  Capillary refill < 2 seconds.  Lungs:  Clear to auscultation bilaterally, no tachypnea or increased work of breathing.  Abdomen:  Soft with active bowel sounds, no tenderness to palpation, no distention.  Musculoskeletal:  Moves all extremities with good strength.  No clubbing.  Skin:  Warm and dry, no obvious wounds or  rashes.    Neuro:  No focal deficits.  No numbness or tingling.  Psych:  Calm and cooperative, awake alert and oriented.      Service: Hospital Medicine    Final Active Diagnoses:    Diagnosis Date Noted POA    PRINCIPAL PROBLEM:  Chest pain [R07.9] 10/16/2023 Yes      Problems Resolved During this Admission:       Discharged Condition: good    Disposition: Home or Self Care    Follow Up:   Follow-up Information     Katherine Fergusno MD Follow up.    Specialty: Internal Medicine  Why: 10/26 @ 1:40 pm  Contact information:  [address] [facility] [place] [address]  [phone]             Gautam Luo MD. Schedule an appointment as soon as possible for a visit.    Specialties: Interventional Cardiology, Cardiology, Cardiovascular Disease  Contact information:  [address] [facility]  [address]  [place] [address]  [phone]                       Patient Instructions:   No discharge procedures on file.    Significant Diagnostic Studies: Labs:   CMP   Recent Labs   Lab 10/16/23  1304 10/17/23  0445    139   K 4.1 4.1    108   CO2 26 25   GLU 94 93   BUN 14 18   CREATININE 0.8 0.8   CALCIUM 8.8 8.4*   PROT 6.5 5.8*   ALBUMIN 4.2 3.6   BILITOT 0.3 0.3   ALKPHOS 70 65   AST 19 15   ALT 17 14   ANIONGAP 5* 6*    and CBC   Recent Labs   Lab 10/16/23  1304 10/17/23  0445   WBC 7.55 7.60   HGB 11.9* 11.3*   HCT 38.0 36.5*    285       Pending Diagnostic Studies:     None         Medications:  Reconciled Home Medications:      Medication List      START taking these medications    aspirin 81 MG Chew  Take 1 tablet (81 mg total) by mouth once daily.  Start taking on: October 18, 2023     isosorbide mononitrate 30 MG 24 hr tablet  Commonly known as: IMDUR  Take 1 tablet (30 mg total) by mouth once daily.     rosuvastatin 40 MG Tab  Commonly known as: CRESTOR  Take 1 tablet (40 mg total) by mouth once daily.        CHANGE how you take these medications    cyanocobalamin 1,000 mcg/mL injection  Inject every week 1  cc  What changed:   · how much to take  · how to take this  · when to take this        CONTINUE taking these medications    metFORMIN 500 MG tablet  Commonly known as: GLUCOPHAGE  Take 1 tablet (500 mg total) by mouth 2 (two) times daily with meals.     mometasone 220 mcg/ actuation (30) inhaler  Commonly known as: ASMANEX TWISTHALER  Inhale 2 puffs into the lungs once daily. Controller     tolterodine 4 MG 24 hr capsule  Commonly known as: DETROL LA  Take 1 capsule (4 mg total) by mouth once daily.     traMADoL 50 mg tablet  Commonly known as: ULTRAM  Take 1 tablet (50 mg total) by mouth every 4 (four) hours as needed for Pain.            Indwelling Lines/Drains at time of discharge:   Lines/Drains/Airways     None                 Time spent on the discharge of patient: 40 minutes         Faisal Khan MD  Department of Hospital Medicine  [facility]

## 2023-10-17 NOTE — HOSPITAL COURSE
Patient admitted with chest pain. Troponin X 3 negative. Stress test negative. Patient did have relief after nitro, therefore added Imdur 30 mg daily. Aspirin 81 mg added and resumed Crestor. Symptoms resolved on discharge.

## 2023-10-22 NOTE — PROGRESS NOTES
SUBJECTIVE:    Patient ID: Nalini Wooten is a 68 y.o. female.    Chief Complaint: Hospital Follow Up (Chest pain)    HPI    Admit Date: 10/16/23   Discharge Date: 10/17/23  Discharge Facility: Hospital         Hospital Course:   Patient admitted with chest pain. Troponin X 3 negative. Stress test negative. Patient did have relief after nitro, therefore added Imdur 30 mg daily. Aspirin 81 mg added and resumed Crestor. Symptoms resolved on discharge.      Medication Reconciliation-see below    Medications:  Reconciled Home Medications:       Medication List       START taking these medications    aspirin 81 MG Chew  Take 1 tablet (81 mg total) by mouth once daily.  Start taking on: October 18, 2023      isosorbide mononitrate 30 MG 24 hr tablet  Commonly known as: IMDUR  Take 1 tablet (30 mg total) by mouth once daily.      rosuvastatin 40 MG Tab  Commonly known as: CRESTOR  Take 1 tablet (40 mg total) by mouth once daily.          CHANGE how you take these medications    cyanocobalamin 1,000 mcg/mL injection  Inject every week 1 cc  What changed:   how much to take  how to take this  when to take this          CONTINUE taking these medications    metFORMIN 500 MG tablet  Commonly known as: GLUCOPHAGE  Take 1 tablet (500 mg total) by mouth 2 (two) times daily with meals.      mometasone 220 mcg/ actuation (30) inhaler  Commonly known as: ASMANEX TWISTHALER  Inhale 2 puffs into the lungs once daily. Controller      tolterodine 4 MG 24 hr capsule  Commonly known as: DETROL LA  Take 1 capsule (4 mg total) by mouth once daily.      traMADoL 50 mg tablet  Commonly known as: ULTRAM  Take 1 tablet (50 mg total) by mouth every 4 (four) hours as needed for Pain.             New Prescriptions filled after discharge: yes  Discharge summary reviewed:  yes  Pending test results at discharge reviewed:   no  Follow up appointments scheduled:  no              PCP  Follow up labs/tests ordered:   no  Home Health ordered on  discharge:   no  Home Health company name:  NA  DME ordered at discharge:   no  How patient is feeling since discharge from the hospital?  Patient is having occ pain in the lower anterior chest which is nhxnev-[id number]-cic is having headaches with the IMDUR and taking acetaminophen three times a day for headaches-never had headaches before-got HA from [facility] in ER   -she continues to feel pressure in the lower anterior chest- she describes slow food transit from eso to stomach-last scope was 20-21    She says xyzal makes her sweat at night so she stopped it and wants something else    Admission on 10/16/2023, Discharged on 10/17/2023   Component Date Value Ref Range Status    WBC 10/16/2023 7.55  3.90 - 12.70 K/uL Final    RBC 10/16/2023 4.39  4.00 - 5.40 M/uL Final    Hemoglobin 10/16/2023 11.9 (L)  12.0 - 16.0 g/dL Final    Hematocrit 10/16/2023 38.0  37.0 - 48.5 % Final    MCV 10/16/2023 87  82 - 98 fL Final    MCH 10/16/2023 27.1  27.0 - 31.0 pg Final    MCHC 10/16/2023 31.3 (L)  32.0 - 36.0 g/dL Final    RDW 10/16/2023 13.3  11.5 - 14.5 % Final    Platelets 10/16/2023 312  150 - 450 K/uL Final    MPV 10/16/2023 9.4  9.2 - 12.9 fL Final    Immature Granulocytes 10/16/2023 0.3  0.0 - 0.5 % Final    Gran # (ANC) 10/16/2023 4.2  1.8 - 7.7 K/uL Final    Immature Grans (Abs) 10/16/2023 0.02  0.00 - 0.04 K/uL Final    Lymph # 10/16/2023 2.4  1.0 - 4.8 K/uL Final    Mono # 10/16/2023 0.7  0.3 - 1.0 K/uL Final    Eos # 10/16/2023 0.2  0.0 - 0.5 K/uL Final    Baso # 10/16/2023 0.05  0.00 - 0.20 K/uL Final    nRBC 10/16/2023 0  0 /100 WBC Final    Gran % 10/16/2023 56.1  38.0 - 73.0 % Final    Lymph % 10/16/2023 31.4  18.0 - 48.0 % Final    Mono % 10/16/2023 8.7  4.0 - 15.0 % Final    Eosinophil % 10/16/2023 2.8  0.0 - 8.0 % Final    Basophil % 10/16/2023 0.7  0.0 - 1.9 % Final    Differential Method 10/16/2023 Automated   Final    Sodium 10/16/2023 138  136 - 145 mmol/L Final    Potassium 10/16/2023 4.1  3.5 - 5.1 mmol/L  Final    Chloride 10/16/2023 107  95 - 110 mmol/L Final    CO2 10/16/2023 26  23 - 29 mmol/L Final    Glucose 10/16/2023 94  70 - 110 mg/dL Final    BUN 10/16/2023 14  8 - 23 mg/dL Final    Creatinine 10/16/2023 0.8  0.5 - 1.4 mg/dL Final    Calcium 10/16/2023 8.8  8.7 - 10.5 mg/dL Final    Total Protein 10/16/2023 6.5  6.0 - 8.4 g/dL Final    Albumin 10/16/2023 4.2  3.5 - 5.2 g/dL Final    Total Bilirubin 10/16/2023 0.3  0.1 - 1.0 mg/dL Final    Alkaline Phosphatase 10/16/2023 70  55 - 135 U/L Final    AST 10/16/2023 19  10 - 40 U/L Final    ALT 10/16/2023 17  10 - 44 U/L Final    eGFR 10/16/2023 >60.0  >60 mL/min/1.73 m^2 Final    Anion Gap 10/16/2023 5 (L)  8 - 16 mmol/L Final    BNP 10/16/2023 57  0 - 99 pg/mL Final    Troponin I High Sensitivity 10/16/2023 4.9  0.0 - 14.9 pg/mL Final    Troponin I High Sensitivity 10/16/2023 4.8  0.0 - 14.9 pg/mL Final    Prothrombin Time 10/16/2023 10.3  9.0 - 12.5 sec Final    INR 10/16/2023 0.9  0.8 - 1.2 Final    POC Glucose 10/16/2023 81  70 - 110 Final    Troponin I High Sensitivity 10/16/2023 5.4  0.0 - 14.9 pg/mL Final    Troponin I High Sensitivity 10/16/2023 5.4  0.0 - 14.9 pg/mL Final    Cholesterol 10/16/2023 232 (H)  120 - 199 mg/dL Final    Triglycerides 10/16/2023 364 (H)  30 - 150 mg/dL Final    HDL 10/16/2023 37 (L)  40 - 75 mg/dL Final    LDL Cholesterol 10/16/2023 122.2  63.0 - 159.0 mg/dL Final    HDL/Cholesterol Ratio 10/16/2023 15.9 (L)  20.0 - 50.0 % Final    Total Cholesterol/HDL Ratio 10/16/2023 6.3 (H)  2.0 - 5.0 Final    Non-HDL Cholesterol 10/16/2023 195  mg/dL Final    POC Glucose 10/16/2023 168 (H)  70 - 110 Final    Sodium 10/17/2023 139  136 - 145 mmol/L Final    Potassium 10/17/2023 4.1  3.5 - 5.1 mmol/L Final    Chloride 10/17/2023 108  95 - 110 mmol/L Final    CO2 10/17/2023 25  23 - 29 mmol/L Final    Glucose 10/17/2023 93  70 - 110 mg/dL Final    BUN 10/17/2023 18  8 - 23 mg/dL Final    Creatinine 10/17/2023 0.8  0.5 - 1.4 mg/dL Final     Calcium 10/17/2023 8.4 (L)  8.7 - 10.5 mg/dL Final    Total Protein 10/17/2023 5.8 (L)  6.0 - 8.4 g/dL Final    Albumin 10/17/2023 3.6  3.5 - 5.2 g/dL Final    Total Bilirubin 10/17/2023 0.3  0.1 - 1.0 mg/dL Final    Alkaline Phosphatase 10/17/2023 65  55 - 135 U/L Final    AST 10/17/2023 15  10 - 40 U/L Final    ALT 10/17/2023 14  10 - 44 U/L Final    eGFR 10/17/2023 >60.0  >60 mL/min/1.73 m^2 Final    Anion Gap 10/17/2023 6 (L)  8 - 16 mmol/L Final    WBC 10/17/2023 7.60  3.90 - 12.70 K/uL Final    RBC 10/17/2023 4.19  4.00 - 5.40 M/uL Final    Hemoglobin 10/17/2023 11.3 (L)  12.0 - 16.0 g/dL Final    Hematocrit 10/17/2023 36.5 (L)  37.0 - 48.5 % Final    MCV 10/17/2023 87  82 - 98 fL Final    MCH 10/17/2023 27.0  27.0 - 31.0 pg Final    MCHC 10/17/2023 31.0 (L)  32.0 - 36.0 g/dL Final    RDW 10/17/2023 13.3  11.5 - 14.5 % Final    Platelets 10/17/2023 285  150 - 450 K/uL Final    MPV 10/17/2023 9.5  9.2 - 12.9 fL Final    Immature Granulocytes 10/17/2023 0.3  0.0 - 0.5 % Final    Gran # (ANC) 10/17/2023 3.9  1.8 - 7.7 K/uL Final    Immature Grans (Abs) 10/17/2023 0.02  0.00 - 0.04 K/uL Final    Lymph # 10/17/2023 2.7  1.0 - 4.8 K/uL Final    Mono # 10/17/2023 0.6  0.3 - 1.0 K/uL Final    Eos # 10/17/2023 0.3  0.0 - 0.5 K/uL Final    Baso # 10/17/2023 0.06  0.00 - 0.20 K/uL Final    nRBC 10/17/2023 0  0 /100 WBC Final    Gran % 10/17/2023 51.5  38.0 - 73.0 % Final    Lymph % 10/17/2023 35.0  18.0 - 48.0 % Final    Mono % 10/17/2023 8.3  4.0 - 15.0 % Final    Eosinophil % 10/17/2023 4.1  0.0 - 8.0 % Final    Basophil % 10/17/2023 0.8  0.0 - 1.9 % Final    Differential Method 10/17/2023 Automated   Final    85% Max Predicted HR 10/17/2023 126   Final    Max Predicted HR 10/17/2023 148   Final    [facility] CV CPX PATIENT IS MALE 10/17/2023 0.0   Final    [facility] CV CPX PATIENT IS FEMALE 10/17/2023 1.0   Final    Systolic blood pressure 10/17/2023 140  mmHg Final    Diastolic blood pressure 10/17/2023 80  mmHg Final    HR at  rest 10/17/2023 61  bpm Final    dose 10/17/2023 0.4  mg Final    Peak Systolic BP 10/17/2023 150  mmHg Final    Peak Diatolic BP 10/17/2023 81  mmHg Final    Peak HR 10/17/2023 86.0  bpm Final    % Max HR Achieved 10/17/2023 58   Final    1 Minute Recovery HR 10/17/2023 74  bpm Final    RPP 10/17/2023 8,540   Final    Peak RPP 10/17/2023 12,900   Final    POC Glucose 10/17/2023 199 (H)  70 - 110 Final       Past Medical History:   Diagnosis Date    Allergies     Bulging disc     lower back     Carpal tunnel syndrome     Degenerative disc disease     High cholesterol     Pinched nerve in neck      Past Surgical History:   Procedure Laterality Date    ARTHROSCOPIC REPAIR OF ROTATOR CUFF OF SHOULDER Right 3/10/2021    Procedure: REPAIR, ROTATOR CUFF, ARTHROSCOPIC;  Surgeon: Dustin Vanegas MD;  Location: [facility];  Service: Orthopedics;  Laterality: Right;  arthrex notified    ARTHROSCOPY OF SHOULDER WITH DECOMPRESSION OF SUBACROMIAL SPACE Right 3/10/2021    Procedure: ARTHROSCOPY, SHOULDER, WITH SUBACROMIAL SPACE DECOMPRESSION;  Surgeon: Dustin Vanegas MD;  Location: [facility] OR;  Service: Orthopedics;  Laterality: Right;    BLADDER SUSPENSION  1990    CATARACT EXTRACTION W/  INTRAOCULAR LENS IMPLANT Right 6/21/2023    Procedure: CEIOL OD;  Surgeon: Shawn Shearer MD;  Location: [facility] OR;  Service: Ophthalmology;  Laterality: Right;    CATARACT EXTRACTION W/  INTRAOCULAR LENS IMPLANT Left 7/19/2023    Procedure: CEIOL OS;  Surgeon: Shawn Shearer MD;  Location: [facility] OR;  Service: Ophthalmology;  Laterality: Left;    DISTAL CLAVICLE EXCISION Right 3/10/2021    Procedure: EXCISION, CLAVICLE, DISTAL;  Surgeon: Dustin Vanegas MD;  Location: [facility] OR;  Service: Orthopedics;  Laterality: Right;    FOOT SURGERY Bilateral 2001    FOOT SURGERY Right 05/13/2022    HAND SURGERY Left 2017    HYSTERECTOMY  1990    JOINT REPLACEMENT  2013    KNEE SURGERY Bilateral 2013    MANDIBLE FRACTURE SURGERY  1981    SHOULDER ARTHROSCOPY Left  02/27/2019    THYROIDECTOMY Left 05/13/2019        TONSILLECTOMY       Family History   Problem Relation Age of Onset    Arthritis Mother     Aneurysm Mother         Abdominal Aneurysm     Heart disease Father 65    Arthritis Father     Diabetes Father     Hearing loss Father     Hypertension Father     Dementia Father     No Known Problems Sister     No Known Problems Sister     Heart disease Brother 50 - 59        stent placement    Heart disease Brother 49        stent placement    Aneurysm Brother         Abdominal Aneurysm    Leukemia Son     Crohn's disease Son     Ankylosing spondylitis Son     Other Son         avascular necrosis of pancho hips    Rheumatologic disease Son     Heart defect Son     Kidney failure Son     SIDS Maternal Aunt     Uterine cancer Maternal Aunt     Dementia Paternal Aunt     Diabetes Paternal Grandmother     Heart disease Paternal Grandfather        Marital Status:   Alcohol History:  reports no history of alcohol use.  Tobacco History:  reports that she has never smoked. She has never used smokeless tobacco.  Drug History:  reports no history of drug use.    Review of patient's allergies indicates:   Allergen Reactions    Estrogens Other (See Comments)     Mini stroke    Tetanus vaccines and toxoid Swelling and Other (See Comments)     Swelling at injection site, fever    Shingrix (pf)  [[id number] (pf)] Diarrhea, Itching and Nausea And Vomiting       Current Outpatient Medications:     aspirin 81 MG Chew, Take 1 tablet (81 mg total) by mouth once daily., Disp: 30 tablet, Rfl: 0    isosorbide mononitrate (IMDUR) 30 MG 24 hr tablet, Take 1 tablet (30 mg total) by mouth once daily., Disp: 30 tablet, Rfl: 0    levocetirizine (XYZAL) 5 MG tablet, Take 1 tablet (5 mg total) by mouth every evening., Disp: 90 tablet, Rfl: 1    cyanocobalamin 1,000 mcg/mL injection, Inject every week 1 cc, Disp: 10 mL, Rfl: 1    fexofenadine (ALLEGRA) 180 MG tablet, Take 1  tablet (180 mg total) by mouth once daily., Disp: 30 tablet, Rfl: 2    metFORMIN (GLUCOPHAGE) 500 MG tablet, Take 1 tablet (500 mg total) by mouth 2 (two) times daily with meals., Disp: 180 tablet, Rfl: 2    rosuvastatin (CRESTOR) 40 MG Tab, Take 1 tablet (40 mg total) by mouth once daily., Disp: 90 tablet, Rfl: 2    sucralfate (CARAFATE) 1 gram tablet, Take 1 tablet (1 g total) by mouth 4 (four) times daily before meals and nightly., Disp: 120 tablet, Rfl: 1    tolterodine (DETROL LA) 4 MG 24 hr capsule, Take 1 capsule (4 mg total) by mouth once daily., Disp: 90 capsule, Rfl: 1    traMADoL (ULTRAM) 50 mg tablet, Take 1 tablet (50 mg total) by mouth every 4 (four) hours as needed for Pain., Disp: 30 tablet, Rfl: 0    Review of Systems   Constitutional:  Positive for unexpected weight change (despite keto diet-she eats too many sweets so she IS NOT ON A KETO DIET). Negative for appetite change, chills, diaphoresis, fatigue and fever.   HENT:  Negative for congestion, ear pain, hearing loss, nosebleeds, postnasal drip, sinus pressure, sinus pain, sneezing, sore throat, tinnitus, trouble swallowing and voice change.    Eyes:  Negative for photophobia, pain, itching and visual disturbance.   Respiratory:  Negative for apnea, cough, chest tightness, shortness of breath, wheezing and stridor.    Cardiovascular:  Negative for chest pain, palpitations and leg swelling.   Gastrointestinal:  Positive for abdominal pain. Negative for abdominal distention, blood in stool, constipation, diarrhea, nausea and vomiting.        HPI   Endocrine: Negative for cold intolerance, heat intolerance, polydipsia and polyuria.   Genitourinary:  Negative for difficulty urinating, dyspareunia, dysuria, flank pain, frequency, hematuria, menstrual problem, pelvic pain, urgency, vaginal discharge and vaginal pain.   Musculoskeletal:  Negative for arthralgias, back pain, joint swelling, myalgias, neck pain and neck stiffness.   Skin:  Negative for  "pallor.   Allergic/Immunologic: Negative for environmental allergies and food allergies.   Neurological:  Positive for headaches. Negative for dizziness, tremors, speech difficulty, weakness, light-headedness and numbness.   Hematological:  Does not bruise/bleed easily.   Psychiatric/Behavioral:  Negative for agitation, confusion, decreased concentration, sleep disturbance and suicidal ideas. The patient is not nervous/anxious.         Objective:      Vitals:    10/26/23 1349   BP: 116/70   Pulse: 64   Resp: 16   Temp: 98.6 °F (37 °C)   SpO2: 98%   Weight: 86.6 kg (191 lb)   Height: 5' 3" (1.6 m)     Physical Exam  Vitals and nursing note reviewed.   Constitutional:       Appearance: She is obese. She is not ill-appearing.   HENT:      Head: Normocephalic and atraumatic.      Nose: Nose normal.      Mouth/Throat:      Mouth: Mucous membranes are moist.   Eyes:      Extraocular Movements: Extraocular movements intact.      Pupils: Pupils are equal, round, and reactive to light.   Neck:      Vascular: No carotid bruit.   Cardiovascular:      Rate and Rhythm: Normal rate and regular rhythm.      Pulses: Normal pulses.      Heart sounds: Normal heart sounds. No murmur heard.  Pulmonary:      Effort: Pulmonary effort is normal. No respiratory distress.      Breath sounds: Normal breath sounds. No wheezing.   Abdominal:      General: Bowel sounds are normal.      Palpations: Abdomen is soft.   Musculoskeletal:      Right lower leg: No edema.      Left lower leg: No edema.   Lymphadenopathy:      Cervical: No cervical adenopathy.   Skin:     General: Skin is warm and dry.      Coloration: Skin is not jaundiced.      Findings: Lesion (medial aspect of horizontal scar medial left buttock with small early abscess without head) present. No bruising.   Neurological:      General: No focal deficit present.      Mental Status: She is alert and oriented to person, place, and time.      Motor: No weakness.   Psychiatric:         " Mood and Affect: Mood normal.         Behavior: Behavior normal.         Thought Content: Thought content normal.         Assessment:       1. Gastroesophageal reflux disease with esophagitis without hemorrhage        3. Fatigue, unspecified type    4. Pure hypercholesterolemia    5. DDD (degenerative disc disease), lumbar    6. Chronic midline low back pain without sciatica    7. Elevated glucose    8. Night sweats    9. Allergy, initial encounter    10. Frequent headaches         Plan:       Gastroesophageal reflux disease with esophagitis without hemorrhage        -     stop nexium        -     start carafate 1 gram four times vargas+    Fatigue, unspecified type    Pure hypercholesterolemia        -     follow-up lipids    DDD (degenerative disc disease), lumbar        -     ok tramadol no 30 no refills    Chronic midline low back pain without sciatica        -     see above    Elevated glucose          -     check A1C           Night sweats        -     due to Xyzal    Allergy,initial encounter        -     allegra     Frequent headaches        -     stop Imdur-there is no reason to continue imdue-this is not cardiac pain      Follow up in about 3 months (around 1/26/2024) for FOLLOW UP LABS, FOLLOW UP MEDICATIONS, FOLLOW-UP STATUS.

## 2023-10-24 DIAGNOSIS — J30.89 ENVIRONMENTAL AND SEASONAL ALLERGIES: Primary | ICD-10-CM

## 2023-10-24 RX ORDER — LEVOCETIRIZINE DIHYDROCHLORIDE 5 MG/1
5 TABLET, FILM COATED ORAL NIGHTLY
Qty: 90 TABLET | Refills: 1 | Status: SHIPPED | OUTPATIENT
Start: 2023-10-24 | End: 2023-11-15

## 2023-10-26 ENCOUNTER — OFFICE VISIT (OUTPATIENT)
Dept: FAMILY MEDICINE | Facility: CLINIC | Age: 68
End: 2023-10-26
Payer: MEDICARE

## 2023-10-26 VITALS
SYSTOLIC BLOOD PRESSURE: 116 MMHG | BODY MASS INDEX: 33.84 KG/M2 | TEMPERATURE: 99 F | HEIGHT: 63 IN | WEIGHT: 191 LBS | RESPIRATION RATE: 16 BRPM | HEART RATE: 64 BPM | OXYGEN SATURATION: 98 % | DIASTOLIC BLOOD PRESSURE: 70 MMHG

## 2023-10-26 DIAGNOSIS — R53.83 FATIGUE, UNSPECIFIED TYPE: ICD-10-CM

## 2023-10-26 DIAGNOSIS — M54.50 CHRONIC MIDLINE LOW BACK PAIN WITHOUT SCIATICA: ICD-10-CM

## 2023-10-26 DIAGNOSIS — E78.00 PURE HYPERCHOLESTEROLEMIA: ICD-10-CM

## 2023-10-26 DIAGNOSIS — R73.03 PRE-DIABETES: ICD-10-CM

## 2023-10-26 DIAGNOSIS — R73.09 ELEVATED GLUCOSE: ICD-10-CM

## 2023-10-26 DIAGNOSIS — T78.40XA ALLERGY, INITIAL ENCOUNTER: ICD-10-CM

## 2023-10-26 DIAGNOSIS — K21.00 GASTROESOPHAGEAL REFLUX DISEASE WITH ESOPHAGITIS WITHOUT HEMORRHAGE: Primary | ICD-10-CM

## 2023-10-26 DIAGNOSIS — G89.29 CHRONIC MIDLINE LOW BACK PAIN WITHOUT SCIATICA: ICD-10-CM

## 2023-10-26 DIAGNOSIS — M51.36 DDD (DEGENERATIVE DISC DISEASE), LUMBAR: ICD-10-CM

## 2023-10-26 DIAGNOSIS — R51.9 FREQUENT HEADACHES: ICD-10-CM

## 2023-10-26 DIAGNOSIS — R61 NIGHT SWEATS: ICD-10-CM

## 2023-10-26 PROCEDURE — 1101F PR PT FALLS ASSESS DOC 0-1 FALLS W/OUT INJ PAST YR: ICD-10-PCS | Mod: CPTII,S$GLB,, | Performed by: INTERNAL MEDICINE

## 2023-10-26 PROCEDURE — 1126F PR PAIN SEVERITY QUANTIFIED, NO PAIN PRESENT: ICD-10-PCS | Mod: CPTII,S$GLB,, | Performed by: INTERNAL MEDICINE

## 2023-10-26 PROCEDURE — 99214 PR OFFICE/OUTPT VISIT, EST, LEVL IV, 30-39 MIN: ICD-10-PCS | Mod: S$GLB,,, | Performed by: INTERNAL MEDICINE

## 2023-10-26 PROCEDURE — 1159F PR MEDICATION LIST DOCUMENTED IN MEDICAL RECORD: ICD-10-PCS | Mod: CPTII,S$GLB,, | Performed by: INTERNAL MEDICINE

## 2023-10-26 PROCEDURE — 3044F HG A1C LEVEL LT 7.0%: CPT | Mod: CPTII,S$GLB,, | Performed by: INTERNAL MEDICINE

## 2023-10-26 PROCEDURE — 3078F DIAST BP <80 MM HG: CPT | Mod: CPTII,S$GLB,, | Performed by: INTERNAL MEDICINE

## 2023-10-26 PROCEDURE — 3044F PR MOST RECENT HEMOGLOBIN A1C LEVEL <7.0%: ICD-10-PCS | Mod: CPTII,S$GLB,, | Performed by: INTERNAL MEDICINE

## 2023-10-26 PROCEDURE — 3078F PR MOST RECENT DIASTOLIC BLOOD PRESSURE < 80 MM HG: ICD-10-PCS | Mod: CPTII,S$GLB,, | Performed by: INTERNAL MEDICINE

## 2023-10-26 PROCEDURE — 3288F PR FALLS RISK ASSESSMENT DOCUMENTED: ICD-10-PCS | Mod: CPTII,S$GLB,, | Performed by: INTERNAL MEDICINE

## 2023-10-26 PROCEDURE — 3288F FALL RISK ASSESSMENT DOCD: CPT | Mod: CPTII,S$GLB,, | Performed by: INTERNAL MEDICINE

## 2023-10-26 PROCEDURE — 99214 OFFICE O/P EST MOD 30 MIN: CPT | Mod: S$GLB,,, | Performed by: INTERNAL MEDICINE

## 2023-10-26 PROCEDURE — 1126F AMNT PAIN NOTED NONE PRSNT: CPT | Mod: CPTII,S$GLB,, | Performed by: INTERNAL MEDICINE

## 2023-10-26 PROCEDURE — 3074F PR MOST RECENT SYSTOLIC BLOOD PRESSURE < 130 MM HG: ICD-10-PCS | Mod: CPTII,S$GLB,, | Performed by: INTERNAL MEDICINE

## 2023-10-26 PROCEDURE — 3074F SYST BP LT 130 MM HG: CPT | Mod: CPTII,S$GLB,, | Performed by: INTERNAL MEDICINE

## 2023-10-26 PROCEDURE — 1159F MED LIST DOCD IN RCRD: CPT | Mod: CPTII,S$GLB,, | Performed by: INTERNAL MEDICINE

## 2023-10-26 PROCEDURE — 1101F PT FALLS ASSESS-DOCD LE1/YR: CPT | Mod: CPTII,S$GLB,, | Performed by: INTERNAL MEDICINE

## 2023-10-26 RX ORDER — TOLTERODINE 4 MG/1
4 CAPSULE, EXTENDED RELEASE ORAL DAILY
Qty: 90 CAPSULE | Refills: 1 | Status: SHIPPED | OUTPATIENT
Start: 2023-10-26 | End: 2024-10-25

## 2023-10-26 RX ORDER — CYANOCOBALAMIN 1000 UG/ML
INJECTION, SOLUTION INTRAMUSCULAR; SUBCUTANEOUS
Qty: 10 ML | Refills: 1 | Status: SHIPPED | OUTPATIENT
Start: 2023-10-26 | End: 2024-03-05 | Stop reason: SDUPTHER

## 2023-10-26 RX ORDER — TRAMADOL HYDROCHLORIDE 50 MG/1
50 TABLET ORAL EVERY 4 HOURS PRN
Qty: 30 TABLET | Refills: 0 | Status: SHIPPED | OUTPATIENT
Start: 2023-10-26 | End: 2024-03-05 | Stop reason: SDUPTHER

## 2023-10-26 RX ORDER — ROSUVASTATIN CALCIUM 40 MG/1
40 TABLET, COATED ORAL DAILY
Qty: 90 TABLET | Refills: 2 | Status: SHIPPED | OUTPATIENT
Start: 2023-10-26 | End: 2023-12-22 | Stop reason: SDUPTHER

## 2023-10-26 RX ORDER — METFORMIN HYDROCHLORIDE 500 MG/1
500 TABLET ORAL 2 TIMES DAILY WITH MEALS
Qty: 180 TABLET | Refills: 2 | Status: SHIPPED | OUTPATIENT
Start: 2023-10-26 | End: 2024-10-25

## 2023-10-26 RX ORDER — MINERAL OIL
180 ENEMA (ML) RECTAL DAILY
Qty: 30 TABLET | Refills: 2 | Status: SHIPPED | OUTPATIENT
Start: 2023-10-26 | End: 2024-03-05 | Stop reason: SDUPTHER

## 2023-10-26 RX ORDER — SUCRALFATE 1 G/1
1 TABLET ORAL
Qty: 120 TABLET | Refills: 1 | Status: SHIPPED | OUTPATIENT
Start: 2023-10-26 | End: 2023-12-22

## 2023-10-27 ENCOUNTER — PATIENT MESSAGE (OUTPATIENT)
Dept: FAMILY MEDICINE | Facility: CLINIC | Age: 68
End: 2023-10-27

## 2023-11-07 ENCOUNTER — PATIENT OUTREACH (OUTPATIENT)
Dept: ADMINISTRATIVE | Facility: HOSPITAL | Age: 68
End: 2023-11-07
Payer: MEDICARE

## 2023-11-07 NOTE — PROGRESS NOTES

## 2023-11-13 ENCOUNTER — PATIENT MESSAGE (OUTPATIENT)
Dept: FAMILY MEDICINE | Facility: CLINIC | Age: 68
End: 2023-11-13

## 2023-11-15 ENCOUNTER — OFFICE VISIT (OUTPATIENT)
Dept: FAMILY MEDICINE | Facility: CLINIC | Age: 68
End: 2023-11-15
Payer: MEDICARE

## 2023-11-15 VITALS
DIASTOLIC BLOOD PRESSURE: 78 MMHG | TEMPERATURE: 99 F | SYSTOLIC BLOOD PRESSURE: 136 MMHG | WEIGHT: 192 LBS | HEART RATE: 64 BPM | OXYGEN SATURATION: 98 % | HEIGHT: 63 IN | RESPIRATION RATE: 16 BRPM | BODY MASS INDEX: 34.02 KG/M2

## 2023-11-15 DIAGNOSIS — K21.00 GASTROESOPHAGEAL REFLUX DISEASE WITH ESOPHAGITIS WITHOUT HEMORRHAGE: ICD-10-CM

## 2023-11-15 DIAGNOSIS — M89.9 BONE DISEASE: ICD-10-CM

## 2023-11-15 DIAGNOSIS — T78.40XA ALLERGIC REACTION, INITIAL ENCOUNTER: Primary | ICD-10-CM

## 2023-11-15 PROCEDURE — 3078F PR MOST RECENT DIASTOLIC BLOOD PRESSURE < 80 MM HG: ICD-10-PCS | Mod: CPTII,S$GLB,, | Performed by: INTERNAL MEDICINE

## 2023-11-15 PROCEDURE — 1159F PR MEDICATION LIST DOCUMENTED IN MEDICAL RECORD: ICD-10-PCS | Mod: CPTII,S$GLB,, | Performed by: INTERNAL MEDICINE

## 2023-11-15 PROCEDURE — 3044F PR MOST RECENT HEMOGLOBIN A1C LEVEL <7.0%: ICD-10-PCS | Mod: CPTII,S$GLB,, | Performed by: INTERNAL MEDICINE

## 2023-11-15 PROCEDURE — 3008F PR BODY MASS INDEX (BMI) DOCUMENTED: ICD-10-PCS | Mod: CPTII,S$GLB,, | Performed by: INTERNAL MEDICINE

## 2023-11-15 PROCEDURE — 99213 OFFICE O/P EST LOW 20 MIN: CPT | Mod: S$GLB,,, | Performed by: INTERNAL MEDICINE

## 2023-11-15 PROCEDURE — 3075F SYST BP GE 130 - 139MM HG: CPT | Mod: CPTII,S$GLB,, | Performed by: INTERNAL MEDICINE

## 2023-11-15 PROCEDURE — 3008F BODY MASS INDEX DOCD: CPT | Mod: CPTII,S$GLB,, | Performed by: INTERNAL MEDICINE

## 2023-11-15 PROCEDURE — 3078F DIAST BP <80 MM HG: CPT | Mod: CPTII,S$GLB,, | Performed by: INTERNAL MEDICINE

## 2023-11-15 PROCEDURE — 1159F MED LIST DOCD IN RCRD: CPT | Mod: CPTII,S$GLB,, | Performed by: INTERNAL MEDICINE

## 2023-11-15 PROCEDURE — 1126F AMNT PAIN NOTED NONE PRSNT: CPT | Mod: CPTII,S$GLB,, | Performed by: INTERNAL MEDICINE

## 2023-11-15 PROCEDURE — 99213 PR OFFICE/OUTPT VISIT, EST, LEVL III, 20-29 MIN: ICD-10-PCS | Mod: S$GLB,,, | Performed by: INTERNAL MEDICINE

## 2023-11-15 PROCEDURE — 3075F PR MOST RECENT SYSTOLIC BLOOD PRESS GE 130-139MM HG: ICD-10-PCS | Mod: CPTII,S$GLB,, | Performed by: INTERNAL MEDICINE

## 2023-11-15 PROCEDURE — 1126F PR PAIN SEVERITY QUANTIFIED, NO PAIN PRESENT: ICD-10-PCS | Mod: CPTII,S$GLB,, | Performed by: INTERNAL MEDICINE

## 2023-11-15 PROCEDURE — 3044F HG A1C LEVEL LT 7.0%: CPT | Mod: CPTII,S$GLB,, | Performed by: INTERNAL MEDICINE

## 2023-11-15 RX ORDER — PANTOPRAZOLE SODIUM 40 MG/1
40 TABLET, DELAYED RELEASE ORAL EVERY MORNING
COMMUNITY
Start: 2023-11-01

## 2023-11-15 RX ORDER — LIDOCAINE HYDROCHLORIDE 20 MG/ML
SOLUTION ORAL; TOPICAL
COMMUNITY
Start: 2023-11-13 | End: 2023-12-22

## 2023-11-15 RX ORDER — ISOSORBIDE MONONITRATE 30 MG/1
30 TABLET, EXTENDED RELEASE ORAL DAILY
Qty: 30 TABLET | Refills: 0 | Status: SHIPPED | OUTPATIENT
Start: 2023-11-15 | End: 2023-11-20 | Stop reason: SDUPTHER

## 2023-11-15 RX ORDER — METHYLPREDNISOLONE 4 MG/1
TABLET ORAL
COMMUNITY
Start: 2023-11-13 | End: 2023-12-22

## 2023-11-15 NOTE — PROGRESS NOTES
"  SUBJECTIVE:    Patient ID: Nalini Wooten is a 68 y.o. female.    Chief Complaint: Allergic Reaction    Allergic Reaction  Pertinent negatives include no abdominal pain, chest pain, coughing, diarrhea, rash, trouble swallowing, vomiting or wheezing. There is no history of food allergies.       Patient had a crown placed fri and developed a reaction to the adhesive-she used benadryl and tramadol-she had blisters around the affected tooth and the cheek was hot to the touch-she was given a steroid pack and magic wash-adhesives have been added to her record of allergies    GERD-on carafate-it  worked " great" -carafate and protonix    Has a kardia -says it constantly says     Pt notes that Isosorbide helps bring the BP down    Admission on 10/16/2023, Discharged on 10/17/2023   Component Date Value Ref Range Status    WBC 10/16/2023 7.55  3.90 - 12.70 K/uL Final    RBC 10/16/2023 4.39  4.00 - 5.40 M/uL Final    Hemoglobin 10/16/2023 11.9 (L)  12.0 - 16.0 g/dL Final    Hematocrit 10/16/2023 38.0  37.0 - 48.5 % Final    MCV 10/16/2023 87  82 - 98 fL Final    MCH 10/16/2023 27.1  27.0 - 31.0 pg Final    MCHC 10/16/2023 31.3 (L)  32.0 - 36.0 g/dL Final    RDW 10/16/2023 13.3  11.5 - 14.5 % Final    Platelets 10/16/2023 312  150 - 450 K/uL Final    MPV 10/16/2023 9.4  9.2 - 12.9 fL Final    Immature Granulocytes 10/16/2023 0.3  0.0 - 0.5 % Final    Gran # (ANC) 10/16/2023 4.2  1.8 - 7.7 K/uL Final    Immature Grans (Abs) 10/16/2023 0.02  0.00 - 0.04 K/uL Final    Lymph # 10/16/2023 2.4  1.0 - 4.8 K/uL Final    Mono # 10/16/2023 0.7  0.3 - 1.0 K/uL Final    Eos # 10/16/2023 0.2  0.0 - 0.5 K/uL Final    Baso # 10/16/2023 0.05  0.00 - 0.20 K/uL Final    nRBC 10/16/2023 0  0 /100 WBC Final    Gran % 10/16/2023 56.1  38.0 - 73.0 % Final    Lymph % 10/16/2023 31.4  18.0 - 48.0 % Final    Mono % 10/16/2023 8.7  4.0 - 15.0 % Final    Eosinophil % 10/16/2023 2.8  0.0 - 8.0 % Final    Basophil % 10/16/2023 0.7  0.0 - 1.9 % Final    " Differential Method 10/16/2023 Automated   Final    Sodium 10/16/2023 138  136 - 145 mmol/L Final    Potassium 10/16/2023 4.1  3.5 - 5.1 mmol/L Final    Chloride 10/16/2023 107  95 - 110 mmol/L Final    CO2 10/16/2023 26  23 - 29 mmol/L Final    Glucose 10/16/2023 94  70 - 110 mg/dL Final    BUN 10/16/2023 14  8 - 23 mg/dL Final    Creatinine 10/16/2023 0.8  0.5 - 1.4 mg/dL Final    Calcium 10/16/2023 8.8  8.7 - 10.5 mg/dL Final    Total Protein 10/16/2023 6.5  6.0 - 8.4 g/dL Final    Albumin 10/16/2023 4.2  3.5 - 5.2 g/dL Final    Total Bilirubin 10/16/2023 0.3  0.1 - 1.0 mg/dL Final    Alkaline Phosphatase 10/16/2023 70  55 - 135 U/L Final    AST 10/16/2023 19  10 - 40 U/L Final    ALT 10/16/2023 17  10 - 44 U/L Final    eGFR 10/16/2023 >60.0  >60 mL/min/1.73 m^2 Final    Anion Gap 10/16/2023 5 (L)  8 - 16 mmol/L Final    BNP 10/16/2023 57  0 - 99 pg/mL Final    Troponin I High Sensitivity 10/16/2023 4.9  0.0 - 14.9 pg/mL Final    Troponin I High Sensitivity 10/16/2023 4.8  0.0 - 14.9 pg/mL Final    Prothrombin Time 10/16/2023 10.3  9.0 - 12.5 sec Final    INR 10/16/2023 0.9  0.8 - 1.2 Final    POC Glucose 10/16/2023 81  70 - 110 Final    Troponin I High Sensitivity 10/16/2023 5.4  0.0 - 14.9 pg/mL Final    Troponin I High Sensitivity 10/16/2023 5.4  0.0 - 14.9 pg/mL Final    Cholesterol 10/16/2023 232 (H)  120 - 199 mg/dL Final    Triglycerides 10/16/2023 364 (H)  30 - 150 mg/dL Final    HDL 10/16/2023 37 (L)  40 - 75 mg/dL Final    LDL Cholesterol 10/16/2023 122.2  63.0 - 159.0 mg/dL Final    HDL/Cholesterol Ratio 10/16/2023 15.9 (L)  20.0 - 50.0 % Final    Total Cholesterol/HDL Ratio 10/16/2023 6.3 (H)  2.0 - 5.0 Final    Non-HDL Cholesterol 10/16/2023 195  mg/dL Final    POC Glucose 10/16/2023 168 (H)  70 - 110 Final    Sodium 10/17/2023 139  136 - 145 mmol/L Final    Potassium 10/17/2023 4.1  3.5 - 5.1 mmol/L Final    Chloride 10/17/2023 108  95 - 110 mmol/L Final    CO2 10/17/2023 25  23 - 29 mmol/L Final     Glucose 10/17/2023 93  70 - 110 mg/dL Final    BUN 10/17/2023 18  8 - 23 mg/dL Final    Creatinine 10/17/2023 0.8  0.5 - 1.4 mg/dL Final    Calcium 10/17/2023 8.4 (L)  8.7 - 10.5 mg/dL Final    Total Protein 10/17/2023 5.8 (L)  6.0 - 8.4 g/dL Final    Albumin 10/17/2023 3.6  3.5 - 5.2 g/dL Final    Total Bilirubin 10/17/2023 0.3  0.1 - 1.0 mg/dL Final    Alkaline Phosphatase 10/17/2023 65  55 - 135 U/L Final    AST 10/17/2023 15  10 - 40 U/L Final    ALT 10/17/2023 14  10 - 44 U/L Final    eGFR 10/17/2023 >60.0  >60 mL/min/1.73 m^2 Final    Anion Gap 10/17/2023 6 (L)  8 - 16 mmol/L Final    WBC 10/17/2023 7.60  3.90 - 12.70 K/uL Final    RBC 10/17/2023 4.19  4.00 - 5.40 M/uL Final    Hemoglobin 10/17/2023 11.3 (L)  12.0 - 16.0 g/dL Final    Hematocrit 10/17/2023 36.5 (L)  37.0 - 48.5 % Final    MCV 10/17/2023 87  82 - 98 fL Final    MCH 10/17/2023 27.0  27.0 - 31.0 pg Final    MCHC 10/17/2023 31.0 (L)  32.0 - 36.0 g/dL Final    RDW 10/17/2023 13.3  11.5 - 14.5 % Final    Platelets 10/17/2023 285  150 - 450 K/uL Final    MPV 10/17/2023 9.5  9.2 - 12.9 fL Final    Immature Granulocytes 10/17/2023 0.3  0.0 - 0.5 % Final    Gran # (ANC) 10/17/2023 3.9  1.8 - 7.7 K/uL Final    Immature Grans (Abs) 10/17/2023 0.02  0.00 - 0.04 K/uL Final    Lymph # 10/17/2023 2.7  1.0 - 4.8 K/uL Final    Mono # 10/17/2023 0.6  0.3 - 1.0 K/uL Final    Eos # 10/17/2023 0.3  0.0 - 0.5 K/uL Final    Baso # 10/17/2023 0.06  0.00 - 0.20 K/uL Final    nRBC 10/17/2023 0  0 /100 WBC Final    Gran % 10/17/2023 51.5  38.0 - 73.0 % Final    Lymph % 10/17/2023 35.0  18.0 - 48.0 % Final    Mono % 10/17/2023 8.3  4.0 - 15.0 % Final    Eosinophil % 10/17/2023 4.1  0.0 - 8.0 % Final    Basophil % 10/17/2023 0.8  0.0 - 1.9 % Final    Differential Method 10/17/2023 Automated   Final    85% Max Predicted HR 10/17/2023 126   Final    Max Predicted HR 10/17/2023 148   Final    [facility] CV CPX PATIENT IS MALE 10/17/2023 0.0   Final    [facility] CV CPX PATIENT IS FEMALE  10/17/2023 1.0   Final    Systolic blood pressure 10/17/2023 140  mmHg Final    Diastolic blood pressure 10/17/2023 80  mmHg Final    HR at rest 10/17/2023 61  bpm Final    dose 10/17/2023 0.4  mg Final    Peak Systolic BP 10/17/2023 150  mmHg Final    Peak Diatolic BP 10/17/2023 81  mmHg Final    Peak HR 10/17/2023 86.0  bpm Final    % Max HR Achieved 10/17/2023 58   Final    1 Minute Recovery HR 10/17/2023 74  bpm Final    RPP 10/17/2023 8,540   Final    Peak RPP 10/17/2023 12,900   Final    POC Glucose 10/17/2023 199 (H)  70 - 110 Final   Office Visit on 04/13/2023   Component Date Value Ref Range Status    Hemoglobin A1C, POC 04/13/2023 6.2  % Final       Past Medical History:   Diagnosis Date    Allergies     Bulging disc     lower back     Carpal tunnel syndrome     Degenerative disc disease     High cholesterol     Pinched nerve in neck      Past Surgical History:   Procedure Laterality Date    ARTHROSCOPIC REPAIR OF ROTATOR CUFF OF SHOULDER Right 3/10/2021    Procedure: REPAIR, ROTATOR CUFF, ARTHROSCOPIC;  Surgeon: Dustin Vanegas MD;  Location: [facility] OR;  Service: Orthopedics;  Laterality: Right;  arthrex notified    ARTHROSCOPY OF SHOULDER WITH DECOMPRESSION OF SUBACROMIAL SPACE Right 3/10/2021    Procedure: ARTHROSCOPY, SHOULDER, WITH SUBACROMIAL SPACE DECOMPRESSION;  Surgeon: Dustin Vanegas MD;  Location: [facility] OR;  Service: Orthopedics;  Laterality: Right;    BLADDER SUSPENSION  1990    CATARACT EXTRACTION W/  INTRAOCULAR LENS IMPLANT Right 6/21/2023    Procedure: CEIOL OD;  Surgeon: Shawn Shearer MD;  Location: [facility] OR;  Service: Ophthalmology;  Laterality: Right;    CATARACT EXTRACTION W/  INTRAOCULAR LENS IMPLANT Left 7/19/2023    Procedure: CEIOL OS;  Surgeon: Shawn Shearer MD;  Location: [facility] OR;  Service: Ophthalmology;  Laterality: Left;    DISTAL CLAVICLE EXCISION Right 3/10/2021    Procedure: EXCISION, CLAVICLE, DISTAL;  Surgeon: Dustin Vanegas MD;  Location: [facility] OR;  Service: Orthopedics;   Laterality: Right;    FOOT SURGERY Bilateral 2001    FOOT SURGERY Right 05/13/2022    HAND SURGERY Left 2017    HYSTERECTOMY  1990    JOINT REPLACEMENT  2013    KNEE SURGERY Bilateral 2013    MANDIBLE FRACTURE SURGERY  1981    SHOULDER ARTHROSCOPY Left 02/27/2019    THYROIDECTOMY Left 05/13/2019        TONSILLECTOMY       Family History   Problem Relation Age of Onset    Arthritis Mother     Aneurysm Mother         Abdominal Aneurysm     Heart disease Father 65    Arthritis Father     Diabetes Father     Hearing loss Father     Hypertension Father     Dementia Father     No Known Problems Sister     No Known Problems Sister     Heart disease Brother 50 - 59        stent placement    Heart disease Brother 49        stent placement    Aneurysm Brother         Abdominal Aneurysm    Leukemia Son     Crohn's disease Son     Ankylosing spondylitis Son     Other Son         avascular necrosis of pancho hips    Rheumatologic disease Son     Heart defect Son     Kidney failure Son     SIDS Maternal Aunt     Uterine cancer Maternal Aunt     Dementia Paternal Aunt     Diabetes Paternal Grandmother     Heart disease Paternal Grandfather        Marital Status:   Alcohol History:  reports no history of alcohol use.  Tobacco History:  reports that she has never smoked. She has never used smokeless tobacco.  Drug History:  reports no history of drug use.    Review of patient's allergies indicates:   Allergen Reactions    Adhesive Swelling and Blisters     Adhesive that requires uv light    Estrogens Other (See Comments)     Mini stroke    Tetanus vaccines and toxoid Swelling and Other (See Comments)     Swelling at injection site, fever    Shingrix (pf)  [varicella-zoster ge-as01b (pf)] Diarrhea, Itching and Nausea And Vomiting       Current Outpatient Medications:     aspirin 81 MG Chew, Take 1 tablet (81 mg total) by mouth once daily., Disp: 30 tablet, Rfl: 0    cyanocobalamin 1,000 mcg/mL injection, Inject every  week 1 cc, Disp: 10 mL, Rfl: 1    fexofenadine (ALLEGRA) 180 MG tablet, Take 1 tablet (180 mg total) by mouth once daily., Disp: 30 tablet, Rfl: 2    isosorbide mononitrate (IMDUR) 30 MG 24 hr tablet, Take 1 tablet (30 mg total) by mouth once daily., Disp: 30 tablet, Rfl: 0    LIDOCAINE VISCOUS 2 % solution, Take by mouth., Disp: , Rfl:     metFORMIN (GLUCOPHAGE) 500 MG tablet, Take 1 tablet (500 mg total) by mouth 2 (two) times daily with meals., Disp: 180 tablet, Rfl: 2    methylPREDNISolone (MEDROL DOSEPACK) 4 mg tablet, Take by mouth., Disp: , Rfl:     pantoprazole (PROTONIX) 40 MG tablet, Take 40 mg by mouth every morning., Disp: , Rfl:     rosuvastatin (CRESTOR) 40 MG Tab, Take 1 tablet (40 mg total) by mouth once daily., Disp: 90 tablet, Rfl: 2    sucralfate (CARAFATE) 1 gram tablet, Take 1 tablet (1 g total) by mouth 4 (four) times daily before meals and nightly., Disp: 120 tablet, Rfl: 1    tolterodine (DETROL LA) 4 MG 24 hr capsule, Take 1 capsule (4 mg total) by mouth once daily., Disp: 90 capsule, Rfl: 1    traMADoL (ULTRAM) 50 mg tablet, Take 1 tablet (50 mg total) by mouth every 4 (four) hours as needed for Pain., Disp: 30 tablet, Rfl: 0    water Liqd 150 mL with MILK OF MAGNESIA 400 mg/5 mL Susp 400 mg, diphenhydrAMINE 12.5 mg/5 mL Elix 60 mg, nystatin 100,000 unit/mL Susp 500,000 Units, , Disp: , Rfl:     Review of Systems   Constitutional:  Negative for activity change, appetite change, chills, fatigue, fever and unexpected weight change.   HENT:  Positive for tinnitus. Negative for congestion, ear pain, hearing loss, postnasal drip, sinus pain, sneezing, sore throat and trouble swallowing.    Eyes:  Negative for pain, discharge and visual disturbance.   Respiratory:  Negative for cough, choking, shortness of breath and wheezing.    Cardiovascular:  Negative for chest pain, palpitations and leg swelling.   Gastrointestinal:  Negative for abdominal distention, abdominal pain, blood in stool,  "constipation, diarrhea, nausea and vomiting.   Endocrine: Negative for cold intolerance and heat intolerance.   Genitourinary:  Negative for difficulty urinating, dysuria, frequency, pelvic pain and urgency.   Musculoskeletal:  Negative for back pain, joint swelling and neck pain.   Skin:  Negative for pallor and rash.   Allergic/Immunologic: Negative for environmental allergies and food allergies.   Neurological:  Negative for dizziness, tremors, weakness, numbness and headaches.   Hematological:  Does not bruise/bleed easily.   Psychiatric/Behavioral:  Negative for agitation, confusion, dysphoric mood, sleep disturbance and suicidal ideas. The patient is not nervous/anxious.           Objective:          8/17/2023    10:46 AM 8/28/2023     3:03 PM 8/29/2023     1:51 PM 10/16/2023    11:51 AM 10/26/2023     1:49 PM 11/15/2023    10:55 AM   Vitals - 1 value per visit   SYSTOLIC    177 116 136   DIASTOLIC    91 70 78   Pulse    69 64 64   Temp    98.2 °F (36.8 °C) 98.6 °F (37 °C) 98.6 °F (37 °C)   Resp    16 16 16   SPO2    98 % 98 % 98 %   Weight (lb)  180  180 191 192   Weight (kg)  81.647  81.647 86.637 87.091   Height  5' 3" (1.6 m)  5' 3" (1.6 m) 5' 3" (1.6 m) 5' 3" (1.6 m)   BMI (Calculated)  31.9  31.9 33.8 34   Pain Score Zero Four Zero  Zero Zero   (    Physical Exam  Vitals and nursing note reviewed.   Constitutional:       Appearance: She is obese. She is not ill-appearing.   HENT:      Head: Normocephalic and atraumatic.      Nose: Nose normal.      Mouth/Throat:      Mouth: Mucous membranes are moist.   Eyes:      Conjunctiva/sclera: Conjunctivae normal.      Pupils: Pupils are equal, round, and reactive to light.   Neck:      Vascular: No carotid bruit.   Cardiovascular:      Rate and Rhythm: Normal rate and regular rhythm.      Pulses: Normal pulses.      Heart sounds: Normal heart sounds.   Pulmonary:      Effort: Pulmonary effort is normal.      Breath sounds: Normal breath sounds.   Lymphadenopathy: "      Cervical: No cervical adenopathy.   Neurological:      Mental Status: She is alert.           Assessment:       1. Allergic reaction, initial encounter    2. Gastroesophageal reflux disease with esophagitis without hemorrhage    3. Bone disease         Plan:       Allergic reaction, initial encounter    Gastroesophageal reflux disease with esophagitis without hemorrhage    Bone disease      Follow up if symptoms worsen or fail to improve, for FOLLOW UP LABS, FOLLOW-UP STATUS, FOLLOW UP MEDICATIONS.        11/15/2023 Katherine Ferguson M.D.

## 2023-11-20 RX ORDER — ISOSORBIDE MONONITRATE 30 MG/1
30 TABLET, EXTENDED RELEASE ORAL DAILY
Qty: 30 TABLET | Refills: 0 | Status: SHIPPED | OUTPATIENT
Start: 2023-11-20 | End: 2023-11-22 | Stop reason: SDUPTHER

## 2023-11-22 RX ORDER — ISOSORBIDE MONONITRATE 30 MG/1
30 TABLET, EXTENDED RELEASE ORAL DAILY
Qty: 30 TABLET | Refills: 0 | Status: SHIPPED | OUTPATIENT
Start: 2023-11-22 | End: 2023-12-22

## 2023-12-13 ENCOUNTER — PATIENT MESSAGE (OUTPATIENT)
Dept: FAMILY MEDICINE | Facility: CLINIC | Age: 68
End: 2023-12-13

## 2023-12-22 ENCOUNTER — OFFICE VISIT (OUTPATIENT)
Dept: CARDIOLOGY | Facility: CLINIC | Age: 68
End: 2023-12-22
Payer: MEDICARE

## 2023-12-22 VITALS
SYSTOLIC BLOOD PRESSURE: 130 MMHG | OXYGEN SATURATION: 99 % | WEIGHT: 200 LBS | HEART RATE: 57 BPM | HEIGHT: 63 IN | DIASTOLIC BLOOD PRESSURE: 82 MMHG | BODY MASS INDEX: 35.44 KG/M2

## 2023-12-22 DIAGNOSIS — R07.9 CHEST PAIN, UNSPECIFIED TYPE: Primary | ICD-10-CM

## 2023-12-22 DIAGNOSIS — R10.13 DYSPEPSIA: ICD-10-CM

## 2023-12-22 DIAGNOSIS — E78.00 PURE HYPERCHOLESTEROLEMIA: ICD-10-CM

## 2023-12-22 DIAGNOSIS — E78.00 HIGH CHOLESTEROL: ICD-10-CM

## 2023-12-22 DIAGNOSIS — R73.9 HYPERGLYCEMIA: ICD-10-CM

## 2023-12-22 PROCEDURE — 3075F SYST BP GE 130 - 139MM HG: CPT | Mod: CPTII,S$GLB,, | Performed by: INTERNAL MEDICINE

## 2023-12-22 PROCEDURE — 3008F BODY MASS INDEX DOCD: CPT | Mod: CPTII,S$GLB,, | Performed by: INTERNAL MEDICINE

## 2023-12-22 PROCEDURE — 99213 OFFICE O/P EST LOW 20 MIN: CPT | Mod: S$GLB,,, | Performed by: INTERNAL MEDICINE

## 2023-12-22 PROCEDURE — 3079F PR MOST RECENT DIASTOLIC BLOOD PRESSURE 80-89 MM HG: ICD-10-PCS | Mod: CPTII,S$GLB,, | Performed by: INTERNAL MEDICINE

## 2023-12-22 PROCEDURE — 3079F DIAST BP 80-89 MM HG: CPT | Mod: CPTII,S$GLB,, | Performed by: INTERNAL MEDICINE

## 2023-12-22 PROCEDURE — 3075F PR MOST RECENT SYSTOLIC BLOOD PRESS GE 130-139MM HG: ICD-10-PCS | Mod: CPTII,S$GLB,, | Performed by: INTERNAL MEDICINE

## 2023-12-22 PROCEDURE — 1159F PR MEDICATION LIST DOCUMENTED IN MEDICAL RECORD: ICD-10-PCS | Mod: CPTII,S$GLB,, | Performed by: INTERNAL MEDICINE

## 2023-12-22 PROCEDURE — 99213 PR OFFICE/OUTPT VISIT, EST, LEVL III, 20-29 MIN: ICD-10-PCS | Mod: S$GLB,,, | Performed by: INTERNAL MEDICINE

## 2023-12-22 PROCEDURE — 1101F PR PT FALLS ASSESS DOC 0-1 FALLS W/OUT INJ PAST YR: ICD-10-PCS | Mod: CPTII,S$GLB,, | Performed by: INTERNAL MEDICINE

## 2023-12-22 PROCEDURE — 1126F PR PAIN SEVERITY QUANTIFIED, NO PAIN PRESENT: ICD-10-PCS | Mod: CPTII,S$GLB,, | Performed by: INTERNAL MEDICINE

## 2023-12-22 PROCEDURE — 1159F MED LIST DOCD IN RCRD: CPT | Mod: CPTII,S$GLB,, | Performed by: INTERNAL MEDICINE

## 2023-12-22 PROCEDURE — 3288F FALL RISK ASSESSMENT DOCD: CPT | Mod: CPTII,S$GLB,, | Performed by: INTERNAL MEDICINE

## 2023-12-22 PROCEDURE — 3288F PR FALLS RISK ASSESSMENT DOCUMENTED: ICD-10-PCS | Mod: CPTII,S$GLB,, | Performed by: INTERNAL MEDICINE

## 2023-12-22 PROCEDURE — 3044F PR MOST RECENT HEMOGLOBIN A1C LEVEL <7.0%: ICD-10-PCS | Mod: CPTII,S$GLB,, | Performed by: INTERNAL MEDICINE

## 2023-12-22 PROCEDURE — 3044F HG A1C LEVEL LT 7.0%: CPT | Mod: CPTII,S$GLB,, | Performed by: INTERNAL MEDICINE

## 2023-12-22 PROCEDURE — 1126F AMNT PAIN NOTED NONE PRSNT: CPT | Mod: CPTII,S$GLB,, | Performed by: INTERNAL MEDICINE

## 2023-12-22 PROCEDURE — 99999 PR PBB SHADOW E&M-EST. PATIENT-LVL III: CPT | Mod: PBBFAC,,, | Performed by: INTERNAL MEDICINE

## 2023-12-22 PROCEDURE — 99999 PR PBB SHADOW E&M-EST. PATIENT-LVL III: ICD-10-PCS | Mod: PBBFAC,,, | Performed by: INTERNAL MEDICINE

## 2023-12-22 PROCEDURE — 1160F RVW MEDS BY RX/DR IN RCRD: CPT | Mod: CPTII,S$GLB,, | Performed by: INTERNAL MEDICINE

## 2023-12-22 PROCEDURE — 1101F PT FALLS ASSESS-DOCD LE1/YR: CPT | Mod: CPTII,S$GLB,, | Performed by: INTERNAL MEDICINE

## 2023-12-22 PROCEDURE — 1160F PR REVIEW ALL MEDS BY PRESCRIBER/CLIN PHARMACIST DOCUMENTED: ICD-10-PCS | Mod: CPTII,S$GLB,, | Performed by: INTERNAL MEDICINE

## 2023-12-22 PROCEDURE — 3008F PR BODY MASS INDEX (BMI) DOCUMENTED: ICD-10-PCS | Mod: CPTII,S$GLB,, | Performed by: INTERNAL MEDICINE

## 2023-12-22 RX ORDER — ROSUVASTATIN CALCIUM 40 MG/1
40 TABLET, COATED ORAL DAILY
Qty: 90 TABLET | Refills: 3 | Status: SHIPPED | OUTPATIENT
Start: 2023-12-22 | End: 2024-12-21

## 2023-12-22 RX ORDER — ROSUVASTATIN CALCIUM 40 MG/1
40 TABLET, COATED ORAL DAILY
Qty: 90 TABLET | Refills: 3 | Status: CANCELLED | OUTPATIENT
Start: 2023-12-22 | End: 2024-12-21

## 2023-12-22 RX ORDER — ROSUVASTATIN CALCIUM 40 MG/1
40 TABLET, COATED ORAL DAILY
Qty: 90 TABLET | Refills: 3 | Status: SHIPPED | OUTPATIENT
Start: 2023-12-22 | End: 2023-12-22

## 2023-12-22 NOTE — ASSESSMENT & PLAN NOTE
Atypical chest pains now clinically improved.  Continue on pantoprazole 40 mg daily.  As she has no known coronary revascularization out encourage recommend she stop aspirin as well.

## 2023-12-22 NOTE — PROGRESS NOTES
Subjective:    Patient ID:  Nalini Wooten is a 68 y.o. female patient here for evaluation Hospital Follow Up and Results      History of Present Illness:     A 68-year-old lady with history of atypical chest pains had tried isosorbide did not have any relief she was seen by gastroenterologist thought to have some esophageal spasm and reflux.  She was taken off isosorbide and started on pantoprazole with significant improvement of her symptoms.  This denies denies having any nausea vomiting no blood in the stools or black stools.  No cough or congestion and she remains fairly active and during these activities no cardiac symptoms are noted.  In October she had blood test done total cholesterol is 232 and triglycerides 364 and LDL cholesterol of 122.        Review of patient's allergies indicates:   Allergen Reactions    Adhesive Swelling and Blisters     Adhesive that requires uv light    Estrogens Other (See Comments)     Mini stroke    Tetanus vaccines and toxoid Swelling and Other (See Comments)     Swelling at injection site, fever    Shingrix (pf)  [varicella-zoster ge-as01b (pf)] Diarrhea, Itching and Nausea And Vomiting       Past Medical History:   Diagnosis Date    Allergies     Bulging disc     lower back     Carpal tunnel syndrome     Degenerative disc disease     High cholesterol     Pinched nerve in neck      Past Surgical History:   Procedure Laterality Date    ARTHROSCOPIC REPAIR OF ROTATOR CUFF OF SHOULDER Right 3/10/2021    Procedure: REPAIR, ROTATOR CUFF, ARTHROSCOPIC;  Surgeon: Dustin Vanegas MD;  Location: [facility] OR;  Service: Orthopedics;  Laterality: Right;  arthrex notified    ARTHROSCOPY OF SHOULDER WITH DECOMPRESSION OF SUBACROMIAL SPACE Right 3/10/2021    Procedure: ARTHROSCOPY, SHOULDER, WITH SUBACROMIAL SPACE DECOMPRESSION;  Surgeon: Dustin Vanegas MD;  Location: [facility] OR;  Service: Orthopedics;  Laterality: Right;    BLADDER SUSPENSION  1990    CATARACT EXTRACTION W/  INTRAOCULAR LENS  IMPLANT Right 6/21/2023    Procedure: CEIOL OD;  Surgeon: Shawn Shearer MD;  Location: [facility] OR;  Service: Ophthalmology;  Laterality: Right;    CATARACT EXTRACTION W/  INTRAOCULAR LENS IMPLANT Left 7/19/2023    Procedure: CEIOL OS;  Surgeon: Shawn Shearer MD;  Location: [facility] OR;  Service: Ophthalmology;  Laterality: Left;    DISTAL CLAVICLE EXCISION Right 3/10/2021    Procedure: EXCISION, CLAVICLE, DISTAL;  Surgeon: Dustin Vanegas MD;  Location: [facility] OR;  Service: Orthopedics;  Laterality: Right;    FOOT SURGERY Bilateral 2001    FOOT SURGERY Right 05/13/2022    HAND SURGERY Left 2017    HYSTERECTOMY  1990    JOINT REPLACEMENT  2013    KNEE SURGERY Bilateral 2013    MANDIBLE FRACTURE SURGERY  1981    SHOULDER ARTHROSCOPY Left 02/27/2019    THYROIDECTOMY Left 05/13/2019        TONSILLECTOMY       Social History     Tobacco Use    Smoking status: Never    Smokeless tobacco: Never   Substance Use Topics    Alcohol use: Never     Alcohol/week: 0.0 standard drinks of alcohol    Drug use: No        Review of Systems:    As noted in HPI in addition      REVIEW OF SYSTEMS  CARDIOVASCULAR: No recent chest pain, palpitations, arm, neck, or jaw pain  RESPIRATORY: No recent fever, cough chills, SOB or congestion  : No blood in the urine  GI: No Nausea, vomiting, constipation, diarrhea, blood, or reflux.  MUSCULOSKELETAL: No myalgias  NEURO: No lightheadedness or dizziness  EYES: No Double vision, blurry, vision or headache              Objective        Vitals:    12/22/23 0848   BP: 130/82   Pulse: (!) 57       LIPIDS - LAST 2   Lab Results   Component Value Date    CHOL 232 (H) 10/16/2023    CHOL 133 10/18/2021    HDL 37 (L) 10/16/2023    HDL 47 10/18/2021    LDLCALC 122.2 10/16/2023    LDLCALC 71.4 10/18/2021    TRIG 364 (H) 10/16/2023    TRIG 73 10/18/2021    CHOLHDL 15.9 (L) 10/16/2023    CHOLHDL 35.3 10/18/2021       CBC - LAST 2  Lab Results   Component Value Date    WBC 7.60 10/17/2023    WBC 7.55  10/16/2023    RBC 4.19 10/17/2023    RBC 4.39 10/16/2023    HGB 11.3 (L) 10/17/2023    HGB 11.9 (L) 10/16/2023    HCT 36.5 (L) 10/17/2023    HCT 38.0 10/16/2023    MCV 87 10/17/2023    MCV 87 10/16/2023    MCH 27.0 10/17/2023    MCH 27.1 10/16/2023    MCHC 31.0 (L) 10/17/2023    MCHC 31.3 (L) 10/16/2023    RDW 13.3 10/17/2023    RDW 13.3 10/16/2023     10/17/2023     10/16/2023    MPV 9.5 10/17/2023    MPV 9.4 10/16/2023    GRAN 3.9 10/17/2023    GRAN 51.5 10/17/2023    LYMPH 2.7 10/17/2023    LYMPH 35.0 10/17/2023    MONO 0.6 10/17/2023    MONO 8.3 10/17/2023    BASO 0.06 10/17/2023    BASO 0.05 10/16/2023    NRBC 0 10/17/2023    NRBC 0 10/16/2023       CHEMISTRY & LIVER FUNCTION - LAST 2  Lab Results   Component Value Date     10/17/2023     10/16/2023    K 4.1 10/17/2023    K 4.1 10/16/2023     10/17/2023     10/16/2023    CO2 25 10/17/2023    CO2 26 10/16/2023    ANIONGAP 6 (L) 10/17/2023    ANIONGAP 5 (L) 10/16/2023    BUN 18 10/17/2023    BUN 14 10/16/2023    CREATININE 0.8 10/17/2023    CREATININE 0.8 10/16/2023    GLU 93 10/17/2023    GLU 94 10/16/2023    CALCIUM 8.4 (L) 10/17/2023    CALCIUM 8.8 10/16/2023    ALBUMIN 3.6 10/17/2023    ALBUMIN 4.2 10/16/2023    PROT 5.8 (L) 10/17/2023    PROT 6.5 10/16/2023    ALKPHOS 65 10/17/2023    ALKPHOS 70 10/16/2023    ALT 14 10/17/2023    ALT 17 10/16/2023    AST 15 10/17/2023    AST 19 10/16/2023    BILITOT 0.3 10/17/2023    BILITOT 0.3 10/16/2023        CARDIAC PROFILE - LAST 2  Lab Results   Component Value Date    BNP 57 10/16/2023    TROPONINIHS 5.4 10/16/2023    TROPONINIHS 5.4 10/16/2023        COAGULATION - LAST 2  Lab Results   Component Value Date    INR 0.9 10/16/2023       ENDOCRINE & PSA - LAST 2  Lab Results   Component Value Date    HGBA1C 6.1 06/28/2022    HGBA1C 6.1 06/28/2022    TSH 2.500 06/28/2022    TSH 2.100 10/18/2021        ECHOCARDIOGRAM RESULTS  No results found for this or any previous  visit.      CURRENT/PREVIOUS VISIT EKG  Results for orders placed or performed during the hospital encounter of 10/16/23   EKG 12-lead    Collection Time: 10/16/23  6:04 PM    Narrative    Test Reason : R07.9,    Vent. Rate : 064 BPM     Atrial Rate : 064 BPM     P-R Int : 182 ms          QRS Dur : 080 ms      QT Int : 404 ms       P-R-T Axes : 059 071 093 degrees     QTc Int : 416 ms    Normal sinus rhythm  Nonspecific ST abnormality  Abnormal ECG  When compared with ECG of 16-OCT-2023 12:06,  No significant change was found  Confirmed by Ladi SHELBY, Josiah GIBBS ([id number]) on 10/17/2023 10:31:06 PM    Referred By: [email or web address]   SELF           Confirmed By:Josiah Bledsoe MD     No valid procedures specified.   Results for orders placed during the hospital encounter of 10/16/23    Nuclear Stress Test    Interpretation Summary    The ECG portion of the study is negative for ischemia.    The patient reported no chest pain during the stress test.    The nuclear portion of this study will be reported separately.    No valid procedures specified.    PHYSICAL EXAM  CONSTITUTIONAL: Well built, well nourished in no apparent distress  NECK: no carotid bruit, no JVD  LUNGS: CTA  CHEST WALL: no tenderness  HEART: regular rate and rhythm, S1, S2 normal, no murmur, click, rub or gallop   ABDOMEN: soft, non-tender; bowel sounds normal; no masses,  no organomegaly  EXTREMITIES: Extremities normal, no edema, no calf tenderness noted  NEURO: AAO X 3    I HAVE REVIEWED :    The vital signs, nurses notes, and all the pertinent radiology and labs.        Current Outpatient Medications   Medication Instructions    aspirin 81 mg, Oral, Daily    cyanocobalamin 1,000 mcg/mL injection Inject every week 1 cc    fexofenadine (ALLEGRA) 180 mg, Oral, Daily    metFORMIN (GLUCOPHAGE) 500 mg, Oral, 2 times daily with meals    pantoprazole (PROTONIX) 40 mg, Oral, Every morning    rosuvastatin (CRESTOR) 40 mg, Oral, Daily    tolterodine (DETROL LA) 4 mg,  Oral, Daily    traMADoL (ULTRAM) 50 mg, Oral, Every 4 hours PRN          Assessment & Plan     Chest pain  Atypical chest pains now clinically improved.  Continue on pantoprazole 40 mg daily.  As she has no known coronary revascularization out encourage recommend she stop aspirin as well.    High cholesterol  Patient has been has been on Crestor before and when the cholesterol improved she was taken off the Crestor and cholesterol LDL bumped months back up again.  As expected recommend to keep her back on Crestor at 40 mg nightly and maintain low-fat low-salt diet.    Dyspepsia  Maintain on pantoprazole 40 mg nightly.  And avoid spicy foods and greasy foods for time being.    Hyperglycemia  She is currently well controlled on metformin A1c is down to 6.2 on her last testing.  Continue the same with risk factor modification calorie restricted diet          Follow up in about 6 months (around 6/22/2024).

## 2023-12-22 NOTE — ASSESSMENT & PLAN NOTE
She is currently well controlled on metformin A1c is down to 6.2 on her last testing.  Continue the same with risk factor modification calorie restricted diet

## 2023-12-22 NOTE — ASSESSMENT & PLAN NOTE
Patient has been has been on Crestor before and when the cholesterol improved she was taken off the Crestor and cholesterol LDL bumped months back up again.  As expected recommend to keep her back on Crestor at 40 mg nightly and maintain low-fat low-salt diet.

## 2024-01-10 ENCOUNTER — PATIENT MESSAGE (OUTPATIENT)
Dept: FAMILY MEDICINE | Facility: CLINIC | Age: 69
End: 2024-01-10
Payer: MEDICARE

## 2024-01-11 RX ORDER — NITROFURANTOIN 25; 75 MG/1; MG/1
100 CAPSULE ORAL 2 TIMES DAILY
Qty: 20 CAPSULE | Refills: 0 | Status: SHIPPED | OUTPATIENT
Start: 2024-01-11

## 2024-01-21 ENCOUNTER — PATIENT MESSAGE (OUTPATIENT)
Dept: FAMILY MEDICINE | Facility: CLINIC | Age: 69
End: 2024-01-21
Payer: MEDICARE

## 2024-01-25 NOTE — PROGRESS NOTES
"ALLERGY & IMMUNOLOGY CLINIC -  NEW PATIENT     HISTORY OF PRESENT ILLNESS     Patient ID: Nalini Wooten is a 68 y.o. female    CC:   Chief Complaint   Patient presents with    Other     Blisters on fingers after acrylic nails    facial redness   reaction of cheek after dental procedure       HPI: Nalini Wooten is a 68 y.o. female presents for evaluation of:    01/26/2024  Query Allergy: States that for 35 years she has been getting acrylic nails. Stopped using for 4-5 years and returned to nail salon. 30 Minutes after acrylic nails were placed, developed burning and blistering. Symptoms persisted for 7 days thereafter before resolution. Now strictly avoids    Mouth: Had crown placed and several hours later developed inside jaw pain. Believes she had blisters on the inside of her mouth. Also had unilateral swelling at the time.     Eyes: Developed bilateral lower eye swelling approximately one month ago for the previous 4-5 years, as recently as last month. Has been present most days of the month. Denies association with nasal congestion, sneezing, runny nose. States sometimes eyes will blister thereafter and peel (Starts burning-->Puffiness-->Peeling). These "cycle" will last approximately one week and now she avoids skin care on the face.   Denies diminished vision. Denies watery eyes.      REVIEW OF SYSTEMS     CONST: no F/C/NS, no unintentional weight changes  Balance of review of systems negative except as mentioned above      MEDICAL HISTORY     MedHx: active problems reviewed  SurgHx:   Past Surgical History:   Procedure Laterality Date    ARTHROSCOPIC REPAIR OF ROTATOR CUFF OF SHOULDER Right 3/10/2021    Procedure: REPAIR, ROTATOR CUFF, ARTHROSCOPIC;  Surgeon: Dustin Vanegas MD;  Location: [facility];  Service: Orthopedics;  Laterality: Right;  arthrex notified    ARTHROSCOPY OF SHOULDER WITH DECOMPRESSION OF SUBACROMIAL SPACE Right 3/10/2021    Procedure: ARTHROSCOPY, SHOULDER, WITH SUBACROMIAL SPACE " "DECOMPRESSION;  Surgeon: Dustin Vanegas MD;  Location: [facility] OR;  Service: Orthopedics;  Laterality: Right;    BLADDER SUSPENSION  1990    CATARACT EXTRACTION W/  INTRAOCULAR LENS IMPLANT Right 6/21/2023    Procedure: CEIOL OD;  Surgeon: Shawn Shearer MD;  Location: [facility] OR;  Service: Ophthalmology;  Laterality: Right;    CATARACT EXTRACTION W/  INTRAOCULAR LENS IMPLANT Left 7/19/2023    Procedure: CEIOL OS;  Surgeon: Shawn Shearer MD;  Location: [facility] OR;  Service: Ophthalmology;  Laterality: Left;    DISTAL CLAVICLE EXCISION Right 3/10/2021    Procedure: EXCISION, CLAVICLE, DISTAL;  Surgeon: Dustin Vanegas MD;  Location: [facility] OR;  Service: Orthopedics;  Laterality: Right;    FOOT SURGERY Bilateral 2001    FOOT SURGERY Right 05/13/2022    HAND SURGERY Left 2017    HYSTERECTOMY  1990    JOINT REPLACEMENT  2013    KNEE SURGERY Bilateral 2013    MANDIBLE FRACTURE SURGERY  1981    SHOULDER ARTHROSCOPY Left 02/27/2019    THYROIDECTOMY Left 05/13/2019        TONSILLECTOMY         SocHx:   -Denies smoking history and illicit drug use     FamHx:   Otherwise no Family History of asthma, allergic rhinitis, atopic dermatitis, drug allergy, food allergy, venom allergy or immune deficiency.     Allergies: see below  Medications: MAR reviewed       PHYSICAL EXAM     VS: Ht 5' 3" (1.6 m)   Wt 92.9 kg (204 lb 12.9 oz)   BMI 36.28 kg/m²   GENERAL: awake, alert, cooperative with exam  EYES: PERRL, EOMI, no conjunctival injection, no discharge, no infraorbital shiners  EARS: external auditory canals normal B/L, TM normal B/L  ORAL: MMM, no ulcers, no thrush, no cobblestoning  NECK: supple, trachea midline, no cervical or submandibular LAD  LUNGS: CTAB, no w/r/c, no increased WOB  HEART: Normal Rate and regular rhythm, normal S1/S2, no m/g/r  EXTREMITIES: +2 distal pulses, no c/c/e  DERM: no rashes, no skin breaks  NEURO: normal gait, no facial asymmetry     ASSESSMENT/PLAN     Nalini Wooten is a 68 y.o. female with "     1. 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 Needs confirmatory patch testing (with dermatology) for definitive diagnosis.Recommend avoidance and systemic steroids for future reactions. Likely Type IV hypersensitivity  - CBC Auto Differential; Future    2. Chronic conjunctivitis of both eyes, unspecified chronic conjunctivitis type  -Possibly allergic in etiology. Screen with region 6 panel today and trial course of topical antihistamines BID  - Dermatophagoides [place]; Future  - Dermatophagoides Pteronyssinus; Future  - [place]; Future  - Phani; Future  - [place]; Future  - English Plantain; Future  - Oak; Future  - Pecan; Future  - [place]; Future  - Ragweed; Future  - Alternaria; Future  - Aspergillus; Future  - Cat; Future  - Cockroach; Future  - Dog; Future  - IgE; Future  - CBC Auto Differential; Future    3. Other chronic allergic conjunctivitis of both eyes  As ABove  - Dermatophagoides [place]; Future  - Dermatophagoides Pteronyssinus; Future  - [place]; Future  - Phani; Future  - [place]; Future  - English Plantain; Future  - Oak; Future  - Pecan; Future  - [place]; Future  - Ragweed; Future  - Alternaria; Future  - Aspergillus; Future  - Cat; Future  - Cockroach; Future  - Dog; Future  - IgE; Future  - CBC Auto Differential; Future        Follow up: 1 month      Fidel Pérez MD    I spent a total of 45 minutes on the day of the visit. This includes face to face time and non-face to face time preparing to see the patient (eg, review of tests), obtaining and/or reviewing separately obtained history, documenting clinical information in the electronic or other health record, independently interpreting results and communicating results to the patient/family/caregiver, or care coordinator.

## 2024-01-26 ENCOUNTER — OFFICE VISIT (OUTPATIENT)
Dept: ALLERGY | Facility: CLINIC | Age: 69
End: 2024-01-26
Payer: MEDICARE

## 2024-01-26 ENCOUNTER — LAB VISIT (OUTPATIENT)
Dept: LAB | Facility: HOSPITAL | Age: 69
End: 2024-01-26
Attending: STUDENT IN AN ORGANIZED HEALTH CARE EDUCATION/TRAINING PROGRAM
Payer: MEDICARE

## 2024-01-26 VITALS — WEIGHT: 204.81 LBS | BODY MASS INDEX: 36.29 KG/M2 | HEIGHT: 63 IN

## 2024-01-26 DIAGNOSIS — H10.403 CHRONIC CONJUNCTIVITIS OF BOTH EYES, UNSPECIFIED CHRONIC CONJUNCTIVITIS TYPE: ICD-10-CM

## 2024-01-26 DIAGNOSIS — L23.3 ALLERGIC CONTACT DERMATITIS DUE TO DRUGS IN CONTACT WITH SKIN: Primary | ICD-10-CM

## 2024-01-26 DIAGNOSIS — H10.45 OTHER CHRONIC ALLERGIC CONJUNCTIVITIS OF BOTH EYES: ICD-10-CM

## 2024-01-26 DIAGNOSIS — L23.3 ALLERGIC CONTACT DERMATITIS DUE TO DRUGS IN CONTACT WITH SKIN: ICD-10-CM

## 2024-01-26 LAB
BASOPHILS # BLD AUTO: 0.05 K/UL (ref 0–0.2)
BASOPHILS NFR BLD: 0.7 % (ref 0–1.9)
DIFFERENTIAL METHOD BLD: ABNORMAL
EOSINOPHIL # BLD AUTO: 0.2 K/UL (ref 0–0.5)
EOSINOPHIL NFR BLD: 2.2 % (ref 0–8)
ERYTHROCYTE [DISTWIDTH] IN BLOOD BY AUTOMATED COUNT: 13.6 % (ref 11.5–14.5)
HCT VFR BLD AUTO: 42.4 % (ref 37–48.5)
HGB BLD-MCNC: 13 G/DL (ref 12–16)
IGE SERPL-ACNC: 302 IU/ML (ref 0–100)
IMM GRANULOCYTES # BLD AUTO: 0.02 K/UL (ref 0–0.04)
IMM GRANULOCYTES NFR BLD AUTO: 0.3 % (ref 0–0.5)
LYMPHOCYTES # BLD AUTO: 2.2 K/UL (ref 1–4.8)
LYMPHOCYTES NFR BLD: 29.6 % (ref 18–48)
MCH RBC QN AUTO: 25.2 PG (ref 27–31)
MCHC RBC AUTO-ENTMCNC: 30.7 G/DL (ref 32–36)
MCV RBC AUTO: 82 FL (ref 82–98)
MONOCYTES # BLD AUTO: 0.6 K/UL (ref 0.3–1)
MONOCYTES NFR BLD: 8.7 % (ref 4–15)
NEUTROPHILS # BLD AUTO: 4.3 K/UL (ref 1.8–7.7)
NEUTROPHILS NFR BLD: 58.5 % (ref 38–73)
NRBC BLD-RTO: 0 /100 WBC
PLATELET # BLD AUTO: 352 K/UL (ref 150–450)
PMV BLD AUTO: 10.1 FL (ref 9.2–12.9)
RBC # BLD AUTO: 5.15 M/UL (ref 4–5.4)
WBC # BLD AUTO: 7.26 K/UL (ref 3.9–12.7)

## 2024-01-26 PROCEDURE — 86003 ALLG SPEC IGE CRUDE XTRC EA: CPT | Mod: 59 | Performed by: STUDENT IN AN ORGANIZED HEALTH CARE EDUCATION/TRAINING PROGRAM

## 2024-01-26 PROCEDURE — 99999 PR PBB SHADOW E&M-EST. PATIENT-LVL III: CPT | Mod: PBBFAC,,, | Performed by: STUDENT IN AN ORGANIZED HEALTH CARE EDUCATION/TRAINING PROGRAM

## 2024-01-26 PROCEDURE — 3008F BODY MASS INDEX DOCD: CPT | Mod: CPTII,S$GLB,, | Performed by: STUDENT IN AN ORGANIZED HEALTH CARE EDUCATION/TRAINING PROGRAM

## 2024-01-26 PROCEDURE — 85025 COMPLETE CBC W/AUTO DIFF WBC: CPT | Performed by: STUDENT IN AN ORGANIZED HEALTH CARE EDUCATION/TRAINING PROGRAM

## 2024-01-26 PROCEDURE — 99204 OFFICE O/P NEW MOD 45 MIN: CPT | Mod: S$GLB,,, | Performed by: STUDENT IN AN ORGANIZED HEALTH CARE EDUCATION/TRAINING PROGRAM

## 2024-01-26 PROCEDURE — 82785 ASSAY OF IGE: CPT | Performed by: STUDENT IN AN ORGANIZED HEALTH CARE EDUCATION/TRAINING PROGRAM

## 2024-01-26 PROCEDURE — 1126F AMNT PAIN NOTED NONE PRSNT: CPT | Mod: CPTII,S$GLB,, | Performed by: STUDENT IN AN ORGANIZED HEALTH CARE EDUCATION/TRAINING PROGRAM

## 2024-01-26 PROCEDURE — 86003 ALLG SPEC IGE CRUDE XTRC EA: CPT | Performed by: STUDENT IN AN ORGANIZED HEALTH CARE EDUCATION/TRAINING PROGRAM

## 2024-01-26 PROCEDURE — 36415 COLL VENOUS BLD VENIPUNCTURE: CPT | Mod: PO | Performed by: STUDENT IN AN ORGANIZED HEALTH CARE EDUCATION/TRAINING PROGRAM

## 2024-01-26 PROCEDURE — 1159F MED LIST DOCD IN RCRD: CPT | Mod: CPTII,S$GLB,, | Performed by: STUDENT IN AN ORGANIZED HEALTH CARE EDUCATION/TRAINING PROGRAM

## 2024-01-26 RX ORDER — AZELASTINE HYDROCHLORIDE 0.5 MG/ML
1 SOLUTION/ DROPS OPHTHALMIC 2 TIMES DAILY
Qty: 6 ML | Refills: 12 | Status: SHIPPED | OUTPATIENT
Start: 2024-01-26 | End: 2024-06-19

## 2024-01-30 LAB
A ALTERNATA IGE QN: 2.41 KU/L
A FUMIGATUS IGE QN: <0.1 KU/L
BERMUDA GRASS IGE QN: 0.11 KU/L
CAT DANDER IGE QN: <0.1 KU/L
CEDAR IGE QN: <0.1 KU/L
D FARINAE IGE QN: 0.37 KU/L
D PTERONYSS IGE QN: 0.28 KU/L
DEPRECATED A ALTERNATA IGE RAST QL: ABNORMAL
DEPRECATED A FUMIGATUS IGE RAST QL: NORMAL
DEPRECATED BERMUDA GRASS IGE RAST QL: ABNORMAL
DEPRECATED CAT DANDER IGE RAST QL: NORMAL
DEPRECATED CEDAR IGE RAST QL: NORMAL
DEPRECATED D FARINAE IGE RAST QL: ABNORMAL
DEPRECATED D PTERONYSS IGE RAST QL: ABNORMAL
DEPRECATED DOG DANDER IGE RAST QL: ABNORMAL
DEPRECATED ELDER IGE RAST QL: ABNORMAL
DEPRECATED ENGL PLANTAIN IGE RAST QL: NORMAL
DEPRECATED PECAN/HICK TREE IGE RAST QL: NORMAL
DEPRECATED ROACH IGE RAST QL: ABNORMAL
DEPRECATED TIMOTHY IGE RAST QL: NORMAL
DEPRECATED WEST RAGWEED IGE RAST QL: ABNORMAL
DEPRECATED WHITE OAK IGE RAST QL: NORMAL
DOG DANDER IGE QN: 0.13 KU/L
ELDER IGE QN: 0.12 KU/L
ENGL PLANTAIN IGE QN: <0.1 KU/L
PECAN/HICK TREE IGE QN: <0.1 KU/L
ROACH IGE QN: 0.89 KU/L
TIMOTHY IGE QN: <0.1 KU/L
WEST RAGWEED IGE QN: 0.16 KU/L
WHITE OAK IGE QN: <0.1 KU/L

## 2024-02-27 NOTE — PROGRESS NOTES
"ALLERGY & IMMUNOLOGY CLINIC -  Established Patient     HISTORY OF PRESENT ILLNESS     Patient ID: Nalini Wooten is a 68 y.o. female    CC: follow up visit    HPI: Nalini Wooten is a 68 y.o. female presents for evaluation of:    Office Visit 02/28/2024  Allergic Conjunctivitis: Slight improvement in itchy eyes since azelastine eye drops with improved dry eyes. Still with some peeling around the eyes, denies nasal symptoms    01/26/2024  Query Allergy: States that for 35 years she has been getting acrylic nails. Stopped using for 4-5 years and returned to nail salon. 30 Minutes after acrylic nails were placed, developed burning and blistering. Symptoms persisted for 7 days thereafter before resolution. Now strictly avoids     Mouth: Had crown placed and several hours later developed inside jaw pain. Believes she had blisters on the inside of her mouth. Also had unilateral swelling at the time.      Eyes: Developed bilateral lower eye swelling approximately one month ago for the previous 4-5 years, as recently as last month. Has been present most days of the month. Denies association with nasal congestion, sneezing, runny nose. States sometimes eyes will blister thereafter and peel (Starts burning-->Puffiness-->Peeling). These "cycle" will last approximately one week and now she avoids skin care on the face.   Denies diminished vision. Denies watery eyes.      REVIEW OF SYSTEMS     CONST: no F/C/NS, no unintentional weight changes  Balance of review of systems negative except as mentioned above     MEDICAL HISTORY     MedHx: active problems reviewed  SurgHx:   Past Surgical History:   Procedure Laterality Date    ARTHROSCOPIC REPAIR OF ROTATOR CUFF OF SHOULDER Right 3/10/2021    Procedure: REPAIR, ROTATOR CUFF, ARTHROSCOPIC;  Surgeon: Dustin Vanegas MD;  Location: [facility];  Service: Orthopedics;  Laterality: Right;  arthrex notified    ARTHROSCOPY OF SHOULDER WITH DECOMPRESSION OF SUBACROMIAL SPACE Right 3/10/2021    " "Procedure: ARTHROSCOPY, SHOULDER, WITH SUBACROMIAL SPACE DECOMPRESSION;  Surgeon: Dustin Vanegas MD;  Location: [facility] OR;  Service: Orthopedics;  Laterality: Right;    BLADDER SUSPENSION  1990    CATARACT EXTRACTION W/  INTRAOCULAR LENS IMPLANT Right 6/21/2023    Procedure: CEIOL OD;  Surgeon: Shawn Shearer MD;  Location: [facility] OR;  Service: Ophthalmology;  Laterality: Right;    CATARACT EXTRACTION W/  INTRAOCULAR LENS IMPLANT Left 7/19/2023    Procedure: CEIOL OS;  Surgeon: Shawn Shearer MD;  Location: [facility] OR;  Service: Ophthalmology;  Laterality: Left;    DISTAL CLAVICLE EXCISION Right 3/10/2021    Procedure: EXCISION, CLAVICLE, DISTAL;  Surgeon: Dustin Vanegas MD;  Location: [facility] OR;  Service: Orthopedics;  Laterality: Right;    FOOT SURGERY Bilateral 2001    FOOT SURGERY Right 05/13/2022    HAND SURGERY Left 2017    HYSTERECTOMY  1990    JOINT REPLACEMENT  2013    KNEE SURGERY Bilateral 2013    MANDIBLE FRACTURE SURGERY  1981    SHOULDER ARTHROSCOPY Left 02/27/2019    THYROIDECTOMY Left 05/13/2019        TONSILLECTOMY       Allergies: see below  Medications: MAR reviewed    No pertinent allergy changes in medical history since last visit     PHYSICAL EXAM     VS: Ht 5' 3" (1.6 m)   BMI 36.28 kg/m²   GENERAL: awake, alert, cooperative with exam  EYES: PERRL, EOMI, no conjunctival injection, no discharge, no infraorbital shiners  LUNGS: CTAB, no w/r/c, no increased WOB  HEART: Normal Rate and regular rhythm, normal S1/S2, no m/g/r  EXTREMITIES: +2 distal pulses, no c/c/e  DERM: no rashes, no skin breaks     LABORATORY STUDIES      Latest Reference Range & Units 01/26/24 14:09   A. alternata IgE <0.10 kU/L 2.41 (H)   Altern. alternata Class  CLASS 2   Aspergillus Fumigatus IgE <0.10 kU/L <0.10   A. fumigatus Class  CLASS 0   [place] Grass IgE <0.10 kU/L 0.11 (H)   [place] Grass Class  CLASS 0/1   Cat Dander IgE <0.10 kU/L <0.10   Cat Epithelium Class  CLASS 0   [place] IgE <0.10 kU/L <0.10   [place]edar " Class  CLASS 0   Cockroach, IgE <0.10 kU/L  -  0.89 (H)  CLASS 2   D. farinae <0.10 kU/L 0.37 (H)   D. farinae Class  CLASS 1   D. pteronyssinus Dust Mite IgE <0.10 kU/L 0.28 (H)   D. pteronyssinus Class  CLASS 0/1   Dog Dander IgE <0.10 kU/L 0.13 (H)   Dog Dander Class  CLASS 0/1   English Plantain IgE <0.10 kU/L <0.10   English Plantain Class  CLASS 0   [place] IgE <0.10 kU/L 0.12 (H)   Marshelder Class  CLASS 0/1   [place], Class  CLASS 0   Pecan [place] Tree IgE <0.10 kU/L <0.10   Pecan, Class  CLASS 0   Ragweed, Western IgE <0.10 kU/L 0.16 (H)   Ragweed, Western, Class  CLASS 0/1   Phani Grass IgE <0.10 kU/L <0.10   Phani Grass Class  CLASS 0   [place] Tree IgE <0.10 kU/L <0.10   Total IgE 0 - 100 IU/mL 302 (H)   (H): Data is abnormally high       ASSESSMENT/PLAN     Nalini Wooten is a 68 y.o. female with     1. Allergic contact dermatitis due to drugs in contact with skin  -Recommend patch testing to confirm acrylate contact dermatitis. Would additionally benefit for additional patch testing given continued peeling and irritation with cosmetic and skin care products. Performed open application test using paraben-containing [place] eye makeup today and will follow up in 1 week to further assess.   - Ambulatory referral/consult to Dermatology; Future    2. Other chronic allergic conjunctivitis of both eyes  -Continue azelastine eye drops at this time. Deferred further discussion of allergen immunotherapy at this time.      Follow up: 1 week      Fidel Pérez MD    I spent a total of 45 minutes on the day of the visit. This includes face to face time and non-face to face time preparing to see the patient (eg, review of tests), obtaining and/or reviewing separately obtained history, documenting clinical information in the electronic or other health record, independently interpreting results and communicating results to the patient/family/caregiver, or care coordinator.

## 2024-02-28 ENCOUNTER — OFFICE VISIT (OUTPATIENT)
Dept: ALLERGY | Facility: CLINIC | Age: 69
End: 2024-02-28
Payer: MEDICARE

## 2024-02-28 VITALS — BODY MASS INDEX: 36.28 KG/M2 | HEIGHT: 63 IN

## 2024-02-28 DIAGNOSIS — L23.3 ALLERGIC CONTACT DERMATITIS DUE TO DRUGS IN CONTACT WITH SKIN: Primary | ICD-10-CM

## 2024-02-28 DIAGNOSIS — H10.45 OTHER CHRONIC ALLERGIC CONJUNCTIVITIS OF BOTH EYES: ICD-10-CM

## 2024-02-28 PROCEDURE — 3008F BODY MASS INDEX DOCD: CPT | Mod: CPTII,S$GLB,, | Performed by: STUDENT IN AN ORGANIZED HEALTH CARE EDUCATION/TRAINING PROGRAM

## 2024-02-28 PROCEDURE — 99999 PR PBB SHADOW E&M-EST. PATIENT-LVL III: CPT | Mod: PBBFAC,,, | Performed by: STUDENT IN AN ORGANIZED HEALTH CARE EDUCATION/TRAINING PROGRAM

## 2024-02-28 PROCEDURE — 1159F MED LIST DOCD IN RCRD: CPT | Mod: CPTII,S$GLB,, | Performed by: STUDENT IN AN ORGANIZED HEALTH CARE EDUCATION/TRAINING PROGRAM

## 2024-02-28 PROCEDURE — 1126F AMNT PAIN NOTED NONE PRSNT: CPT | Mod: CPTII,S$GLB,, | Performed by: STUDENT IN AN ORGANIZED HEALTH CARE EDUCATION/TRAINING PROGRAM

## 2024-02-28 PROCEDURE — 99215 OFFICE O/P EST HI 40 MIN: CPT | Mod: S$GLB,,, | Performed by: STUDENT IN AN ORGANIZED HEALTH CARE EDUCATION/TRAINING PROGRAM

## 2024-03-03 NOTE — PROGRESS NOTES
"  SUBJECTIVE:    Patient ID: Nalini Wooten is a 68 y.o. female.    Chief Complaint: Medication Refill and Follow-up    HPI    In for recheck-patient says she is having lots of eye allergy issues-the eyes are puffy and the skin peels-she is seeing an allergist-she is also going to see a dermatologist but that is not until June 5-she is following up with her allergist this week-we reviewed her meds-see adds...    She has gained 25 pounds since thanksgiving and want to lose weight--    Wbc 7.26 H/H 13/42 platelets 252,000 BUN 14 Cr .8    Last 3 sets of Vitals        1/26/2024     1:09 PM 2/28/2024     1:16 PM 3/5/2024     2:39 PM   Vitals - 1 value per visit   SYSTOLIC   132   DIASTOLIC   74   Pulse   66   Temp   98.7 °F (37.1 °C)   Resp   16   SPO2   98 %   Weight (lb) 204.81  206   Weight (kg) 92.9  93.441   Height 5' 3" (1.6 m) 5' 3" (1.6 m) 5' 3" (1.6 m)   BMI (Calculated) 36.3  36.5   Pain Score Zero Zero Zero          Lab Visit on 01/26/2024   Component Date Value Ref Range Status    D. farinae 01/26/2024 0.37 (H)  <0.10 kU/L Final    D. farinae Class 01/26/2024 CLASS 1   Final    D. pteronyssinus Dust Mite IgE 01/26/2024 0.28 (H)  <0.10 kU/L Final    D. pteronyssinus Class 01/26/2024 CLASS 0/1   Final    [place] Grass IgE 01/26/2024 0.11 (H)  <0.10 kU/L Final    [place] Grass Class 01/26/2024 CLASS 0/1   Final    Phani Grass IgE 01/26/2024 <0.10  <0.10 kU/L Final    Phani Grass Class 01/26/2024 CLASS 0   Final    Prentiss IgE 01/26/2024 <0.10  <0.10 kU/L Final    Prentiss Class 01/26/2024 CLASS 0   Final    English Plantain IgE 01/26/2024 <0.10  <0.10 kU/L Final    English Plantain Class 01/26/2024 CLASS 0   Final    [place] Tree IgE 01/26/2024 <0.10  <0.10 kU/L Final    [place], Class 01/26/2024 CLASS 0   Final    Pecan [place] Tree IgE 01/26/2024 <0.10  <0.10 kU/L Final    [place]ec[place], Class 01/26/2024 CLASS 0   Final    [place] IgE 01/26/2024 0.12 (H)  <0.10 kU/L Final    [place] Class 01/26/2024 CLASS 0/1   " Final    Ragweed, Western IgE 01/26/2024 0.16 (H)  <0.10 kU/L Final    Ragweed, Western, Class 01/26/2024 CLASS 0/1   Final    A. alternata IgE 01/26/2024 2.41 (H)  <0.10 kU/L Final    Altern. alternata Class 01/26/2024 CLASS 2   Final    Aspergillus Fumigatus IgE 01/26/2024 <0.10  <0.10 kU/L Final    A. fumigatus Class 01/26/2024 CLASS 0   Final    Cat Dander IgE 01/26/2024 <0.10  <0.10 kU/L Final    Cat Epithelium Class 01/26/2024 CLASS 0   Final    Cockroach, IgE 01/26/2024 0.89 (H)  <0.10 kU/L Final    Cockroach, IgE 01/26/2024 CLASS 2   Final    Dog Dander IgE 01/26/2024 0.13 (H)  <0.10 kU/L Final    Dog Dander Class 01/26/2024 CLASS 0/1   Final    Total IgE 01/26/2024 302 (H)  0 - 100 IU/mL Final    WBC 01/26/2024 7.26  3.90 - 12.70 K/uL Final    RBC 01/26/2024 5.15  4.00 - 5.40 M/uL Final    Hemoglobin 01/26/2024 13.0  12.0 - 16.0 g/dL Final    Hematocrit 01/26/2024 42.4  37.0 - 48.5 % Final    MCV 01/26/2024 82  82 - 98 fL Final    MCH 01/26/2024 25.2 (L)  27.0 - 31.0 pg Final    MCHC 01/26/2024 30.7 (L)  32.0 - 36.0 g/dL Final    RDW 01/26/2024 13.6  11.5 - 14.5 % Final    Platelets 01/26/2024 352  150 - 450 K/uL Final    MPV 01/26/2024 10.1  9.2 - 12.9 fL Final    Immature Granulocytes 01/26/2024 0.3  0.0 - 0.5 % Final    Gran # (ANC) 01/26/2024 4.3  1.8 - 7.7 K/uL Final    Immature Grans (Abs) 01/26/2024 0.02  0.00 - 0.04 K/uL Final    Lymph # 01/26/2024 2.2  1.0 - 4.8 K/uL Final    Mono # 01/26/2024 0.6  0.3 - 1.0 K/uL Final    Eos # 01/26/2024 0.2  0.0 - 0.5 K/uL Final    Baso # 01/26/2024 0.05  0.00 - 0.20 K/uL Final    nRBC 01/26/2024 0  0 /100 WBC Final    Gran % 01/26/2024 58.5  38.0 - 73.0 % Final    Lymph % 01/26/2024 29.6  18.0 - 48.0 % Final    Mono % 01/26/2024 8.7  4.0 - 15.0 % Final    Eosinophil % 01/26/2024 2.2  0.0 - 8.0 % Final    Basophil % 01/26/2024 0.7  0.0 - 1.9 % Final    Differential Method 01/26/2024 Automated   Final   Admission on 10/16/2023, Discharged on 10/17/2023   Component  Date Value Ref Range Status    WBC 10/16/2023 7.55  3.90 - 12.70 K/uL Final    RBC 10/16/2023 4.39  4.00 - 5.40 M/uL Final    Hemoglobin 10/16/2023 11.9 (L)  12.0 - 16.0 g/dL Final    Hematocrit 10/16/2023 38.0  37.0 - 48.5 % Final    MCV 10/16/2023 87  82 - 98 fL Final    MCH 10/16/2023 27.1  27.0 - 31.0 pg Final    MCHC 10/16/2023 31.3 (L)  32.0 - 36.0 g/dL Final    RDW 10/16/2023 13.3  11.5 - 14.5 % Final    Platelets 10/16/2023 312  150 - 450 K/uL Final    MPV 10/16/2023 9.4  9.2 - 12.9 fL Final    Immature Granulocytes 10/16/2023 0.3  0.0 - 0.5 % Final    Gran # (ANC) 10/16/2023 4.2  1.8 - 7.7 K/uL Final    Immature Grans (Abs) 10/16/2023 0.02  0.00 - 0.04 K/uL Final    Lymph # 10/16/2023 2.4  1.0 - 4.8 K/uL Final    Mono # 10/16/2023 0.7  0.3 - 1.0 K/uL Final    Eos # 10/16/2023 0.2  0.0 - 0.5 K/uL Final    Baso # 10/16/2023 0.05  0.00 - 0.20 K/uL Final    nRBC 10/16/2023 0  0 /100 WBC Final    Gran % 10/16/2023 56.1  38.0 - 73.0 % Final    Lymph % 10/16/2023 31.4  18.0 - 48.0 % Final    Mono % 10/16/2023 8.7  4.0 - 15.0 % Final    Eosinophil % 10/16/2023 2.8  0.0 - 8.0 % Final    Basophil % 10/16/2023 0.7  0.0 - 1.9 % Final    Differential Method 10/16/2023 Automated   Final    Sodium 10/16/2023 138  136 - 145 mmol/L Final    Potassium 10/16/2023 4.1  3.5 - 5.1 mmol/L Final    Chloride 10/16/2023 107  95 - 110 mmol/L Final    CO2 10/16/2023 26  23 - 29 mmol/L Final    Glucose 10/16/2023 94  70 - 110 mg/dL Final    BUN 10/16/2023 14  8 - 23 mg/dL Final    Creatinine 10/16/2023 0.8  0.5 - 1.4 mg/dL Final    Calcium 10/16/2023 8.8  8.7 - 10.5 mg/dL Final    Total Protein 10/16/2023 6.5  6.0 - 8.4 g/dL Final    Albumin 10/16/2023 4.2  3.5 - 5.2 g/dL Final    Total Bilirubin 10/16/2023 0.3  0.1 - 1.0 mg/dL Final    Alkaline Phosphatase 10/16/2023 70  55 - 135 U/L Final    AST 10/16/2023 19  10 - 40 U/L Final    ALT 10/16/2023 17  10 - 44 U/L Final    eGFR 10/16/2023 >60.0  >60 mL/min/1.73 m^2 Final    Anion Gap  10/16/2023 5 (L)  8 - 16 mmol/L Final    BNP 10/16/2023 57  0 - 99 pg/mL Final    Troponin I High Sensitivity 10/16/2023 4.9  0.0 - 14.9 pg/mL Final    Troponin I High Sensitivity 10/16/2023 4.8  0.0 - 14.9 pg/mL Final    Prothrombin Time 10/16/2023 10.3  9.0 - 12.5 sec Final    INR 10/16/2023 0.9  0.8 - 1.2 Final    POC Glucose 10/16/2023 81  70 - 110 Final    Troponin I High Sensitivity 10/16/2023 5.4  0.0 - 14.9 pg/mL Final    Troponin I High Sensitivity 10/16/2023 5.4  0.0 - 14.9 pg/mL Final    Cholesterol 10/16/2023 232 (H)  120 - 199 mg/dL Final    Triglycerides 10/16/2023 364 (H)  30 - 150 mg/dL Final    HDL 10/16/2023 37 (L)  40 - 75 mg/dL Final    LDL Cholesterol 10/16/2023 122.2  63.0 - 159.0 mg/dL Final    HDL/Cholesterol Ratio 10/16/2023 15.9 (L)  20.0 - 50.0 % Final    Total Cholesterol/HDL Ratio 10/16/2023 6.3 (H)  2.0 - 5.0 Final    Non-HDL Cholesterol 10/16/2023 195  mg/dL Final    POC Glucose 10/16/2023 168 (H)  70 - 110 Final    Sodium 10/17/2023 139  136 - 145 mmol/L Final    Potassium 10/17/2023 4.1  3.5 - 5.1 mmol/L Final    Chloride 10/17/2023 108  95 - 110 mmol/L Final    CO2 10/17/2023 25  23 - 29 mmol/L Final    Glucose 10/17/2023 93  70 - 110 mg/dL Final    BUN 10/17/2023 18  8 - 23 mg/dL Final    Creatinine 10/17/2023 0.8  0.5 - 1.4 mg/dL Final    Calcium 10/17/2023 8.4 (L)  8.7 - 10.5 mg/dL Final    Total Protein 10/17/2023 5.8 (L)  6.0 - 8.4 g/dL Final    Albumin 10/17/2023 3.6  3.5 - 5.2 g/dL Final    Total Bilirubin 10/17/2023 0.3  0.1 - 1.0 mg/dL Final    Alkaline Phosphatase 10/17/2023 65  55 - 135 U/L Final    AST 10/17/2023 15  10 - 40 U/L Final    ALT 10/17/2023 14  10 - 44 U/L Final    eGFR 10/17/2023 >60.0  >60 mL/min/1.73 m^2 Final    Anion Gap 10/17/2023 6 (L)  8 - 16 mmol/L Final    WBC 10/17/2023 7.60  3.90 - 12.70 K/uL Final    RBC 10/17/2023 4.19  4.00 - 5.40 M/uL Final    Hemoglobin 10/17/2023 11.3 (L)  12.0 - 16.0 g/dL Final    Hematocrit 10/17/2023 36.5 (L)  37.0 - 48.5  % Final    MCV 10/17/2023 87  82 - 98 fL Final    MCH 10/17/2023 27.0  27.0 - 31.0 pg Final    MCHC 10/17/2023 31.0 (L)  32.0 - 36.0 g/dL Final    RDW 10/17/2023 13.3  11.5 - 14.5 % Final    Platelets 10/17/2023 285  150 - 450 K/uL Final    MPV 10/17/2023 9.5  9.2 - 12.9 fL Final    Immature Granulocytes 10/17/2023 0.3  0.0 - 0.5 % Final    Gran # (ANC) 10/17/2023 3.9  1.8 - 7.7 K/uL Final    Immature Grans (Abs) 10/17/2023 0.02  0.00 - 0.04 K/uL Final    Lymph # 10/17/2023 2.7  1.0 - 4.8 K/uL Final    Mono # 10/17/2023 0.6  0.3 - 1.0 K/uL Final    Eos # 10/17/2023 0.3  0.0 - 0.5 K/uL Final    Baso # 10/17/2023 0.06  0.00 - 0.20 K/uL Final    nRBC 10/17/2023 0  0 /100 WBC Final    Gran % 10/17/2023 51.5  38.0 - 73.0 % Final    Lymph % 10/17/2023 35.0  18.0 - 48.0 % Final    Mono % 10/17/2023 8.3  4.0 - 15.0 % Final    Eosinophil % 10/17/2023 4.1  0.0 - 8.0 % Final    Basophil % 10/17/2023 0.8  0.0 - 1.9 % Final    Differential Method 10/17/2023 Automated   Final    85% Max Predicted HR 10/17/2023 126   Final    Max Predicted HR 10/17/2023 148   Final    [facility] CV CPX PATIENT IS MALE 10/17/2023 0.0   Final    [facility] CV CPX PATIENT IS FEMALE 10/17/2023 1.0   Final    Systolic blood pressure 10/17/2023 140  mmHg Final    Diastolic blood pressure 10/17/2023 80  mmHg Final    HR at rest 10/17/2023 61  bpm Final    dose 10/17/2023 0.4  mg Final    Peak Systolic BP 10/17/2023 150  mmHg Final    Peak Diatolic BP 10/17/2023 81  mmHg Final    Peak HR 10/17/2023 86.0  bpm Final    % Max HR Achieved 10/17/2023 58   Final    1 Minute Recovery HR 10/17/2023 74  bpm Final    RPP 10/17/2023 8,540   Final    Peak RPP 10/17/2023 12,900   Final    POC Glucose 10/17/2023 199 (H)  70 - 110 Final   Office Visit on 04/13/2023   Component Date Value Ref Range Status    Hemoglobin A1C, POC 04/13/2023 6.2  % Final         Past Medical History:   Diagnosis Date    Allergies     Bulging disc     lower back     Carpal tunnel syndrome     Degenerative  disc disease     High cholesterol     Pinched nerve in neck      Past Surgical History:   Procedure Laterality Date    ARTHROSCOPIC REPAIR OF ROTATOR CUFF OF SHOULDER Right 3/10/2021    Procedure: REPAIR, ROTATOR CUFF, ARTHROSCOPIC;  Surgeon: Dustin Vanegas MD;  Location: [facility] OR;  Service: Orthopedics;  Laterality: Right;  arthrex notified    ARTHROSCOPY OF SHOULDER WITH DECOMPRESSION OF SUBACROMIAL SPACE Right 3/10/2021    Procedure: ARTHROSCOPY, SHOULDER, WITH SUBACROMIAL SPACE DECOMPRESSION;  Surgeon: Dustin Vanegas MD;  Location: [facility] OR;  Service: Orthopedics;  Laterality: Right;    BLADDER SUSPENSION  1990    CATARACT EXTRACTION W/  INTRAOCULAR LENS IMPLANT Right 6/21/2023    Procedure: CEIOL OD;  Surgeon: Shawn Shearer MD;  Location: [facility] OR;  Service: Ophthalmology;  Laterality: Right;    CATARACT EXTRACTION W/  INTRAOCULAR LENS IMPLANT Left 7/19/2023    Procedure: CEIOL OS;  Surgeon: Shawn Shearer MD;  Location: [facility] OR;  Service: Ophthalmology;  Laterality: Left;    DISTAL CLAVICLE EXCISION Right 3/10/2021    Procedure: EXCISION, CLAVICLE, DISTAL;  Surgeon: Dustin Vanegas MD;  Location: [facility] OR;  Service: Orthopedics;  Laterality: Right;    FOOT SURGERY Bilateral 2001    FOOT SURGERY Right 05/13/2022    HAND SURGERY Left 2017    HYSTERECTOMY  1990    JOINT REPLACEMENT  2013    KNEE SURGERY Bilateral 2013    MANDIBLE FRACTURE SURGERY  1981    SHOULDER ARTHROSCOPY Left 02/27/2019    THYROIDECTOMY Left 05/13/2019        TONSILLECTOMY       Family History   Problem Relation Age of Onset    Arthritis Mother     Aneurysm Mother         Abdominal Aneurysm     Heart disease Father 65    Arthritis Father     Diabetes Father     Hearing loss Father     Hypertension Father     Dementia Father     No Known Problems Sister     No Known Problems Sister     Heart disease Brother 50 - 59        stent placement    Heart disease Brother 49        stent placement    Aneurysm Brother         Abdominal  Aneurysm    Leukemia Son     Crohn's disease Son     Ankylosing spondylitis Son     Other Son         avascular necrosis of pancho hips    Rheumatologic disease Son     Heart defect Son     Kidney failure Son     SIDS Maternal Aunt     Uterine cancer Maternal Aunt     Dementia Paternal Aunt     Diabetes Paternal Grandmother     Heart disease Paternal Grandfather        Marital Status:   Alcohol History:  reports no history of alcohol use.  Tobacco History:  reports that she has never smoked. She has never used smokeless tobacco.  Drug History:  reports no history of drug use.    Review of patient's allergies indicates:   Allergen Reactions    Adhesive Swelling and Blisters     Adhesive that requires uv light    Estrogens Other (See Comments)     Mini stroke    Tetanus vaccines and toxoid Swelling and Other (See Comments)     Swelling at injection site, fever    Shingrix (pf)  [[id number] [id number] (pf)] Diarrhea, Itching and Nausea And Vomiting       Current Outpatient Medications:     azelastine (OPTIVAR) 0.05 % ophthalmic solution, Place 1 drop into both eyes 2 (two) times daily., Disp: 6 mL, Rfl: 12    metFORMIN (GLUCOPHAGE) 500 MG tablet, Take 1 tablet (500 mg total) by mouth 2 (two) times daily with meals., Disp: 180 tablet, Rfl: 2    pantoprazole (PROTONIX) 40 MG tablet, Take 40 mg by mouth every morning., Disp: , Rfl:     rosuvastatin (CRESTOR) 40 MG Tab, Take 1 tablet (40 mg total) by mouth once daily., Disp: 90 tablet, Rfl: 3    tolterodine (DETROL LA) 4 MG 24 hr capsule, Take 1 capsule (4 mg total) by mouth once daily., Disp: 90 capsule, Rfl: 1    cyanocobalamin 1,000 mcg/mL injection, Inject every week 1 cc, Disp: 10 mL, Rfl: 1    fexofenadine (ALLEGRA) 180 MG tablet, Take 1 tablet (180 mg total) by mouth once daily., Disp: 30 tablet, Rfl: 2    montelukast (SINGULAIR) 10 mg tablet, Take 1 tablet (10 mg total) by mouth every evening., Disp: 30 tablet, Rfl: 2    traMADoL (ULTRAM) 50 mg tablet, Take  1 tablet (50 mg total) by mouth every 4 (four) hours as needed for Pain., Disp: 30 tablet, Rfl: 0    Review of Systems   Constitutional:  Negative for appetite change, chills, diaphoresis, fatigue, fever and unexpected weight change.   HENT:  Positive for congestion, ear pain, postnasal drip, sinus pressure, sneezing and sore throat. Negative for hearing loss, nosebleeds, sinus pain, trouble swallowing and voice change.         Allergy related congestion   Eyes:  Negative for photophobia, pain, itching and visual disturbance.   Respiratory:  Positive for cough. Negative for apnea, chest tightness, shortness of breath, wheezing and stridor.    Cardiovascular:  Negative for chest pain, palpitations and leg swelling.   Gastrointestinal:  Positive for diarrhea. Negative for abdominal distention, abdominal pain, blood in stool, constipation, nausea and vomiting.   Endocrine: Negative for cold intolerance, heat intolerance, polydipsia and polyuria.   Genitourinary:  Negative for difficulty urinating, dyspareunia, dysuria, flank pain, frequency, hematuria, menstrual problem, pelvic pain, urgency, vaginal discharge and vaginal pain.   Musculoskeletal:  Positive for arthralgias and back pain. Negative for joint swelling, myalgias, neck pain and neck stiffness.   Skin:  Negative for pallor.   Allergic/Immunologic: Negative for environmental allergies and food allergies.   Neurological:  Negative for dizziness, tremors, speech difficulty, weakness, light-headedness and numbness.   Hematological:  Does not bruise/bleed easily.   Psychiatric/Behavioral:  Negative for agitation, confusion, decreased concentration, sleep disturbance and suicidal ideas. The patient is not nervous/anxious.           Objective:          10/26/2023     1:49 PM 11/15/2023    10:55 AM 12/22/2023     8:48 AM 1/26/2024     1:09 PM 2/28/2024     1:16 PM 3/5/2024     2:39 PM   Vitals - 1 value per visit   SYSTOLIC 116 136 130   132   DIASTOLIC 70 78 82   74  "  Pulse 64 64 57   66   Temp 98.6 °F (37 °C) 98.6 °F (37 °C)    98.7 °F (37.1 °C)   Resp 16 16    16   SPO2 98 % 98 % 99 %   98 %   Weight (lb) 191 192 200 204.81  206   Weight (kg) 86.637 87.091 90.719 92.9  93.441   Height 5' 3" (1.6 m) 5' 3" (1.6 m) 5' 3" (1.6 m) 5' 3" (1.6 m) 5' 3" (1.6 m) 5' 3" (1.6 m)   BMI (Calculated) 33.8 34 35.4 36.3  36.5   Pain Score Zero Zero Zero Zero Zero Zero   (    Physical Exam  Vitals and nursing note reviewed.   Constitutional:       Appearance: She is obese. She is not ill-appearing.   HENT:      Head: Normocephalic and atraumatic.      Nose: Nose normal.      Mouth/Throat:      Mouth: Mucous membranes are moist.   Eyes:      Extraocular Movements: Extraocular movements intact.      Conjunctiva/sclera: Conjunctivae normal.      Pupils: Pupils are equal, round, and reactive to light.   Neck:      Vascular: No carotid bruit.   Cardiovascular:      Rate and Rhythm: Normal rate and regular rhythm.      Pulses: Normal pulses.      Heart sounds: Normal heart sounds.   Pulmonary:      Effort: Pulmonary effort is normal.      Breath sounds: Normal breath sounds.   Abdominal:      General: Abdomen is flat.      Palpations: Abdomen is soft.   Musculoskeletal:      Right lower leg: No edema.      Left lower leg: No edema.   Lymphadenopathy:      Cervical: No cervical adenopathy.   Skin:     General: Skin is warm and dry.   Neurological:      General: No focal deficit present.      Mental Status: She is alert and oriented to person, place, and time. Mental status is at baseline.      Gait: Gait normal.   Psychiatric:         Mood and Affect: Mood normal.         Thought Content: Thought content normal.         Judgment: Judgment normal.           Assessment:       1. Allergy, initial encounter    2. Fatigue, unspecified type    3. Encounter for screening mammogram for breast cancer    4. Menopause    5. Encounter for osteoporosis screening in asymptomatic postmenopausal patient    6. DDD " (degenerative disc disease), lumbar    7. Chronic midline low back pain without sciatica    8. Obesity (BMI 30-39.9)         Plan:       Allergy, initial encounter  -     fexofenadine (ALLEGRA) 180 MG tablet; Take 1 tablet (180 mg total) by mouth once daily.  Dispense: 30 tablet; Refill: 2  -     montelukast (SINGULAIR) 10 mg tablet; Take 1 tablet (10 mg total) by mouth every evening.  Dispense: 30 tablet; Refill: 2    Fatigue, unspecified type  -     cyanocobalamin 1,000 mcg/mL injection; Inject every week 1 cc  Dispense: 10 mL; Refill: 1    Encounter for screening mammogram for breast cancer  -     Mammo Digital Screening Bilat; Future; Expected date: 06/07/2024    Menopause  -     DXA Bone Density Axial Skeleton 1 or more sites; Future; Expected date: 06/03/2024    Encounter for osteoporosis screening in asymptomatic postmenopausal patient  -     DXA Bone Density Axial Skeleton 1 or more sites; Future; Expected date: 06/03/2024    DDD (degenerative disc disease), lumbar  -     traMADoL (ULTRAM) 50 mg tablet; Take 1 tablet (50 mg total) by mouth every 4 (four) hours as needed for Pain.  Dispense: 30 tablet; Refill: 0    Chronic midline low back pain without sciatica  -     traMADoL (ULTRAM) 50 mg tablet; Take 1 tablet (50 mg total) by mouth every 4 (four) hours as needed for Pain.  Dispense: 30 tablet; Refill: 0    Obesity (BMI 30-39.9)      Follow up in about 3 months (around 6/5/2024).        3/5/2024 Katherine Ferguson M.D.

## 2024-03-05 ENCOUNTER — OFFICE VISIT (OUTPATIENT)
Dept: FAMILY MEDICINE | Facility: CLINIC | Age: 69
End: 2024-03-05
Payer: MEDICARE

## 2024-03-05 VITALS
HEIGHT: 63 IN | TEMPERATURE: 99 F | BODY MASS INDEX: 36.5 KG/M2 | HEART RATE: 66 BPM | RESPIRATION RATE: 16 BRPM | SYSTOLIC BLOOD PRESSURE: 132 MMHG | WEIGHT: 206 LBS | DIASTOLIC BLOOD PRESSURE: 74 MMHG | OXYGEN SATURATION: 98 %

## 2024-03-05 DIAGNOSIS — Z78.0 MENOPAUSE: ICD-10-CM

## 2024-03-05 DIAGNOSIS — Z78.0 ENCOUNTER FOR OSTEOPOROSIS SCREENING IN ASYMPTOMATIC POSTMENOPAUSAL PATIENT: ICD-10-CM

## 2024-03-05 DIAGNOSIS — G89.29 CHRONIC MIDLINE LOW BACK PAIN WITHOUT SCIATICA: ICD-10-CM

## 2024-03-05 DIAGNOSIS — E66.9 OBESITY (BMI 30-39.9): ICD-10-CM

## 2024-03-05 DIAGNOSIS — T78.40XA ALLERGY, INITIAL ENCOUNTER: Primary | ICD-10-CM

## 2024-03-05 DIAGNOSIS — R53.83 FATIGUE, UNSPECIFIED TYPE: ICD-10-CM

## 2024-03-05 DIAGNOSIS — Z12.31 ENCOUNTER FOR SCREENING MAMMOGRAM FOR BREAST CANCER: ICD-10-CM

## 2024-03-05 DIAGNOSIS — Z13.820 ENCOUNTER FOR OSTEOPOROSIS SCREENING IN ASYMPTOMATIC POSTMENOPAUSAL PATIENT: ICD-10-CM

## 2024-03-05 DIAGNOSIS — M54.50 CHRONIC MIDLINE LOW BACK PAIN WITHOUT SCIATICA: ICD-10-CM

## 2024-03-05 DIAGNOSIS — M51.36 DDD (DEGENERATIVE DISC DISEASE), LUMBAR: ICD-10-CM

## 2024-03-05 PROCEDURE — 99999 PR PBB SHADOW E&M-EST. PATIENT-LVL IV: CPT | Mod: PBBFAC,,, | Performed by: INTERNAL MEDICINE

## 2024-03-05 PROCEDURE — 3078F DIAST BP <80 MM HG: CPT | Mod: CPTII,S$GLB,, | Performed by: INTERNAL MEDICINE

## 2024-03-05 PROCEDURE — 1159F MED LIST DOCD IN RCRD: CPT | Mod: CPTII,S$GLB,, | Performed by: INTERNAL MEDICINE

## 2024-03-05 PROCEDURE — 3008F BODY MASS INDEX DOCD: CPT | Mod: CPTII,S$GLB,, | Performed by: INTERNAL MEDICINE

## 2024-03-05 PROCEDURE — 3288F FALL RISK ASSESSMENT DOCD: CPT | Mod: CPTII,S$GLB,, | Performed by: INTERNAL MEDICINE

## 2024-03-05 PROCEDURE — 99214 OFFICE O/P EST MOD 30 MIN: CPT | Mod: S$GLB,,, | Performed by: INTERNAL MEDICINE

## 2024-03-05 PROCEDURE — 1126F AMNT PAIN NOTED NONE PRSNT: CPT | Mod: CPTII,S$GLB,, | Performed by: INTERNAL MEDICINE

## 2024-03-05 PROCEDURE — 1101F PT FALLS ASSESS-DOCD LE1/YR: CPT | Mod: CPTII,S$GLB,, | Performed by: INTERNAL MEDICINE

## 2024-03-05 PROCEDURE — 3075F SYST BP GE 130 - 139MM HG: CPT | Mod: CPTII,S$GLB,, | Performed by: INTERNAL MEDICINE

## 2024-03-05 RX ORDER — MINERAL OIL
180 ENEMA (ML) RECTAL DAILY
Qty: 30 TABLET | Refills: 2 | Status: SHIPPED | OUTPATIENT
Start: 2024-03-05 | End: 2024-06-19 | Stop reason: SDUPTHER

## 2024-03-05 RX ORDER — MONTELUKAST SODIUM 10 MG/1
10 TABLET ORAL NIGHTLY
Qty: 30 TABLET | Refills: 2 | Status: SHIPPED | OUTPATIENT
Start: 2024-03-05 | End: 2024-04-04

## 2024-03-05 RX ORDER — TRAMADOL HYDROCHLORIDE 50 MG/1
50 TABLET ORAL EVERY 4 HOURS PRN
Qty: 30 TABLET | Refills: 0 | Status: CANCELLED | OUTPATIENT
Start: 2024-03-05

## 2024-03-05 RX ORDER — TRAMADOL HYDROCHLORIDE 50 MG/1
50 TABLET ORAL EVERY 4 HOURS PRN
Qty: 30 TABLET | Refills: 0 | Status: SHIPPED | OUTPATIENT
Start: 2024-03-05

## 2024-03-05 RX ORDER — CYANOCOBALAMIN 1000 UG/ML
INJECTION, SOLUTION INTRAMUSCULAR; SUBCUTANEOUS
Qty: 10 ML | Refills: 1 | Status: SHIPPED | OUTPATIENT
Start: 2024-03-05

## 2024-03-06 NOTE — PROGRESS NOTES
"ALLERGY & IMMUNOLOGY CLINIC -  Established Patient     HISTORY OF PRESENT ILLNESS     Patient ID: Nalini Wooten is a 68 y.o. female    CC: follow up visit    HPI: Nalini Wooten is a 68 y.o. female presents for evaluation of:    Office Visit 03/07/2024  Allergic Conjunctivitis: Recently aggravated symptoms and started Allegra and Montelukast daily. Was working outdoors and noted significant aggravation of symptoms     ACD: Negative open application test from one week ago to both skin care products. Scheduled to see dermatology May 2024.     Office Visit 02/28/2024  Allergic Conjunctivitis: Slight improvement in itchy eyes since azelastine eye drops with improved dry eyes. Still with some peeling around the eyes, denies nasal symptoms     01/26/2024  Query Allergy: States that for 35 years she has been getting acrylic nails. Stopped using for 4-5 years and returned to nail salon. 30 Minutes after acrylic nails were placed, developed burning and blistering. Symptoms persisted for 7 days thereafter before resolution. Now strictly avoids     Mouth: Had crown placed and several hours later developed inside jaw pain. Believes she had blisters on the inside of her mouth. Also had unilateral swelling at the time.      Eyes: Developed bilateral lower eye swelling approximately one month ago for the previous 4-5 years, as recently as last month. Has been present most days of the month. Denies association with nasal congestion, sneezing, runny nose. States sometimes eyes will blister thereafter and peel (Starts burning-->Puffiness-->Peeling). These "cycle" will last approximately one week and now she avoids skin care on the face.   Denies diminished vision. Denies watery eyes.      REVIEW OF SYSTEMS     CONST: no F/C/NS, no unintentional weight changes  Balance of review of systems negative except as mentioned above     MEDICAL HISTORY     MedHx: active problems reviewed  SurgHx:   Past Surgical History:   Procedure " "Laterality Date    ARTHROSCOPIC REPAIR OF ROTATOR CUFF OF SHOULDER Right 3/10/2021    Procedure: REPAIR, ROTATOR CUFF, ARTHROSCOPIC;  Surgeon: Dustin Vanegas MD;  Location: [facility] OR;  Service: Orthopedics;  Laterality: Right;  arthrex notified    ARTHROSCOPY OF SHOULDER WITH DECOMPRESSION OF SUBACROMIAL SPACE Right 3/10/2021    Procedure: ARTHROSCOPY, SHOULDER, WITH SUBACROMIAL SPACE DECOMPRESSION;  Surgeon: Dustin Vanegas MD;  Location: [facility] OR;  Service: Orthopedics;  Laterality: Right;    BLADDER SUSPENSION  1990    CATARACT EXTRACTION W/  INTRAOCULAR LENS IMPLANT Right 6/21/2023    Procedure: CEIOL OD;  Surgeon: Shawn Shearer MD;  Location: [facility] OR;  Service: Ophthalmology;  Laterality: Right;    CATARACT EXTRACTION W/  INTRAOCULAR LENS IMPLANT Left 7/19/2023    Procedure: CEIOL OS;  Surgeon: Shawn Shearer MD;  Location: [facility] OR;  Service: Ophthalmology;  Laterality: Left;    DISTAL CLAVICLE EXCISION Right 3/10/2021    Procedure: EXCISION, CLAVICLE, DISTAL;  Surgeon: Dustin Vanegas MD;  Location: [facility] OR;  Service: Orthopedics;  Laterality: Right;    FOOT SURGERY Bilateral 2001    FOOT SURGERY Right 05/13/2022    HAND SURGERY Left 2017    HYSTERECTOMY  1990    JOINT REPLACEMENT  2013    KNEE SURGERY Bilateral 2013    MANDIBLE FRACTURE SURGERY  1981    SHOULDER ARTHROSCOPY Left 02/27/2019    THYROIDECTOMY Left 05/13/2019        TONSILLECTOMY       Allergies: see below  Medications: MAR reviewed    No pertinent allergy changes in medical history since last visit     PHYSICAL EXAM     VS: Ht 5' 3" (1.6 m)   Wt 94.4 kg (208 lb 1.8 oz)   BMI 36.87 kg/m²   GENERAL: awake, alert, cooperative with exam  EYES: PERRL, EOMI, no conjunctival injection, no discharge, no infraorbital shiners  EARS: external auditory canals normal B/L  ORAL: MMM, no ulcers, no thrush, no cobblestoning  NECK: no cervical or submandibular LAD  LUNGS: CTAB, no w/r/c, no increased WOB  HEART: Normal Rate and regular rhythm, normal " S1/S2, no m/g/r  EXTREMITIES: +2 distal pulses, no c/c/e  DERM: no rashes, no skin breaks     ALLERGEN TESTING       Latest Reference Range & Units 01/26/24 14:09   A. alternata IgE <0.10 kU/L 2.41 (H)   Altern. alternata Class   CLASS 2   Aspergillus Fumigatus IgE <0.10 kU/L <0.10   A. fumigatus Class   CLASS 0   [place] Grass IgE <0.10 kU/L 0.11 (H)   [place] Grass Class   CLASS 0/1   Cat Dander IgE <0.10 kU/L <0.10   Cat Epithelium Class   CLASS 0   Floris IgE <0.10 kU/L <0.10   Floris Class   CLASS 0   Cockroach, IgE <0.10 kU/L  -  0.89 (H)  CLASS 2   D. farinae <0.10 kU/L 0.37 (H)   D. farinae Class   CLASS 1   D. pteronyssinus Dust Mite IgE <0.10 kU/L 0.28 (H)   D. pteronyssinus Class   CLASS 0/1   Dog Dander IgE <0.10 kU/L 0.13 (H)   Dog Dander Class   CLASS 0/1   English Plantain IgE <0.10 kU/L <0.10   English Plantain Class   CLASS 0   Marshelder IgE <0.10 kU/L 0.12 (H)   Marshelder Class   CLASS 0/1   [place], Class   CLASS 0   Pecan Osage Tree IgE <0.10 kU/L <0.10   Pecan, Class   CLASS 0   Ragweed, Western IgE <0.10 kU/L 0.16 (H)   Ragweed, Western, Class   CLASS 0/1   Phani Grass IgE <0.10 kU/L <0.10   Phani Grass Class   CLASS 0   [place] Tree IgE <0.10 kU/L <0.10   Total IgE 0 - 100 IU/mL 302 (H)   (H): Data is abnormally high        ASSESSMENT/PLAN     Nalini Wooten is a 68 y.o. female with     1. Allergic contact dermatitis due to drugs in contact with skin    2. Chronic conjunctivitis of both eyes, unspecified chronic conjunctivitis type    3. Other chronic allergic conjunctivitis of both eyes    4. Allergic reaction, subsequent encounter      Dermatology evaluation May 2024 for allergic contact dermatitis with patch testing  Return in 1 week for aeroallergen skin prick testing and discussion of allergen immunotherapy; Hold antihistamines until that visit  Given concern of vaccine related reactions, administered Influenza and Tdap today in office given upcoming foreign travel. No reactions  observed    Follow up: 1 week      Fidel Pérez MD    I spent a total of 45 minutes on the day of the visit. This includes face to face time and non-face to face time preparing to see the patient (eg, review of tests), obtaining and/or reviewing separately obtained history, documenting clinical information in the electronic or other health record, independently interpreting results and communicating results to the patient/family/caregiver, or care coordinator.

## 2024-03-07 ENCOUNTER — OFFICE VISIT (OUTPATIENT)
Dept: ALLERGY | Facility: CLINIC | Age: 69
End: 2024-03-07
Payer: MEDICARE

## 2024-03-07 VITALS — BODY MASS INDEX: 36.88 KG/M2 | HEIGHT: 63 IN | WEIGHT: 208.13 LBS

## 2024-03-07 DIAGNOSIS — H10.45 OTHER CHRONIC ALLERGIC CONJUNCTIVITIS OF BOTH EYES: ICD-10-CM

## 2024-03-07 DIAGNOSIS — T78.40XD ALLERGIC REACTION, SUBSEQUENT ENCOUNTER: ICD-10-CM

## 2024-03-07 DIAGNOSIS — H10.403 CHRONIC CONJUNCTIVITIS OF BOTH EYES, UNSPECIFIED CHRONIC CONJUNCTIVITIS TYPE: ICD-10-CM

## 2024-03-07 DIAGNOSIS — L23.3 ALLERGIC CONTACT DERMATITIS DUE TO DRUGS IN CONTACT WITH SKIN: Primary | ICD-10-CM

## 2024-03-07 PROCEDURE — 1126F AMNT PAIN NOTED NONE PRSNT: CPT | Mod: CPTII,S$GLB,, | Performed by: STUDENT IN AN ORGANIZED HEALTH CARE EDUCATION/TRAINING PROGRAM

## 2024-03-07 PROCEDURE — G0008 ADMIN INFLUENZA VIRUS VAC: HCPCS | Mod: S$GLB,,, | Performed by: STUDENT IN AN ORGANIZED HEALTH CARE EDUCATION/TRAINING PROGRAM

## 2024-03-07 PROCEDURE — 99999 PR PBB SHADOW E&M-EST. PATIENT-LVL II: CPT | Mod: PBBFAC,,, | Performed by: STUDENT IN AN ORGANIZED HEALTH CARE EDUCATION/TRAINING PROGRAM

## 2024-03-07 PROCEDURE — 90686 IIV4 VACC NO PRSV 0.5 ML IM: CPT | Mod: S$GLB,,, | Performed by: STUDENT IN AN ORGANIZED HEALTH CARE EDUCATION/TRAINING PROGRAM

## 2024-03-07 PROCEDURE — 3288F FALL RISK ASSESSMENT DOCD: CPT | Mod: CPTII,S$GLB,, | Performed by: STUDENT IN AN ORGANIZED HEALTH CARE EDUCATION/TRAINING PROGRAM

## 2024-03-07 PROCEDURE — 90472 IMMUNIZATION ADMIN EACH ADD: CPT | Mod: S$GLB,,, | Performed by: STUDENT IN AN ORGANIZED HEALTH CARE EDUCATION/TRAINING PROGRAM

## 2024-03-07 PROCEDURE — 3008F BODY MASS INDEX DOCD: CPT | Mod: CPTII,S$GLB,, | Performed by: STUDENT IN AN ORGANIZED HEALTH CARE EDUCATION/TRAINING PROGRAM

## 2024-03-07 PROCEDURE — 1159F MED LIST DOCD IN RCRD: CPT | Mod: CPTII,S$GLB,, | Performed by: STUDENT IN AN ORGANIZED HEALTH CARE EDUCATION/TRAINING PROGRAM

## 2024-03-07 PROCEDURE — 1101F PT FALLS ASSESS-DOCD LE1/YR: CPT | Mod: CPTII,S$GLB,, | Performed by: STUDENT IN AN ORGANIZED HEALTH CARE EDUCATION/TRAINING PROGRAM

## 2024-03-07 PROCEDURE — 99215 OFFICE O/P EST HI 40 MIN: CPT | Mod: 25,S$GLB,, | Performed by: STUDENT IN AN ORGANIZED HEALTH CARE EDUCATION/TRAINING PROGRAM

## 2024-03-07 PROCEDURE — 90715 TDAP VACCINE 7 YRS/> IM: CPT | Mod: S$GLB,,, | Performed by: STUDENT IN AN ORGANIZED HEALTH CARE EDUCATION/TRAINING PROGRAM

## 2024-03-07 RX ORDER — MOMETASONE FUROATE 1 MG/G
CREAM TOPICAL DAILY
Qty: 45 G | Refills: 3 | Status: SHIPPED | OUTPATIENT
Start: 2024-03-07 | End: 2024-06-19

## 2024-03-08 ENCOUNTER — PATIENT MESSAGE (OUTPATIENT)
Dept: ALLERGY | Facility: CLINIC | Age: 69
End: 2024-03-08
Payer: MEDICARE

## 2024-03-15 ENCOUNTER — PROCEDURE VISIT (OUTPATIENT)
Dept: ALLERGY | Facility: CLINIC | Age: 69
End: 2024-03-15
Payer: MEDICARE

## 2024-03-15 DIAGNOSIS — H10.45 OTHER CHRONIC ALLERGIC CONJUNCTIVITIS OF BOTH EYES: ICD-10-CM

## 2024-03-15 DIAGNOSIS — J30.9 CHRONIC ALLERGIC RHINITIS: Primary | ICD-10-CM

## 2024-03-15 PROCEDURE — 95004 PERQ TESTS W/ALRGNC XTRCS: CPT | Mod: S$GLB,,, | Performed by: STUDENT IN AN ORGANIZED HEALTH CARE EDUCATION/TRAINING PROGRAM

## 2024-03-15 PROCEDURE — 99213 OFFICE O/P EST LOW 20 MIN: CPT | Mod: 25,S$GLB,, | Performed by: STUDENT IN AN ORGANIZED HEALTH CARE EDUCATION/TRAINING PROGRAM

## 2024-03-15 NOTE — PROGRESS NOTES
ALLERGY & IMMUNOLOGY CLINIC - Skin Testing     HISTORY OF PRESENT ILLNESS     Patient ID: Nalini Wooten is a 69 y.o. female    CC: skin testing    HPI: Nalini Wooten is a 69 y.o. female here for aeroallergen skin testing. Has been off antihistamines the previous week and denies acute illness today.       REVIEW OF SYSTEMS     Balance of review of systems negative except as mentioned above     MEDICAL HISTORY     MedHx: active problems reviewed  SurgHx:   Past Surgical History:   Procedure Laterality Date    ARTHROSCOPIC REPAIR OF ROTATOR CUFF OF SHOULDER Right 3/10/2021    Procedure: REPAIR, ROTATOR CUFF, ARTHROSCOPIC;  Surgeon: Dustin Vanegas MD;  Location: [facility] OR;  Service: Orthopedics;  Laterality: Right;  arthrex notified    ARTHROSCOPY OF SHOULDER WITH DECOMPRESSION OF SUBACROMIAL SPACE Right 3/10/2021    Procedure: ARTHROSCOPY, SHOULDER, WITH SUBACROMIAL SPACE DECOMPRESSION;  Surgeon: Dustin Vanegas MD;  Location: [facility] OR;  Service: Orthopedics;  Laterality: Right;    BLADDER SUSPENSION  1990    CATARACT EXTRACTION W/  INTRAOCULAR LENS IMPLANT Right 6/21/2023    Procedure: CEIOL OD;  Surgeon: Shawn Shearer MD;  Location: [facility] OR;  Service: Ophthalmology;  Laterality: Right;    CATARACT EXTRACTION W/  INTRAOCULAR LENS IMPLANT Left 7/19/2023    Procedure: CEIOL OS;  Surgeon: Shawn Shearer MD;  Location: [facility] OR;  Service: Ophthalmology;  Laterality: Left;    DISTAL CLAVICLE EXCISION Right 3/10/2021    Procedure: EXCISION, CLAVICLE, DISTAL;  Surgeon: Dustin Vanegas MD;  Location: [facility] OR;  Service: Orthopedics;  Laterality: Right;    FOOT SURGERY Bilateral 2001    FOOT SURGERY Right 05/13/2022    HAND SURGERY Left 2017    HYSTERECTOMY  1990    JOINT REPLACEMENT  2013    KNEE SURGERY Bilateral 2013    MANDIBLE FRACTURE SURGERY  1981    SHOULDER ARTHROSCOPY Left 02/27/2019    THYROIDECTOMY Left 05/13/2019        TONSILLECTOMY       Allergies: see below  Medications: MAR reviewed     PHYSICAL EXAM      General: Awake, Alert, Oriented  Heart: RRR, No Murmurs  Lungs: CTAB, No wheezes  Skin: No rashes or skin breaks     ALLERGEN TESTING     After explaining risks and benefits, elected to proceed with aeroallergen skin prick testing.   Skin Prick:   +Indoor: Cockroach, Dust mites x2  +Tree: [place], Pecan, [place]  +Weeds: Mugwort  +Grasses: [place], Bermuda, [place]  +mold: Botrytis cinera, Gliocladium, Neurospora, Phoma     ASSESSMENT & PLAN     Nalini Wooten is a 69 y.o. female with     1. Chronic allergic rhinitis    2. Other chronic allergic conjunctivitis of both eyes      After explaining risks and benefits, proceeded to discuss informed consent for allergen immunotherapy with patient. Signed informed consent and will be scanned into Epic. Patient would like to proceed with allergen immunotherapy via conventional protocol. Recommended to notify MD if starting any new medications including beta blockers while on allergen immunotherapy. Will prescribe extracts today.     Spent an additional 20 minutes discussing allergen mitigation, medical management with oral antihistamines and nasal sprays, as well as allergen immunotherapy compared to these therapies.     Follow up: Conventional Allergen immunotherapy       Fidel Pérez MD  Allergy&Immunology

## 2024-03-18 ENCOUNTER — TELEPHONE (OUTPATIENT)
Dept: ALLERGY | Facility: CLINIC | Age: 69
End: 2024-03-18
Payer: MEDICARE

## 2024-03-22 PROCEDURE — 95165 ANTIGEN THERAPY SERVICES: CPT | Mod: S$GLB,,, | Performed by: STUDENT IN AN ORGANIZED HEALTH CARE EDUCATION/TRAINING PROGRAM

## 2024-03-28 ENCOUNTER — TELEPHONE (OUTPATIENT)
Dept: ALLERGY | Facility: CLINIC | Age: 69
End: 2024-03-28
Payer: MEDICARE

## 2024-04-04 ENCOUNTER — TELEPHONE (OUTPATIENT)
Dept: ALLERGY | Facility: CLINIC | Age: 69
End: 2024-04-04
Payer: MEDICARE

## 2024-04-04 NOTE — TELEPHONE ENCOUNTER
Left voicemail message that patient's allergy extract vials are in, and I'm trying to schedule her injection appt. Patient can respond to my previous message in the patient portal and let me know when she'd like to schedule for.

## 2024-04-19 ENCOUNTER — TELEPHONE (OUTPATIENT)
Dept: DERMATOLOGY | Facility: CLINIC | Age: 69
End: 2024-04-19
Payer: MEDICARE

## 2024-04-19 NOTE — TELEPHONE ENCOUNTER
Spoke with pt in regards to r/s 5/2 PM appt due to JGD not being in that day. Pt agreed to r/s to 5/2 AM.

## 2024-04-22 ENCOUNTER — OFFICE VISIT (OUTPATIENT)
Dept: PAIN MEDICINE | Facility: CLINIC | Age: 69
End: 2024-04-22
Payer: MEDICARE

## 2024-04-22 ENCOUNTER — PATIENT MESSAGE (OUTPATIENT)
Dept: ALLERGY | Facility: CLINIC | Age: 69
End: 2024-04-22
Payer: MEDICARE

## 2024-04-22 ENCOUNTER — HOSPITAL ENCOUNTER (OUTPATIENT)
Dept: RADIOLOGY | Facility: HOSPITAL | Age: 69
Discharge: HOME | End: 2024-04-22
Attending: ANESTHESIOLOGY
Payer: MEDICARE

## 2024-04-22 ENCOUNTER — TELEPHONE (OUTPATIENT)
Dept: PAIN MEDICINE | Facility: CLINIC | Age: 69
End: 2024-04-22
Payer: MEDICARE

## 2024-04-22 VITALS
HEIGHT: 63 IN | DIASTOLIC BLOOD PRESSURE: 80 MMHG | HEART RATE: 64 BPM | WEIGHT: 206.56 LBS | SYSTOLIC BLOOD PRESSURE: 151 MMHG | BODY MASS INDEX: 36.6 KG/M2

## 2024-04-22 DIAGNOSIS — M47.816 LUMBAR SPONDYLOSIS: ICD-10-CM

## 2024-04-22 DIAGNOSIS — M54.9 DORSALGIA, UNSPECIFIED: Primary | ICD-10-CM

## 2024-04-22 DIAGNOSIS — M54.9 DORSALGIA, UNSPECIFIED: ICD-10-CM

## 2024-04-22 PROCEDURE — 1159F MED LIST DOCD IN RCRD: CPT | Mod: CPTII,S$GLB,, | Performed by: ANESTHESIOLOGY

## 2024-04-22 PROCEDURE — 3008F BODY MASS INDEX DOCD: CPT | Mod: CPTII,S$GLB,, | Performed by: ANESTHESIOLOGY

## 2024-04-22 PROCEDURE — 96372 THER/PROPH/DIAG INJ SC/IM: CPT | Mod: S$GLB,,, | Performed by: ANESTHESIOLOGY

## 2024-04-22 PROCEDURE — 72114 X-RAY EXAM L-S SPINE BENDING: CPT | Mod: TC,PO

## 2024-04-22 PROCEDURE — 1160F RVW MEDS BY RX/DR IN RCRD: CPT | Mod: CPTII,S$GLB,, | Performed by: ANESTHESIOLOGY

## 2024-04-22 PROCEDURE — 1125F AMNT PAIN NOTED PAIN PRSNT: CPT | Mod: CPTII,S$GLB,, | Performed by: ANESTHESIOLOGY

## 2024-04-22 PROCEDURE — 72114 X-RAY EXAM L-S SPINE BENDING: CPT | Mod: 26,,, | Performed by: RADIOLOGY

## 2024-04-22 PROCEDURE — 3077F SYST BP >= 140 MM HG: CPT | Mod: CPTII,S$GLB,, | Performed by: ANESTHESIOLOGY

## 2024-04-22 PROCEDURE — 99204 OFFICE O/P NEW MOD 45 MIN: CPT | Mod: 25,S$GLB,, | Performed by: ANESTHESIOLOGY

## 2024-04-22 PROCEDURE — 1101F PT FALLS ASSESS-DOCD LE1/YR: CPT | Mod: CPTII,S$GLB,, | Performed by: ANESTHESIOLOGY

## 2024-04-22 PROCEDURE — 99999 PR PBB SHADOW E&M-EST. PATIENT-LVL III: CPT | Mod: PBBFAC,,, | Performed by: ANESTHESIOLOGY

## 2024-04-22 PROCEDURE — 3288F FALL RISK ASSESSMENT DOCD: CPT | Mod: CPTII,S$GLB,, | Performed by: ANESTHESIOLOGY

## 2024-04-22 PROCEDURE — 3079F DIAST BP 80-89 MM HG: CPT | Mod: CPTII,S$GLB,, | Performed by: ANESTHESIOLOGY

## 2024-04-22 RX ORDER — KETOROLAC TROMETHAMINE 30 MG/ML
30 INJECTION, SOLUTION INTRAMUSCULAR; INTRAVENOUS ONCE
Status: COMPLETED | OUTPATIENT
Start: 2024-04-22 | End: 2024-04-22

## 2024-04-22 RX ADMIN — KETOROLAC TROMETHAMINE 30 MG: 30 INJECTION, SOLUTION INTRAMUSCULAR; INTRAVENOUS at 04:04

## 2024-04-22 NOTE — H&P (VIEW-ONLY)
[facility] New Patient Evaluation      Referred by: Zohaib Gallego    PCP:     CC:   Chief Complaint   Patient presents with    Low-back Pain     Mid low back pain             4/22/2024     3:41 PM   Last 3 PDI Scores   Pain Disability Index (PDI) 26         HPI:   Nalini Wooten is a 69 y.o. female patient who has a past medical history of Allergies, Bulging disc, Carpal tunnel syndrome, Degenerative disc disease, High cholesterol, and Pinched nerve in neck. She presents with back pain.  Had chronic back pain for many years that has been gradually worsening.  Today she reports her pain is 9 in 10, intermittent, aching.  Can get referred pain into the bilateral buttocks.  Pain is worse with sitting, standing, bending, coughing, walking, lifting and relieved with rest and injections.  She has had injections for 5 years ago with excellent relief of previous pain.  She denies any numbness or weakness.      Pain Intervention History:      Past Spine Surgical History:      Past and current medications:  Antineuropathics:  NSAIDs:  Physical therapy: yes, completed   Antidepressants:  Muscle relaxers:  Opioids: tramadol   Antiplatelets/Anticoagulants:     History:    Current Outpatient Medications:     azelastine (OPTIVAR) 0.05 % ophthalmic solution, Place 1 drop into both eyes 2 (two) times daily., Disp: 6 mL, Rfl: 12    cyanocobalamin 1,000 mcg/mL injection, Inject every week 1 cc, Disp: 10 mL, Rfl: 1    fexofenadine (ALLEGRA) 180 MG tablet, Take 1 tablet (180 mg total) by mouth once daily., Disp: 30 tablet, Rfl: 2    metFORMIN (GLUCOPHAGE) 500 MG tablet, Take 1 tablet (500 mg total) by mouth 2 (two) times daily with meals., Disp: 180 tablet, Rfl: 2    mometasone 0.1% (ELOCON) 0.1 % cream, Apply topically once daily., Disp: 45 g, Rfl: 3    pantoprazole (PROTONIX) 40 MG tablet, Take 40 mg by mouth every morning., Disp: , Rfl:     rosuvastatin (CRESTOR) 40 MG Tab, Take 1 tablet (40 mg total) by mouth once  daily., Disp: 90 tablet, Rfl: 3    tolterodine (DETROL LA) 4 MG 24 hr capsule, Take 1 capsule (4 mg total) by mouth once daily., Disp: 90 capsule, Rfl: 1    traMADoL (ULTRAM) 50 mg tablet, Take 1 tablet (50 mg total) by mouth every 4 (four) hours as needed for Pain., Disp: 30 tablet, Rfl: 0  No current facility-administered medications for this visit.    Past Medical History:   Diagnosis Date    Allergies     Bulging disc     lower back     Carpal tunnel syndrome     Degenerative disc disease     High cholesterol     Pinched nerve in neck        Past Surgical History:   Procedure Laterality Date    ARTHROSCOPIC REPAIR OF ROTATOR CUFF OF SHOULDER Right 3/10/2021    Procedure: REPAIR, ROTATOR CUFF, ARTHROSCOPIC;  Surgeon: Dustin Vanegas MD;  Location: [facility] OR;  Service: Orthopedics;  Laterality: Right;  arthrex notified    ARTHROSCOPY OF SHOULDER WITH DECOMPRESSION OF SUBACROMIAL SPACE Right 3/10/2021    Procedure: ARTHROSCOPY, SHOULDER, WITH SUBACROMIAL SPACE DECOMPRESSION;  Surgeon: Dustin Vanegas MD;  Location: [facility] OR;  Service: Orthopedics;  Laterality: Right;    BLADDER SUSPENSION  1990    CATARACT EXTRACTION W/  INTRAOCULAR LENS IMPLANT Right 6/21/2023    Procedure: CEIOL OD;  Surgeon: Shawn Shearer MD;  Location: [facility] OR;  Service: Ophthalmology;  Laterality: Right;    CATARACT EXTRACTION W/  INTRAOCULAR LENS IMPLANT Left 7/19/2023    Procedure: CEIOL OS;  Surgeon: Shawn Shearer MD;  Location: [facility] OR;  Service: Ophthalmology;  Laterality: Left;    DISTAL CLAVICLE EXCISION Right 3/10/2021    Procedure: EXCISION, CLAVICLE, DISTAL;  Surgeon: Dustin Vanegas MD;  Location: [facility] OR;  Service: Orthopedics;  Laterality: Right;    FOOT SURGERY Bilateral 2001    FOOT SURGERY Right 05/13/2022    HAND SURGERY Left 2017    HYSTERECTOMY  1990    JOINT REPLACEMENT  2013    KNEE SURGERY Bilateral 2013    MANDIBLE FRACTURE SURGERY  1981    SHOULDER ARTHROSCOPY Left 02/27/2019    THYROIDECTOMY Left 05/13/2019         TONSILLECTOMY         Family History   Problem Relation Name Age of Onset    Arthritis Mother      Aneurysm Mother          Abdominal Aneurysm     Heart disease Father  65    Arthritis Father      Diabetes Father      Hearing loss Father      Hypertension Father      Dementia Father      No Known Problems Sister      No Known Problems Sister      Heart disease Brother  50 - 59        stent placement    Heart disease Brother  49        stent placement    Aneurysm Brother          Abdominal Aneurysm    Leukemia Son 3     Crohn's disease Son 3     Ankylosing spondylitis Son 3     Other Son 3         avascular necrosis of pancho hips    Rheumatologic disease Son 3     Heart defect Son 3     Kidney failure Son 3     SIDS Maternal Aunt 2     Uterine cancer Maternal Aunt 2     Dementia Paternal Aunt 1     Diabetes Paternal Grandmother      Heart disease Paternal Grandfather         Social History     Socioeconomic History    Marital status:     Number of children: 3   Tobacco Use    Smoking status: Never    Smokeless tobacco: Never   Substance and Sexual Activity    Alcohol use: Never     Alcohol/week: 0.0 standard drinks of alcohol    Drug use: No    Sexual activity: Yes     Birth control/protection: None     Social Determinants of Health     Financial Resource Strain: Low Risk  (6/7/2023)    Overall Financial Resource Strain (CARDIA)     Difficulty of Paying Living Expenses: Not hard at all   Food Insecurity: No Food Insecurity (6/7/2023)    Hunger Vital Sign     Worried About Running Out of Food in the Last Year: Never true     Ran Out of Food in the Last Year: Never true   Transportation Needs: No Transportation Needs (6/7/2023)    PRAPARE - Transportation     Lack of Transportation (Medical): No     Lack of Transportation (Non-Medical): No   Physical Activity: Unknown (6/7/2023)    Exercise Vital Sign     Days of Exercise per Week: 5 days   Stress: No Stress Concern Present (6/7/2023)    [place]  "[place] of Occupational Health - Occupational Stress Questionnaire     Feeling of Stress : Not at all   Social Connections: Unknown (6/7/2023)    Social Connection and Isolation Panel [NHANES]     Frequency of Communication with Friends and Family: More than three times a week     Frequency of Social Gatherings with Friends and Family: More than three times a week     Active Member of Clubs or Organizations: Yes     Attends Club or Organization Meetings: More than 4 times per year     Marital Status:    Housing Stability: Low Risk  (6/7/2023)    Housing Stability Vital Sign     Unable to Pay for Housing in the Last Year: No     Number of Places Lived in the Last Year: 1     Unstable Housing in the Last Year: No       Review of patient's allergies indicates:   Allergen Reactions    Adhesive Swelling and Blisters     Adhesive that requires uv light    Estrogens Other (See Comments)     Mini stroke    Tetanus vaccines and toxoid Swelling and Other (See Comments)     Swelling at injection site, fever    Shingrix (pf)  [[id number] (pf)] Diarrhea, Itching and Nausea And Vomiting       Review of Systems:  Positive for headaches, weight gain, difficulty sleeping, urinary frequency.  Balance of review of systems is negative.    Physical Exam:  Vitals:    04/22/24 1540   BP: (!) 151/80   Pulse: 64   Weight: 93.7 kg (206 lb 9.1 oz)   Height: 5' 3" (1.6 m)   PainSc:   6   PainLoc: Back     Body mass index is 36.59 kg/m².    Gen: NAD  Psych: mood appropriate for given condition  HEENT: eyes anicteric   CV: RRR  HEENT: anicteric   Respiratory: non-labored, no signs of respiratory distress  Abd: non-distended  Skin: warm, dry and intact.  Gait: No antalgic gait.     Reproducible pain with lumbar axial facet loading    Sensory:  Intact and symmetrical to light touch in L1-S1 dermatomes bilaterally.    Motor:     Right Left   L2/3 Iliacus Hip flexion  5  5   L3/4 Qudratus Femoris Knee Extension  5  5   L4/5 " Tib Anterior Ankle Dorsiflexion   5  5   L5/S1 Extensor Hallicus Longus Great toe extension  5  5   S1/S2 Gastroc/Soleus Plantar Flexion  5  5      Right Left                  Patellar DTR 0 0   Achilles DTR 0 0                      Labs:  Lab Results   Component Value Date    HGBA1C 6.1 06/28/2022    HGBA1C 6.1 06/28/2022       Lab Results   Component Value Date    WBC 7.26 01/26/2024    HGB 13.0 01/26/2024    HCT 42.4 01/26/2024    MCV 82 01/26/2024     01/26/2024           Imaging:  none    Assessment:   Problem List Items Addressed This Visit    None  Visit Diagnoses       Dorsalgia, unspecified    -  Primary    Relevant Medications    ketorolac injection 30 mg (Completed) (Start on 4/22/2024  5:15 PM)    Other Relevant Orders    X-Ray Lumbar Complete Including Flex And Ext    MRI Lumbar Spine Without Contrast    Lumbar spondylosis                  Nalini Wooten is a 69 y.o. female patient who has a past medical history of Allergies, Bulging disc, Carpal tunnel syndrome, Degenerative disc disease, High cholesterol, and Pinched nerve in neck. She presents with back pain.  Had chronic back pain for many years that has been gradually worsening.  Today she reports her pain is 9 in 10, intermittent, aching.  Can get referred pain into the bilateral buttocks.  Pain is worse with sitting, standing, bending, coughing, walking, lifting and relieved with rest and injections.  She has had injections for 5 years ago with excellent relief of previous pain.  She denies any numbness or weakness.    - on exam she has full strength in his lower extremities and intact sensation to light touch bilateral L2-S1.  She has reproducible pain with lumbar axial facet loading  - I think she likely has pain secondary to lumbar spondylosis.  She may also has some central canal narrowing  - I have ordered a lumbar x-ray to assess her bony anatomy and rule out any instability  - she reports that over the past year she has participate  in formal physical therapy however it made her pain worse and she can not currently tolerate PT.  - I am going to order a lumbar MRI to better evaluate her neural anatomy  - we will give her a Toradol 30 mg IM injection today for inflammatory pain  - we will call her once imaging complete discuss further treatment options      : Not applicable    Angel Yousif M.D.  Interventional Pain Medicine / Anesthesiology    This note was completed with dictation software and grammatical errors may exist.

## 2024-04-22 NOTE — PROGRESS NOTES
[facility] New Patient Evaluation      Referred by: Zohaib Gallego    PCP:     CC:   Chief Complaint   Patient presents with    Low-back Pain     Mid low back pain             4/22/2024     3:41 PM   Last 3 PDI Scores   Pain Disability Index (PDI) 26         HPI:   Nalini Wooten is a 69 y.o. female patient who has a past medical history of Allergies, Bulging disc, Carpal tunnel syndrome, Degenerative disc disease, High cholesterol, and Pinched nerve in neck. She presents with back pain.  Had chronic back pain for many years that has been gradually worsening.  Today she reports her pain is 9 in 10, intermittent, aching.  Can get referred pain into the bilateral buttocks.  Pain is worse with sitting, standing, bending, coughing, walking, lifting and relieved with rest and injections.  She has had injections for 5 years ago with excellent relief of previous pain.  She denies any numbness or weakness.      Pain Intervention History:      Past Spine Surgical History:      Past and current medications:  Antineuropathics:  NSAIDs:  Physical therapy: yes, completed   Antidepressants:  Muscle relaxers:  Opioids: tramadol   Antiplatelets/Anticoagulants:     History:    Current Outpatient Medications:     azelastine (OPTIVAR) 0.05 % ophthalmic solution, Place 1 drop into both eyes 2 (two) times daily., Disp: 6 mL, Rfl: 12    cyanocobalamin 1,000 mcg/mL injection, Inject every week 1 cc, Disp: 10 mL, Rfl: 1    fexofenadine (ALLEGRA) 180 MG tablet, Take 1 tablet (180 mg total) by mouth once daily., Disp: 30 tablet, Rfl: 2    metFORMIN (GLUCOPHAGE) 500 MG tablet, Take 1 tablet (500 mg total) by mouth 2 (two) times daily with meals., Disp: 180 tablet, Rfl: 2    mometasone 0.1% (ELOCON) 0.1 % cream, Apply topically once daily., Disp: 45 g, Rfl: 3    pantoprazole (PROTONIX) 40 MG tablet, Take 40 mg by mouth every morning., Disp: , Rfl:     rosuvastatin (CRESTOR) 40 MG Tab, Take 1 tablet (40 mg total) by mouth once  daily., Disp: 90 tablet, Rfl: 3    tolterodine (DETROL LA) 4 MG 24 hr capsule, Take 1 capsule (4 mg total) by mouth once daily., Disp: 90 capsule, Rfl: 1    traMADoL (ULTRAM) 50 mg tablet, Take 1 tablet (50 mg total) by mouth every 4 (four) hours as needed for Pain., Disp: 30 tablet, Rfl: 0  No current facility-administered medications for this visit.    Past Medical History:   Diagnosis Date    Allergies     Bulging disc     lower back     Carpal tunnel syndrome     Degenerative disc disease     High cholesterol     Pinched nerve in neck        Past Surgical History:   Procedure Laterality Date    ARTHROSCOPIC REPAIR OF ROTATOR CUFF OF SHOULDER Right 3/10/2021    Procedure: REPAIR, ROTATOR CUFF, ARTHROSCOPIC;  Surgeon: Dustin Vanegas MD;  Location: [facility] OR;  Service: Orthopedics;  Laterality: Right;  arthrex notified    ARTHROSCOPY OF SHOULDER WITH DECOMPRESSION OF SUBACROMIAL SPACE Right 3/10/2021    Procedure: ARTHROSCOPY, SHOULDER, WITH SUBACROMIAL SPACE DECOMPRESSION;  Surgeon: Dustin Vanegas MD;  Location: [facility] OR;  Service: Orthopedics;  Laterality: Right;    BLADDER SUSPENSION  1990    CATARACT EXTRACTION W/  INTRAOCULAR LENS IMPLANT Right 6/21/2023    Procedure: CEIOL OD;  Surgeon: Shawn Shearer MD;  Location: [facility] OR;  Service: Ophthalmology;  Laterality: Right;    CATARACT EXTRACTION W/  INTRAOCULAR LENS IMPLANT Left 7/19/2023    Procedure: CEIOL OS;  Surgeon: Shawn Shearer MD;  Location: [facility] OR;  Service: Ophthalmology;  Laterality: Left;    DISTAL CLAVICLE EXCISION Right 3/10/2021    Procedure: EXCISION, CLAVICLE, DISTAL;  Surgeon: Dustin Vanegas MD;  Location: [facility] OR;  Service: Orthopedics;  Laterality: Right;    FOOT SURGERY Bilateral 2001    FOOT SURGERY Right 05/13/2022    HAND SURGERY Left 2017    HYSTERECTOMY  1990    JOINT REPLACEMENT  2013    KNEE SURGERY Bilateral 2013    MANDIBLE FRACTURE SURGERY  1981    SHOULDER ARTHROSCOPY Left 02/27/2019    THYROIDECTOMY Left 05/13/2019         TONSILLECTOMY         Family History   Problem Relation Name Age of Onset    Arthritis Mother      Aneurysm Mother          Abdominal Aneurysm     Heart disease Father  65    Arthritis Father      Diabetes Father      Hearing loss Father      Hypertension Father      Dementia Father      No Known Problems Sister      No Known Problems Sister      Heart disease Brother  50 - 59        stent placement    Heart disease Brother  49        stent placement    Aneurysm Brother          Abdominal Aneurysm    Leukemia Son 3     Crohn's disease Son 3     Ankylosing spondylitis Son 3     Other Son 3         avascular necrosis of pancho hips    Rheumatologic disease Son 3     Heart defect Son 3     Kidney failure Son 3     SIDS Maternal Aunt 2     Uterine cancer Maternal Aunt 2     Dementia Paternal Aunt 1     Diabetes Paternal Grandmother      Heart disease Paternal Grandfather         Social History     Socioeconomic History    Marital status:     Number of children: 3   Tobacco Use    Smoking status: Never    Smokeless tobacco: Never   Substance and Sexual Activity    Alcohol use: Never     Alcohol/week: 0.0 standard drinks of alcohol    Drug use: No    Sexual activity: Yes     Birth control/protection: None     Social Determinants of Health     Financial Resource Strain: Low Risk  (6/7/2023)    Overall Financial Resource Strain (CARDIA)     Difficulty of Paying Living Expenses: Not hard at all   Food Insecurity: No Food Insecurity (6/7/2023)    Hunger Vital Sign     Worried About Running Out of Food in the Last Year: Never true     Ran Out of Food in the Last Year: Never true   Transportation Needs: No Transportation Needs (6/7/2023)    PRAPARE - Transportation     Lack of Transportation (Medical): No     Lack of Transportation (Non-Medical): No   Physical Activity: Unknown (6/7/2023)    Exercise Vital Sign     Days of Exercise per Week: 5 days   Stress: No Stress Concern Present (6/7/2023)    [place]  "[place] [employer] - Occupational Stress Questionnaire     Feeling of Stress : Not at all   Social Connections: Unknown (6/7/2023)    Social Connection and Isolation Panel [NHANES]     Frequency of Communication with Friends and Family: More than three times a week     Frequency of Social Gatherings with Friends and Family: More than three times a week     Active Member of Clubs or Organizations: Yes     Attends Club or Organization Meetings: More than 4 times per year     Marital Status:    Housing Stability: Low Risk  (6/7/2023)    Housing Stability Vital Sign     Unable to Pay for Housing in the Last Year: No     Number of Places Lived in the Last Year: 1     Unstable Housing in the Last Year: No       Review of patient's allergies indicates:   Allergen Reactions    Adhesive Swelling and Blisters     Adhesive that requires uv light    Estrogens Other (See Comments)     Mini stroke    Tetanus vaccines and toxoid Swelling and Other (See Comments)     Swelling at injection site, fever    Shingrix (pf)  [varicella-zoster [id number] (pf)] Diarrhea, Itching and Nausea And Vomiting       Review of Systems:  Positive for headaches, weight gain, difficulty sleeping, urinary frequency.  Balance of review of systems is negative.    Physical Exam:  Vitals:    04/22/24 1540   BP: (!) 151/80   Pulse: 64   Weight: 93.7 kg (206 lb 9.1 oz)   Height: 5' 3" (1.6 m)   PainSc:   6   PainLoc: Back     Body mass index is 36.59 kg/m².    Gen: NAD  Psych: mood appropriate for given condition  HEENT: eyes anicteric   CV: RRR  HEENT: anicteric   Respiratory: non-labored, no signs of respiratory distress  Abd: non-distended  Skin: warm, dry and intact.  Gait: No antalgic gait.     Reproducible pain with lumbar axial facet loading    Sensory:  Intact and symmetrical to light touch in L1-S1 dermatomes bilaterally.    Motor:     Right Left   L2/3 Iliacus Hip flexion  5  5   L3/4 Qudratus Femoris Knee Extension  5  5   L4/5 " Tib Anterior Ankle Dorsiflexion   5  5   L5/S1 Extensor Hallicus Longus Great toe extension  5  5   S1/S2 Gastroc/Soleus Plantar Flexion  5  5      Right Left                  Patellar DTR 0 0   Achilles DTR 0 0                      Labs:  Lab Results   Component Value Date    HGBA1C 6.1 06/28/2022    HGBA1C 6.1 06/28/2022       Lab Results   Component Value Date    WBC 7.26 01/26/2024    HGB 13.0 01/26/2024    HCT 42.4 01/26/2024    MCV 82 01/26/2024     01/26/2024           Imaging:  none    Assessment:   Problem List Items Addressed This Visit    None  Visit Diagnoses       Dorsalgia, unspecified    -  Primary    Relevant Medications    ketorolac injection 30 mg (Completed) (Start on 4/22/2024  5:15 PM)    Other Relevant Orders    X-Ray Lumbar Complete Including Flex And Ext    MRI Lumbar Spine Without Contrast    Lumbar spondylosis                  Nalini Wooten is a 69 y.o. female patient who has a past medical history of Allergies, Bulging disc, Carpal tunnel syndrome, Degenerative disc disease, High cholesterol, and Pinched nerve in neck. She presents with back pain.  Had chronic back pain for many years that has been gradually worsening.  Today she reports her pain is 9 in 10, intermittent, aching.  Can get referred pain into the bilateral buttocks.  Pain is worse with sitting, standing, bending, coughing, walking, lifting and relieved with rest and injections.  She has had injections for 5 years ago with excellent relief of previous pain.  She denies any numbness or weakness.    - on exam she has full strength in his lower extremities and intact sensation to light touch bilateral L2-S1.  She has reproducible pain with lumbar axial facet loading  - I think she likely has pain secondary to lumbar spondylosis.  She may also has some central canal narrowing  - I have ordered a lumbar x-ray to assess her bony anatomy and rule out any instability  - she reports that over the past year she has participate  in formal physical therapy however it made her pain worse and she can not currently tolerate PT.  - I am going to order a lumbar MRI to better evaluate her neural anatomy  - we will give her a Toradol 30 mg IM injection today for inflammatory pain  - we will call her once imaging complete discuss further treatment options      : Not applicable    Angel Yousif M.D.  Interventional Pain Medicine / Anesthesiology    This note was completed with dictation software and grammatical errors may exist.

## 2024-04-22 NOTE — TELEPHONE ENCOUNTER
----- Message from Georgette Lanza sent at 4/22/2024  8:46 AM CDT -----  Contact: self  Type: Sooner Appointment Request        Caller is requesting a sooner appointment. Caller declined first available appointment listed below. Caller will not accept being placed on the waitlist and is requesting a message be sent to doctor.        Name of Caller: Patient   Best Call Back Number: [phone]  Additional Information: Pt will bring CD of her MRI that was done at [facility]. Dr. Lechuga is who dealt with  back last.  Thanks

## 2024-04-23 NOTE — PROGRESS NOTES
ALLERGY & [facility]MMUNOLOGY CLINIC -  Established Patient     HISTORY OF PRESENT ILLNESS     Patient ID: Nalini Wooten is a 69 y.o. female    CC: follow up visit    HPI: Nalini Wooten is a 69 y.o. female presents for evaluation of:    Office Visit 04/25/2024  Rash: Recently applied old nail polish to flexural crease of left elbow and developed localized erythema and blistering. Started application of mometasone that was previously prescribed with improvement in itching and burning. Scheduled for dermatology visit next week. Also scheduled to start allergen immunotherapy June 2024 after she returns from trip to [place]        REVIEW OF SYSTEMS     CONST: no F/C/NS, no unintentional weight changes  Balance of review of systems negative except as mentioned above     MEDICAL HISTORY     MedHx: active problems reviewed  SurgHx:   Past Surgical History:   Procedure Laterality Date    ARTHROSCOPIC REPAIR OF ROTATOR CUFF OF SHOULDER Right 3/10/2021    Procedure: REPAIR, ROTATOR CUFF, ARTHROSCOPIC;  Surgeon: Dustin Vanegas MD;  Location: [facility] OR;  Service: Orthopedics;  Laterality: Right;  arthrex notified    ARTHROSCOPY OF SHOULDER WITH DECOMPRESSION OF SUBACROMIAL SPACE Right 3/10/2021    Procedure: ARTHROSCOPY, SHOULDER, WITH SUBACROMIAL SPACE DECOMPRESSION;  Surgeon: Dustin Vanegas MD;  Location: [facility] OR;  Service: Orthopedics;  Laterality: Right;    BLADDER SUSPENSION  1990    CATARACT EXTRACTION W/  INTRAOCULAR LENS IMPLANT Right 6/21/2023    Procedure: CEIOL OD;  Surgeon: Shawn Shearer MD;  Location: [facility] OR;  Service: Ophthalmology;  Laterality: Right;    CATARACT EXTRACTION W/  INTRAOCULAR LENS IMPLANT Left 7/19/2023    Procedure: CEIOL OS;  Surgeon: Shawn Shearer MD;  Location: [facility] OR;  Service: Ophthalmology;  Laterality: Left;    DISTAL CLAVICLE EXCISION Right 3/10/2021    Procedure: EXCISION, CLAVICLE, DISTAL;  Surgeon: Dustin Vanegas MD;  Location: [facility] OR;  Service: Orthopedics;  Laterality: Right;    FOOT  "SURGERY Bilateral 2001    FOOT SURGERY Right 05/13/2022    HAND SURGERY Left 2017    HYSTERECTOMY  1990    JOINT REPLACEMENT  2013    KNEE SURGERY Bilateral 2013    MANDIBLE FRACTURE SURGERY  1981    SHOULDER ARTHROSCOPY Left 02/27/2019    THYROIDECTOMY Left 05/13/2019        TONSILLECTOMY       Allergies: see below  Medications: MAR reviewed    No pertinent allergy changes in medical history since last visit     PHYSICAL EXAM     VS: Pulse 83   Ht 5' 3" (1.6 m)   Wt 93.7 kg (206 lb 9.1 oz)   SpO2 97%   BMI 36.59 kg/m²   GENERAL: awake, alert, cooperative with exam  LUNGS: CTAB, no w/r/c, no increased WOB  HEART: Normal Rate and regular rhythm, normal S1/S2, no m/g/r  DERM: Well demarcated erythema to flexural crease of left elbow; no blistering noted       LABORATORY/ALLERGY Evaluation      Latest Reference Range & Units 01/26/24 14:09   A. alternata IgE <0.10 kU/L 2.41 (H)   Altern. alternata Class  CLASS 2   Aspergillus Fumigatus IgE <0.10 kU/L <0.10   A. fumigatus Class  CLASS 0   Bermuda Grass IgE <0.10 kU/L 0.11 (H)   Bermuda Grass Class  CLASS 0/1   Cat Dander IgE <0.10 kU/L <0.10   Cat Epithelium Class  CLASS 0   [place]ockley IgE <0.10 kU/L <0.10   [place]ley Class  CLASS 0   Cockroach, IgE <0.10 kU/L  -  0.89 (H)  CLASS 2   D. farinae <0.10 kU/L 0.37 (H)   D. farinae Class  CLASS 1   D. pteronyssinus Dust Mite IgE <0.10 kU/L 0.28 (H)   D. pteronyssinus Class  CLASS 0/1   Dog Dander IgE <0.10 kU/L 0.13 (H)   Dog Dander Class  CLASS 0/1   English Plantain IgE <0.10 kU/L <0.10   English Plantain Class  CLASS 0   Marshelder IgE <0.10 kU/L 0.12 (H)   Marshelder Class  CLASS 0/1   [place], Class  CLASS 0   Pecan Webb Tree IgE <0.10 kU/L <0.10   Pecan, Class  CLASS 0   Ragweed, Western IgE <0.10 kU/L 0.16 (H)   Ragweed, Western, Class  CLASS 0/1   Phani Grass IgE <0.10 kU/L <0.10   Phani Grass Class  CLASS 0   Aspen Tree IgE <0.10 kU/L <0.10   Total IgE 0 - 100 IU/mL 302 (H)   (H): Data is abnormally " high     ASSESSMENT/PLAN     Nalini Wooten is a 69 y.o. female with       1. Chronic allergic rhinitis    2. Allergic contact dermatitis due to drugs in contact with skin      Likely allergic contact dermatitis to acrylates given reactions with dental implants as well as nail polish. Recommend dermatology patch testing (Establishes care next week). Additionally sent for short course of systemic steroids given active dermatitis refractory to medium potency topical steroids  Scheduled allergen immunotherapy injections during today's visit and recommended to continue daily regimen while on build up phase of allergen immunotherapy     Follow up: 1 year      Fidel Pérez MD    I spent a total of 30 minutes on the day of the visit. This includes face to face time and non-face to face time preparing to see the patient (eg, review of tests), obtaining and/or reviewing separately obtained history, documenting clinical information in the electronic or other health record, independently interpreting results and communicating results to the patient/family/caregiver, or care coordinator.

## 2024-04-25 ENCOUNTER — OFFICE VISIT (OUTPATIENT)
Dept: ALLERGY | Facility: CLINIC | Age: 69
End: 2024-04-25
Payer: MEDICARE

## 2024-04-25 VITALS — HEIGHT: 63 IN | BODY MASS INDEX: 36.6 KG/M2 | HEART RATE: 83 BPM | WEIGHT: 206.56 LBS | OXYGEN SATURATION: 97 %

## 2024-04-25 DIAGNOSIS — L23.3 ALLERGIC CONTACT DERMATITIS DUE TO DRUGS IN CONTACT WITH SKIN: ICD-10-CM

## 2024-04-25 DIAGNOSIS — J30.9 CHRONIC ALLERGIC RHINITIS: Primary | ICD-10-CM

## 2024-04-25 PROCEDURE — 3008F BODY MASS INDEX DOCD: CPT | Mod: CPTII,S$GLB,, | Performed by: STUDENT IN AN ORGANIZED HEALTH CARE EDUCATION/TRAINING PROGRAM

## 2024-04-25 PROCEDURE — 1126F AMNT PAIN NOTED NONE PRSNT: CPT | Mod: CPTII,S$GLB,, | Performed by: STUDENT IN AN ORGANIZED HEALTH CARE EDUCATION/TRAINING PROGRAM

## 2024-04-25 PROCEDURE — 99214 OFFICE O/P EST MOD 30 MIN: CPT | Mod: S$GLB,,, | Performed by: STUDENT IN AN ORGANIZED HEALTH CARE EDUCATION/TRAINING PROGRAM

## 2024-04-25 PROCEDURE — 1159F MED LIST DOCD IN RCRD: CPT | Mod: CPTII,S$GLB,, | Performed by: STUDENT IN AN ORGANIZED HEALTH CARE EDUCATION/TRAINING PROGRAM

## 2024-04-25 PROCEDURE — 99999 PR PBB SHADOW E&M-EST. PATIENT-LVL III: CPT | Mod: PBBFAC,,, | Performed by: STUDENT IN AN ORGANIZED HEALTH CARE EDUCATION/TRAINING PROGRAM

## 2024-04-25 RX ORDER — PREDNISONE 20 MG/1
20 TABLET ORAL DAILY
Qty: 7 TABLET | Refills: 0 | Status: SHIPPED | OUTPATIENT
Start: 2024-04-25 | End: 2024-05-02

## 2024-04-30 ENCOUNTER — HOSPITAL ENCOUNTER (OUTPATIENT)
Dept: RADIOLOGY | Facility: HOSPITAL | Age: 69
Discharge: HOME | End: 2024-04-30
Attending: ANESTHESIOLOGY
Payer: MEDICARE

## 2024-04-30 DIAGNOSIS — M54.9 DORSALGIA, UNSPECIFIED: ICD-10-CM

## 2024-04-30 PROCEDURE — 72148 MRI LUMBAR SPINE W/O DYE: CPT | Mod: 26,,, | Performed by: RADIOLOGY

## 2024-04-30 PROCEDURE — 72148 MRI LUMBAR SPINE W/O DYE: CPT | Mod: TC

## 2024-05-02 ENCOUNTER — TELEPHONE (OUTPATIENT)
Dept: PAIN MEDICINE | Facility: CLINIC | Age: 69
End: 2024-05-02
Payer: MEDICARE

## 2024-05-02 NOTE — TELEPHONE ENCOUNTER
Please let the patient know I reviewed her MRI.  She has arthritis in her lower back.  No severe narrowing around the nerves    We can schedule for injections to try and treat her arthritis    Physician - Dr Yousif    Type of Procedure/Injection - Lumbar Medial Branch Block  L4/5 and L5/S1           Laterality - Bilateral      Anxiolysis- RNIV      Need to hold medication - No      Clearance needed - No      Follow up - phone call next day

## 2024-05-03 DIAGNOSIS — M47.816 LUMBAR SPONDYLOSIS: Primary | ICD-10-CM

## 2024-05-03 RX ORDER — SODIUM CHLORIDE, SODIUM LACTATE, POTASSIUM CHLORIDE, CALCIUM CHLORIDE 600; 310; 30; 20 MG/100ML; MG/100ML; MG/100ML; MG/100ML
INJECTION, SOLUTION INTRAVENOUS CONTINUOUS
Status: CANCELLED | OUTPATIENT
Start: 2024-05-03

## 2024-05-03 NOTE — TELEPHONE ENCOUNTER
Spoke with patient regarding providers review and recommendation based off recent MRI lumbar. Patient agreeable to plan and scheduled for lumbar MBB. Pre op information given and all questions answered.

## 2024-05-22 ENCOUNTER — HOSPITAL ENCOUNTER (OUTPATIENT)
Facility: HOSPITAL | Age: 69
Discharge: HOME | End: 2024-05-22
Attending: ANESTHESIOLOGY | Admitting: ANESTHESIOLOGY
Payer: MEDICARE

## 2024-05-22 ENCOUNTER — HOSPITAL ENCOUNTER (OUTPATIENT)
Dept: RADIOLOGY | Facility: HOSPITAL | Age: 69
Discharge: HOME | End: 2024-05-22
Attending: ANESTHESIOLOGY | Admitting: ANESTHESIOLOGY
Payer: MEDICARE

## 2024-05-22 VITALS
SYSTOLIC BLOOD PRESSURE: 133 MMHG | BODY MASS INDEX: 35.26 KG/M2 | WEIGHT: 199 LBS | HEIGHT: 63 IN | TEMPERATURE: 98 F | DIASTOLIC BLOOD PRESSURE: 65 MMHG | RESPIRATION RATE: 16 BRPM | OXYGEN SATURATION: 100 % | HEART RATE: 59 BPM

## 2024-05-22 DIAGNOSIS — M47.816 LUMBAR SPONDYLOSIS: Primary | ICD-10-CM

## 2024-05-22 DIAGNOSIS — M54.50 LOWER BACK PAIN: ICD-10-CM

## 2024-05-22 LAB — GLUCOSE SERPL-MCNC: 103 MG/DL (ref 70–110)

## 2024-05-22 PROCEDURE — 25000003 PHARM REV CODE 250: Mod: PO | Performed by: ANESTHESIOLOGY

## 2024-05-22 PROCEDURE — 99152 MOD SED SAME PHYS/QHP 5/>YRS: CPT | Mod: ,,, | Performed by: ANESTHESIOLOGY

## 2024-05-22 PROCEDURE — 64494 INJ PARAVERT F JNT L/S 2 LEV: CPT | Mod: 50,PO | Performed by: ANESTHESIOLOGY

## 2024-05-22 PROCEDURE — 63600175 PHARM REV CODE 636 W HCPCS: Mod: PO | Performed by: ANESTHESIOLOGY

## 2024-05-22 PROCEDURE — 76000 FLUOROSCOPY <1 HR PHYS/QHP: CPT | Mod: TC,PO

## 2024-05-22 PROCEDURE — 64493 INJ PARAVERT F JNT L/S 1 LEV: CPT | Mod: 50,PO | Performed by: ANESTHESIOLOGY

## 2024-05-22 PROCEDURE — 64494 INJ PARAVERT F JNT L/S 2 LEV: CPT | Mod: 50,KX,, | Performed by: ANESTHESIOLOGY

## 2024-05-22 PROCEDURE — 64493 INJ PARAVERT F JNT L/S 1 LEV: CPT | Mod: 50,KX,, | Performed by: ANESTHESIOLOGY

## 2024-05-22 RX ORDER — MIDAZOLAM HYDROCHLORIDE 1 MG/ML
INJECTION INTRAMUSCULAR; INTRAVENOUS
Status: DISCONTINUED | OUTPATIENT
Start: 2024-05-22 | End: 2024-05-22 | Stop reason: HOSPADM

## 2024-05-22 RX ORDER — SODIUM CHLORIDE, SODIUM LACTATE, POTASSIUM CHLORIDE, CALCIUM CHLORIDE 600; 310; 30; 20 MG/100ML; MG/100ML; MG/100ML; MG/100ML
INJECTION, SOLUTION INTRAVENOUS CONTINUOUS
Status: DISCONTINUED | OUTPATIENT
Start: 2024-05-22 | End: 2024-05-22 | Stop reason: HOSPADM

## 2024-05-22 RX ORDER — BUPIVACAINE HYDROCHLORIDE 2.5 MG/ML
INJECTION, SOLUTION EPIDURAL; INFILTRATION; INTRACAUDAL
Status: DISCONTINUED | OUTPATIENT
Start: 2024-05-22 | End: 2024-05-22 | Stop reason: HOSPADM

## 2024-05-22 RX ORDER — LIDOCAINE HYDROCHLORIDE 10 MG/ML
INJECTION, SOLUTION EPIDURAL; INFILTRATION; INTRACAUDAL; PERINEURAL
Status: DISCONTINUED | OUTPATIENT
Start: 2024-05-22 | End: 2024-05-22 | Stop reason: HOSPADM

## 2024-05-22 RX ADMIN — SODIUM CHLORIDE, POTASSIUM CHLORIDE, SODIUM LACTATE AND CALCIUM CHLORIDE: 600; 310; 30; 20 INJECTION, SOLUTION INTRAVENOUS at 02:05

## 2024-05-22 NOTE — OP NOTE
Procedure Note    Procedure Date: 5/22/2024    Procedure Performed:  bilateral Diagnostic Lumbar Medial Branch @ L4/5 and L5/S1, with Fluoroscopic Guidance    Indications:   Nalini Wooten has chronic, moderate to severe axial lower back pain present for over the past 3 months that has failed to respond to physical therapy and PT-directed home exercises. There is no untreated radiculopathy or claudication present.  The patient has clinical and radiologic findings suggestive of facet mediated pain.     Pre-op diagnosis: Lumbar Spondylosis    Post-op diagnosis: same    Physician: Angel Yousif MD    IV Anxiolysis medications: versed 2mg    Medications injected: Bupivacaine 0.25%, 0.5 cc per level    Local anesthetic used: 1% Lidocaine, 4 ml    Estimated Blood Loss: none    Complications:  none    Technique:  The patient was interviewed in the holding area and Risks/Benefits were discussed, including, but not limited to, the possibility of new or different pain, bleeding or infection.   All questions were answered.  The patient understood and accepted risks.  Consent was reviewed.  A time out was taken to identify the patient, procedure and side of the procedure. The patient was placed in a prone position, then prepped and draped in the usual sterile fashion using ChloraPrep x2 and sterile towels.  The levels were determined under fluoroscopic guidance and then marked.  1% Lidocaine was given by raising a wheal at the skin over each site and then infiltrated approximately 2cm deeper.  A 25-gauge 3.5 inch needle was introduced to the anatomic location of the L3-5 medial branch nerves on the bilateral side.  Then, after negative aspiration, 0.5 cc of Bupivacaine 0.25% was injected @ each level.  The patient tolerated the procedure well.  The patient tolerated the procedure well and was transferred to the [facility] in stable condition.  The patient was monitored after the procedure.  Patient was given post procedure and  discharge instructions to follow at home.  The patient was discharged in a stable condition.    Event Time In   In Facility 1338   In Pre-Procedure 1430   Physician Available    Anesthesia Available    Pre-Op: Bedside Procedure Start    Pre-Op: Bedside Procedure Stop    Pre-Procedure Complete 1458   Out of Pre-Procedure    Anesthesia Start    Anesthesia Start Data Collection    Setup Start    Setup Complete    In Room 1531   Prep Start    Procedure Prep Complete    MD notified pt. ready    Procedure Start 1537   Procedure Closing    Emergence    Procedure Finish 1542   Sedation Start 1530   Scope In    Extent Reached    Scope Out    Sedation End 1542   Out of Room 1543   Cleanup Start    Cleanup Complete    Cosmetic Start    Cosmetic Stop    Pain Mgmt In Room    Pain Mgmt Out Room    In Recovery    Anesthesia Finish    Bedside Procedure Start    Bedside Procedure Stop    Recovery Care Complete    Out of Recovery    To Phase II    In Phase II    Pain Mgmt Recovery Start    Pain Mgmt Recovery Stop    Obs Rec Start    Obs Rec Stop    Phase II Care Complete    Out of Phase II    Procedural Care Complete    Discharge    Pain Follow Up Needed    Pain Follow Up Complete

## 2024-05-22 NOTE — INTERVAL H&P NOTE
The patient has been examined and the H&P has been reviewed:    I concur with the findings and changes have been noted since the H&P was written: MRI reviewed.  We will proceed with 1st diagnostic bilateral L4/5 and L5/S1 medial branch blocks. The risks and benefits of this intervention, and alternative therapies were discussed with the patient.  The discussion of risks included infection, bleeding, need for additional procedures or surgery, nerve damage.  Questions regarding the procedure, risks, expected outcome, and possible side effects were solicited and answered to the patient's satisfaction.  Nalini Wooten wishes to proceed with the injection or procedure.  Written consent was obtained.  ASA 2, MP II        There are no hospital problems to display for this patient.

## 2024-05-22 NOTE — DISCHARGE SUMMARY
[facility]  Discharge Note  Short Stay    Admit Date: 5/22/2024    Discharge Date: 5/22/2024    Attending Physician: Angel Yousif     Discharge Provider: Angel Yousif    Diagnoses:  There are no hospital problems to display for this patient.      Discharged Condition: Good    Final Diagnoses: Lumbar spondylosis [M47.816]    Disposition: Home or Self Care    Hospital Course: No complications, uneventful    Outcome of Hospitalization, Treatment, Procedure, or Surgery:  Patient was admitted for outpatient interventional pain management procedure. The patient tolerated the procedure well with no complications.    Follow up/Patient Instructions:  Follow up as scheduled in Pain Management office in 2-3 weeks.  Patient has received instructions and follow up date and time.    Medications:  Continue previous medications    Discharge Procedure Orders   Notify your health care provider if you experience any of the following:  temperature >100.4     Notify your health care provider if you experience any of the following:  persistent nausea and vomiting or diarrhea     Notify your health care provider if you experience any of the following:  severe uncontrolled pain     Notify your health care provider if you experience any of the following:  redness, tenderness, or signs of infection (pain, swelling, redness, odor or green/yellow discharge around incision site)     Notify your health care provider if you experience any of the following:  difficulty breathing or increased cough     Notify your health care provider if you experience any of the following:  severe persistent headache     Notify your health care provider if you experience any of the following:  worsening rash     Notify your health care provider if you experience any of the following:  persistent dizziness, light-headedness, or visual disturbances     Notify your health care provider if you experience any of the following:  increased confusion or  weakness     Activity as tolerated

## 2024-05-23 ENCOUNTER — PATIENT MESSAGE (OUTPATIENT)
Dept: PAIN MEDICINE | Facility: CLINIC | Age: 69
End: 2024-05-23
Payer: MEDICARE

## 2024-05-23 ENCOUNTER — TELEPHONE (OUTPATIENT)
Dept: PAIN MEDICINE | Facility: CLINIC | Age: 69
End: 2024-05-23
Payer: MEDICARE

## 2024-05-23 NOTE — TELEPHONE ENCOUNTER
Attempted to reach patient for lumbar MBB follow up. No answer. Left voicemail to return call to office. Also block questions [facility].

## 2024-06-18 ENCOUNTER — CLINICAL SUPPORT (OUTPATIENT)
Dept: ALLERGY | Facility: CLINIC | Age: 69
End: 2024-06-18
Payer: MEDICARE

## 2024-06-18 DIAGNOSIS — J30.9 CHRONIC ALLERGIC RHINITIS: Primary | ICD-10-CM

## 2024-06-18 PROCEDURE — 95117 IMMUNOTHERAPY INJECTIONS: CPT | Mod: S$GLB,,, | Performed by: STUDENT IN AN ORGANIZED HEALTH CARE EDUCATION/TRAINING PROGRAM

## 2024-06-19 ENCOUNTER — OFFICE VISIT (OUTPATIENT)
Dept: FAMILY MEDICINE | Facility: CLINIC | Age: 69
End: 2024-06-19
Payer: MEDICARE

## 2024-06-19 VITALS
BODY MASS INDEX: 35.56 KG/M2 | WEIGHT: 200.69 LBS | HEART RATE: 75 BPM | HEIGHT: 63 IN | DIASTOLIC BLOOD PRESSURE: 74 MMHG | OXYGEN SATURATION: 96 % | SYSTOLIC BLOOD PRESSURE: 136 MMHG

## 2024-06-19 DIAGNOSIS — E03.9 ACQUIRED HYPOTHYROIDISM: ICD-10-CM

## 2024-06-19 DIAGNOSIS — J30.2 SEASONAL ALLERGIES: ICD-10-CM

## 2024-06-19 DIAGNOSIS — N32.81 OAB (OVERACTIVE BLADDER): ICD-10-CM

## 2024-06-19 DIAGNOSIS — E66.01 CLASS 2 SEVERE OBESITY DUE TO EXCESS CALORIES WITH SERIOUS COMORBIDITY AND BODY MASS INDEX (BMI) OF 35.0 TO 35.9 IN ADULT: Primary | ICD-10-CM

## 2024-06-19 DIAGNOSIS — R73.9 HYPERGLYCEMIA: ICD-10-CM

## 2024-06-19 DIAGNOSIS — E78.5 DYSLIPIDEMIA: Primary | ICD-10-CM

## 2024-06-19 PROCEDURE — 99999 PR PBB SHADOW E&M-EST. PATIENT-LVL III: CPT | Mod: PBBFAC,,, | Performed by: NURSE PRACTITIONER

## 2024-06-19 RX ORDER — ROSUVASTATIN CALCIUM 40 MG/1
40 TABLET, COATED ORAL DAILY
Qty: 90 TABLET | Refills: 1 | Status: SHIPPED | OUTPATIENT
Start: 2024-06-19

## 2024-06-19 RX ORDER — PHENTERMINE HYDROCHLORIDE 37.5 MG/1
37.5 TABLET ORAL
COMMUNITY
Start: 2024-05-21 | End: 2024-06-19 | Stop reason: SDUPTHER

## 2024-06-19 RX ORDER — MINERAL OIL
180 ENEMA (ML) RECTAL DAILY
Qty: 90 TABLET | Refills: 1 | Status: SHIPPED | OUTPATIENT
Start: 2024-06-19

## 2024-06-19 RX ORDER — METFORMIN HYDROCHLORIDE 500 MG/1
500 TABLET ORAL 2 TIMES DAILY WITH MEALS
Qty: 180 TABLET | Refills: 2 | Status: SHIPPED | OUTPATIENT
Start: 2024-06-19

## 2024-06-19 RX ORDER — TOLTERODINE 4 MG/1
4 CAPSULE, EXTENDED RELEASE ORAL DAILY
Qty: 90 CAPSULE | Refills: 1 | Status: SHIPPED | OUTPATIENT
Start: 2024-06-19

## 2024-06-20 ENCOUNTER — OFFICE VISIT (OUTPATIENT)
Dept: PAIN MEDICINE | Facility: CLINIC | Age: 69
End: 2024-06-20
Payer: MEDICARE

## 2024-06-20 ENCOUNTER — TELEPHONE (OUTPATIENT)
Dept: PAIN MEDICINE | Facility: CLINIC | Age: 69
End: 2024-06-20

## 2024-06-20 VITALS
SYSTOLIC BLOOD PRESSURE: 145 MMHG | HEIGHT: 63 IN | HEART RATE: 68 BPM | BODY MASS INDEX: 35.32 KG/M2 | WEIGHT: 199.31 LBS | DIASTOLIC BLOOD PRESSURE: 82 MMHG

## 2024-06-20 DIAGNOSIS — M47.816 LUMBAR SPONDYLOSIS: Primary | ICD-10-CM

## 2024-06-20 DIAGNOSIS — M51.36 DDD (DEGENERATIVE DISC DISEASE), LUMBAR: ICD-10-CM

## 2024-06-20 DIAGNOSIS — M54.50 CHRONIC MIDLINE LOW BACK PAIN WITHOUT SCIATICA: ICD-10-CM

## 2024-06-20 DIAGNOSIS — G89.29 CHRONIC MIDLINE LOW BACK PAIN WITHOUT SCIATICA: ICD-10-CM

## 2024-06-20 PROCEDURE — 99999 PR PBB SHADOW E&M-EST. PATIENT-LVL III: CPT | Mod: PBBFAC,,, | Performed by: ANESTHESIOLOGY

## 2024-06-20 RX ORDER — TRAMADOL HYDROCHLORIDE 50 MG/1
50 TABLET ORAL EVERY 12 HOURS PRN
Qty: 30 TABLET | Refills: 0 | Status: SHIPPED | OUTPATIENT
Start: 2024-06-20

## 2024-06-20 RX ORDER — PHENTERMINE HYDROCHLORIDE 37.5 MG/1
37.5 TABLET ORAL DAILY
Qty: 90 TABLET | Refills: 0 | Status: SHIPPED | OUTPATIENT
Start: 2024-06-20

## 2024-06-20 NOTE — PROGRESS NOTES
SUBJECTIVE:      Patient ID: Nalini Wooten is a 69 y.o. female.    Chief Complaint: Follow-up (Medication Refills)    Former patient of Dr. Ferguson, known to this provider, presents to establish care    Follows with Dr. Pérez (allergy & immun), Dr. Yousif (ortho)    Discussed outstanding health maintenance     Hyperlipidemia  This is a chronic problem. The current episode started more than 1 year ago. Exacerbating diseases include obesity. Factors aggravating her hyperlipidemia include fatty foods. Associated symptoms include myalgias. Pertinent negatives include no chest pain or shortness of breath. Current antihyperlipidemic treatment includes statins. Compliance problems include adherence to diet and adherence to exercise.  Risk factors for coronary artery disease include family history, dyslipidemia, obesity, post-menopausal and a sedentary lifestyle.   Thyroid Problem  Presents for follow-up visit. Symptoms include dry skin, fatigue and weight gain. Patient reports no anxiety, cold intolerance, constipation, diarrhea, heat intolerance or palpitations. The symptoms have been stable. Her past medical history is significant for hyperlipidemia.   Female  Problem  Primary symptoms comment: OAB. This is a chronic problem. The current episode started more than 1 year ago. The problem occurs daily. The patient is experiencing no pain. Pertinent negatives include no abdominal pain, back pain, constipation, diarrhea, dysuria, flank pain, frequency, hematuria, nausea, sore throat, urgency or vomiting. The symptoms are aggravated by heavy lifting (coughing, sneezing). Treatments tried: detrol. The treatment provided moderate relief.       Past Surgical History:   Procedure Laterality Date    ARTHROSCOPIC REPAIR OF ROTATOR CUFF OF SHOULDER Right 3/10/2021    Procedure: REPAIR, ROTATOR CUFF, ARTHROSCOPIC;  Surgeon: Dustin Vanegas MD;  Location: [facility];  Service: Orthopedics;  Laterality: Right;  arthrex notified     ARTHROSCOPY OF SHOULDER WITH DECOMPRESSION OF SUBACROMIAL SPACE Right 3/10/2021    Procedure: ARTHROSCOPY, SHOULDER, WITH SUBACROMIAL SPACE DECOMPRESSION;  Surgeon: Dustin Vanegas MD;  Location: [facility] OR;  Service: Orthopedics;  Laterality: Right;    BLADDER SUSPENSION  1990    CATARACT EXTRACTION W/  INTRAOCULAR LENS IMPLANT Right 6/21/2023    Procedure: CEIOL OD;  Surgeon: Shawn Shearer MD;  Location: [facility] OR;  Service: Ophthalmology;  Laterality: Right;    CATARACT EXTRACTION W/  INTRAOCULAR LENS IMPLANT Left 7/19/2023    Procedure: CEIOL OS;  Surgeon: Shawn Shearer MD;  Location: [facility] OR;  Service: Ophthalmology;  Laterality: Left;    DISTAL CLAVICLE EXCISION Right 3/10/2021    Procedure: EXCISION, CLAVICLE, DISTAL;  Surgeon: Dustin Vanegas MD;  Location: [facility] OR;  Service: Orthopedics;  Laterality: Right;    FOOT SURGERY Bilateral 2001    FOOT SURGERY Right 05/13/2022    HAND SURGERY Left 2017    HYSTERECTOMY  1990    INJECTION OF ANESTHETIC AGENT AROUND MEDIAL BRANCH NERVES INNERVATING LUMBAR FACET JOINT Bilateral 5/22/2024    Procedure: Block-nerve-medial branch-lumbar     L4/5, L5/S1;  Surgeon: Angel Yousif MD;  Location: [facility] OR;  Service: Pain Management;  Laterality: Bilateral;    JOINT REPLACEMENT  2013    KNEE SURGERY Bilateral 2013    MANDIBLE FRACTURE SURGERY  1981    SHOULDER ARTHROSCOPY Left 02/27/2019    THYROIDECTOMY Left 05/13/2019        TONSILLECTOMY       Family History   Problem Relation Name Age of Onset    Arthritis Mother      Aneurysm Mother          Abdominal Aneurysm     Heart disease Father  65    Arthritis Father      Diabetes Father      Hearing loss Father      Hypertension Father      Dementia Father      No Known Problems Sister      No Known Problems Sister      Heart disease Brother  50 - 59        stent placement    Heart disease Brother  49        stent placement    Aneurysm Brother          Abdominal Aneurysm    Leukemia Son 3     Crohn's disease Son 3      Ankylosing spondylitis Son 3     Other Son 3         avascular necrosis of pancho hips    Rheumatologic disease Son 3     Heart defect Son 3     Kidney failure Son 3     SIDS Maternal Aunt 2     Uterine cancer Maternal Aunt 2     Dementia Paternal Aunt 1     Diabetes Paternal Grandmother      Heart disease Paternal Grandfather        Social History     Socioeconomic History    Marital status:     Number of children: 3   Tobacco Use    Smoking status: Never    Smokeless tobacco: Never   Substance and Sexual Activity    Alcohol use: Never     Alcohol/week: 0.0 standard drinks of alcohol    Drug use: No    Sexual activity: Yes     Birth control/protection: None     Social Determinants of Health     Financial Resource Strain: Low Risk  (6/7/2023)    Overall Financial Resource Strain (CARDIA)     Difficulty of Paying Living Expenses: Not hard at all   Food Insecurity: No Food Insecurity (6/7/2023)    Hunger Vital Sign     Worried About Running Out of Food in the Last Year: Never true     Ran Out of Food in the Last Year: Never true   Transportation Needs: No Transportation Needs (6/7/2023)    PRAPARE - Transportation     Lack of Transportation (Medical): No     Lack of Transportation (Non-Medical): No   Physical Activity: Unknown (6/7/2023)    Exercise Vital Sign     Days of Exercise per Week: 5 days   Stress: No Stress Concern Present (6/7/2023)    [employer] - Occupational Stress Questionnaire     Feeling of Stress : Not at all   Housing Stability: Low Risk  (6/7/2023)    Housing Stability Vital Sign     Unable to Pay for Housing in the Last Year: No     Number of Places Lived in the Last Year: 1     Unstable Housing in the Last Year: No     Current Outpatient Medications   Medication Sig Dispense Refill    cyanocobalamin 1,000 mcg/mL injection Inject every week 1 cc 10 mL 1    pantoprazole (PROTONIX) 40 MG tablet Take 40 mg by mouth every morning.      fexofenadine (ALLEGRA) 180 MG  tablet Take 1 tablet (180 mg total) by mouth once daily. 90 tablet 1    metFORMIN (GLUCOPHAGE) 500 MG tablet Take 1 tablet (500 mg total) by mouth 2 (two) times daily with meals. 180 tablet 2    phentermine (ADIPEX-P) 37.5 mg tablet Take 1 tablet (37.5 mg total) by mouth once daily. 90 tablet 0    rosuvastatin (CRESTOR) 40 MG Tab Take 1 tablet (40 mg total) by mouth once daily. 90 tablet 1    tolterodine (DETROL LA) 4 MG 24 hr capsule Take 1 capsule (4 mg total) by mouth once daily. 90 capsule 1    traMADoL (ULTRAM) 50 mg tablet Take 1 tablet (50 mg total) by mouth every 12 (twelve) hours as needed for Pain. 30 tablet 0     No current facility-administered medications for this visit.     Review of patient's allergies indicates:   Allergen Reactions    Adhesive Swelling and Blisters     Adhesive that requires uv light    Estrogens Other (See Comments)     Mini stroke    Tetanus vaccines and toxoid Swelling and Other (See Comments)     Swelling at injection site, fever    Shingrix (pf)  [[id number] (pf)] Diarrhea, Itching and Nausea And Vomiting      Past Medical History:   Diagnosis Date    Allergies     Bulging disc     lower back     Carpal tunnel syndrome     Degenerative disc disease     High cholesterol     Pinched nerve in neck      Past Surgical History:   Procedure Laterality Date    ARTHROSCOPIC REPAIR OF ROTATOR CUFF OF SHOULDER Right 3/10/2021    Procedure: REPAIR, ROTATOR CUFF, ARTHROSCOPIC;  Surgeon: Dustin Vanegas MD;  Location: [facility] OR;  Service: Orthopedics;  Laterality: Right;  arthrex notified    ARTHROSCOPY OF SHOULDER WITH DECOMPRESSION OF SUBACROMIAL SPACE Right 3/10/2021    Procedure: ARTHROSCOPY, SHOULDER, WITH SUBACROMIAL SPACE DECOMPRESSION;  Surgeon: Dustin Vanegas MD;  Location: [facility] OR;  Service: Orthopedics;  Laterality: Right;    BLADDER SUSPENSION  1990    CATARACT EXTRACTION W/  INTRAOCULAR LENS IMPLANT Right 6/21/2023    Procedure: CEIOL OD;  Surgeon: Shawn Shearer MD;   Location: [facility] OR;  Service: Ophthalmology;  Laterality: Right;    CATARACT EXTRACTION W/  INTRAOCULAR LENS IMPLANT Left 7/19/2023    Procedure: CEIOL OS;  Surgeon: Shawn Shearer MD;  Location: [facility] OR;  Service: Ophthalmology;  Laterality: Left;    DISTAL CLAVICLE EXCISION Right 3/10/2021    Procedure: EXCISION, CLAVICLE, DISTAL;  Surgeon: Dustin Vanegas MD;  Location: [facility] OR;  Service: Orthopedics;  Laterality: Right;    FOOT SURGERY Bilateral 2001    FOOT SURGERY Right 05/13/2022    HAND SURGERY Left 2017    HYSTERECTOMY  1990    INJECTION OF ANESTHETIC AGENT AROUND MEDIAL BRANCH NERVES INNERVATING LUMBAR FACET JOINT Bilateral 5/22/2024    Procedure: Block-nerve-medial branch-lumbar     L4/5, L5/S1;  Surgeon: Angel Yousif MD;  Location: [facility] OR;  Service: Pain Management;  Laterality: Bilateral;    JOINT REPLACEMENT  2013    KNEE SURGERY Bilateral 2013    MANDIBLE FRACTURE SURGERY  1981    SHOULDER ARTHROSCOPY Left 02/27/2019    THYROIDECTOMY Left 05/13/2019        TONSILLECTOMY         Review of Systems   Constitutional:  Positive for fatigue and weight gain. Negative for activity change, appetite change and unexpected weight change.   HENT:  Negative for congestion, ear pain, hearing loss, postnasal drip, sinus pressure, sinus pain, sneezing and sore throat.    Eyes:  Negative for photophobia and pain.   Respiratory:  Negative for cough, chest tightness, shortness of breath and wheezing.    Cardiovascular:  Negative for chest pain, palpitations and leg swelling.   Gastrointestinal:  Negative for abdominal distention, abdominal pain, blood in stool, constipation, diarrhea, nausea and vomiting.   Endocrine: Negative for cold intolerance, heat intolerance, polydipsia and polyuria.   Genitourinary:  Negative for difficulty urinating, dysuria, flank pain, frequency, hematuria and urgency.   Musculoskeletal:  Positive for arthralgias and myalgias. Negative for back pain, joint swelling and neck  "pain.   Skin:  Negative for pallor.   Allergic/Immunologic: Positive for environmental allergies. Negative for food allergies.   Neurological:  Negative for dizziness, weakness, light-headedness and numbness.   Hematological:  Does not bruise/bleed easily.   Psychiatric/Behavioral:  Negative for agitation, confusion, decreased concentration and sleep disturbance. The patient is not nervous/anxious.       OBJECTIVE:      Vitals:    06/19/24 1637   BP: 136/74   BP Location: Right arm   Patient Position: Sitting   BP Method: Large (Manual)   Pulse: 75   SpO2: 96%   Weight: 91 kg (200 lb 11.2 oz)   Height: 5' 3" (1.6 m)     Physical Exam  Vitals and nursing note reviewed.   Constitutional:       General: She is not in acute distress.     Appearance: Normal appearance. She is well-developed. She is obese.   HENT:      Head: Normocephalic and atraumatic.      Right Ear: Hearing normal.      Left Ear: Hearing normal.      Nose: Nose normal. No rhinorrhea.   Eyes:      General: Lids are normal.         Right eye: No discharge.         Left eye: No discharge.      Conjunctiva/sclera: Conjunctivae normal.      Right eye: Right conjunctiva is not injected.      Left eye: Left conjunctiva is not injected.      Pupils: Pupils are equal, round, and reactive to light. Pupils are equal.      Right eye: Pupil is round and reactive.      Left eye: Pupil is round and reactive.   Neck:      Thyroid: No thyromegaly.      Vascular: No JVD.      Trachea: Trachea normal. No tracheal deviation.   Cardiovascular:      Rate and Rhythm: Normal rate and regular rhythm.      Pulses:           Radial pulses are 2+ on the right side and 2+ on the left side.        Posterior tibial pulses are 2+ on the right side and 2+ on the left side.      Heart sounds: Normal heart sounds. No murmur heard.     No friction rub. No gallop.   Pulmonary:      Effort: Pulmonary effort is normal. No respiratory distress.      Breath sounds: Normal breath sounds. No " stridor. No decreased breath sounds, wheezing, rhonchi or rales.   Abdominal:      General: Bowel sounds are normal. There is no distension.      Palpations: Abdomen is soft. Abdomen is not rigid.      Tenderness: There is no abdominal tenderness. There is no guarding.   Musculoskeletal:         General: Normal range of motion.      Cervical back: Normal range of motion and neck supple.   Lymphadenopathy:      Cervical: No cervical adenopathy.   Skin:     General: Skin is warm and dry.      Capillary Refill: Capillary refill takes less than 2 seconds.      Coloration: Skin is not pale.      Findings: No lesion or rash.   Neurological:      Mental Status: She is alert and oriented to person, place, and time.      GCS: GCS eye subscore is 4. GCS verbal subscore is 5. GCS motor subscore is 6.      Cranial Nerves: Cranial nerves 2-12 are intact.      Sensory: Sensation is intact.      Motor: Motor function is intact. No atrophy.      Coordination: Coordination is intact. Coordination normal.      Gait: Gait is intact. Gait normal.   Psychiatric:         Attention and Perception: Attention and perception normal. She is attentive.         Mood and Affect: Mood and affect normal.         Speech: Speech normal.         Behavior: Behavior normal.         Thought Content: Thought content normal.         Cognition and Memory: Cognition and memory normal.         Judgment: Judgment normal.        Assessment:       1. Dyslipidemia    2. Acquired hypothyroidism    3. Hyperglycemia    4. OAB (overactive bladder)    5. Seasonal allergies        Plan:       Dyslipidemia  -     CBC Auto Differential; Future; Expected date: 06/26/2024  -     Comprehensive Metabolic Panel; Future; Expected date: 06/19/2024  -     Lipid Panel; Future; Expected date: 06/19/2024  -     rosuvastatin (CRESTOR) 40 MG Tab; Take 1 tablet (40 mg total) by mouth once daily.  Dispense: 90 tablet; Refill: 1    Acquired hypothyroidism  -     TSH; Future; Expected  date: 06/19/2024  -     T4, Free; Future; Expected date: 06/19/2024  -     US Thyroid; Future; Expected date: 06/19/2024    Hyperglycemia  -     Comprehensive Metabolic Panel; Future; Expected date: 06/19/2024  -     Hemoglobin A1C; Future; Expected date: 06/19/2024  -     metFORMIN (GLUCOPHAGE) 500 MG tablet; Take 1 tablet (500 mg total) by mouth 2 (two) times daily with meals.  Dispense: 180 tablet; Refill: 2    OAB (overactive bladder)  -     tolterodine (DETROL LA) 4 MG 24 hr capsule; Take 1 capsule (4 mg total) by mouth once daily.  Dispense: 90 capsule; Refill: 1    Seasonal allergies  -     fexofenadine (ALLEGRA) 180 MG tablet; Take 1 tablet (180 mg total) by mouth once daily.  Dispense: 90 tablet; Refill: 1            Follow up in about 6 months (around 12/19/2024) for lab review.      6/20/2024 CHICO Blackwood, FNP-C

## 2024-06-20 NOTE — TELEPHONE ENCOUNTER
Physician - Dr Yousif    Type of Procedure/Injection - Lumbar Medial Branch Block  L4/5 and L5/S1           Laterality - NA      Anxiolysis- RNIV      Need to hold medication - No      Clearance needed - No      Follow up - phone call next day

## 2024-06-20 NOTE — PROGRESS NOTES
[facility] Follow Up Evaluation      Referred by: No ref. provider found    PCP:     CC:   Chief Complaint   Patient presents with    Low-back Pain     Bilateral low back more so on the left side           6/20/2024     1:18 PM 4/22/2024     3:41 PM   Last 3 PDI Scores   Pain Disability Index (PDI) 38 26       Interval HPI 6/20/24: Ms. Wooten returns to the office for follow up.  Reports he continues to have her typical axial lower back pain.  Today her lower back pain is constant, aching, 6/10.  She denies any radicular pain, numbness, weakness.    HPI:   Nalini Wooten is a 69 y.o. female patient who has a past medical history of Allergies, Bulging disc, Carpal tunnel syndrome, Degenerative disc disease, High cholesterol, and Pinched nerve in neck. She presents with back pain.  Had chronic back pain for many years that has been gradually worsening.  Today she reports her pain is 9 in 10, intermittent, aching.  Can get referred pain into the bilateral buttocks.  Pain is worse with sitting, standing, bending, coughing, walking, lifting and relieved with rest and injections.  She has had injections for 5 years ago with excellent relief of previous pain.  She denies any numbness or weakness.      Pain Intervention History:  - s/p 1st diagnostic bilateral L4-5 and L5-S1 medial branch blocks on 05/22/2024 and reports she got 85% relief lasting for 8 hours.  Preprocedure pain score 7.  Postprocedure pain score 1    Past Spine Surgical History:      Past and current medications:  Antineuropathics:  NSAIDs:  Physical therapy: yes, completed   Antidepressants:  Muscle relaxers:  Opioids: tramadol   Antiplatelets/Anticoagulants:     History:    Current Outpatient Medications:     cyanocobalamin 1,000 mcg/mL injection, Inject every week 1 cc, Disp: 10 mL, Rfl: 1    fexofenadine (ALLEGRA) 180 MG tablet, Take 1 tablet (180 mg total) by mouth once daily., Disp: 90 tablet, Rfl: 1    metFORMIN (GLUCOPHAGE) 500 MG  tablet, Take 1 tablet (500 mg total) by mouth 2 (two) times daily with meals., Disp: 180 tablet, Rfl: 2    pantoprazole (PROTONIX) 40 MG tablet, Take 40 mg by mouth every morning., Disp: , Rfl:     phentermine (ADIPEX-P) 37.5 mg tablet, Take 37.5 mg by mouth., Disp: , Rfl:     rosuvastatin (CRESTOR) 40 MG Tab, Take 1 tablet (40 mg total) by mouth once daily., Disp: 90 tablet, Rfl: 1    tolterodine (DETROL LA) 4 MG 24 hr capsule, Take 1 capsule (4 mg total) by mouth once daily., Disp: 90 capsule, Rfl: 1    traMADoL (ULTRAM) 50 mg tablet, Take 1 tablet (50 mg total) by mouth every 12 (twelve) hours as needed for Pain., Disp: 30 tablet, Rfl: 0    Past Medical History:   Diagnosis Date    Allergies     Bulging disc     lower back     Carpal tunnel syndrome     Degenerative disc disease     High cholesterol     Pinched nerve in neck        Past Surgical History:   Procedure Laterality Date    ARTHROSCOPIC REPAIR OF ROTATOR CUFF OF SHOULDER Right 3/10/2021    Procedure: REPAIR, ROTATOR CUFF, ARTHROSCOPIC;  Surgeon: Dustin Vanegas MD;  Location: [facility] OR;  Service: Orthopedics;  Laterality: Right;  arthrex notified    ARTHROSCOPY OF SHOULDER WITH DECOMPRESSION OF SUBACROMIAL SPACE Right 3/10/2021    Procedure: ARTHROSCOPY, SHOULDER, WITH SUBACROMIAL SPACE DECOMPRESSION;  Surgeon: Dustin Vanegas MD;  Location: [facility] OR;  Service: Orthopedics;  Laterality: Right;    BLADDER SUSPENSION  1990    CATARACT EXTRACTION W/  INTRAOCULAR LENS IMPLANT Right 6/21/2023    Procedure: CEIOL OD;  Surgeon: Shawn Shearer MD;  Location: [facility] OR;  Service: Ophthalmology;  Laterality: Right;    CATARACT EXTRACTION W/  INTRAOCULAR LENS IMPLANT Left 7/19/2023    Procedure: CEIOL OS;  Surgeon: Shawn Shearer MD;  Location: [facility] OR;  Service: Ophthalmology;  Laterality: Left;    DISTAL CLAVICLE EXCISION Right 3/10/2021    Procedure: EXCISION, CLAVICLE, DISTAL;  Surgeon: Dustin Vanegas MD;  Location: [facility] OR;  Service: Orthopedics;  Laterality:  Right;    FOOT SURGERY Bilateral 2001    FOOT SURGERY Right 05/13/2022    HAND SURGERY Left 2017    HYSTERECTOMY  1990    INJECTION OF ANESTHETIC AGENT AROUND MEDIAL BRANCH NERVES INNERVATING LUMBAR FACET JOINT Bilateral 5/22/2024    Procedure: Block-nerve-medial branch-lumbar     L4/5, L5/S1;  Surgeon: Angel Yousif MD;  Location: [facility] OR;  Service: Pain Management;  Laterality: Bilateral;    JOINT REPLACEMENT  2013    KNEE SURGERY Bilateral 2013    MANDIBLE FRACTURE SURGERY  1981    SHOULDER ARTHROSCOPY Left 02/27/2019    THYROIDECTOMY Left 05/13/2019        TONSILLECTOMY         Family History   Problem Relation Name Age of Onset    Arthritis Mother      Aneurysm Mother          Abdominal Aneurysm     Heart disease Father  65    Arthritis Father      Diabetes Father      Hearing loss Father      Hypertension Father      Dementia Father      No Known Problems Sister      No Known Problems Sister      Heart disease Brother  50 - 59        stent placement    Heart disease Brother  49        stent placement    Aneurysm Brother          Abdominal Aneurysm    Leukemia Son 3     Crohn's disease Son 3     Ankylosing spondylitis Son 3     Other Son 3         avascular necrosis of pancho hips    Rheumatologic disease Son 3     Heart defect Son 3     Kidney failure Son 3     SIDS Maternal Aunt 2     Uterine cancer Maternal Aunt 2     Dementia Paternal Aunt 1     Diabetes Paternal Grandmother      Heart disease Paternal Grandfather         Social History     Socioeconomic History    Marital status:     Number of children: 3   Tobacco Use    Smoking status: Never    Smokeless tobacco: Never   Substance and Sexual Activity    Alcohol use: Never     Alcohol/week: 0.0 standard drinks of alcohol    Drug use: No    Sexual activity: Yes     Birth control/protection: None     Social Determinants of Health     Financial Resource Strain: Low Risk  (6/7/2023)    Overall Financial Resource Strain (CARDIA)      "Difficulty of Paying Living Expenses: Not hard at all   Food Insecurity: No Food Insecurity (6/7/2023)    Hunger Vital Sign     Worried About Running Out of Food in the Last Year: Never true     Ran Out of Food in the Last Year: Never true   Transportation Needs: No Transportation Needs (6/7/2023)    PRAPARE - Transportation     Lack of Transportation (Medical): No     Lack of Transportation (Non-Medical): No   Physical Activity: Unknown (6/7/2023)    Exercise Vital Sign     Days of Exercise per Week: 5 days   Stress: No Stress Concern Present (6/7/2023)    [employer] [place] [employer] - Occupational Stress Questionnaire     Feeling of Stress : Not at all   Housing Stability: Low Risk  (6/7/2023)    Housing Stability Vital Sign     Unable to Pay for Housing in the Last Year: No     Number of Places Lived in the Last Year: 1     Unstable Housing in the Last Year: No       Review of patient's allergies indicates:   Allergen Reactions    Adhesive Swelling and Blisters     Adhesive that requires uv light    Estrogens Other (See Comments)     Mini stroke    Tetanus vaccines and toxoid Swelling and Other (See Comments)     Swelling at injection site, fever    Shingrix (pf)  [varicella-zoster [id number] (pf)] Diarrhea, Itching and Nausea And Vomiting       Review of Systems:  Positive for headaches, weight gain, difficulty sleeping, urinary frequency.  Balance of review of systems is negative.    Physical Exam:  Vitals:    06/20/24 1317   BP: (!) 145/82   Pulse: 68   Weight: 90.4 kg (199 lb 4.7 oz)   Height: 5' 3" (1.6 m)   PainSc:   7   PainLoc: Back     Body mass index is 35.3 kg/m².    Gen: NAD  Psych: mood appropriate for given condition  HEENT: eyes anicteric   CV: RRR  HEENT: anicteric   Respiratory: non-labored, no signs of respiratory distress  Abd: non-distended  Skin: warm, dry and intact.  Gait: No antalgic gait.     Reproducible pain with lumbar axial facet loading    Sensory:  Intact and symmetrical " to light touch in L1-S1 dermatomes bilaterally.    Motor:     Right Left   L2/3 Iliacus Hip flexion  5  5   L3/4 Qudratus Femoris Knee Extension  5  5   L4/5 Tib Anterior Ankle Dorsiflexion   5  5   L5/S1 Extensor Hallicus Longus Great toe extension  5  5   S1/S2 Gastroc/Soleus Plantar Flexion  5  5      Right Left                  Patellar DTR 0 0   Achilles DTR 0 0                      Labs:  Lab Results   Component Value Date    HGBA1C 6.1 06/28/2022    HGBA1C 6.1 06/28/2022       Lab Results   Component Value Date    WBC 7.26 01/26/2024    HGB 13.0 01/26/2024    HCT 42.4 01/26/2024    MCV 82 01/26/2024     01/26/2024         Imaging:  MRI lumbar spine 4/30/24  FINDINGS:  Vertebral column: There is degenerative change which will be described by level.  The lumbar vertebral bodies maintain normal height.  There is no fracture.  There is trace anterolisthesis of L4 on L5.  There is stable trace retrolisthesis of L2 on L3, L3 on L4.  The lumbar discs are desiccated.  There is mild-to-moderate disc space narrowing at the L2-3 level with mild disc space narrowing at the L3-4 level.  Baseline marrow signal is normal.     Spinal canal, conus, epidural space: The spinal canal is developmentally normal.  The conus terminates at the level of T12-L1 and is normal in contour and signal intensity.  There is no abnormal epidural mass or fluid collection.     Findings by level:     On the sagittal images, there is a minimal disc bulge at the T11-12 level but there is no spinal stenosis or cord compression.  T12-L1: There is a minimal disc bulge.  There is no spinal canal or significant foraminal stenosis.  There is a small perineural cyst in the superior left foraminal recess.  L1-2: There is mild facet joint arthropathy and a minimal disc bulge.  There is no spinal canal or significant foraminal stenosis.  L2-3: There is trace retrolisthesis of L2 on L3.  There is mild-to-moderate disc space narrowing.  There is a  diffuse disc bulge with osteophytic ridging eccentric to the left.  There is mild facet joint arthropathy with ligamentum flavum thickening.  There is only borderline spinal stenosis.  There is crowding of the left lateral recess.  There is mild-to-moderate left and mild right foraminal stenosis.  L3-4: There is trace retrolisthesis of L3 on L4 where there is also mild disc space narrowing and inferior unroofing of a diffuse disc bulge with mild osteophytic ridging eccentric to the left.  There is only mild facet joint arthropathy.  There is no spinal stenosis.  Again there is crowding of the left lateral recess.  There is mild-to-moderate left foraminal stenosis.  L4-5: There is subtle trace anterolisthesis of L4 on L5.  There is moderate facet joint arthropathy with ligamentum flavum thickening.  There is a mild disc bulge.  There is flattening of the ventral dural sac.  There is borderline to mild spinal stenosis.  There is mild bilateral foraminal stenosis.  L5-S1: There is facet joint arthropathy.  There is no spinal canal or significant foraminal stenosis.     Soft tissues, other: The posterior spinous muscles are somewhat atrophic.  The prevertebral soft tissues are normal.  The aorta is ectatic measuring up approximately 3 cm at the level of L3-4.  There is no hydronephrosis.    Assessment:   Problem List Items Addressed This Visit          Neuro    DDD (degenerative disc disease), lumbar     Other Visit Diagnoses       Lumbar spondylosis    -  Primary    Relevant Medications    traMADoL (ULTRAM) 50 mg tablet    Chronic midline low back pain without sciatica                    Nalini Wooten is a 69 y.o. female patient who has a past medical history of Allergies, Bulging disc, Carpal tunnel syndrome, Degenerative disc disease, High cholesterol, and Pinched nerve in neck. She presents with back pain.  Had chronic back pain for many years that has been gradually worsening.  Today she reports her pain is 9 in  10, intermittent, aching.      6/20/24 - Ms. Wooten returns to the office for follow up.  Reports he continues to have her typical axial lower back pain.  Today her lower back pain is constant, aching, 6/10.  She denies any radicular pain, numbness, weakness.    - on exam she has full strength in his lower extremities and intact sensation to light touch bilateral L2-S1.  She has reproducible pain with lumbar axial facet loading  - I independently reviewed her lumbar MRI and she has multilevel bilateral facet arthropathy  - over the past 12 months she is participate in formal physical therapy however she felt it made her pain worse.  She has not currently tolerating PT directed home exercises  - she is status post s/p 1st diagnostic bilateral L4-5 and L5-S1 medial branch blocks on 05/22/2024 and reports she got 85% relief lasting for 8 hours.  Preprocedure pain score 7.  Postprocedure pain score 1  - her pain is limiting her mobility and interfering with the quality of her life  - we will schedule for 2nd diagnostic bilateral L4-5 and L5-S1 medial branch blocks.  The risks and benefits of this intervention, and alternative therapies were discussed with the patient.  The discussion of risks included infection, bleeding, need for additional procedures or surgery, nerve damage.  Questions regarding the procedure, risks, expected outcome, and possible side effects were solicited and answered to the patient's satisfaction.  Nalini Wooten wishes to proceed with the injection or procedure.  Written consent was obtained.  - she uses tramadol on occasion on an as needed basis for severe breakthrough pain.  I have prescribed her some tramadol to use 1 or 2 times a day as needed while we continue treatment for axial lower back      : Not applicable    Angel Yousif M.D.  Interventional Pain Medicine / Anesthesiology    This note was completed with dictation software and grammatical errors may exist.

## 2024-06-21 ENCOUNTER — HOSPITAL ENCOUNTER (OUTPATIENT)
Dept: RADIOLOGY | Facility: HOSPITAL | Age: 69
Discharge: HOME | End: 2024-06-21
Attending: INTERNAL MEDICINE
Payer: MEDICARE

## 2024-06-21 ENCOUNTER — HOSPITAL ENCOUNTER (OUTPATIENT)
Dept: RADIOLOGY | Facility: HOSPITAL | Age: 69
Discharge: HOME | End: 2024-06-21
Attending: NURSE PRACTITIONER
Payer: MEDICARE

## 2024-06-21 DIAGNOSIS — Z78.0 MENOPAUSE: ICD-10-CM

## 2024-06-21 DIAGNOSIS — Z78.0 ENCOUNTER FOR OSTEOPOROSIS SCREENING IN ASYMPTOMATIC POSTMENOPAUSAL PATIENT: ICD-10-CM

## 2024-06-21 DIAGNOSIS — M47.816 LUMBAR SPONDYLOSIS: Primary | ICD-10-CM

## 2024-06-21 DIAGNOSIS — Z13.820 ENCOUNTER FOR OSTEOPOROSIS SCREENING IN ASYMPTOMATIC POSTMENOPAUSAL PATIENT: ICD-10-CM

## 2024-06-21 DIAGNOSIS — Z12.31 ENCOUNTER FOR SCREENING MAMMOGRAM FOR BREAST CANCER: ICD-10-CM

## 2024-06-21 DIAGNOSIS — E03.9 ACQUIRED HYPOTHYROIDISM: ICD-10-CM

## 2024-06-21 PROCEDURE — 77080 DXA BONE DENSITY AXIAL: CPT | Mod: TC,PO

## 2024-06-21 PROCEDURE — 77067 SCR MAMMO BI INCL CAD: CPT | Mod: 26,,, | Performed by: RADIOLOGY

## 2024-06-21 PROCEDURE — 77067 SCR MAMMO BI INCL CAD: CPT | Mod: TC,PO

## 2024-06-21 PROCEDURE — 77080 DXA BONE DENSITY AXIAL: CPT | Mod: 26,,, | Performed by: RADIOLOGY

## 2024-06-21 PROCEDURE — 76536 US EXAM OF HEAD AND NECK: CPT | Mod: 26,,, | Performed by: RADIOLOGY

## 2024-06-21 PROCEDURE — 76536 US EXAM OF HEAD AND NECK: CPT | Mod: TC,PO

## 2024-06-21 PROCEDURE — 77063 BREAST TOMOSYNTHESIS BI: CPT | Mod: 26,,, | Performed by: RADIOLOGY

## 2024-06-21 RX ORDER — SODIUM CHLORIDE, SODIUM LACTATE, POTASSIUM CHLORIDE, CALCIUM CHLORIDE 600; 310; 30; 20 MG/100ML; MG/100ML; MG/100ML; MG/100ML
INJECTION, SOLUTION INTRAVENOUS CONTINUOUS
OUTPATIENT
Start: 2024-06-21

## 2024-06-21 NOTE — TELEPHONE ENCOUNTER
Spoke with pt, scheduled second diagnostic MBB with Dr. Yousif on 7/19/24 with RN IV sedation. Reviewed pre op instructions. Pain diary sent for day of procedure.

## 2024-06-25 ENCOUNTER — CLINICAL SUPPORT (OUTPATIENT)
Dept: ALLERGY | Facility: CLINIC | Age: 69
End: 2024-06-25
Payer: MEDICARE

## 2024-06-25 DIAGNOSIS — J30.9 CHRONIC ALLERGIC RHINITIS: Primary | ICD-10-CM

## 2024-06-25 PROCEDURE — 95117 IMMUNOTHERAPY INJECTIONS: CPT | Mod: S$GLB,,, | Performed by: STUDENT IN AN ORGANIZED HEALTH CARE EDUCATION/TRAINING PROGRAM

## 2024-07-09 ENCOUNTER — CLINICAL SUPPORT (OUTPATIENT)
Dept: ALLERGY | Facility: CLINIC | Age: 69
End: 2024-07-09
Payer: MEDICARE

## 2024-07-09 DIAGNOSIS — J30.9 CHRONIC ALLERGIC RHINITIS: Primary | ICD-10-CM

## 2024-07-09 PROCEDURE — 95117 IMMUNOTHERAPY INJECTIONS: CPT | Mod: S$GLB,,, | Performed by: STUDENT IN AN ORGANIZED HEALTH CARE EDUCATION/TRAINING PROGRAM

## 2024-07-16 ENCOUNTER — CLINICAL SUPPORT (OUTPATIENT)
Dept: ALLERGY | Facility: CLINIC | Age: 69
End: 2024-07-16
Payer: MEDICARE

## 2024-07-16 DIAGNOSIS — J30.9 CHRONIC ALLERGIC RHINITIS: Primary | ICD-10-CM

## 2024-07-16 PROCEDURE — 95117 IMMUNOTHERAPY INJECTIONS: CPT | Mod: S$GLB,,, | Performed by: STUDENT IN AN ORGANIZED HEALTH CARE EDUCATION/TRAINING PROGRAM

## 2024-07-18 ENCOUNTER — TELEPHONE (OUTPATIENT)
Dept: SURGERY | Facility: HOSPITAL | Age: 69
End: 2024-07-18
Payer: MEDICARE

## 2024-07-18 RX ORDER — LIDOCAINE HYDROCHLORIDE 20 MG/ML
2.5 SOLUTION OROPHARYNGEAL EVERY 6 HOURS
COMMUNITY

## 2024-07-18 RX ORDER — CHLORHEXIDINE GLUCONATE ORAL RINSE 1.2 MG/ML
15 SOLUTION DENTAL 2 TIMES DAILY
COMMUNITY

## 2024-07-18 NOTE — TELEPHONE ENCOUNTER
"Pt is scheduled for Block-nerve-medial branch-lumbar Bilat L4/5 and L5/S1 - Bilateral on 7/19. Pt states she is taking Chlorhexidine oral rinse and lidocaine gel for a "bump" she had removed from her lip last week. Please reach out to pt if this is going to interfere with procedure. Thank you   "

## 2024-07-18 NOTE — TELEPHONE ENCOUNTER
"Pt is scheduled for Block-nerve-medial branch-lumbar Bilat L4/5 and L5/S1 - Bilateral on 7/19. Pt states she is taking Chlorhexidine oral rinse and lidocaine gel for a "bump" she had removed from her lip last week. Please reach out to pt if this is going to interfere with procedure. Thank you.  "

## 2024-07-19 ENCOUNTER — HOSPITAL ENCOUNTER (OUTPATIENT)
Dept: RADIOLOGY | Facility: HOSPITAL | Age: 69
Discharge: HOME | End: 2024-07-19
Attending: ANESTHESIOLOGY | Admitting: ANESTHESIOLOGY
Payer: MEDICARE

## 2024-07-19 ENCOUNTER — HOSPITAL ENCOUNTER (OUTPATIENT)
Facility: HOSPITAL | Age: 69
Discharge: HOME | End: 2024-07-19
Attending: ANESTHESIOLOGY | Admitting: ANESTHESIOLOGY
Payer: MEDICARE

## 2024-07-19 VITALS
RESPIRATION RATE: 18 BRPM | OXYGEN SATURATION: 99 % | HEIGHT: 63 IN | TEMPERATURE: 97 F | HEART RATE: 61 BPM | BODY MASS INDEX: 35.32 KG/M2 | WEIGHT: 199.31 LBS | DIASTOLIC BLOOD PRESSURE: 70 MMHG | SYSTOLIC BLOOD PRESSURE: 153 MMHG

## 2024-07-19 DIAGNOSIS — M47.816 LUMBAR SPONDYLOSIS: Primary | ICD-10-CM

## 2024-07-19 DIAGNOSIS — M54.50 LOWER BACK PAIN: ICD-10-CM

## 2024-07-19 LAB — GLUCOSE SERPL-MCNC: 115 MG/DL (ref 70–110)

## 2024-07-19 PROCEDURE — 76000 FLUOROSCOPY <1 HR PHYS/QHP: CPT | Mod: TC,PO

## 2024-07-19 PROCEDURE — 64494 INJ PARAVERT F JNT L/S 2 LEV: CPT | Mod: 50

## 2024-07-19 PROCEDURE — 63600175 PHARM REV CODE 636 W HCPCS: Mod: PO | Performed by: ANESTHESIOLOGY

## 2024-07-19 PROCEDURE — 25000003 PHARM REV CODE 250: Mod: PO | Performed by: ANESTHESIOLOGY

## 2024-07-19 PROCEDURE — 64493 INJ PARAVERT F JNT L/S 1 LEV: CPT | Mod: 50

## 2024-07-19 RX ORDER — LIDOCAINE HYDROCHLORIDE 10 MG/ML
INJECTION, SOLUTION EPIDURAL; INFILTRATION; INTRACAUDAL; PERINEURAL
Status: DISCONTINUED | OUTPATIENT
Start: 2024-07-19 | End: 2024-07-19 | Stop reason: HOSPADM

## 2024-07-19 RX ORDER — BUPIVACAINE HYDROCHLORIDE 2.5 MG/ML
INJECTION, SOLUTION EPIDURAL; INFILTRATION; INTRACAUDAL
Status: DISCONTINUED | OUTPATIENT
Start: 2024-07-19 | End: 2024-07-19 | Stop reason: HOSPADM

## 2024-07-19 RX ORDER — SODIUM CHLORIDE, SODIUM LACTATE, POTASSIUM CHLORIDE, CALCIUM CHLORIDE 600; 310; 30; 20 MG/100ML; MG/100ML; MG/100ML; MG/100ML
INJECTION, SOLUTION INTRAVENOUS CONTINUOUS
Status: DISCONTINUED | OUTPATIENT
Start: 2024-07-19 | End: 2024-07-19 | Stop reason: HOSPADM

## 2024-07-19 RX ORDER — MIDAZOLAM HYDROCHLORIDE 1 MG/ML
INJECTION INTRAMUSCULAR; INTRAVENOUS
Status: DISCONTINUED | OUTPATIENT
Start: 2024-07-19 | End: 2024-07-19 | Stop reason: HOSPADM

## 2024-07-19 RX ADMIN — SODIUM CHLORIDE, POTASSIUM CHLORIDE, SODIUM LACTATE AND CALCIUM CHLORIDE: 600; 310; 30; 20 INJECTION, SOLUTION INTRAVENOUS at 09:07

## 2024-07-23 ENCOUNTER — CLINICAL SUPPORT (OUTPATIENT)
Dept: ALLERGY | Facility: CLINIC | Age: 69
End: 2024-07-23
Payer: MEDICARE

## 2024-07-23 ENCOUNTER — TELEPHONE (OUTPATIENT)
Dept: PAIN MEDICINE | Facility: CLINIC | Age: 69
End: 2024-07-23
Payer: MEDICARE

## 2024-07-23 DIAGNOSIS — J30.9 CHRONIC ALLERGIC RHINITIS: Primary | ICD-10-CM

## 2024-07-23 PROCEDURE — 99999 PR PBB SHADOW E&M-EST. PATIENT-LVL I: CPT | Mod: PBBFAC,,,

## 2024-07-23 PROCEDURE — 95117 IMMUNOTHERAPY INJECTIONS: CPT | Mod: S$GLB,,, | Performed by: STUDENT IN AN ORGANIZED HEALTH CARE EDUCATION/TRAINING PROGRAM

## 2024-07-23 NOTE — TELEPHONE ENCOUNTER
Regarding your Lumbar Medial Branch Block , For Date of Service:  07.19.24    Please answer the following questions:    1. What percentage of pain relief did you receive following the block, from 0-100%? 80%    2. How many hours did pain relief last following the block?  7 hours    3. During this time please describe in detail the activities you were able to do? Cooking, raked the yard, vacuumed the house, and was able to sit on the floor.    4. Pain score from 0-10 pre procedure - 8     5. Pain score from 0-10 post procedure - 1

## 2024-07-23 NOTE — TELEPHONE ENCOUNTER
She responded appropriately to the diagnostic medial branch block.  Please schedule her for the 2nd block.

## 2024-07-25 ENCOUNTER — PATIENT MESSAGE (OUTPATIENT)
Dept: PAIN MEDICINE | Facility: CLINIC | Age: 69
End: 2024-07-25
Payer: MEDICARE

## 2024-07-30 ENCOUNTER — CLINICAL SUPPORT (OUTPATIENT)
Dept: ALLERGY | Facility: CLINIC | Age: 69
End: 2024-07-30
Payer: MEDICARE

## 2024-07-30 ENCOUNTER — TELEPHONE (OUTPATIENT)
Dept: PAIN MEDICINE | Facility: CLINIC | Age: 69
End: 2024-07-30
Payer: MEDICARE

## 2024-07-30 DIAGNOSIS — J30.9 CHRONIC ALLERGIC RHINITIS: Primary | ICD-10-CM

## 2024-07-30 PROCEDURE — 99999 PR PBB SHADOW E&M-EST. PATIENT-LVL I: CPT | Mod: PBBFAC,,,

## 2024-07-30 PROCEDURE — 95117 IMMUNOTHERAPY INJECTIONS: CPT | Mod: S$GLB,,, | Performed by: STUDENT IN AN ORGANIZED HEALTH CARE EDUCATION/TRAINING PROGRAM

## 2024-07-30 NOTE — TELEPHONE ENCOUNTER
----- Message from Andreea Riddle sent at 7/30/2024  2:47 PM CDT -----  Regarding: advise  Contact: pt  Type: Needs Medical Advice  Who Called:  patient  Symptoms (please be specific):   How long has patient had these symptoms:    Pharmacy name and phone #:    Best Call Back Number: [phone]    Additional Information: call time and instruction needed for procedure 8/8/24 so she can make arrangements call back

## 2024-08-05 ENCOUNTER — OFFICE VISIT (OUTPATIENT)
Dept: DERMATOLOGY | Facility: CLINIC | Age: 69
End: 2024-08-05
Payer: MEDICARE

## 2024-08-05 DIAGNOSIS — L23.9 ALLERGIC CONTACT DERMATITIS, UNSPECIFIED TRIGGER: Primary | ICD-10-CM

## 2024-08-05 PROCEDURE — 99999 PR PBB SHADOW E&M-EST. PATIENT-LVL II: CPT | Mod: PBBFAC,,, | Performed by: STUDENT IN AN ORGANIZED HEALTH CARE EDUCATION/TRAINING PROGRAM

## 2024-08-05 PROCEDURE — 1101F PT FALLS ASSESS-DOCD LE1/YR: CPT | Mod: CPTII,S$GLB,, | Performed by: STUDENT IN AN ORGANIZED HEALTH CARE EDUCATION/TRAINING PROGRAM

## 2024-08-05 PROCEDURE — 99204 OFFICE O/P NEW MOD 45 MIN: CPT | Mod: S$GLB,,, | Performed by: STUDENT IN AN ORGANIZED HEALTH CARE EDUCATION/TRAINING PROGRAM

## 2024-08-05 PROCEDURE — 1160F RVW MEDS BY RX/DR IN RCRD: CPT | Mod: CPTII,S$GLB,, | Performed by: STUDENT IN AN ORGANIZED HEALTH CARE EDUCATION/TRAINING PROGRAM

## 2024-08-05 PROCEDURE — 1125F AMNT PAIN NOTED PAIN PRSNT: CPT | Mod: CPTII,S$GLB,, | Performed by: STUDENT IN AN ORGANIZED HEALTH CARE EDUCATION/TRAINING PROGRAM

## 2024-08-05 PROCEDURE — 3288F FALL RISK ASSESSMENT DOCD: CPT | Mod: CPTII,S$GLB,, | Performed by: STUDENT IN AN ORGANIZED HEALTH CARE EDUCATION/TRAINING PROGRAM

## 2024-08-05 PROCEDURE — 1159F MED LIST DOCD IN RCRD: CPT | Mod: CPTII,S$GLB,, | Performed by: STUDENT IN AN ORGANIZED HEALTH CARE EDUCATION/TRAINING PROGRAM

## 2024-08-05 RX ORDER — HYDROCORTISONE 25 MG/G
CREAM TOPICAL
Qty: 30 G | Refills: 2 | Status: SHIPPED | OUTPATIENT
Start: 2024-08-05

## 2024-08-06 ENCOUNTER — CLINICAL SUPPORT (OUTPATIENT)
Dept: ALLERGY | Facility: CLINIC | Age: 69
End: 2024-08-06
Payer: MEDICARE

## 2024-08-06 DIAGNOSIS — J30.9 CHRONIC ALLERGIC RHINITIS: Primary | ICD-10-CM

## 2024-08-06 PROCEDURE — 99999 PR PBB SHADOW E&M-EST. PATIENT-LVL I: CPT | Mod: PBBFAC,,,

## 2024-08-06 PROCEDURE — 95117 IMMUNOTHERAPY INJECTIONS: CPT | Mod: S$GLB,,, | Performed by: STUDENT IN AN ORGANIZED HEALTH CARE EDUCATION/TRAINING PROGRAM

## 2024-08-08 ENCOUNTER — HOSPITAL ENCOUNTER (OUTPATIENT)
Dept: RADIOLOGY | Facility: HOSPITAL | Age: 69
Discharge: HOME | End: 2024-08-08
Attending: ANESTHESIOLOGY | Admitting: ANESTHESIOLOGY
Payer: MEDICARE

## 2024-08-08 ENCOUNTER — HOSPITAL ENCOUNTER (OUTPATIENT)
Facility: HOSPITAL | Age: 69
Discharge: HOME | End: 2024-08-08
Attending: ANESTHESIOLOGY | Admitting: ANESTHESIOLOGY
Payer: MEDICARE

## 2024-08-08 DIAGNOSIS — M47.816 LUMBAR SPONDYLOSIS: Primary | ICD-10-CM

## 2024-08-08 DIAGNOSIS — E03.9 ACQUIRED HYPOTHYROIDISM: ICD-10-CM

## 2024-08-08 DIAGNOSIS — M54.50 LOWER BACK PAIN: ICD-10-CM

## 2024-08-08 LAB — GLUCOSE SERPL-MCNC: 121 MG/DL (ref 70–110)

## 2024-08-08 PROCEDURE — 64636 DESTROY L/S FACET JNT ADDL: CPT | Mod: 50,PO | Performed by: ANESTHESIOLOGY

## 2024-08-08 PROCEDURE — 99152 MOD SED SAME PHYS/QHP 5/>YRS: CPT | Mod: ,,, | Performed by: ANESTHESIOLOGY

## 2024-08-08 PROCEDURE — 64635 DESTROY LUMB/SAC FACET JNT: CPT | Mod: 50,PO | Performed by: ANESTHESIOLOGY

## 2024-08-08 PROCEDURE — 63600175 PHARM REV CODE 636 W HCPCS: Mod: PO | Performed by: ANESTHESIOLOGY

## 2024-08-08 PROCEDURE — 25000003 PHARM REV CODE 250: Mod: PO | Performed by: ANESTHESIOLOGY

## 2024-08-08 PROCEDURE — 64635 DESTROY LUMB/SAC FACET JNT: CPT | Mod: 50,,, | Performed by: ANESTHESIOLOGY

## 2024-08-08 PROCEDURE — 64636 DESTROY L/S FACET JNT ADDL: CPT | Mod: 50,,, | Performed by: ANESTHESIOLOGY

## 2024-08-08 RX ORDER — TRIAMCINOLONE ACETONIDE 40 MG/ML
INJECTION, SUSPENSION INTRA-ARTICULAR; INTRAMUSCULAR
Status: DISCONTINUED | OUTPATIENT
Start: 2024-08-08 | End: 2024-08-08 | Stop reason: HOSPADM

## 2024-08-08 RX ORDER — SODIUM CHLORIDE, SODIUM LACTATE, POTASSIUM CHLORIDE, CALCIUM CHLORIDE 600; 310; 30; 20 MG/100ML; MG/100ML; MG/100ML; MG/100ML
INJECTION, SOLUTION INTRAVENOUS CONTINUOUS
Status: DISCONTINUED | OUTPATIENT
Start: 2024-08-08 | End: 2024-08-08 | Stop reason: HOSPADM

## 2024-08-08 RX ORDER — MIDAZOLAM HYDROCHLORIDE 1 MG/ML
INJECTION INTRAMUSCULAR; INTRAVENOUS
Status: DISCONTINUED | OUTPATIENT
Start: 2024-08-08 | End: 2024-08-08 | Stop reason: HOSPADM

## 2024-08-08 RX ORDER — LIDOCAINE HYDROCHLORIDE 20 MG/ML
INJECTION, SOLUTION EPIDURAL; INFILTRATION; INTRACAUDAL; PERINEURAL
Status: DISCONTINUED | OUTPATIENT
Start: 2024-08-08 | End: 2024-08-08 | Stop reason: HOSPADM

## 2024-08-08 RX ORDER — LIDOCAINE HYDROCHLORIDE 10 MG/ML
INJECTION, SOLUTION EPIDURAL; INFILTRATION; INTRACAUDAL; PERINEURAL
Status: DISCONTINUED | OUTPATIENT
Start: 2024-08-08 | End: 2024-08-08 | Stop reason: HOSPADM

## 2024-08-08 RX ORDER — FENTANYL CITRATE 50 UG/ML
INJECTION, SOLUTION INTRAMUSCULAR; INTRAVENOUS
Status: DISCONTINUED | OUTPATIENT
Start: 2024-08-08 | End: 2024-08-08 | Stop reason: HOSPADM

## 2024-08-08 RX ORDER — BUPIVACAINE HYDROCHLORIDE 2.5 MG/ML
INJECTION, SOLUTION EPIDURAL; INFILTRATION; INTRACAUDAL
Status: DISCONTINUED | OUTPATIENT
Start: 2024-08-08 | End: 2024-08-08 | Stop reason: HOSPADM

## 2024-08-08 NOTE — OP NOTE
Procedure Note    Procedure Date: 8/8/2024    Procedure Performed:  Radiofrequency ablation of the L4/5 and L5/S1 medial branch nerves on the  bilateral side utilizing fluoroscopy    Indication: The patient has clinical and radiologic findings suggestive of facet mediated pain and is s/p 2nd diagnostic bilateral lumbar L4/5 and L5/S1 medial branch blocks with at least 80% relief of their axial back pain lasting the duration of the local anesthetic utilized.     Pre-op diagnosis: Lumbar Spondylosis    Post-op diagnosis: same    Physician: Angel Yousif MD    IV Sedation medications: Moderate sedation was achieved with midazolam 2 mg and fentanyl 75 mcg.  Continuous monitoring of EKG, blood pressure and pulse oximetry was provided by a registered nurse during the entire course of the procedure under my supervision and recorded in the patient's medical record.   Total time for sedation was 20 minutes.    Medications injected:  Pre-lesion, 1ml of 2% lidocaine at each level.  From a mixture of 5ml of 0.25% bupivacaine and 40mg of methylprednisolone, 1ml of this solution was injected at each level post-lesion.    Local anesthetic used: 1% Lidocaine, 5 ml    Estimated Blood Loss: none    Complications:  none    Technique:  The patient was interviewed in the holding area and Risks/Benefits were discussed, including, but not limited to, the possibility of new or different pain, bleeding or infection.   All questions were answered.  The patient understood and accepted risks.  Consent was reviewed.  A time out was taken to identify the patient, procedure and side of the procedure. The patient was placed in a prone position, then prepped and draped in the usual sterile fashion using ChloraPrep x2 and sterile towels.  The levels were determined under fluoroscopic guidance and then marked.  AP and oblique fluoroscopy were used to identify and solo the junctions between the superior articular processes and transverse processes at  the L3-5 levels on the Bilateral side.  The Bilateral sacral ala was also identified and marked.  The skin and subcutaneous tissues in these identified areas were anesthetized with 1% lidocaine. A 20-gauge 100 mm Shooger RF needle was advanced towards each of these points under fluoroscopic guidance such that the tip of the needle was positioned posterior to the Neuro-foramen on lateral fluoroscopic view.  Then, each needle was positioned such that, on stimulation, the patient had an appropriate pain response between 0.3-0.5 V, with no motor response, other than Lumbar Paraspinal Muscles up to 2.0V.  One mL of 2% lidocaine was then injected at each level prior to lesioning, which was performed for 90 seconds at 80°C.  Once the lesion was complete, 1 mL of a solution consisting of 5 mL 0.25% bupivacaine and 40mg methylprednisolone was injected through each probe. The probes were removed with a 1% lidocaine flush.  The patient tolerated the procedure well.  The patient was monitored after the procedure.  Patient was given post procedure and discharge instructions to follow at home.  The patient was discharged in a stable condition.    Event Time In   In Facility 1013   In Pre-Procedure 1120   Physician Available    Anesthesia Available    Pre-Op: Bedside Procedure Start    Pre-Op: Bedside Procedure Stop    Pre-Procedure Complete 1144   Out of Pre-Procedure    Anesthesia Start    Anesthesia Start Data Collection    Setup Start    Setup Complete    In Room 1205   Prep Start    Procedure Prep Complete    MD notified pt. ready    Procedure Start 1211   Procedure Closing    Emergence    Procedure Finish 1227   Sedation Start 1209   Scope In    Extent Reached    Scope Out    Sedation End    Out of Room 1229   Cleanup Start    Cleanup Complete    Cosmetic Start    Cosmetic Stop    Pain Mgmt In Room    Pain Mgmt Out Room    In Recovery    Anesthesia Finish    Bedside Procedure Start    Bedside Procedure Stop    Recovery Care  Complete    Out of Recovery    To Phase II    In Phase II    Pain Mgmt Recovery Start    Pain Mgmt Recovery Stop    Obs Rec Start    Obs Rec Stop    Phase II Care Complete    Out of Phase II    Procedural Care Complete    Discharge    Pain Follow Up Needed    Pain Follow Up Complete

## 2024-08-08 NOTE — H&P
[place] - Surgery  History & Physical - Short Stay  Pain Management       SUBJECTIVE:     Procedure: Procedure(s) (LRB):  Radiofrequency Ablation, Nerve, Spinal, Lumbar, Medial Branch, L4/5 and L5/S1 (Bilateral)    Chief Complaint/Reason for Admission:  Lumbar spondylosis [M47.816]    PTA Medications   Medication Sig    fexofenadine (ALLEGRA) 180 MG tablet Take 1 tablet (180 mg total) by mouth once daily.    metFORMIN (GLUCOPHAGE) 500 MG tablet Take 1 tablet (500 mg total) by mouth 2 (two) times daily with meals. (Patient taking differently: Take 500 mg by mouth daily with breakfast.)    pantoprazole (PROTONIX) 40 MG tablet Take 40 mg by mouth every morning.    phentermine (ADIPEX-P) 37.5 mg tablet Take 1 tablet (37.5 mg total) by mouth once daily.    rosuvastatin (CRESTOR) 40 MG Tab Take 1 tablet (40 mg total) by mouth once daily.    tolterodine (DETROL LA) 4 MG 24 hr capsule Take 1 capsule (4 mg total) by mouth once daily.    traMADoL (ULTRAM) 50 mg tablet Take 1 tablet (50 mg total) by mouth every 12 (twelve) hours as needed for Pain.    chlorhexidine (PERIDEX) 0.12 % solution Use as directed 15 mLs in the mouth or throat 2 (two) times daily.    cyanocobalamin 1,000 mcg/mL injection Inject every week 1 cc (Patient taking differently: once a week. Inject every week 1 cc)    hydrocortisone 2.5 % cream Apply twice daily as needed to rash on face    LIDOcaine viscous HCl 2% (LIDOCAINE VISCOUS) 2 % Soln 2.5 mLs by Mucous Membrane route every 6 (six) hours.       Review of patient's allergies indicates:   Allergen Reactions    Adhesive Swelling and Blisters     Adhesive that requires uv light    Estrogens Other (See Comments)     Mini stroke    Tetanus vaccines and toxoid Swelling and Other (See Comments)     Swelling at injection site, fever    Shingrix (pf)  [[id number] [id number] (pf)] Diarrhea, Itching and Nausea And Vomiting       Past Medical History:   Diagnosis Date    Allergies     Bulging disc     lower  back     Carpal tunnel syndrome     Degenerative disc disease     High cholesterol     Pinched nerve in neck      Past Surgical History:   Procedure Laterality Date    ARTHROSCOPIC REPAIR OF ROTATOR CUFF OF SHOULDER Right 3/10/2021    Procedure: REPAIR, ROTATOR CUFF, ARTHROSCOPIC;  Surgeon: Dustin Vanegas MD;  Location: [facility] OR;  Service: Orthopedics;  Laterality: Right;  arthrex notified    ARTHROSCOPY OF SHOULDER WITH DECOMPRESSION OF SUBACROMIAL SPACE Right 3/10/2021    Procedure: ARTHROSCOPY, SHOULDER, WITH SUBACROMIAL SPACE DECOMPRESSION;  Surgeon: Dustin Vanegas MD;  Location: [facility] OR;  Service: Orthopedics;  Laterality: Right;    BLADDER SUSPENSION  1990    CATARACT EXTRACTION W/  INTRAOCULAR LENS IMPLANT Right 6/21/2023    Procedure: CEIOL OD;  Surgeon: Shawn Shearer MD;  Location: [facility] OR;  Service: Ophthalmology;  Laterality: Right;    CATARACT EXTRACTION W/  INTRAOCULAR LENS IMPLANT Left 7/19/2023    Procedure: CEIOL OS;  Surgeon: Shawn Shearer MD;  Location: [facility] OR;  Service: Ophthalmology;  Laterality: Left;    DISTAL CLAVICLE EXCISION Right 3/10/2021    Procedure: EXCISION, CLAVICLE, DISTAL;  Surgeon: Dustin Vanegas MD;  Location: [facility] OR;  Service: Orthopedics;  Laterality: Right;    FOOT SURGERY Bilateral 2001    FOOT SURGERY Right 05/13/2022    HAND SURGERY Left 2017    HYSTERECTOMY  1990    INJECTION OF ANESTHETIC AGENT AROUND MEDIAL BRANCH NERVES INNERVATING LUMBAR FACET JOINT Bilateral 5/22/2024    Procedure: Block-nerve-medial branch-lumbar     L4/5, L5/S1;  Surgeon: Angel Yousif MD;  Location: [facility] OR;  Service: Pain Management;  Laterality: Bilateral;    INJECTION OF ANESTHETIC AGENT AROUND MEDIAL BRANCH NERVES INNERVATING LUMBAR FACET JOINT Bilateral 7/19/2024    Procedure: Block-nerve-medial branch-lumbar Bilat L4/5 and L5/S1;  Surgeon: Angel Yousif MD;  Location: [facility] OR;  Service: Pain Management;  Laterality: Bilateral;  Bilat L4/5 and L5/S1    JOINT REPLACEMENT  2013     KNEE SURGERY Bilateral 2013    MANDIBLE FRACTURE SURGERY  1981    SHOULDER ARTHROSCOPY Left 02/27/2019    THYROIDECTOMY Left 05/13/2019        TONSILLECTOMY       Family History   Problem Relation Name Age of Onset    Arthritis Mother      Aneurysm Mother          Abdominal Aneurysm     Heart disease Father  65    Arthritis Father      Diabetes Father      Hearing loss Father      Hypertension Father      Dementia Father      No Known Problems Sister      No Known Problems Sister      Heart disease Brother  50 - 59        stent placement    Heart disease Brother  49        stent placement    Aneurysm Brother          Abdominal Aneurysm    Leukemia Son 3     Crohn's disease Son 3     Ankylosing spondylitis Son 3     Other Son 3         avascular necrosis of pancho hips    Rheumatologic disease Son 3     Heart defect Son 3     Kidney failure Son 3     SIDS Maternal Aunt 2     Uterine cancer Maternal Aunt 2     Dementia Paternal Aunt 1     Diabetes Paternal Grandmother      Heart disease Paternal Grandfather       Social History     Tobacco Use    Smoking status: Never    Smokeless tobacco: Never   Substance Use Topics    Alcohol use: Never     Alcohol/week: 0.0 standard drinks of alcohol    Drug use: No        Current Facility-Administered Medications:     lactated ringers infusion, , Intravenous, Continuous, Angel Yousif MD    Review of Systems:  General ROS: negative for - fever  Dermatological ROS: negative for rash    OBJECTIVE:     Vital Signs (Most Recent):  Temp: 97.3 °F (36.3 °C) (08/08/24 1126)  Body mass index is 35.25 kg/m².    Physical Exam:  General appearance - alert, well appearing, and in no distress  Mental status - AOx3  Eyes - pupils equal and reactive, extraocular eye movements intact  Heart - normal rate, regular rhythm, normal S1, S2, no murmurs, rubs, clicks or gallops  Chest - clear to auscultation, no wheezes, rales or rhonchi, symmetric air entry  Abdomen - soft, nontender,  nondistended, no masses or organomegaly  Neurological - alert, oriented, normal speech, no focal findings or movement disorder noted  Extremities - peripheral pulses normal, no pedal edema, no clubbing or cyanosis      ASSESSMENT/PLAN:     There are no hospital problems to display for this patient.     Indication: The patient has clinical and radiologic findings suggestive of facet mediated pain and is s/p 2nd diagnostic bilateral lumbar L4/5 and L5/S1 medial branch blocks with at least 80% relief of their axial back pain lasting the duration of the local anesthetic utilized.    We will proceed with L4/5 and L5/S1 RFA.    The risks and benefits of this intervention, and alternative therapies were discussed with the patient.  The discussion of risks included infection, bleeding, need for additional procedures or surgery, nerve damage, paralysis, adverse medication reaction(s), stroke, and/or death.  Questions regarding the procedure, risks, expected outcome, and possible side effects were solicited and answered to the patient's satisfaction.  Nalini wishes to proceed with the injection.  Verbal and written consent were verified.  ASA 2, MP II      Proceed with intervention as scheduled.    Angel Yousif M.D.  Interventional Pain Medicine / Anesthesiology

## 2024-08-08 NOTE — DISCHARGE SUMMARY
[facility]  Discharge Note  Short Stay    Admit Date: 8/8/2024    Discharge Date: 8/8/2024    Attending Physician: Angel Yousif     Discharge Provider: Angel Yousif    Diagnoses:  There are no hospital problems to display for this patient.      Discharged Condition: Good    Final Diagnoses: Lumbar spondylosis [M47.816]    Disposition: Home or Self Care    Hospital Course: No complications, uneventful    Outcome of Hospitalization, Treatment, Procedure, or Surgery:  Patient was admitted for outpatient interventional pain management procedure. The patient tolerated the procedure well with no complications.    Follow up/Patient Instructions:  Follow up as scheduled in Pain Management office in 2-3 weeks.  Patient has received instructions and follow up date and time.    Medications:  Continue previous medications    Discharge Procedure Orders   Notify your health care provider if you experience any of the following:  temperature >100.4     Notify your health care provider if you experience any of the following:  persistent nausea and vomiting or diarrhea     Notify your health care provider if you experience any of the following:  severe uncontrolled pain     Notify your health care provider if you experience any of the following:  redness, tenderness, or signs of infection (pain, swelling, redness, odor or green/yellow discharge around incision site)     Notify your health care provider if you experience any of the following:  difficulty breathing or increased cough     Notify your health care provider if you experience any of the following:  severe persistent headache     Notify your health care provider if you experience any of the following:  worsening rash     Notify your health care provider if you experience any of the following:  persistent dizziness, light-headedness, or visual disturbances     Notify your health care provider if you experience any of the following:  increased confusion or  weakness     Activity as tolerated

## 2024-08-09 VITALS
DIASTOLIC BLOOD PRESSURE: 68 MMHG | RESPIRATION RATE: 18 BRPM | BODY MASS INDEX: 35.26 KG/M2 | TEMPERATURE: 97 F | SYSTOLIC BLOOD PRESSURE: 143 MMHG | HEART RATE: 61 BPM | WEIGHT: 199 LBS | OXYGEN SATURATION: 97 % | HEIGHT: 63 IN

## 2024-08-13 ENCOUNTER — CLINICAL SUPPORT (OUTPATIENT)
Dept: ALLERGY | Facility: CLINIC | Age: 69
End: 2024-08-13
Payer: MEDICARE

## 2024-08-13 DIAGNOSIS — J30.9 CHRONIC ALLERGIC RHINITIS: Primary | ICD-10-CM

## 2024-08-13 PROCEDURE — 95117 IMMUNOTHERAPY INJECTIONS: CPT | Mod: S$GLB,,, | Performed by: STUDENT IN AN ORGANIZED HEALTH CARE EDUCATION/TRAINING PROGRAM

## 2024-08-21 ENCOUNTER — CLINICAL SUPPORT (OUTPATIENT)
Dept: ALLERGY | Facility: CLINIC | Age: 69
End: 2024-08-21
Payer: MEDICARE

## 2024-08-21 DIAGNOSIS — J30.9 CHRONIC ALLERGIC RHINITIS: Primary | ICD-10-CM

## 2024-08-21 PROCEDURE — 95117 IMMUNOTHERAPY INJECTIONS: CPT | Mod: S$GLB,,, | Performed by: ALLERGY & IMMUNOLOGY

## 2024-09-03 ENCOUNTER — CLINICAL SUPPORT (OUTPATIENT)
Dept: ALLERGY | Facility: CLINIC | Age: 69
End: 2024-09-03
Payer: MEDICARE

## 2024-09-03 DIAGNOSIS — J30.9 CHRONIC ALLERGIC RHINITIS: Primary | ICD-10-CM

## 2024-09-03 PROCEDURE — 95117 IMMUNOTHERAPY INJECTIONS: CPT | Mod: S$GLB,,, | Performed by: STUDENT IN AN ORGANIZED HEALTH CARE EDUCATION/TRAINING PROGRAM

## 2024-09-03 PROCEDURE — 99999 PR PBB SHADOW E&M-EST. PATIENT-LVL I: CPT | Mod: PBBFAC,,,

## 2024-09-10 ENCOUNTER — CLINICAL SUPPORT (OUTPATIENT)
Dept: ALLERGY | Facility: CLINIC | Age: 69
End: 2024-09-10
Payer: MEDICARE

## 2024-09-10 DIAGNOSIS — J30.9 CHRONIC ALLERGIC RHINITIS: Primary | ICD-10-CM

## 2024-09-10 PROCEDURE — 95117 IMMUNOTHERAPY INJECTIONS: CPT | Mod: S$GLB,,, | Performed by: STUDENT IN AN ORGANIZED HEALTH CARE EDUCATION/TRAINING PROGRAM

## 2024-09-16 ENCOUNTER — OFFICE VISIT (OUTPATIENT)
Dept: PAIN MEDICINE | Facility: CLINIC | Age: 69
End: 2024-09-16
Payer: MEDICARE

## 2024-09-16 VITALS
HEART RATE: 79 BPM | SYSTOLIC BLOOD PRESSURE: 129 MMHG | BODY MASS INDEX: 34.91 KG/M2 | HEIGHT: 63 IN | DIASTOLIC BLOOD PRESSURE: 63 MMHG | WEIGHT: 197 LBS

## 2024-09-16 DIAGNOSIS — M79.18 MYOFASCIAL PAIN: ICD-10-CM

## 2024-09-16 DIAGNOSIS — M47.816 LUMBAR SPONDYLOSIS: Primary | ICD-10-CM

## 2024-09-16 PROCEDURE — 99999 PR PBB SHADOW E&M-EST. PATIENT-LVL III: CPT | Mod: PBBFAC,,, | Performed by: ANESTHESIOLOGY

## 2024-09-16 PROCEDURE — 3288F FALL RISK ASSESSMENT DOCD: CPT | Mod: CPTII,S$GLB,, | Performed by: ANESTHESIOLOGY

## 2024-09-16 PROCEDURE — 1125F AMNT PAIN NOTED PAIN PRSNT: CPT | Mod: CPTII,S$GLB,, | Performed by: ANESTHESIOLOGY

## 2024-09-16 PROCEDURE — 3078F DIAST BP <80 MM HG: CPT | Mod: CPTII,S$GLB,, | Performed by: ANESTHESIOLOGY

## 2024-09-16 PROCEDURE — 3008F BODY MASS INDEX DOCD: CPT | Mod: CPTII,S$GLB,, | Performed by: ANESTHESIOLOGY

## 2024-09-16 PROCEDURE — 1160F RVW MEDS BY RX/DR IN RCRD: CPT | Mod: CPTII,S$GLB,, | Performed by: ANESTHESIOLOGY

## 2024-09-16 PROCEDURE — 1159F MED LIST DOCD IN RCRD: CPT | Mod: CPTII,S$GLB,, | Performed by: ANESTHESIOLOGY

## 2024-09-16 PROCEDURE — 99214 OFFICE O/P EST MOD 30 MIN: CPT | Mod: S$GLB,,, | Performed by: ANESTHESIOLOGY

## 2024-09-16 PROCEDURE — 1101F PT FALLS ASSESS-DOCD LE1/YR: CPT | Mod: CPTII,S$GLB,, | Performed by: ANESTHESIOLOGY

## 2024-09-16 PROCEDURE — 3074F SYST BP LT 130 MM HG: CPT | Mod: CPTII,S$GLB,, | Performed by: ANESTHESIOLOGY

## 2024-09-16 RX ORDER — CYCLOBENZAPRINE HCL 5 MG
5 TABLET ORAL 2 TIMES DAILY PRN
Qty: 30 TABLET | Refills: 0 | Status: SHIPPED | OUTPATIENT
Start: 2024-09-16

## 2024-09-16 NOTE — PROGRESS NOTES
[facility] Follow Up Evaluation      Referred by: No ref. provider found    PCP:     CC:   Chief Complaint   Patient presents with    Mid-back Pain          9/16/2024     2:49 PM 6/20/2024     1:18 PM 4/22/2024     3:41 PM   Last 3 PDI Scores   Pain Disability Index (PDI) 28 38 26       Interval HPI 6/20/24: Ms. Wooten returns to the office for follow up.  She is status post bilateral L4-5 and L5-S1 RFA on 08/08/2024 with 80% relief of her axial lower back pain.  Today she reports she continues to have some pain in the upper portion of her lumbar spine.  Sometimes she will get a sharp pain that radiates into her legs.  If she sits for long period of time when she goes from a standing position she can have significant lower back pain.      HPI:   Nalini Wooten is a 69 y.o. female patient who has a past medical history of Allergies, Bulging disc, Carpal tunnel syndrome, Degenerative disc disease, High cholesterol, and Pinched nerve in neck. She presents with back pain.  Had chronic back pain for many years that has been gradually worsening.  Today she reports her pain is 9 in 10, intermittent, aching.  Can get referred pain into the bilateral buttocks.  Pain is worse with sitting, standing, bending, coughing, walking, lifting and relieved with rest and injections.  She has had injections for 5 years ago with excellent relief of previous pain.  She denies any numbness or weakness.      Pain Intervention History:  - s/p 1st diagnostic bilateral L4-5 and L5-S1 medial branch blocks on 5/22/24 and reports she got 85% relief lasting for 8 hours.  Preprocedure pain score 7.  Postprocedure pain score 1  - s/p 2nd diagnostic bilateral L4-5 and L5-S1 medial branch blocks on 7/19/24 and reports she got 85% relief lasting for 8 hours.   - bilateral L4-5 and L5-S1 RFA on 08/08/2024 with 80% relief     Past Spine Surgical History:      Past and current medications:  Antineuropathics:  NSAIDs:  Physical therapy: yes,  completed   Antidepressants:  Muscle relaxers:  Opioids: tramadol   Antiplatelets/Anticoagulants:     History:    Current Outpatient Medications:     chlorhexidine (PERIDEX) 0.12 % solution, Use as directed 15 mLs in the mouth or throat 2 (two) times daily., Disp: , Rfl:     cyanocobalamin 1,000 mcg/mL injection, Inject every week 1 cc (Patient taking differently: once a week. Inject every week 1 cc), Disp: 10 mL, Rfl: 1    fexofenadine (ALLEGRA) 180 MG tablet, Take 1 tablet (180 mg total) by mouth once daily., Disp: 90 tablet, Rfl: 1    hydrocortisone 2.5 % cream, Apply twice daily as needed to rash on face, Disp: 30 g, Rfl: 2    LIDOcaine viscous HCl 2% (LIDOCAINE VISCOUS) 2 % Soln, 2.5 mLs by Mucous Membrane route every 6 (six) hours., Disp: , Rfl:     metFORMIN (GLUCOPHAGE) 500 MG tablet, Take 1 tablet (500 mg total) by mouth 2 (two) times daily with meals. (Patient taking differently: Take 500 mg by mouth daily with breakfast.), Disp: 180 tablet, Rfl: 2    pantoprazole (PROTONIX) 40 MG tablet, Take 40 mg by mouth every morning., Disp: , Rfl:     phentermine (ADIPEX-P) 37.5 mg tablet, Take 1 tablet (37.5 mg total) by mouth once daily., Disp: 90 tablet, Rfl: 0    rosuvastatin (CRESTOR) 40 MG Tab, Take 1 tablet (40 mg total) by mouth once daily., Disp: 90 tablet, Rfl: 1    tolterodine (DETROL LA) 4 MG 24 hr capsule, Take 1 capsule (4 mg total) by mouth once daily., Disp: 90 capsule, Rfl: 1    traMADoL (ULTRAM) 50 mg tablet, Take 1 tablet (50 mg total) by mouth every 12 (twelve) hours as needed for Pain., Disp: 30 tablet, Rfl: 0    cyclobenzaprine (FLEXERIL) 5 MG tablet, Take 1 tablet (5 mg total) by mouth 2 (two) times daily as needed for Muscle spasms., Disp: 30 tablet, Rfl: 0    Past Medical History:   Diagnosis Date    Allergies     Bulging disc     lower back     Carpal tunnel syndrome     Degenerative disc disease     High cholesterol     Pinched nerve in neck        Past Surgical History:   Procedure  Laterality Date    ARTHROSCOPIC REPAIR OF ROTATOR CUFF OF SHOULDER Right 3/10/2021    Procedure: REPAIR, ROTATOR CUFF, ARTHROSCOPIC;  Surgeon: Dustin Vanegas MD;  Location: [facility] OR;  Service: Orthopedics;  Laterality: Right;  arthrex notified    ARTHROSCOPY OF SHOULDER WITH DECOMPRESSION OF SUBACROMIAL SPACE Right 3/10/2021    Procedure: ARTHROSCOPY, SHOULDER, WITH SUBACROMIAL SPACE DECOMPRESSION;  Surgeon: Dustin Vanegas MD;  Location: [facility] OR;  Service: Orthopedics;  Laterality: Right;    BLADDER SUSPENSION  1990    CATARACT EXTRACTION W/  INTRAOCULAR LENS IMPLANT Right 6/21/2023    Procedure: CEIOL OD;  Surgeon: Shawn Shearer MD;  Location: [facility] OR;  Service: Ophthalmology;  Laterality: Right;    CATARACT EXTRACTION W/  INTRAOCULAR LENS IMPLANT Left 7/19/2023    Procedure: CEIOL OS;  Surgeon: Shawn Shearer MD;  Location: [facility] OR;  Service: Ophthalmology;  Laterality: Left;    DISTAL CLAVICLE EXCISION Right 3/10/2021    Procedure: EXCISION, CLAVICLE, DISTAL;  Surgeon: Dustin Vanegas MD;  Location: [facility] OR;  Service: Orthopedics;  Laterality: Right;    FOOT SURGERY Bilateral 2001    FOOT SURGERY Right 05/13/2022    HAND SURGERY Left 2017    HYSTERECTOMY  1990    INJECTION OF ANESTHETIC AGENT AROUND MEDIAL BRANCH NERVES INNERVATING LUMBAR FACET JOINT Bilateral 5/22/2024    Procedure: Block-nerve-medial branch-lumbar     L4/5, L5/S1;  Surgeon: Angel Yousif MD;  Location: [facility] OR;  Service: Pain Management;  Laterality: Bilateral;    INJECTION OF ANESTHETIC AGENT AROUND MEDIAL BRANCH NERVES INNERVATING LUMBAR FACET JOINT Bilateral 7/19/2024    Procedure: Block-nerve-medial branch-lumbar Bilat L4/5 and L5/S1;  Surgeon: Angel Yousif MD;  Location: [facility] OR;  Service: Pain Management;  Laterality: Bilateral;  Bilat L4/5 and L5/S1    JOINT REPLACEMENT  2013    KNEE SURGERY Bilateral 2013    MANDIBLE FRACTURE SURGERY  1981    RADIOFREQUENCY ABLATION OF LUMBAR MEDIAL BRANCH NERVE AT SINGLE LEVEL Bilateral  8/8/2024    Procedure: Radiofrequency Ablation, Nerve, Spinal, Lumbar, Medial Branch, L4/5 and L5/S1;  Surgeon: Angel Yousif MD;  Location: [facility] OR;  Service: Pain Management;  Laterality: Bilateral;    SHOULDER ARTHROSCOPY Left 02/27/2019    THYROIDECTOMY Left 05/13/2019        TONSILLECTOMY         Family History   Problem Relation Name Age of Onset    Arthritis Mother      Aneurysm Mother          Abdominal Aneurysm     Heart disease Father  65    Arthritis Father      Diabetes Father      Hearing loss Father      Hypertension Father      Dementia Father      No Known Problems Sister      No Known Problems Sister      Heart disease Brother  50 - 59        stent placement    Heart disease Brother  49        stent placement    Aneurysm Brother          Abdominal Aneurysm    Leukemia Son 3     Crohn's disease Son 3     Ankylosing spondylitis Son 3     Other Son 3         avascular necrosis of pancho hips    Rheumatologic disease Son 3     Heart defect Son 3     Kidney failure Son 3     SIDS Maternal Aunt 2     Uterine cancer Maternal Aunt 2     Dementia Paternal Aunt 1     Diabetes Paternal Grandmother      Heart disease Paternal Grandfather         Social History     Socioeconomic History    Marital status:     Number of children: 3   Tobacco Use    Smoking status: Never    Smokeless tobacco: Never   Substance and Sexual Activity    Alcohol use: Never     Alcohol/week: 0.0 standard drinks of alcohol    Drug use: No    Sexual activity: Yes     Birth control/protection: None     Social Determinants of Health     Financial Resource Strain: Low Risk  (8/1/2024)    Overall Financial Resource Strain (CARDIA)     Difficulty of Paying Living Expenses: Not hard at all   Food Insecurity: No Food Insecurity (8/1/2024)    Hunger Vital Sign     Worried About Running Out of Food in the Last Year: Never true     Ran Out of Food in the Last Year: Never true   Transportation Needs: No Transportation Needs  "(6/7/2023)    PRAPARE - Transportation     Lack of Transportation (Medical): No     Lack of Transportation (Non-Medical): No   Physical Activity: Insufficiently Active (8/1/2024)    Exercise Vital Sign     Days of Exercise per Week: 4 days     Minutes of Exercise per Session: 20 min   Stress: Stress Concern Present (8/1/2024)    [place] [employer] - Occupational Stress Questionnaire     Feeling of Stress : To some extent   Housing Stability: Low Risk  (6/7/2023)    Housing Stability Vital Sign     Unable to Pay for Housing in the Last Year: No     Number of Places Lived in the Last Year: 1     Unstable Housing in the Last Year: No       Review of patient's allergies indicates:   Allergen Reactions    Adhesive Swelling and Blisters     Adhesive that requires uv light    Estrogens Other (See Comments)     Mini stroke    Tetanus vaccines and toxoid Swelling and Other (See Comments)     Swelling at injection site, fever    Shingrix (pf)  [[id number] [id number] (pf)] Diarrhea, Itching and Nausea And Vomiting       Review of Systems:  Positive for headaches, weight gain, difficulty sleeping, urinary frequency.  Balance of review of systems is negative.    Physical Exam:  Vitals:    09/16/24 1450   BP: 129/63   Pulse: 79   Weight: 89.4 kg (196 lb 15.7 oz)   Height: 5' 3" (1.6 m)   PainSc:   5   PainLoc: Back     Body mass index is 34.89 kg/m².    Gen: NAD  Psych: mood appropriate for given condition  HEENT: eyes anicteric   CV: RRR  HEENT: anicteric   Respiratory: non-labored, no signs of respiratory distress  Abd: non-distended  Skin: warm, dry and intact.  Gait: No antalgic gait.     Reproducible pain with lumbar axial facet loading    Sensory:  Intact and symmetrical to light touch in L1-S1 dermatomes bilaterally.    Motor:     Right Left   L2/3 Iliacus Hip flexion  5  5   L3/4 Qudratus Femoris Knee Extension  5  5   L4/5 Tib Anterior Ankle Dorsiflexion   5  5   L5/S1 Extensor Hallicus Longus " Great toe extension  5  5   S1/S2 Gastroc/Soleus Plantar Flexion  5  5      Right Left                  Patellar DTR 0 0   Achilles DTR 0 0                      Labs:  Lab Results   Component Value Date    HGBA1C 6.1 06/28/2022    HGBA1C 6.1 06/28/2022       Lab Results   Component Value Date    WBC 7.26 01/26/2024    HGB 13.0 01/26/2024    HCT 42.4 01/26/2024    MCV 82 01/26/2024     01/26/2024         Imaging:  MRI lumbar spine 4/30/24  FINDINGS:  Vertebral column: There is degenerative change which will be described by level.  The lumbar vertebral bodies maintain normal height.  There is no fracture.  There is trace anterolisthesis of L4 on L5.  There is stable trace retrolisthesis of L2 on L3, L3 on L4.  The lumbar discs are desiccated.  There is mild-to-moderate disc space narrowing at the L2-3 level with mild disc space narrowing at the L3-4 level.  Baseline marrow signal is normal.     Spinal canal, conus, epidural space: The spinal canal is developmentally normal.  The conus terminates at the level of T12-L1 and is normal in contour and signal intensity.  There is no abnormal epidural mass or fluid collection.     Findings by level:     On the sagittal images, there is a minimal disc bulge at the T11-12 level but there is no spinal stenosis or cord compression.  T12-L1: There is a minimal disc bulge.  There is no spinal canal or significant foraminal stenosis.  There is a small perineural cyst in the superior left foraminal recess.  L1-2: There is mild facet joint arthropathy and a minimal disc bulge.  There is no spinal canal or significant foraminal stenosis.  L2-3: There is trace retrolisthesis of L2 on L3.  There is mild-to-moderate disc space narrowing.  There is a diffuse disc bulge with osteophytic ridging eccentric to the left.  There is mild facet joint arthropathy with ligamentum flavum thickening.  There is only borderline spinal stenosis.  There is crowding of the left lateral recess.   There is mild-to-moderate left and mild right foraminal stenosis.  L3-4: There is trace retrolisthesis of L3 on L4 where there is also mild disc space narrowing and inferior unroofing of a diffuse disc bulge with mild osteophytic ridging eccentric to the left.  There is only mild facet joint arthropathy.  There is no spinal stenosis.  Again there is crowding of the left lateral recess.  There is mild-to-moderate left foraminal stenosis.  L4-5: There is subtle trace anterolisthesis of L4 on L5.  There is moderate facet joint arthropathy with ligamentum flavum thickening.  There is a mild disc bulge.  There is flattening of the ventral dural sac.  There is borderline to mild spinal stenosis.  There is mild bilateral foraminal stenosis.  L5-S1: There is facet joint arthropathy.  There is no spinal canal or significant foraminal stenosis.     Soft tissues, other: The posterior spinous muscles are somewhat atrophic.  The prevertebral soft tissues are normal.  The aorta is ectatic measuring up approximately 3 cm at the level of L3-4.  There is no hydronephrosis.    Assessment:   Problem List Items Addressed This Visit    None  Visit Diagnoses       Lumbar spondylosis    -  Primary    Relevant Orders    Ambulatory referral/consult to Physical/Occupational Therapy    Myofascial pain        Relevant Medications    cyclobenzaprine (FLEXERIL) 5 MG tablet    Other Relevant Orders    Ambulatory referral/consult to Physical/Occupational Therapy              Nalini Wooten is a 69 y.o. female patient who has a past medical history of Allergies, Bulging disc, Carpal tunnel syndrome, Degenerative disc disease, High cholesterol, and Pinched nerve in neck. She presents with back pain.  Had chronic back pain for many years that has been gradually worsening.  Today she reports her pain is 9 in 10, intermittent, aching.        9/16/24 - Ms. Wooten returns to the office for follow up.  She is status post bilateral L4-5 and L5-S1 RFA on  08/08/2024 with 80% relief of her axial lower back pain.  Today she reports she continues to have some pain in the upper portion of her lumbar spine.  Sometimes she will get a sharp pain that radiates into her legs.  If she sits for long period of time when she goes from a standing position she can have significant lower back pain.    - on exam she has pain with facet loading in the upper portion of her lumbar spine  - I independently reviewed her lumbar MRI with her again today and she has facet arthropathy throughout the lumbar spine  - at this time I think she responded well to lumbar ablation at L4-5 and L5-S1 however she is likely symptomatic from her spondylosis higher in her lower back  - I have placed a referral for her to restart formal physical therapy and also prescribed her some Flexeril to use on an as needed basis for any myofascial component of her pain.  She understands side effects can include drowsiness  - we will have her follow-up in 6-8 weeks.  In the future we consider diagnostic lumbar medial branches at L2-3 and L3-4.  She may also be a candidate for 5 this at some point as she does have degenerative disc disease worse at L2-3 and 3-4 in the upper lumbar spine      : Not applicable    Angel Yousif M.D.  Interventional Pain Medicine / Anesthesiology    This note was completed with dictation software and grammatical errors may exist.

## 2024-09-17 ENCOUNTER — CLINICAL SUPPORT (OUTPATIENT)
Dept: ALLERGY | Facility: CLINIC | Age: 69
End: 2024-09-17
Payer: MEDICARE

## 2024-09-17 DIAGNOSIS — J30.9 CHRONIC ALLERGIC RHINITIS: Primary | ICD-10-CM

## 2024-09-17 PROCEDURE — 99999 PR PBB SHADOW E&M-EST. PATIENT-LVL I: CPT | Mod: PBBFAC,,,

## 2024-09-17 PROCEDURE — 95117 IMMUNOTHERAPY INJECTIONS: CPT | Mod: S$GLB,,, | Performed by: STUDENT IN AN ORGANIZED HEALTH CARE EDUCATION/TRAINING PROGRAM

## 2024-09-24 ENCOUNTER — CLINICAL SUPPORT (OUTPATIENT)
Dept: ALLERGY | Facility: CLINIC | Age: 69
End: 2024-09-24
Payer: MEDICARE

## 2024-09-24 DIAGNOSIS — J30.9 CHRONIC ALLERGIC RHINITIS: Primary | ICD-10-CM

## 2024-09-24 PROCEDURE — 99999 PR PBB SHADOW E&M-EST. PATIENT-LVL I: CPT | Mod: PBBFAC,,,

## 2024-09-24 PROCEDURE — 95117 IMMUNOTHERAPY INJECTIONS: CPT | Mod: S$GLB,,, | Performed by: STUDENT IN AN ORGANIZED HEALTH CARE EDUCATION/TRAINING PROGRAM

## 2024-09-25 DIAGNOSIS — Z00.00 ENCOUNTER FOR MEDICARE ANNUAL WELLNESS EXAM: ICD-10-CM

## 2024-10-01 ENCOUNTER — CLINICAL SUPPORT (OUTPATIENT)
Dept: ALLERGY | Facility: CLINIC | Age: 69
End: 2024-10-01
Payer: MEDICARE

## 2024-10-01 DIAGNOSIS — J30.9 CHRONIC ALLERGIC RHINITIS: Primary | ICD-10-CM

## 2024-10-01 PROCEDURE — 95117 IMMUNOTHERAPY INJECTIONS: CPT | Mod: S$GLB,,, | Performed by: STUDENT IN AN ORGANIZED HEALTH CARE EDUCATION/TRAINING PROGRAM

## 2024-10-02 ENCOUNTER — CLINICAL SUPPORT (OUTPATIENT)
Dept: REHABILITATION | Facility: HOSPITAL | Age: 69
End: 2024-10-02
Attending: ANESTHESIOLOGY
Payer: MEDICARE

## 2024-10-02 DIAGNOSIS — M25.60 DECREASED RANGE OF MOTION: Primary | ICD-10-CM

## 2024-10-02 DIAGNOSIS — M47.816 LUMBAR SPONDYLOSIS: ICD-10-CM

## 2024-10-02 DIAGNOSIS — M62.81 MUSCLE WEAKNESS: ICD-10-CM

## 2024-10-02 DIAGNOSIS — M79.18 MYOFASCIAL PAIN: ICD-10-CM

## 2024-10-02 PROCEDURE — 97110 THERAPEUTIC EXERCISES: CPT | Mod: PN

## 2024-10-02 PROCEDURE — 97161 PT EVAL LOW COMPLEX 20 MIN: CPT | Mod: PN

## 2024-10-02 PROCEDURE — 97530 THERAPEUTIC ACTIVITIES: CPT | Mod: PN

## 2024-10-02 NOTE — PLAN OF CARE
[facility]  Physical Therapy Initial Evaluation    Name: Nalini Wooten  Clinic Number: [id number]    Therapy Diagnosis:   Encounter Diagnoses   Name Primary?    Lumbar spondylosis     Myofascial pain     Decreased range of motion Yes    Muscle weakness      Physician: Angel Yousif MD    Physician Orders: PT Eval and Treat   Medical Diagnosis from Referral: Lumbar spondylosis [M47.816], Myofascial pain   Evaluation Date: 10/2/2024  Authorization Period Expiration: 12/31/24  Plan of Care Expiration: 11/29/24  Visit # / Visits authorized: 1/ 1  FOTO: 45; goal: 62 by visit 11    Time In: 3:20  Time Out: 4:00  Total Billable Time: 40 minutes    Precautions: Standard    Subjective   Date of onset: years  History of current condition - Nalini reports: she's had pain for years.  She did get some relief with rhizotomy.  She still has some pain to upper lumbar spine, which MD said was most likely muscular.  She took muscle relaxer once, but it made her too sleepy.     Medical History:   Past Medical History:   Diagnosis Date    Allergies     Bulging disc     lower back     Carpal tunnel syndrome     Degenerative disc disease     High cholesterol     Pinched nerve in neck        Surgical History:   Nalini Wooten  has a past surgical history that includes Knee surgery (Bilateral, 2013); Mandible fracture surgery (1981); Bladder suspension (1990); Foot surgery (Bilateral, 2001); Tonsillectomy; Hysterectomy (1990); Hand surgery (Left, 2017); Shoulder arthroscopy (Left, 02/27/2019); Joint replacement (2013); Thyroidectomy (Left, 05/13/2019); Arthroscopic repair of rotator cuff of shoulder (Right, 3/10/2021); Arthroscopy of shoulder with decompression of subacromial space (Right, 3/10/2021); Distal clavicle excision (Right, 3/10/2021); Foot surgery (Right, 05/13/2022); Cataract extraction w/  intraocular lens implant (Right, 6/21/2023); Cataract extraction w/  intraocular lens implant (Left,  7/19/2023); Injection of anesthetic agent around medial branch nerves innervating lumbar facet joint (Bilateral, 5/22/2024); Injection of anesthetic agent around medial branch nerves innervating lumbar facet joint (Bilateral, 7/19/2024); and Radiofrequency ablation of lumbar medial branch nerve at single level (Bilateral, 8/8/2024).    Medications:   Nalini has a current medication list which includes the following prescription(s): chlorhexidine, cyanocobalamin, cyclobenzaprine, fexofenadine, hydrocortisone, lidocaine viscous hcl 2%, metformin, pantoprazole, phentermine, rosuvastatin, tolterodine, and tramadol.    Allergies:   Review of patient's allergies indicates:   Allergen Reactions    Adhesive Swelling and Blisters     Adhesive that requires uv light    Estrogens Other (See Comments)     Mini stroke    Tetanus vaccines and toxoid Swelling and Other (See Comments)     Swelling at injection site, fever    Shingrix (pf)  [varicella-zoster ge-as01b (pf)] Diarrhea, Itching and Nausea And Vomiting        Imaging, MRI studies: 1. There is multilevel degenerative change discussed in detail by level above.  There is no fracture.  There is trace degenerative listhesis, disc bulge or protrusion and facet joint arthropathy at several levels.  There is no significant spinal stenosis.  There is borderline spinal stenosis at the L2-3 level with borderline to mild spinal stenosis at the L4-5 level.  2. At the L2-3 level there is crowding of the left lateral recess with mild-to-moderate left and mild right foraminal stenosis.  3. At the L3-4 level there is crowding of the left lateral recess with mild-to-moderate left foraminal stenosis.  4. There is also borderline to mild spinal stenosis with mild bilateral foraminal stenosis at the L4-5 level.  5. Ectasias of the aorta    Prior Therapy: none  Social History: pt lives with their spouse in 1 story house  Occupation: retired  Prior Level of Function: I with ADLs  Current Level  of Function: pain and difficulty with standing from floor, prolonged standing (30 min), sweeping, mopping, fwd bending    Sleep: she wakes up with pain, but states she is getting a new mattress  Numbness/tingling: to posterior lateral R LE    Pain:  Current 6/10, worst 10/10, best 0/10   Location: across low back and occasionally down posterior lateral R LE   Description: Aching  Aggravating Factors: Bending and sweeping, mopping, standing >30 min, prolonged walking  Easing Factors: rest, sidelying    Pts goals: decreased pain, be able to sweep and mop without increased pain, learn HEP        Objective     Observation: pt is pleasant and cooperation    Posture:  fwd head, rounded shoulders, kyphosis    Lumbar Range of Motion:    % Pain   Flexion 90   no        Extension 75   no        Left Side Bending 50 Tight on R        Right Side Bending 75 no        Left rotation   75 no        Right Rotation   50 no             Lower Extremity Strength  Right LE  Left LE    Knee extension: 5/5 Knee extension: 5/5   Knee flexion: 5/5 Knee flexion: 5/5 pull to L low back   Hip flexion: 4/5 pain to R low back Hip flexion: 4+/5   Hip extension:  3+/5 Hip extension: 3+/5   Hip abduction: 4/5 Hip abduction: 4+/5   Hip adduction: 5/5 Hip adduction 4/5 pain to L side of back   Ankle dorsiflexion: 5/5 Ankle dorsiflexion: 5/5     Function:  Sit to stand: able to stand from chair with UE support  Mod I for all aspects of bed mobility    Special Tests:  -Piriformis Test: + B  -Bridge Test: +; pain to low back with bridge    Neuro Dynamic Testing:    Sciatic nerve:      SLR: -      Joint Mobility: hypomobile L1 and L2 with PAs and pain    Palpation: TTP and tightness to B lumbar paraspinals and B piriformis    Sensation: grossly intact to light touch    Flexibility:    Ely's test: R = 100 degrees ; L = 110 degrees   Popliteal Angle: R = -5 degrees ; L = -10 degrees     Intake Outcome Measure for FOTO lumbar Survey  Therapist reviewed FOTO  scores for Nalini Wooten on 10/2/2024.  FOTO report- see Media section or FOTO account episode details.  Intake Score : 45%      PT Evaluation Completed? Yes  Discussed Plan of Care with patient: Yes    TREATMENT   Treatment Time In: 3:40  Treatment Time Out: 4:00  Total Treatment time separate from Evaluation: 20 minutes    Nalini received therapeutic exercises to develop strength, ROM, and flexibility for 10 minutes including:  SKTC 3x20 sec  Wig wags x30 sec  Glute sets x10, 5 sec hold  Piriformis stretch 3x20 sec    May add: TA progressions, bridges, sit to stand, shuttle, edu of body mechanics for chores and lifting    Nalini participated in neuromuscular re-education activities to improve: Kinesthetic, Sense, Proprioception, and Posture for 2 minutes. The following activities were included:  TA set + PPT x10, 5 sec hold      Nalini participated in dynamic functional therapeutic activities to improve functional performance for 8  minutes, including:  Pt edu on use of lumbar support in sitting, take breaks to perform HEP as needed for decreased pain, increase length of mop handle      Home Exercises and Patient Education Provided    Education provided:   - role of PT  -perform ex in pain free ROM  Pt gave verbal understanding to all education provided     Written Home Exercises Provided: yes.  Exercises were reviewed and Nalini was able to demonstrate them prior to the end of the session.  Nalini demonstrated good  understanding of the education provided.     See EMR under Patient Instructions for exercises provided 10/2/2024.    Assessment   Nalini is a 69 y.o. female referred to outpatient Physical Therapy with a medical diagnosis of Lumbar spondylosis [M47.816], Myofascial pain . Pt presents with decreased lumbar ROM, decreased core and LE strength.  She responded well to lumbar flexion exercises.  She will benefit from core and glute strengthening/stabilization.    Pt prognosis is Good.   Pt will benefit from  skilled outpatient Physical Therapy to address the deficits stated above and in the chart below, provide pt/family education, and to maximize pt's level of independence.     Plan of care discussed with patient: Yes  Pt's spiritual, cultural and educational needs considered and patient is agreeable to the plan of care and goals as stated below:     Anticipated Barriers for therapy: none    Medical Necessity is demonstrated by the following  History  Co-morbidities and personal factors that may impact the plan of care [] LOW: no personal factors / co-morbidities  [] MODERATE: 1-2 personal factors / co-morbidities  [x] HIGH: 3+ personal factors / co-morbidities    Moderate / High Support Documentation:   Co-morbidities affecting plan of care: multiple surgeries listed above    Personal Factors:   lifestyle     Examination  Body Structures and Functions, activity limitations and participation restrictions that may impact the plan of care [] LOW: addressing 1-2 elements  [] MODERATE: 3+ elements  [x] HIGH: 4+ elements (please support below)    Moderate / High Support Documentation: pain, decreased ROM, decreased strength, difficulty with chores, lifting, standing, cooking     Clinical Presentation [x] LOW: stable  [] MODERATE: Evolving  [] HIGH: Unstable     Decision Making/ Complexity Score: low       GOALS: Short Term Goals:  4 weeks (progressing, not met)  1.Report decreased    low back    pain  <   / =  4  /10  to increase tolerance for ADLs  2. Pt will demonstrate understanding of proper body mechanics for lifting and performing house chores.  3. Increased R LE strength by 1/3 muscle grade in all deficient planes to increase tolerance for ADL s.  4. Increased L LE strength by 1/3 muscle grade in all deficient planes to increase tolerance for ADLs.  5. Pt to increase B Ely's Test by > or = 10 degrees in order to improve flexibility and posture.   6. Pt to tolerate HEP to improve ROM and independence with ADL's  7.  Increased lumbar L side bending to 75% for increased ease with ADLs.    Long Term Goals: 8 weeks (progressing, not met)  1.Report decreased    low back    pain  <   / =  2  /10  to increase tolerance for ADLs  2.Pt will perform sit to stand from 18in seat without UE support for 5/5 trials for increased core strength and functional mobility.   3.Increased R LE strength by 1 muscle grade in all deficient planes to increase tolerance for ADL and work activities.  4. Increased L LE strength by 1 muscle grade in all deficient planes to increase tolerance for ADL and work activities.  5. Pt to be Independent with HEP to improve ROM and independence with ADL's  6. Pt to demonstrate negative Bridge Test in order to show improved core strength for lumbar stabilization.       Plan   Plan of care Certification: 10/2/2024 to 11/29/24.    Outpatient Physical Therapy 2 times weekly for 8 weeks to include the following interventions: Electrical Stimulation  , Gait Training, Manual Therapy, Moist Heat/ Ice, Neuromuscular Re-ed, Patient Education, Self Care, Therapeutic Activities, Therapeutic Exercise, Ultrasound, and dry needling.     Nalini Carroll, PT

## 2024-10-07 ENCOUNTER — LAB VISIT (OUTPATIENT)
Dept: LAB | Facility: HOSPITAL | Age: 69
End: 2024-10-07
Attending: NURSE PRACTITIONER
Payer: MEDICARE

## 2024-10-07 ENCOUNTER — TELEPHONE (OUTPATIENT)
Dept: PHARMACY | Facility: CLINIC | Age: 69
End: 2024-10-07
Payer: MEDICARE

## 2024-10-07 ENCOUNTER — CLINICAL SUPPORT (OUTPATIENT)
Dept: DERMATOLOGY | Facility: CLINIC | Age: 69
End: 2024-10-07
Payer: MEDICARE

## 2024-10-07 DIAGNOSIS — L23.3 ALLERGIC CONTACT DERMATITIS DUE TO DRUGS IN CONTACT WITH SKIN: ICD-10-CM

## 2024-10-07 DIAGNOSIS — R73.9 HYPERGLYCEMIA: ICD-10-CM

## 2024-10-07 DIAGNOSIS — E78.5 DYSLIPIDEMIA: ICD-10-CM

## 2024-10-07 DIAGNOSIS — E03.9 ACQUIRED HYPOTHYROIDISM: ICD-10-CM

## 2024-10-07 LAB
ALBUMIN SERPL BCP-MCNC: 4 G/DL (ref 3.5–5.2)
ALP SERPL-CCNC: 62 U/L (ref 55–135)
ALT SERPL W/O P-5'-P-CCNC: 21 U/L (ref 10–44)
ANION GAP SERPL CALC-SCNC: 7 MMOL/L (ref 8–16)
AST SERPL-CCNC: 21 U/L (ref 10–40)
BASOPHILS # BLD AUTO: 0.05 K/UL (ref 0–0.2)
BASOPHILS NFR BLD: 0.8 % (ref 0–1.9)
BILIRUB SERPL-MCNC: 0.4 MG/DL (ref 0.1–1)
BUN SERPL-MCNC: 26 MG/DL (ref 8–23)
CALCIUM SERPL-MCNC: 8.8 MG/DL (ref 8.7–10.5)
CHLORIDE SERPL-SCNC: 105 MMOL/L (ref 95–110)
CHOLEST SERPL-MCNC: 248 MG/DL (ref 120–199)
CHOLEST/HDLC SERPL: 6.9 {RATIO} (ref 2–5)
CO2 SERPL-SCNC: 27 MMOL/L (ref 23–29)
CREAT SERPL-MCNC: 1 MG/DL (ref 0.5–1.4)
DIFFERENTIAL METHOD BLD: ABNORMAL
EOSINOPHIL # BLD AUTO: 0.2 K/UL (ref 0–0.5)
EOSINOPHIL NFR BLD: 3.7 % (ref 0–8)
ERYTHROCYTE [DISTWIDTH] IN BLOOD BY AUTOMATED COUNT: 14.1 % (ref 11.5–14.5)
EST. GFR  (NO RACE VARIABLE): >60 ML/MIN/1.73 M^2
GLUCOSE SERPL-MCNC: 104 MG/DL (ref 70–110)
HCT VFR BLD AUTO: 39.2 % (ref 37–48.5)
HDLC SERPL-MCNC: 36 MG/DL (ref 40–75)
HDLC SERPL: 14.5 % (ref 20–50)
HGB BLD-MCNC: 12.3 G/DL (ref 12–16)
IMM GRANULOCYTES # BLD AUTO: 0.01 K/UL (ref 0–0.04)
IMM GRANULOCYTES NFR BLD AUTO: 0.2 % (ref 0–0.5)
LDLC SERPL CALC-MCNC: 153.4 MG/DL (ref 63–159)
LYMPHOCYTES # BLD AUTO: 2 K/UL (ref 1–4.8)
LYMPHOCYTES NFR BLD: 30.7 % (ref 18–48)
MCH RBC QN AUTO: 27.2 PG (ref 27–31)
MCHC RBC AUTO-ENTMCNC: 31.4 G/DL (ref 32–36)
MCV RBC AUTO: 87 FL (ref 82–98)
MONOCYTES # BLD AUTO: 0.6 K/UL (ref 0.3–1)
MONOCYTES NFR BLD: 9.5 % (ref 4–15)
NEUTROPHILS # BLD AUTO: 3.6 K/UL (ref 1.8–7.7)
NEUTROPHILS NFR BLD: 55.1 % (ref 38–73)
NONHDLC SERPL-MCNC: 212 MG/DL
NRBC BLD-RTO: 0 /100 WBC
PLATELET # BLD AUTO: 274 K/UL (ref 150–450)
PMV BLD AUTO: 9.6 FL (ref 9.2–12.9)
POTASSIUM SERPL-SCNC: 4.4 MMOL/L (ref 3.5–5.1)
PROT SERPL-MCNC: 6.5 G/DL (ref 6–8.4)
RBC # BLD AUTO: 4.53 M/UL (ref 4–5.4)
SODIUM SERPL-SCNC: 139 MMOL/L (ref 136–145)
T4 FREE SERPL-MCNC: 0.87 NG/DL (ref 0.71–1.51)
TRIGL SERPL-MCNC: 293 MG/DL (ref 30–150)
TSH SERPL DL<=0.005 MIU/L-ACNC: 2.16 UIU/ML (ref 0.34–5.6)
WBC # BLD AUTO: 6.52 K/UL (ref 3.9–12.7)

## 2024-10-07 PROCEDURE — 85025 COMPLETE CBC W/AUTO DIFF WBC: CPT | Performed by: NURSE PRACTITIONER

## 2024-10-07 PROCEDURE — 80053 COMPREHEN METABOLIC PANEL: CPT | Performed by: NURSE PRACTITIONER

## 2024-10-07 PROCEDURE — 83036 HEMOGLOBIN GLYCOSYLATED A1C: CPT | Performed by: NURSE PRACTITIONER

## 2024-10-07 PROCEDURE — 84439 ASSAY OF FREE THYROXINE: CPT | Performed by: NURSE PRACTITIONER

## 2024-10-07 PROCEDURE — 84443 ASSAY THYROID STIM HORMONE: CPT | Performed by: NURSE PRACTITIONER

## 2024-10-07 PROCEDURE — 36415 COLL VENOUS BLD VENIPUNCTURE: CPT | Performed by: NURSE PRACTITIONER

## 2024-10-07 PROCEDURE — 95044 PATCH/APPLICATION TESTS: CPT | Mod: S$GLB,,, | Performed by: STUDENT IN AN ORGANIZED HEALTH CARE EDUCATION/TRAINING PROGRAM

## 2024-10-07 PROCEDURE — 80061 LIPID PANEL: CPT | Performed by: NURSE PRACTITIONER

## 2024-10-07 NOTE — PATIENT INSTRUCTIONS
[facility]  Pre Patch Test Instructions    You have been scheduled for patch testing in the dermatology department. This process requires you keep three appointments in the same week. (Usually Monday, Wednesday and Friday)  Two weeks prior to testing; sun exposure on your back should be avoided. Patches cannot be placed on pink, red or peeling skin.  Female patients are asked to wear two piece outfits for convenience when being tested. Also, front hooking bras are easier to use for the week, but not a necessity.  Patients should not have any powder, lotions or ointments on their back the day of testing.  During the week of testing you must avoid showers. Only tub bathing in shallow water is allowed. We ask that you not wash your hair so there is no chance of getting the test site wet. All activities must be limited this week on order to stay dry and cool the entire week. Work schedules may need to be arranged.  Male patients may need to have their back shaved prior to testing. This is to be done in the office if needed.  The allergens to be placed are sometimes messy. Please wear garments that can be easily laundered.    Please call or office if you have any questions about your testing. [phone]

## 2024-10-07 NOTE — PROGRESS NOTES
The True Test and an additional 10 allergens were placed on 10/7/24 to the patient's posterior thorax. 50 Allergens were applied for the diagnoses of Allergic contact dermatitis, unspecified trigger. ( ICD-10 code : L23.9 ). The patient is to follow up Wednesday 10/9/24 for first Patch Reading and Friday 10/11/24 for second Patch Reading.

## 2024-10-08 ENCOUNTER — OFFICE VISIT (OUTPATIENT)
Dept: FAMILY MEDICINE | Facility: CLINIC | Age: 69
End: 2024-10-08
Payer: MEDICARE

## 2024-10-08 ENCOUNTER — TELEPHONE (OUTPATIENT)
Dept: FAMILY MEDICINE | Facility: CLINIC | Age: 69
End: 2024-10-08
Payer: MEDICARE

## 2024-10-08 VITALS
DIASTOLIC BLOOD PRESSURE: 72 MMHG | SYSTOLIC BLOOD PRESSURE: 136 MMHG | HEIGHT: 63 IN | WEIGHT: 193.69 LBS | BODY MASS INDEX: 34.32 KG/M2 | RESPIRATION RATE: 20 BRPM | HEART RATE: 80 BPM | TEMPERATURE: 98 F

## 2024-10-08 DIAGNOSIS — E78.5 DYSLIPIDEMIA: Primary | ICD-10-CM

## 2024-10-08 LAB
ESTIMATED AVG GLUCOSE: 134 MG/DL (ref 68–131)
HBA1C MFR BLD: 6.3 % (ref 4.5–6.2)

## 2024-10-08 PROCEDURE — 99213 OFFICE O/P EST LOW 20 MIN: CPT | Mod: S$GLB,,, | Performed by: NURSE PRACTITIONER

## 2024-10-08 PROCEDURE — 1126F AMNT PAIN NOTED NONE PRSNT: CPT | Mod: CPTII,S$GLB,, | Performed by: NURSE PRACTITIONER

## 2024-10-08 PROCEDURE — 3078F DIAST BP <80 MM HG: CPT | Mod: CPTII,S$GLB,, | Performed by: NURSE PRACTITIONER

## 2024-10-08 PROCEDURE — 3008F BODY MASS INDEX DOCD: CPT | Mod: CPTII,S$GLB,, | Performed by: NURSE PRACTITIONER

## 2024-10-08 PROCEDURE — 3075F SYST BP GE 130 - 139MM HG: CPT | Mod: CPTII,S$GLB,, | Performed by: NURSE PRACTITIONER

## 2024-10-08 PROCEDURE — 99999 PR PBB SHADOW E&M-EST. PATIENT-LVL IV: CPT | Mod: PBBFAC,,, | Performed by: NURSE PRACTITIONER

## 2024-10-08 PROCEDURE — 3288F FALL RISK ASSESSMENT DOCD: CPT | Mod: CPTII,S$GLB,, | Performed by: NURSE PRACTITIONER

## 2024-10-08 PROCEDURE — 3044F HG A1C LEVEL LT 7.0%: CPT | Mod: CPTII,S$GLB,, | Performed by: NURSE PRACTITIONER

## 2024-10-08 PROCEDURE — 1159F MED LIST DOCD IN RCRD: CPT | Mod: CPTII,S$GLB,, | Performed by: NURSE PRACTITIONER

## 2024-10-08 PROCEDURE — 1101F PT FALLS ASSESS-DOCD LE1/YR: CPT | Mod: CPTII,S$GLB,, | Performed by: NURSE PRACTITIONER

## 2024-10-08 RX ORDER — PRAVASTATIN SODIUM 20 MG/1
20 TABLET ORAL DAILY
Qty: 90 TABLET | Refills: 3 | Status: SHIPPED | OUTPATIENT
Start: 2024-10-08 | End: 2025-10-08

## 2024-10-08 NOTE — TELEPHONE ENCOUNTER
[facility]/Population Health Chart Review & Patient Outreach Details For Medication Adherence Project    Reason for Outreach Encounter: 3rd Party payor non-compliance report ([employer], [employer], [employer], etc)  2.  Patient Outreach Method: Reviewed patient chart  and [employer] message  3.   Medication in question:   Diabetes Medications               metFORMIN (GLUCOPHAGE) 500 MG tablet Take 1 tablet (500 mg total) by mouth 2 (two) times daily with meals.              Hyperlipidemia Medications               rosuvastatin (CRESTOR) 40 MG Tab Take 1 tablet (40 mg total) by mouth once daily.                  Metformin  last filled  5/14/24 for 90 day supply  Rosuvastatin  last filled 5/6/24 for 90 day supply    4.  Reviewed and or Updates Made To: Patient Chart  5. Outreach Outcomes and/or actions taken: Sent inquiry to patient: Waiting for response  Additional Notes:

## 2024-10-09 ENCOUNTER — OFFICE VISIT (OUTPATIENT)
Dept: DERMATOLOGY | Facility: CLINIC | Age: 69
End: 2024-10-09
Payer: MEDICARE

## 2024-10-09 ENCOUNTER — CLINICAL SUPPORT (OUTPATIENT)
Dept: REHABILITATION | Facility: HOSPITAL | Age: 69
End: 2024-10-09
Payer: MEDICARE

## 2024-10-09 DIAGNOSIS — M25.60 DECREASED RANGE OF MOTION: Primary | ICD-10-CM

## 2024-10-09 DIAGNOSIS — L71.0 PERIORAL DERMATITIS: ICD-10-CM

## 2024-10-09 DIAGNOSIS — E66.812 CLASS 2 SEVERE OBESITY DUE TO EXCESS CALORIES WITH SERIOUS COMORBIDITY AND BODY MASS INDEX (BMI) OF 35.0 TO 35.9 IN ADULT: ICD-10-CM

## 2024-10-09 DIAGNOSIS — M62.81 MUSCLE WEAKNESS: ICD-10-CM

## 2024-10-09 DIAGNOSIS — E66.01 CLASS 2 SEVERE OBESITY DUE TO EXCESS CALORIES WITH SERIOUS COMORBIDITY AND BODY MASS INDEX (BMI) OF 35.0 TO 35.9 IN ADULT: ICD-10-CM

## 2024-10-09 DIAGNOSIS — L30.9 ECZEMA, UNSPECIFIED TYPE: ICD-10-CM

## 2024-10-09 DIAGNOSIS — L23.9 ALLERGIC CONTACT DERMATITIS, UNSPECIFIED TRIGGER: Primary | ICD-10-CM

## 2024-10-09 PROCEDURE — 1160F RVW MEDS BY RX/DR IN RCRD: CPT | Mod: CPTII,S$GLB,, | Performed by: STUDENT IN AN ORGANIZED HEALTH CARE EDUCATION/TRAINING PROGRAM

## 2024-10-09 PROCEDURE — 3044F HG A1C LEVEL LT 7.0%: CPT | Mod: CPTII,S$GLB,, | Performed by: STUDENT IN AN ORGANIZED HEALTH CARE EDUCATION/TRAINING PROGRAM

## 2024-10-09 PROCEDURE — 1159F MED LIST DOCD IN RCRD: CPT | Mod: CPTII,S$GLB,, | Performed by: STUDENT IN AN ORGANIZED HEALTH CARE EDUCATION/TRAINING PROGRAM

## 2024-10-09 PROCEDURE — 97112 NEUROMUSCULAR REEDUCATION: CPT | Mod: PN

## 2024-10-09 PROCEDURE — 97530 THERAPEUTIC ACTIVITIES: CPT | Mod: PN

## 2024-10-09 PROCEDURE — 97110 THERAPEUTIC EXERCISES: CPT | Mod: PN

## 2024-10-09 PROCEDURE — 99999 PR PBB SHADOW E&M-EST. PATIENT-LVL I: CPT | Mod: PBBFAC,,, | Performed by: STUDENT IN AN ORGANIZED HEALTH CARE EDUCATION/TRAINING PROGRAM

## 2024-10-09 PROCEDURE — 99213 OFFICE O/P EST LOW 20 MIN: CPT | Mod: S$GLB,,, | Performed by: STUDENT IN AN ORGANIZED HEALTH CARE EDUCATION/TRAINING PROGRAM

## 2024-10-09 RX ORDER — DOXYCYCLINE 100 MG/1
CAPSULE ORAL
Qty: 30 CAPSULE | Refills: 1 | Status: SHIPPED | OUTPATIENT
Start: 2024-10-09

## 2024-10-09 RX ORDER — PHENTERMINE HYDROCHLORIDE 37.5 MG/1
37.5 TABLET ORAL DAILY
Qty: 90 TABLET | Refills: 0 | Status: SHIPPED | OUTPATIENT
Start: 2024-10-09

## 2024-10-09 RX ORDER — PIMECROLIMUS 10 MG/G
CREAM TOPICAL
Qty: 60 G | Refills: 3 | Status: SHIPPED | OUTPATIENT
Start: 2024-10-09

## 2024-10-09 NOTE — PROGRESS NOTES
Patient ID: Nalini Wooten is a 69 y.o. female.    Chief Complaint: Medication Refill (68 yo female here requesting med refill and consult about weight loss options. KM)    Ms. Ward is an established patient of Omayra Huddleston who presents today for lab review and weight check. She is currently on phentermine and has lost 7 lbs since last visit. She has changed her diet but not really implemented change in activity. She has a goal weight of 160 lbs. While reviewing blood work she is requesting change in cholesterol medication as she experiences side effects from current medication. She is undergoing extensive skin allergy testing, She will follow up with allergist in the coming days. She denies any other issues or complaints at the time of this visit.         Past Medical History:   Diagnosis Date    Allergies     Bulging disc     lower back     Carpal tunnel syndrome     Degenerative disc disease     High cholesterol     Pinched nerve in neck      Past Surgical History:   Procedure Laterality Date    ARTHROSCOPIC REPAIR OF ROTATOR CUFF OF SHOULDER Right 3/10/2021    Procedure: REPAIR, ROTATOR CUFF, ARTHROSCOPIC;  Surgeon: Dustin Vanegas MD;  Location: [facility] OR;  Service: Orthopedics;  Laterality: Right;  arthrex notified    ARTHROSCOPY OF SHOULDER WITH DECOMPRESSION OF SUBACROMIAL SPACE Right 3/10/2021    Procedure: ARTHROSCOPY, SHOULDER, WITH SUBACROMIAL SPACE DECOMPRESSION;  Surgeon: Dustin Vanegas MD;  Location: [facility] OR;  Service: Orthopedics;  Laterality: Right;    BLADDER SUSPENSION  1990    CATARACT EXTRACTION W/  INTRAOCULAR LENS IMPLANT Right 6/21/2023    Procedure: CEIOL OD;  Surgeon: Shawn Shearer MD;  Location: [facility] OR;  Service: Ophthalmology;  Laterality: Right;    CATARACT EXTRACTION W/  INTRAOCULAR LENS IMPLANT Left 7/19/2023    Procedure: CEIOL OS;  Surgeon: Shawn Shearer MD;  Location: [facility] OR;  Service: Ophthalmology;  Laterality: Left;    DISTAL CLAVICLE EXCISION Right 3/10/2021     Procedure: EXCISION, CLAVICLE, DISTAL;  Surgeon: Dustin Vanegas MD;  Location: [facility] OR;  Service: Orthopedics;  Laterality: Right;    FOOT SURGERY Bilateral 2001    FOOT SURGERY Right 05/13/2022    HAND SURGERY Left 2017    HYSTERECTOMY  1990    INJECTION OF ANESTHETIC AGENT AROUND MEDIAL BRANCH NERVES INNERVATING LUMBAR FACET JOINT Bilateral 5/22/2024    Procedure: Block-nerve-medial branch-lumbar     L4/5, L5/S1;  Surgeon: Angel Yousif MD;  Location: [facility] OR;  Service: Pain Management;  Laterality: Bilateral;    INJECTION OF ANESTHETIC AGENT AROUND MEDIAL BRANCH NERVES INNERVATING LUMBAR FACET JOINT Bilateral 7/19/2024    Procedure: Block-nerve-medial branch-lumbar Bilat L4/5 and L5/S1;  Surgeon: Angel Yousif MD;  Location: [facility] OR;  Service: Pain Management;  Laterality: Bilateral;  Bilat L4/5 and L5/S1    JOINT REPLACEMENT  2013    KNEE SURGERY Bilateral 2013    MANDIBLE FRACTURE SURGERY  1981    RADIOFREQUENCY ABLATION OF LUMBAR MEDIAL BRANCH NERVE AT SINGLE LEVEL Bilateral 8/8/2024    Procedure: Radiofrequency Ablation, Nerve, Spinal, Lumbar, Medial Branch, L4/5 and L5/S1;  Surgeon: Angel Yousif MD;  Location: [facility] OR;  Service: Pain Management;  Laterality: Bilateral;    SHOULDER ARTHROSCOPY Left 02/27/2019    THYROIDECTOMY Left 05/13/2019        TONSILLECTOMY           Tobacco History:  reports that she has never smoked. She has never been exposed to tobacco smoke. She has never used smokeless tobacco.      Review of patient's allergies indicates:   Allergen Reactions    Adhesive Swelling and Blisters     Adhesive that requires uv light    Estrogens Other (See Comments)     Mini stroke    Shingrix (pf)  [varicella-zoster ge-as01b (pf)] Diarrhea, Itching and Nausea And Vomiting       Current Outpatient Medications:     chlorhexidine (PERIDEX) 0.12 % solution, Use as directed 15 mLs in the mouth or throat 2 (two) times daily., Disp: , Rfl:     cyanocobalamin 1,000 mcg/mL injection, Inject  every week 1 cc, Disp: 10 mL, Rfl: 1    cyclobenzaprine (FLEXERIL) 5 MG tablet, Take 1 tablet (5 mg total) by mouth 2 (two) times daily as needed for Muscle spasms., Disp: 30 tablet, Rfl: 0    fexofenadine (ALLEGRA) 180 MG tablet, Take 1 tablet (180 mg total) by mouth once daily., Disp: 90 tablet, Rfl: 1    hydrocortisone 2.5 % cream, Apply twice daily as needed to rash on face, Disp: 30 g, Rfl: 2    metFORMIN (GLUCOPHAGE) 500 MG tablet, Take 1 tablet (500 mg total) by mouth 2 (two) times daily with meals., Disp: 180 tablet, Rfl: 2    pantoprazole (PROTONIX) 40 MG tablet, Take 40 mg by mouth every morning., Disp: , Rfl:     phentermine (ADIPEX-P) 37.5 mg tablet, Take 1 tablet (37.5 mg total) by mouth once daily., Disp: 90 tablet, Rfl: 0    tolterodine (DETROL LA) 4 MG 24 hr capsule, Take 1 capsule (4 mg total) by mouth once daily., Disp: 90 capsule, Rfl: 1    traMADoL (ULTRAM) 50 mg tablet, Take 1 tablet (50 mg total) by mouth every 12 (twelve) hours as needed for Pain., Disp: 30 tablet, Rfl: 0    LIDOcaine viscous HCl 2% (LIDOCAINE VISCOUS) 2 % Soln, 2.5 mLs by Mucous Membrane route every 6 (six) hours., Disp: , Rfl:     pravastatin (PRAVACHOL) 20 MG tablet, Take 1 tablet (20 mg total) by mouth once daily., Disp: 90 tablet, Rfl: 3    Review of Systems   Constitutional:  Positive for fatigue. Negative for activity change, appetite change and unexpected weight change.   HENT:  Negative for congestion, ear pain, hearing loss, postnasal drip, sinus pressure, sinus pain, sneezing and sore throat.    Eyes:  Negative for photophobia and pain.   Respiratory:  Negative for cough, chest tightness, shortness of breath and wheezing.    Cardiovascular:  Negative for chest pain, palpitations and leg swelling.   Gastrointestinal:  Negative for abdominal distention, abdominal pain, blood in stool, constipation, diarrhea, nausea and vomiting.   Endocrine: Negative for cold intolerance, heat intolerance, polydipsia and polyuria.  "  Genitourinary:  Negative for difficulty urinating, dysuria, flank pain, frequency, hematuria and urgency.   Musculoskeletal:  Positive for arthralgias and myalgias. Negative for back pain, joint swelling and neck pain.   Skin:  Negative for pallor.   Allergic/Immunologic: Positive for environmental allergies. Negative for food allergies.   Neurological:  Negative for dizziness, weakness, light-headedness and numbness.   Hematological:  Does not bruise/bleed easily.   Psychiatric/Behavioral:  Negative for agitation, confusion, decreased concentration and sleep disturbance. The patient is not nervous/anxious.           Objective:      Vitals:    10/08/24 1341 10/08/24 1424   BP: (!) 144/70 136/72   Pulse: 80    Resp: 20    Temp: 98.2 °F (36.8 °C)    TempSrc: Oral    Weight: 87.9 kg (193 lb 11.2 oz)    Height: 5' 3" (1.6 m)      Physical Exam  Vitals and nursing note reviewed.   Constitutional:       General: She is not in acute distress.     Appearance: Normal appearance. She is well-developed. She is obese.   HENT:      Head: Normocephalic and atraumatic.      Right Ear: Hearing normal.      Left Ear: Hearing normal.      Nose: Nose normal. No rhinorrhea.   Eyes:      General: Lids are normal.         Right eye: No discharge.         Left eye: No discharge.      Conjunctiva/sclera: Conjunctivae normal.      Right eye: Right conjunctiva is not injected.      Left eye: Left conjunctiva is not injected.      Pupils: Pupils are equal, round, and reactive to light. Pupils are equal.      Right eye: Pupil is round and reactive.      Left eye: Pupil is round and reactive.   Neck:      Thyroid: No thyromegaly.      Vascular: No JVD.      Trachea: Trachea normal. No tracheal deviation.   Cardiovascular:      Rate and Rhythm: Normal rate and regular rhythm.      Pulses:           Radial pulses are 2+ on the right side and 2+ on the left side.        Posterior tibial pulses are 2+ on the right side and 2+ on the left side.    "   Heart sounds: Normal heart sounds. No murmur heard.     No friction rub. No gallop.   Pulmonary:      Effort: Pulmonary effort is normal. No respiratory distress.      Breath sounds: Normal breath sounds. No stridor. No decreased breath sounds, wheezing, rhonchi or rales.   Abdominal:      General: Bowel sounds are normal. There is no distension.      Palpations: Abdomen is soft. Abdomen is not rigid.      Tenderness: There is no abdominal tenderness. There is no guarding.   Musculoskeletal:         General: Normal range of motion.      Cervical back: Normal range of motion and neck supple.   Lymphadenopathy:      Cervical: No cervical adenopathy.   Skin:     General: Skin is warm and dry.      Capillary Refill: Capillary refill takes less than 2 seconds.      Coloration: Skin is not pale.      Findings: No lesion or rash.   Neurological:      Mental Status: She is alert and oriented to person, place, and time.      GCS: GCS eye subscore is 4. GCS verbal subscore is 5. GCS motor subscore is 6.      Cranial Nerves: Cranial nerves 2-12 are intact.      Sensory: Sensation is intact.      Motor: Motor function is intact. No atrophy.      Coordination: Coordination is intact. Coordination normal.      Gait: Gait is intact. Gait normal.   Psychiatric:         Attention and Perception: Attention and perception normal. She is attentive.         Mood and Affect: Mood and affect normal.         Speech: Speech normal.         Behavior: Behavior normal.         Thought Content: Thought content normal.         Cognition and Memory: Cognition and memory normal.         Judgment: Judgment normal.           Assessment:       1. Dyslipidemia           Plan:       Dyslipidemia  -     pravastatin (PRAVACHOL) 20 MG tablet; Take 1 tablet (20 mg total) by mouth once daily.  Dispense: 90 tablet; Refill: 3      Follow up in about 3 months (around 1/8/2025), or if symptoms worsen or fail to improve, for weight recheck with PCP .         10/8/2024 Cyn Renee NP

## 2024-10-09 NOTE — PROGRESS NOTES
[facility]   Physical Therapy Treatment Note      Name: Nalini GAN [facility] Number: [id number]    Therapy Diagnosis:   Encounter Diagnoses   Name Primary?    Decreased range of motion Yes    Muscle weakness      Physician: Angel Yousif MD    Visit Date: 10/9/2024    Physician Orders: PT Eval and Treat   Medical Diagnosis from Referral: Lumbar spondylosis [M47.816], Myofascial pain   Evaluation Date: 10/2/2024  Authorization Period Expiration: 12/31/24  Plan of Care Expiration: 11/29/24  Visit # / Visits authorized: 1/ 20 (1 on prior auth)  FOTO: 45; goal: 62 by visit 11    Time In: 9:50  Time Out: 10:30  Total Billable Time: 40 minutes    Precautions: Standard    PTA Visit #: 0/5       Subjective     Patient reports: she has increased pain this morning.  She states she needs to get a new mattress.  She was compliant with home exercise program.  Response to previous treatment: did well  Functional change: too soon to tell    Pain: 7-8/10  Location: across low back      Objective      Objective Measures updated at progress report unless specified.     Treatment     Nalini received the treatments listed below:      therapeutic exercises to develop strength, ROM, and flexibility for 15 minutes including:  SKTC 3x20 sec  Wig wags x30 sec  Glute sets x10, 5 sec hold  Piriformis stretch 3x20 sec  Shuttle 2.5 bands 2x10     Nalini participated in neuromuscular re-education activities to improve: Kinesthetic, Sense, Proprioception, and Posture for 10 minutes. The following activities were included:  Bridges 2x10, 5 sec hold  TA set + PPT x10, 5 sec hold  Hip add + TA set ball x10, 5 sec hold  Hip abd + TA set RTB x10, 5 sec hold  TA + SKFO x10 ea    Nalini participated in dynamic functional therapeutic activities to improve functional performance for 15  minutes, including:  Pt edu on proper body mechanics for lifting floor to/from waist, vacuuming, mopping, sweeping, laundry  Sit to kimmy from  18in box, holding 10# KB x10 (vc for TA and glute sets)    Patient Education and Home Exercises       Education provided:   - importance of TA bracing with functional tasks to protect back  Pt gave verbal understanding to all education provided     Written Home Exercises Provided: Yes. Exercises were reviewed and Nalini was able to demonstrate them prior to the end of the session.  Nalini demonstrated good  understanding of the education provided. See Electronic Medical Record under Patient Instructions for exercises provided during therapy sessions    Assessment     Pt tolerated treatment session well.  She reported decreased pain to 3/10 post tx.  She was able to demonstrate understanding of body mechanics for daily chores by end of session.  She will continue to benefit from PT for improved core stabilization and LE strengthening.    Nalini Is progressing well towards her goals.   Patient prognosis is Good.     Patient will continue to benefit from skilled outpatient physical therapy to address the deficits listed in the problem list box on initial evaluation, provide pt/family education and to maximize pt's level of independence in the home and community environment.     Patient's spiritual, cultural and educational needs considered and pt agreeable to plan of care and goals.     Anticipated barriers to physical therapy: none    Goals:   Short Term Goals:  4 weeks (progressing, not met)  1.Report decreased    low back    pain  <   / =  4  /10  to increase tolerance for ADLs  2. Pt will demonstrate understanding of proper body mechanics for lifting and performing house chores.  3. Increased R LE strength by 1/3 muscle grade in all deficient planes to increase tolerance for ADL s.  4. Increased L LE strength by 1/3 muscle grade in all deficient planes to increase tolerance for ADLs.  5. Pt to increase B Ely's Test by > or = 10 degrees in order to improve flexibility and posture.   6. Pt to tolerate HEP to improve ROM  and independence with ADL's  7. Increased lumbar L side bending to 75% for increased ease with ADLs.    Long Term Goals: 8 weeks (progressing, not met)  1.Report decreased    low back    pain  <   / =  2  /10  to increase tolerance for ADLs  2.Pt will perform sit to stand from 18in seat without UE support for 5/5 trials for increased core strength and functional mobility.   3.Increased R LE strength by 1 muscle grade in all deficient planes to increase tolerance for ADL and work activities.  4. Increased L LE strength by 1 muscle grade in all deficient planes to increase tolerance for ADL and work activities.  5. Pt to be Independent with HEP to improve ROM and independence with ADL's  6. Pt to demonstrate negative Bridge Test in order to show improved core strength for lumbar stabilization.     Plan     Continue PT towards established goals.  Progress LE and core strengthening and stabilization as tolerated.    Plan of care Certification: 10/2/2024 to 11/29/24.    Outpatient Physical Therapy 2 times weekly for 8 weeks to include the following interventions: Electrical Stimulation  , Gait Training, Manual Therapy, Moist Heat/ Ice, Neuromuscular Re-ed, Patient Education, Self Care, Therapeutic Activities, Therapeutic Exercise, Ultrasound, and dry needling.     Nalini Carroll, PT

## 2024-10-09 NOTE — PROGRESS NOTES
Subjective:      Patient ID:  Nalini Wooten is a 69 y.o. female who presents for No chief complaint on file.    HPI  Pt here for patch read. Very itchy and uncomfortable    Also still with flaking around eyes and around mouth using hydrocortisone 2.5% cream and Vanicream products only    Review of Systems    Objective:   Physical Exam     Diagram Legend     Erythematous scaling macule/papule c/w actinic keratosis       Vascular papule c/w angioma      Pigmented verrucoid papule/plaque c/w seborrheic keratosis      Yellow umbilicated papule c/w sebaceous hyperplasia      Irregularly shaped tan macule c/w lentigo     1-2 mm smooth white papules consistent with Milia      Movable subcutaneous cyst with punctum c/w epidermal inclusion cyst      Subcutaneous movable cyst c/w pilar cyst      Firm pink to brown papule c/w dermatofibroma      Pedunculated fleshy papule(s) c/w skin tag(s)      Evenly pigmented macule c/w junctional nevus     Mildly variegated pigmented, slightly irregular-bordered macule c/w mildly atypical nevus      Flesh colored to evenly pigmented papule c/w intradermal nevus       Pink pearly papule/plaque c/w basal cell carcinoma      Erythematous hyperkeratotic cursted plaque c/w SCC      Surgical scar with no sign of skin cancer recurrence      Open and closed comedones      Inflammatory papules and pustules      Verrucoid papule consistent consistent with wart     Erythematous eczematous patches and plaques     Dystrophic onycholytic nail with subungual debris c/w onychomycosis     Umbilicated papule    Erythematous-base heme-crusted tan verrucoid plaque consistent with inflamed seborrheic keratosis     Erythematous Silvery Scaling Plaque c/w Psoriasis     See annotation      Assessment / Plan:        Allergic contact dermatitis, unspecified trigger  Eczema, unspecified type  Patch test read    48 hours:  Patient is positive to the following products:  Irritant n/a  + 2-mercaptobenzothiazole  ++  fragrance mix, methyl methacrylate  +++     Patient will be emailed information after final patch test placed    Perioral dermatitis  Concern for POD flare (Mild) around mouth and eyes from TCS use  Recommend elidil cream BID and doxy 100 mg QD 1 mo    RTC for final patch read

## 2024-10-10 ENCOUNTER — CLINICAL SUPPORT (OUTPATIENT)
Dept: REHABILITATION | Facility: HOSPITAL | Age: 69
End: 2024-10-10
Payer: MEDICARE

## 2024-10-10 DIAGNOSIS — M62.81 MUSCLE WEAKNESS: ICD-10-CM

## 2024-10-10 DIAGNOSIS — M25.60 DECREASED RANGE OF MOTION: Primary | ICD-10-CM

## 2024-10-10 PROCEDURE — 97530 THERAPEUTIC ACTIVITIES: CPT | Mod: PN

## 2024-10-10 PROCEDURE — 97112 NEUROMUSCULAR REEDUCATION: CPT | Mod: PN

## 2024-10-10 PROCEDURE — 97110 THERAPEUTIC EXERCISES: CPT | Mod: PN

## 2024-10-10 NOTE — PROGRESS NOTES
[facility]   Physical Therapy Treatment Note      Name: Nalini GAN [facility] Number: [id number]    Therapy Diagnosis:   Encounter Diagnoses   Name Primary?    Decreased range of motion Yes    Muscle weakness        Physician: Angel Yousif MD    Visit Date: 10/10/2024    Physician Orders: PT Eval and Treat   Medical Diagnosis from Referral: Lumbar spondylosis [M47.816], Myofascial pain   Evaluation Date: 10/2/2024  Authorization Period Expiration: 12/31/24  Plan of Care Expiration: 11/29/24  Visit # / Visits authorized: 2/ 20 (1 on prior auth)  FOTO: 45; goal: 62 by visit 11    Time In: 1:00  Time Out: 1:39  Total Billable Time: 39 minutes    Precautions: Standard    PTA Visit #: 0/5       Subjective     Patient reports: she has increased pain this morning.  She states she needs to get a new mattress.  She was compliant with home exercise program.  Response to previous treatment: did well  Functional change: too soon to tell    Pain: 2/10  Location: across low back      Objective      Objective Measures updated at progress report unless specified.     Treatment     Nalini received the treatments listed below:      therapeutic exercises to develop strength, ROM, and flexibility for 15 minutes including:  NP SKTC 3x20 sec  NP Wig wags x30 sec  Glute sets x10, 5 sec hold  Piriformis stretch 3x20 sec  Shuttle 2.5 bands 2x10     Nalini participated in neuromuscular re-education activities to improve: Kinesthetic, Sense, Proprioception, and Posture for 16 minutes. The following activities were included:  Bridges 2x10, 5 sec hold  TA set + PPT x10, 5 sec hold  Hip add + TA set ball 2x10, 5 sec hold  Hip abd + TA set RTB 2x10, 5 sec hold  TA + SKFO x10 ea  TA + march x10 ea  SL clams 2x10, 3 sec hold  Bent over leaning on elevated HOB TA + hip ext x10 ea  Standing TA set + orange ball press 2x10, 5 sec hold    Nalini participated in dynamic functional therapeutic activities to improve functional  performance for 8  minutes, including:  Sit to stand from 18in box, holding 10# KB x10 (vc for TA and glute sets)  Reviewed body mechanics for lifting, push, pulling    Patient Education and Home Exercises       Education provided:   - importance of TA bracing with functional tasks to protect back  Pt gave verbal understanding to all education provided     Written Home Exercises Provided: Yes. Exercises were reviewed and Nalini was able to demonstrate them prior to the end of the session.  Nalini demonstrated good  understanding of the education provided. See Electronic Medical Record under Patient Instructions for exercises provided during therapy sessions    Assessment     Pt tolerated treatment session well.  She reported decreased pain to 0/10 post tx.  She tolerated progression of core stabilization well.  She will continue to benefit from PT for improved core stabilization and LE strengthening.    Nalini Is progressing well towards her goals.   Patient prognosis is Good.     Patient will continue to benefit from skilled outpatient physical therapy to address the deficits listed in the problem list box on initial evaluation, provide pt/family education and to maximize pt's level of independence in the home and community environment.     Patient's spiritual, cultural and educational needs considered and pt agreeable to plan of care and goals.     Anticipated barriers to physical therapy: none    Goals:   Short Term Goals:  4 weeks (progressing, not met)  1.Report decreased    low back    pain  <   / =  4  /10  to increase tolerance for ADLs  2. Pt will demonstrate understanding of proper body mechanics for lifting and performing house chores.  3. Increased R LE strength by 1/3 muscle grade in all deficient planes to increase tolerance for ADL s.  4. Increased L LE strength by 1/3 muscle grade in all deficient planes to increase tolerance for ADLs.  5. Pt to increase B Ely's Test by > or = 10 degrees in order to  improve flexibility and posture.   6. Pt to tolerate HEP to improve ROM and independence with ADL's  7. Increased lumbar L side bending to 75% for increased ease with ADLs.    Long Term Goals: 8 weeks (progressing, not met)  1.Report decreased    low back    pain  <   / =  2  /10  to increase tolerance for ADLs  2.Pt will perform sit to stand from 18in seat without UE support for 5/5 trials for increased core strength and functional mobility.   3.Increased R LE strength by 1 muscle grade in all deficient planes to increase tolerance for ADL and work activities.  4. Increased L LE strength by 1 muscle grade in all deficient planes to increase tolerance for ADL and work activities.  5. Pt to be Independent with HEP to improve ROM and independence with ADL's  6. Pt to demonstrate negative Bridge Test in order to show improved core strength for lumbar stabilization.     Plan     Continue PT towards established goals.  Progress LE and core strengthening and stabilization as tolerated.    Plan of care Certification: 10/2/2024 to 11/29/24.    Outpatient Physical Therapy 2 times weekly for 8 weeks to include the following interventions: Electrical Stimulation  , Gait Training, Manual Therapy, Moist Heat/ Ice, Neuromuscular Re-ed, Patient Education, Self Care, Therapeutic Activities, Therapeutic Exercise, Ultrasound, and dry needling.     Nalini Carroll, PT

## 2024-10-11 ENCOUNTER — OFFICE VISIT (OUTPATIENT)
Dept: DERMATOLOGY | Facility: CLINIC | Age: 69
End: 2024-10-11
Payer: MEDICARE

## 2024-10-11 DIAGNOSIS — L23.9 ALLERGIC CONTACT DERMATITIS, UNSPECIFIED TRIGGER: Primary | ICD-10-CM

## 2024-10-11 PROCEDURE — 99999 PR PBB SHADOW E&M-EST. PATIENT-LVL III: CPT | Mod: PBBFAC,,, | Performed by: STUDENT IN AN ORGANIZED HEALTH CARE EDUCATION/TRAINING PROGRAM

## 2024-10-11 NOTE — PROGRESS NOTES
Patch test read    48 hours:  Patient is positive to the following products:  Irritant n/a  + 2-mercaptobenzothiazole  ++ fragrance mix, methyl methacrylate  +++     96 hours:  Patient is positive to the following products:  Irritant n/a  + fragrance mix, ethyl acrylate, nickel  ++ methyl methacrylate  +++     Pt emailed safe product codes    Allergen   codes:  [id number]     Reviewed products she brought in. Shampoo/conditioner contain fragrance , recommend to switch. She already knows her allergy to adhesives and glues (hx allergy to artificial nails) and nickel (known jewelry allergy)     Final results:  1 + - 2-mercaptobenzothiazole, ethyl acrylate, nickel  2 + - methyl methacrylate, fragrance

## 2024-10-14 ENCOUNTER — CLINICAL SUPPORT (OUTPATIENT)
Dept: REHABILITATION | Facility: HOSPITAL | Age: 69
End: 2024-10-14
Payer: MEDICARE

## 2024-10-14 DIAGNOSIS — M62.81 MUSCLE WEAKNESS: ICD-10-CM

## 2024-10-14 DIAGNOSIS — M25.60 DECREASED RANGE OF MOTION: Primary | ICD-10-CM

## 2024-10-14 PROCEDURE — 97530 THERAPEUTIC ACTIVITIES: CPT | Mod: PN

## 2024-10-14 PROCEDURE — 97110 THERAPEUTIC EXERCISES: CPT | Mod: PN

## 2024-10-14 PROCEDURE — 97112 NEUROMUSCULAR REEDUCATION: CPT | Mod: PN

## 2024-10-14 NOTE — PROGRESS NOTES
[facility]   Physical Therapy Treatment Note      Name: Nalini GAN [facility] Number: [id number]    Therapy Diagnosis:   Encounter Diagnoses   Name Primary?    Decreased range of motion Yes    Muscle weakness        Physician: Angel Yousif MD    Visit Date: 10/14/2024    Physician Orders: PT Eval and Treat   Medical Diagnosis from Referral: Lumbar spondylosis [M47.816], Myofascial pain   Evaluation Date: 10/2/2024  Authorization Period Expiration: 12/31/24  Plan of Care Expiration: 11/29/24  Visit # / Visits authorized: 3/ 20 (1 on prior auth)  FOTO: 45; goal: 62 by visit 11    Time In: 3:20  Time Out: 4:03  Total Billable Time: 43 minutes    Precautions: Standard    PTA Visit #: 0/5       Subjective     Patient reports: she is doing well.  She does not have much pain.  Over the weekend she had some pain while she was sewing due to poor ergonomics.  She was compliant with home exercise program.  Response to previous treatment: did well  Functional change: increased ease with mopping and sweeping    Pain: 2/10  Location: across low back      Objective      Objective Measures updated at progress report unless specified.     Treatment     Nalini received the treatments listed below:      therapeutic exercises to develop strength, ROM, and flexibility for 15 minutes including:  NP SKTC 3x20 sec  NP Wig wags x30 sec  Piriformis stretch 3x20 sec  Shuttle 2.5 bands 3x10     Nalini participated in neuromuscular re-education activities to improve: Kinesthetic, Sense, Proprioception, and Posture for 20 minutes. The following activities were included:  Bridges 2x10, 5 sec hold  TA set + PPT x10, 5 sec hold  Hip add + TA set ball x15, 5 sec hold  Hip abd + TA set RTB x15, 5 sec hold  TA + SKFO x15 ea  TA + march x15 ea  SL clams 2x10, 3 sec hold  Bent over leaning on elevated HOB TA + hip ext 2x10 ea  Standing TA set + orange ball press 2x10, 5 sec hold    Nalini participated in dynamic functional  therapeutic activities to improve functional performance for 8  minutes, including:  Sit to stand from 18in box, holding 10# KB x10 (vc for TA and glute sets)  Pt edu on proper ergonomic for sewing.  May need a different sewing table to allow for proper height and seat height    Patient Education and Home Exercises       Education provided:   - importance of TA bracing with functional tasks to protect back  Pt gave verbal understanding to all education provided     Written Home Exercises Provided: Yes. Exercises were reviewed and Nalini was able to demonstrate them prior to the end of the session.  Nalini demonstrated good  understanding of the education provided. See Electronic Medical Record under Patient Instructions for exercises provided during therapy sessions    Assessment     Pt tolerated treatment session well.  She is improving with increased core strength and tolerance for functional activities.  She will continue to benefit from PT for improved core stabilization and LE strengthening.    Nalini Is progressing well towards her goals.   Patient prognosis is Good.     Patient will continue to benefit from skilled outpatient physical therapy to address the deficits listed in the problem list box on initial evaluation, provide pt/family education and to maximize pt's level of independence in the home and community environment.     Patient's spiritual, cultural and educational needs considered and pt agreeable to plan of care and goals.     Anticipated barriers to physical therapy: none    Goals:   Short Term Goals:  4 weeks (progressing, not met)  1.Report decreased    low back    pain  <   / =  4  /10  to increase tolerance for ADLs  2. Pt will demonstrate understanding of proper body mechanics for lifting and performing house chores.  3. Increased R LE strength by 1/3 muscle grade in all deficient planes to increase tolerance for ADL s.  4. Increased L LE strength by 1/3 muscle grade in all deficient planes to  increase tolerance for ADLs.  5. Pt to increase B Ely's Test by > or = 10 degrees in order to improve flexibility and posture.   6. Pt to tolerate HEP to improve ROM and independence with ADL's  7. Increased lumbar L side bending to 75% for increased ease with ADLs.    Long Term Goals: 8 weeks (progressing, not met)  1.Report decreased    low back    pain  <   / =  2  /10  to increase tolerance for ADLs  2.Pt will perform sit to stand from 18in seat without UE support for 5/5 trials for increased core strength and functional mobility.   3.Increased R LE strength by 1 muscle grade in all deficient planes to increase tolerance for ADL and work activities.  4. Increased L LE strength by 1 muscle grade in all deficient planes to increase tolerance for ADL and work activities.  5. Pt to be Independent with HEP to improve ROM and independence with ADL's  6. Pt to demonstrate negative Bridge Test in order to show improved core strength for lumbar stabilization.     Plan     Continue PT towards established goals.  Progress LE and core strengthening and stabilization as tolerated.    Plan of care Certification: 10/2/2024 to 11/29/24.    Outpatient Physical Therapy 2 times weekly for 8 weeks to include the following interventions: Electrical Stimulation  , Gait Training, Manual Therapy, Moist Heat/ Ice, Neuromuscular Re-ed, Patient Education, Self Care, Therapeutic Activities, Therapeutic Exercise, Ultrasound, and dry needling.     Nalini Carroll, PT

## 2024-10-15 ENCOUNTER — CLINICAL SUPPORT (OUTPATIENT)
Dept: ALLERGY | Facility: CLINIC | Age: 69
End: 2024-10-15
Payer: MEDICARE

## 2024-10-15 DIAGNOSIS — J30.9 CHRONIC ALLERGIC RHINITIS: Primary | ICD-10-CM

## 2024-10-15 PROCEDURE — 95117 IMMUNOTHERAPY INJECTIONS: CPT | Mod: S$GLB,,, | Performed by: STUDENT IN AN ORGANIZED HEALTH CARE EDUCATION/TRAINING PROGRAM

## 2024-10-22 ENCOUNTER — CLINICAL SUPPORT (OUTPATIENT)
Dept: REHABILITATION | Facility: HOSPITAL | Age: 69
End: 2024-10-22
Payer: MEDICARE

## 2024-10-22 ENCOUNTER — CLINICAL SUPPORT (OUTPATIENT)
Dept: ALLERGY | Facility: CLINIC | Age: 69
End: 2024-10-22
Payer: MEDICARE

## 2024-10-22 DIAGNOSIS — M25.60 DECREASED RANGE OF MOTION: Primary | ICD-10-CM

## 2024-10-22 DIAGNOSIS — J30.9 CHRONIC ALLERGIC RHINITIS: Primary | ICD-10-CM

## 2024-10-22 DIAGNOSIS — M62.81 MUSCLE WEAKNESS: ICD-10-CM

## 2024-10-22 PROCEDURE — 99999 PR PBB SHADOW E&M-EST. PATIENT-LVL I: CPT | Mod: PBBFAC,,,

## 2024-10-22 PROCEDURE — 97110 THERAPEUTIC EXERCISES: CPT | Mod: PN

## 2024-10-22 PROCEDURE — 95117 IMMUNOTHERAPY INJECTIONS: CPT | Mod: S$GLB,,, | Performed by: STUDENT IN AN ORGANIZED HEALTH CARE EDUCATION/TRAINING PROGRAM

## 2024-10-22 PROCEDURE — 97530 THERAPEUTIC ACTIVITIES: CPT | Mod: PN

## 2024-10-22 PROCEDURE — 97112 NEUROMUSCULAR REEDUCATION: CPT | Mod: PN

## 2024-10-22 NOTE — PROGRESS NOTES
[facility]   Physical Therapy Treatment Note      Name: Nalini GAN [facility] Number: [id number]    Therapy Diagnosis:   Encounter Diagnoses   Name Primary?    Decreased range of motion Yes    Muscle weakness          Physician: Angel Yousif MD    Visit Date: 10/22/2024    Physician Orders: PT Eval and Treat   Medical Diagnosis from Referral: Lumbar spondylosis [M47.816], Myofascial pain   Evaluation Date: 10/2/2024  Authorization Period Expiration: 12/31/24  Plan of Care Expiration: 11/29/24  Visit # / Visits authorized: 4/ 20 (1 on prior auth)  FOTO: 45; goal: 62 by visit 11    Time In: 10:35  Time Out: 11:20  Total Billable Time: 45 minutes    Precautions: Standard    PTA Visit #: 0/5       Subjective     Patient reports: she was feeling good until she woke up Sunday morning.  She sat all day in an uncomfortable chair at a baby shower on Saturday.  She was compliant with home exercise program.  Response to previous treatment: did well  Functional change: increased ease with mopping and sweeping    Pain: 3-4/10  Location: across low back      Objective      Objective Measures updated at progress report unless specified.     Treatment     Nalini received the treatments listed below:      therapeutic exercises to develop strength, ROM, and flexibility for 15 minutes including:  SKTC 3x20 sec  Wig wags x30 sec  Piriformis stretch 3x20 sec  SL open books 5x10 sec ea  Shuttle 2.5 bands 3x10     Nalini participated in neuromuscular re-education activities to improve: Kinesthetic, Sense, Proprioception, and Posture for 20 minutes. The following activities were included:  Bridges 2x10, 5 sec hold  TA set + PPT x10, 5 sec hold  Hip add + TA set ball x15, 5 sec hold  Hip abd + TA set RTB x15, 5 sec hold  TA + SKFO x15 ea  TA + march x10 ea RTB  SL clams 2x10, 3 sec hold, YTB  Bent over leaning on elevated HOB TA + hip ext 2x10 ea  Standing TA set + orange ball press 2x10, 5 sec hold    Nalini  participated in dynamic functional therapeutic activities to improve functional performance for 10  minutes, including:  Sit to stand from 18in box, holding 10# KB x10 (vc for TA and glute sets)  Hip abd walk Y sport loop 2 laps in // bars  Hip hike + hip abd on 4in x10 ea  Pt edu on proper ergonomic for sewing.  May need a different sewing table to allow for proper height and seat height    Patient Education and Home Exercises       Education provided:   - importance of TA bracing with functional tasks to protect back  Pt gave verbal understanding to all education provided     Written Home Exercises Provided: Yes. Exercises were reviewed and Nalini was able to demonstrate them prior to the end of the session.  Nalini demonstrated good  understanding of the education provided. See Electronic Medical Record under Patient Instructions for exercises provided during therapy sessions    Assessment     Pt presented with increased stiffness/tightness after prolonged sitting over the weekend.  She demonstrated improved trunk rotation ROM post treatment and she reported decreased pain.  She continues to progress with core and glute strengthening. She will continue to benefit from PT for improved core stabilization and LE strengthening.    Nalini Is progressing well towards her goals.   Patient prognosis is Good.     Patient will continue to benefit from skilled outpatient physical therapy to address the deficits listed in the problem list box on initial evaluation, provide pt/family education and to maximize pt's level of independence in the home and community environment.     Patient's spiritual, cultural and educational needs considered and pt agreeable to plan of care and goals.     Anticipated barriers to physical therapy: none    Goals:   Short Term Goals:  4 weeks (progressing, not met)  1.Report decreased    low back    pain  <   / =  4  /10  to increase tolerance for ADLs  2. Pt will demonstrate understanding of proper  body mechanics for lifting and performing house chores.  3. Increased R LE strength by 1/3 muscle grade in all deficient planes to increase tolerance for ADL s.  4. Increased L LE strength by 1/3 muscle grade in all deficient planes to increase tolerance for ADLs.  5. Pt to increase B Ely's Test by > or = 10 degrees in order to improve flexibility and posture.   6. Pt to tolerate HEP to improve ROM and independence with ADL's  7. Increased lumbar L side bending to 75% for increased ease with ADLs.    Long Term Goals: 8 weeks (progressing, not met)  1.Report decreased    low back    pain  <   / =  2  /10  to increase tolerance for ADLs  2.Pt will perform sit to stand from 18in seat without UE support for 5/5 trials for increased core strength and functional mobility.   3.Increased R LE strength by 1 muscle grade in all deficient planes to increase tolerance for ADL and work activities.  4. Increased L LE strength by 1 muscle grade in all deficient planes to increase tolerance for ADL and work activities.  5. Pt to be Independent with HEP to improve ROM and independence with ADL's  6. Pt to demonstrate negative Bridge Test in order to show improved core strength for lumbar stabilization.     Plan     Continue PT towards established goals.  Progress LE and core strengthening and stabilization as tolerated.    Plan of care Certification: 10/2/2024 to 11/29/24.    Outpatient Physical Therapy 2 times weekly for 8 weeks to include the following interventions: Electrical Stimulation  , Gait Training, Manual Therapy, Moist Heat/ Ice, Neuromuscular Re-ed, Patient Education, Self Care, Therapeutic Activities, Therapeutic Exercise, Ultrasound, and dry needling.     Nalini Carroll, PT

## 2024-10-23 NOTE — PROGRESS NOTES
[facility]   Physical Therapy Treatment Note      Name: Nalini GAN [facility] Number: [id number]    Therapy Diagnosis:   Encounter Diagnoses   Name Primary?    Decreased range of motion Yes    Muscle weakness        Physician: Angel Yousif MD    Visit Date: 10/24/2024    Physician Orders: PT Eval and Treat   Medical Diagnosis from Referral: Lumbar spondylosis [M47.816], Myofascial pain   Evaluation Date: 10/2/2024  Authorization Period Expiration: 12/31/24  Plan of Care Expiration: 11/29/24  Visit # / Visits authorized: 5/ 20 (1 on prior auth)  FOTO: 45; goal: 62 by visit 11    Time In: 11:20  Time Out: 12:05  Total Billable Time: 43 minutes (28min 1:1)    Precautions: Standard    PTA Visit #: 0/5       Subjective     Patient reports: she was feeling good until she woke up Sunday morning.  She sat all day in an uncomfortable chair at a baby shower on Saturday.  She was compliant with home exercise program.  Response to previous treatment: did well  Functional change: increased ease with mopping and sweeping    Pain: 2/10  Location: across low back      Objective      Objective Measures updated at progress report unless specified.     Treatment     Nalini received the treatments listed below:      therapeutic exercises to develop strength, ROM, and flexibility for 15 minutes including:  SKTC 3x20 sec  Wig wags x30 sec  Piriformis stretch 3x20 sec  SL open books 5x10 sec ea  Shuttle 2.5 bands 3x10     Nalini participated in neuromuscular re-education activities to improve: Kinesthetic, Sense, Proprioception, and Posture for 20 minutes. The following activities were included:  Bridges 2x10, 5 sec hold  TA set + PPT x10, 5 sec hold  Hip add + TA set ball x15, 5 sec hold  Hip abd + TA set RTB x15, 5 sec hold  TA + SKFO GTB x10 ea  TA + march 2x10 ea GTB  SL clams 2x10, 3 sec hold, YTB  Bent over leaning on elevated HOB TA + hip ext 2x10 ea  NP Standing TA set + orange ball press 2x10, 5 sec  black Gayle participated in dynamic functional therapeutic activities to improve functional performance for 8  minutes, including:  NP Sit to stand from 18in box, holding 10# KB x10 (vc for TA and glute sets)  Hip abd walk Y sport loop 2 laps in // bars  Hip hike + hip abd on 4in x10 ea  Pt edu on proper ergonomic for sewing.  May need a different sewing table to allow for proper height and seat height    Patient Education and Home Exercises       Education provided:   - importance of TA bracing with functional tasks to protect back  Pt gave verbal understanding to all education provided     Written Home Exercises Provided: Yes. Exercises were reviewed and Nalini was able to demonstrate them prior to the end of the session.  Nalini demonstrated good  understanding of the education provided. See Electronic Medical Record under Patient Instructions for exercises provided during therapy sessions    Assessment     Pt was able to progress core challenges today.  She is improving with core and glute strength/stabilization.  She continues to progress with core and glute strengthening. She will continue to benefit from PT for improved core stabilization and LE strengthening.    Nalini Is progressing well towards her goals.   Patient prognosis is Good.     Patient will continue to benefit from skilled outpatient physical therapy to address the deficits listed in the problem list box on initial evaluation, provide pt/family education and to maximize pt's level of independence in the home and community environment.     Patient's spiritual, cultural and educational needs considered and pt agreeable to plan of care and goals.     Anticipated barriers to physical therapy: none    Goals:   Short Term Goals:  4 weeks (progressing, not met)  1.Report decreased    low back    pain  <   / =  4  /10  to increase tolerance for ADLs  2. Pt will demonstrate understanding of proper body mechanics for lifting and performing house chores.  3.  Increased R LE strength by 1/3 muscle grade in all deficient planes to increase tolerance for ADL s.  4. Increased L LE strength by 1/3 muscle grade in all deficient planes to increase tolerance for ADLs.  5. Pt to increase B Ely's Test by > or = 10 degrees in order to improve flexibility and posture.   6. Pt to tolerate HEP to improve ROM and independence with ADL's  7. Increased lumbar L side bending to 75% for increased ease with ADLs.    Long Term Goals: 8 weeks (progressing, not met)  1.Report decreased    low back    pain  <   / =  2  /10  to increase tolerance for ADLs  2.Pt will perform sit to stand from 18in seat without UE support for 5/5 trials for increased core strength and functional mobility.   3.Increased R LE strength by 1 muscle grade in all deficient planes to increase tolerance for ADL and work activities.  4. Increased L LE strength by 1 muscle grade in all deficient planes to increase tolerance for ADL and work activities.  5. Pt to be Independent with HEP to improve ROM and independence with ADL's  6. Pt to demonstrate negative Bridge Test in order to show improved core strength for lumbar stabilization.     Plan     Continue PT towards established goals.  Progress LE and core strengthening and stabilization as tolerated.    Plan of care Certification: 10/2/2024 to 11/29/24.    Outpatient Physical Therapy 2 times weekly for 8 weeks to include the following interventions: Electrical Stimulation  , Gait Training, Manual Therapy, Moist Heat/ Ice, Neuromuscular Re-ed, Patient Education, Self Care, Therapeutic Activities, Therapeutic Exercise, Ultrasound, and dry needling.     Nalini Carroll, PT

## 2024-10-24 ENCOUNTER — CLINICAL SUPPORT (OUTPATIENT)
Dept: REHABILITATION | Facility: HOSPITAL | Age: 69
End: 2024-10-24
Payer: MEDICARE

## 2024-10-24 DIAGNOSIS — M25.60 DECREASED RANGE OF MOTION: Primary | ICD-10-CM

## 2024-10-24 DIAGNOSIS — M62.81 MUSCLE WEAKNESS: ICD-10-CM

## 2024-10-24 PROCEDURE — 97112 NEUROMUSCULAR REEDUCATION: CPT | Mod: PN

## 2024-10-24 PROCEDURE — 97530 THERAPEUTIC ACTIVITIES: CPT | Mod: PN

## 2024-10-29 ENCOUNTER — CLINICAL SUPPORT (OUTPATIENT)
Dept: REHABILITATION | Facility: HOSPITAL | Age: 69
End: 2024-10-29
Payer: MEDICARE

## 2024-10-29 ENCOUNTER — CLINICAL SUPPORT (OUTPATIENT)
Dept: ALLERGY | Facility: CLINIC | Age: 69
End: 2024-10-29
Payer: MEDICARE

## 2024-10-29 DIAGNOSIS — M62.81 MUSCLE WEAKNESS: ICD-10-CM

## 2024-10-29 DIAGNOSIS — J30.9 CHRONIC ALLERGIC RHINITIS: Primary | ICD-10-CM

## 2024-10-29 DIAGNOSIS — M25.60 DECREASED RANGE OF MOTION: Primary | ICD-10-CM

## 2024-10-29 PROCEDURE — 97112 NEUROMUSCULAR REEDUCATION: CPT | Mod: PN,CQ

## 2024-10-29 PROCEDURE — 99999 PR PBB SHADOW E&M-EST. PATIENT-LVL I: CPT | Mod: PBBFAC,,,

## 2024-10-29 PROCEDURE — 97530 THERAPEUTIC ACTIVITIES: CPT | Mod: PN,CQ

## 2024-10-29 PROCEDURE — 97110 THERAPEUTIC EXERCISES: CPT | Mod: PN,CQ

## 2024-10-29 PROCEDURE — 95117 IMMUNOTHERAPY INJECTIONS: CPT | Mod: S$GLB,,, | Performed by: STUDENT IN AN ORGANIZED HEALTH CARE EDUCATION/TRAINING PROGRAM

## 2024-11-05 ENCOUNTER — CLINICAL SUPPORT (OUTPATIENT)
Dept: ALLERGY | Facility: CLINIC | Age: 69
End: 2024-11-05
Payer: MEDICARE

## 2024-11-05 ENCOUNTER — CLINICAL SUPPORT (OUTPATIENT)
Dept: REHABILITATION | Facility: HOSPITAL | Age: 69
End: 2024-11-05
Payer: MEDICARE

## 2024-11-05 DIAGNOSIS — J30.9 CHRONIC ALLERGIC RHINITIS: Primary | ICD-10-CM

## 2024-11-05 DIAGNOSIS — M62.81 MUSCLE WEAKNESS: ICD-10-CM

## 2024-11-05 DIAGNOSIS — M25.60 DECREASED RANGE OF MOTION: Primary | ICD-10-CM

## 2024-11-05 PROCEDURE — 97112 NEUROMUSCULAR REEDUCATION: CPT | Mod: PN

## 2024-11-05 PROCEDURE — 97530 THERAPEUTIC ACTIVITIES: CPT | Mod: PN

## 2024-11-05 PROCEDURE — 99999 PR PBB SHADOW E&M-EST. PATIENT-LVL I: CPT | Mod: PBBFAC,,,

## 2024-11-05 PROCEDURE — 97110 THERAPEUTIC EXERCISES: CPT | Mod: PN

## 2024-11-05 PROCEDURE — 95117 IMMUNOTHERAPY INJECTIONS: CPT | Mod: S$GLB,,, | Performed by: STUDENT IN AN ORGANIZED HEALTH CARE EDUCATION/TRAINING PROGRAM

## 2024-11-05 NOTE — PROGRESS NOTES
[facility]   Physical Therapy Treatment Note      Name: Nalini Wooten  [facility] Number: [id number]    Therapy Diagnosis:   Encounter Diagnoses   Name Primary?    Decreased range of motion Yes    Muscle weakness        Physician: Angel Yousif MD    Visit Date: 11/5/2024    Physician Orders: PT Eval and Treat   Medical Diagnosis from Referral: Lumbar spondylosis [M47.816], Myofascial pain   Evaluation Date: 10/2/2024  Authorization Period Expiration: 12/31/24  Plan of Care Expiration: 11/29/24  Visit # / Visits authorized: 7/ 20 (1 on prior auth)  FOTO: 69; goal: 62 by visit 11 (met)    PTA Visit #: 1/5     Time In: 10:30  Time Out: 11:15  Total Billable Time: 45 minutes     Precautions: Standard    Subjective     Patient reports:she does not have much pain today.  She was compliant with home exercise program.  Response to previous treatment: did well  Functional change: mopping and sweeping without pain    Pain: 2/10  Location: across low back      Objective      Observation: pt is pleasant and cooperation    Posture:  fwd head, rounded shoulders, kyphosis    Lumbar Range of Motion:    % Pain   Flexion 100   no        Extension 100   no        Left Side Bending 50 no      Right Side Bending 75 no        Left rotation   75 no        Right Rotation   100 no             Lower Extremity Strength  Right LE  Left LE    Knee extension: 5/5 Knee extension: 5/5   Knee flexion: 5/5 Knee flexion: 5/5 pull to L low back   Hip flexion: 4/5  Hip flexion: 4+/5   Hip extension:  4-/5 Hip extension: 4/5   Hip abduction: 4+/5 Hip abduction: 5/5   Hip adduction: 5/5 Hip adduction 5/5    Ankle dorsiflexion: 5/5 Ankle dorsiflexion: 5/5     Function:  Sit to stand: able to stand from chair with UE support  Mod I for all aspects of bed mobility    Special Tests:  -Piriformis Test: -  -Bridge Test: able to hold x10 sec    Sensation: grossly intact to light touch    Flexibility:    Ely's test: R = 110 degrees ; L  = 120 degrees   Popliteal Angle: R = -5 degrees ; L = -10 degrees    Treatment     Nalini received the treatments listed below:      therapeutic exercises to develop strength, ROM, and flexibility for 20 minutes including:  Reassessment  SL open books 5x10 sec ea  NP Shuttle 2.5 bands 3x10    Not performed due to time:  SKTC 3x20 sec  Wig wags x30 sec  Piriformis stretch 3x20 sec     Nalini participated in neuromuscular re-education activities to improve: Kinesthetic, Sense, Proprioception, and Posture for 15 minutes. The following activities were included:  Bridges 2x10, 5 sec hold  NP Hip add + TA set ball x15, 5 sec hold  Hip abd + TA set RTB x15, 5 sec hold  TA + SKFO GTB x15 ea  TA + march x15 ea GTB  NP SL clams 2x10, 3 sec hold, YTB  Prone hip ext x 10 each   NP Bent over leaning on elevated HOB TA + hip ext 2x10 ea  NP Standing TA set + orange ball press 2x10, 5 sec hold    Nalini participated in dynamic functional therapeutic activities to improve functional performance for 10  minutes, including:  Sit to stand from 18in box, holding 10# KB 2x10 (vc for TA and glute sets)  Hip abd walk Y sport loop 3 laps in // bars  hip hike + hip abd on 4in 2x10 ea  NP Pt edu on proper ergonomic for sewing.  May need a different sewing table to allow for proper height and seat height    Patient Education and Home Exercises       Education provided:   - importance of TA bracing with functional tasks to protect back  Pt gave verbal understanding to all education provided     Written Home Exercises Provided: Yes. Exercises were reviewed and Nalini was able to demonstrate them prior to the end of the session.  Nalini demonstrated good  understanding of the education provided. See Electronic Medical Record under Patient Instructions for exercises provided during therapy sessions    Assessment     Pt reassessed today.  She has improved with increased lumbar ROM, increased LE and core strength.  She continues to have some difficulty  with yard work.  She will continue to benefit from PT for improved core stabilization and LE strengthening.    Nalini Is progressing well towards her goals.   Patient prognosis is Good.     Patient will continue to benefit from skilled outpatient physical therapy to address the deficits listed in the problem list box on initial evaluation, provide pt/family education and to maximize pt's level of independence in the home and community environment.     Patient's spiritual, cultural and educational needs considered and pt agreeable to plan of care and goals.     Anticipated barriers to physical therapy: none    Goals:   Short Term Goals:  4 weeks   1.Report decreased    low back    pain  <   / =  4  /10  to increase tolerance for ADLs (met)  2. Pt will demonstrate understanding of proper body mechanics for lifting and performing house chores. (Met)  3. Increased R LE strength by 1/3 muscle grade in all deficient planes to increase tolerance for ADLs. (Met)  4. Increased L LE strength by 1/3 muscle grade in all deficient planes to increase tolerance for ADLs. (Met)  5. Pt to increase B Ely's Test by > or = 10 degrees in order to improve flexibility and posture. (Met)  6. Pt to tolerate HEP to improve ROM and independence with ADL's (met)  7. Increased lumbar L side bending to 75% for increased ease with ADLs.(Progressing, not met)    Long Term Goals: 8 weeks (progressing, not met)  1.Report decreased    low back    pain  <   / =  2  /10  to increase tolerance for ADLs  2.Pt will perform sit to stand from 18in seat without UE support for 5/5 trials for increased core strength and functional mobility.   3.Increased R LE strength by 1 muscle grade in all deficient planes to increase tolerance for ADL and work activities.  4. Increased L LE strength by 1 muscle grade in all deficient planes to increase tolerance for ADL and work activities.  5. Pt to be Independent with HEP to improve ROM and independence with ADL's  6. Pt  to demonstrate negative Bridge Test in order to show improved core strength for lumbar stabilization.     Plan     Continue PT towards established goals.  Progress LE and core strengthening and stabilization as tolerated.    Plan of care Certification: 10/2/2024 to 11/29/24.    Outpatient Physical Therapy 2 times weekly for 8 weeks to include the following interventions: Electrical Stimulation  , Gait Training, Manual Therapy, Moist Heat/ Ice, Neuromuscular Re-ed, Patient Education, Self Care, Therapeutic Activities, Therapeutic Exercise, Ultrasound, and dry needling.     Nalini Carroll, PT

## 2024-11-07 ENCOUNTER — CLINICAL SUPPORT (OUTPATIENT)
Dept: REHABILITATION | Facility: HOSPITAL | Age: 69
End: 2024-11-07
Payer: MEDICARE

## 2024-11-07 DIAGNOSIS — M62.81 MUSCLE WEAKNESS: ICD-10-CM

## 2024-11-07 DIAGNOSIS — M25.60 DECREASED RANGE OF MOTION: Primary | ICD-10-CM

## 2024-11-07 PROCEDURE — 97530 THERAPEUTIC ACTIVITIES: CPT | Mod: PN,CQ

## 2024-11-07 PROCEDURE — 97112 NEUROMUSCULAR REEDUCATION: CPT | Mod: PN,CQ

## 2024-11-07 PROCEDURE — 97110 THERAPEUTIC EXERCISES: CPT | Mod: PN,CQ

## 2024-11-07 NOTE — PROGRESS NOTES
[facility]   Physical Therapy Treatment Note      Name: Nalini GAN [facility] Number: [id number]    Therapy Diagnosis:   Encounter Diagnoses   Name Primary?    Decreased range of motion Yes    Muscle weakness        Physician: Angel Yousif MD    Visit Date: 11/7/2024    Physician Orders: PT Eval and Treat   Medical Diagnosis from Referral: Lumbar spondylosis [M47.816], Myofascial pain   Evaluation Date: 10/2/2024  Authorization Period Expiration: 12/31/24  Plan of Care Expiration: 11/29/24  Visit # / Visits authorized: 9/ 20 (1 on prior auth)  FOTO: 69; goal: 62 by visit 11 (met)    PTA Visit #: 1/5     Time In: 10:35  Time Out: 11:20  Total Billable Time: 45 minutes     Precautions: Standard    Subjective     Patient reports: doing well, sometimes stiff in the mornings but does some exercises that help. Mild increase of aching to the low back and knees today, possibly due to the weather.   She was compliant with home exercise program.  Response to previous treatment: did well  Functional change: mopping and sweeping without pain    Pain: 3/10  Location: across low back      Objective      Objective measures taken at reassess unless otherwise specified     Treatment     Nalini received the treatments listed below:      therapeutic exercises to develop strength, ROM, and flexibility for 20 minutes including:  SL open books 5x10 sec ea  NP Shuttle 2.5 bands 3x10    SKTC 3x20 sec  Wig wags x30 sec - NP due to doing at home  Piriformis stretch 3x20 sec     Nalini participated in neuromuscular re-education activities to improve: Kinesthetic, Sense, Proprioception, and Posture for 15 minutes. The following activities were included:  Bridges 2x10, 5 sec hold  Hip add + TA set ball x15, 5 sec hold  Hip abd + TA set GTB x 10, 5 sec hold  TA + SKFO GTB x15 ea  TA + march x15 ea GTB  NP SL clams 2x10, 3 sec hold, YTB  Prone hip ext 2 x 10 each   NP Standing TA set + orange ball press 2x10, 5 sec  black Gayle participated in dynamic functional therapeutic activities to improve functional performance for 10  minutes, including:  Sit to stand from 18in box, holding 10# KB 2x10 (vc for TA and glute sets)  Hip abd walk Y sport loop 3 laps in // bars  hip hike + hip abd on 4in 2x10 ea  NP Pt edu on proper ergonomic for sewing.  May need a different sewing table to allow for proper height and seat height    Patient Education and Home Exercises       Education provided:   - importance of TA bracing with functional tasks to protect back  Pt gave verbal understanding to all education provided     Written Home Exercises Provided: Yes. Exercises were reviewed and Nalini was able to demonstrate them prior to the end of the session.  Nalini demonstrated good  understanding of the education provided. See Electronic Medical Record under Patient Instructions for exercises provided during therapy sessions    Assessment     Pt able to complete above exercises without pain exacerbation to the low back, reduced symptoms by end of treatment. Muscle fatigue with several exercises but tolerated all very well. Some cueing required with SL hip abduction to maintain good pelvic alignment and to avoid lumbar compensation. Will benefit from further progression as able for improved strength, endurance, and stability with functional activities.     Nalini Is progressing well towards her goals.   Patient prognosis is Good.     Patient will continue to benefit from skilled outpatient physical therapy to address the deficits listed in the problem list box on initial evaluation, provide pt/family education and to maximize pt's level of independence in the home and community environment.     Patient's spiritual, cultural and educational needs considered and pt agreeable to plan of care and goals.     Anticipated barriers to physical therapy: none    Goals:   Short Term Goals:  4 weeks   1.Report decreased    low back    pain  <   / =  4  /10   to increase tolerance for ADLs (met)  2. Pt will demonstrate understanding of proper body mechanics for lifting and performing house chores. (Met)  3. Increased R LE strength by 1/3 muscle grade in all deficient planes to increase tolerance for ADLs. (Met)  4. Increased L LE strength by 1/3 muscle grade in all deficient planes to increase tolerance for ADLs. (Met)  5. Pt to increase B Ely's Test by > or = 10 degrees in order to improve flexibility and posture. (Met)  6. Pt to tolerate HEP to improve ROM and independence with ADL's (met)  7. Increased lumbar L side bending to 75% for increased ease with ADLs.(Progressing, not met)    Long Term Goals: 8 weeks (progressing, not met)  1.Report decreased    low back    pain  <   / =  2  /10  to increase tolerance for ADLs  2.Pt will perform sit to stand from 18in seat without UE support for 5/5 trials for increased core strength and functional mobility.   3.Increased R LE strength by 1 muscle grade in all deficient planes to increase tolerance for ADL and work activities.  4. Increased L LE strength by 1 muscle grade in all deficient planes to increase tolerance for ADL and work activities.  5. Pt to be Independent with HEP to improve ROM and independence with ADL's  6. Pt to demonstrate negative Bridge Test in order to show improved core strength for lumbar stabilization.     Plan     Continue PT towards established goals.  Progress LE and core strengthening and stabilization as tolerated.    Plan of care Certification: 10/2/2024 to 11/29/24.    Outpatient Physical Therapy 2 times weekly for 8 weeks to include the following interventions: Electrical Stimulation  , Gait Training, Manual Therapy, Moist Heat/ Ice, Neuromuscular Re-ed, Patient Education, Self Care, Therapeutic Activities, Therapeutic Exercise, Ultrasound, and dry needling.     Alicia Ornelas, PTA

## 2024-11-13 ENCOUNTER — CLINICAL SUPPORT (OUTPATIENT)
Dept: REHABILITATION | Facility: HOSPITAL | Age: 69
End: 2024-11-13
Payer: MEDICARE

## 2024-11-13 ENCOUNTER — CLINICAL SUPPORT (OUTPATIENT)
Dept: ALLERGY | Facility: CLINIC | Age: 69
End: 2024-11-13
Payer: MEDICARE

## 2024-11-13 DIAGNOSIS — M25.60 DECREASED RANGE OF MOTION: Primary | ICD-10-CM

## 2024-11-13 DIAGNOSIS — M62.81 MUSCLE WEAKNESS: ICD-10-CM

## 2024-11-13 DIAGNOSIS — J30.9 CHRONIC ALLERGIC RHINITIS: Primary | ICD-10-CM

## 2024-11-13 PROCEDURE — 97112 NEUROMUSCULAR REEDUCATION: CPT | Mod: PN,CQ

## 2024-11-13 PROCEDURE — 95117 IMMUNOTHERAPY INJECTIONS: CPT | Mod: S$GLB,,, | Performed by: FAMILY MEDICINE

## 2024-11-13 PROCEDURE — 97530 THERAPEUTIC ACTIVITIES: CPT | Mod: PN,CQ

## 2024-11-13 PROCEDURE — 97110 THERAPEUTIC EXERCISES: CPT | Mod: PN,CQ

## 2024-11-13 PROCEDURE — 99999 PR PBB SHADOW E&M-EST. PATIENT-LVL I: CPT | Mod: PBBFAC,,,

## 2024-11-13 NOTE — PROGRESS NOTES
[facility]   Physical Therapy Treatment Note      Name: Nalini GAN [facility] Number: [id number]    Therapy Diagnosis:   Encounter Diagnoses   Name Primary?    Decreased range of motion Yes    Muscle weakness        Physician: Angel Yousif MD    Visit Date: 11/13/2024    Physician Orders: PT Eval and Treat   Medical Diagnosis from Referral: Lumbar spondylosis [M47.816], Myofascial pain   Evaluation Date: 10/2/2024  Authorization Period Expiration: 12/31/24  Plan of Care Expiration: 11/29/24  Visit # / Visits authorized: 9/ 20 (1 on prior auth)  FOTO: 69; goal: 62 by visit 11 (met)    PTA Visit #: 1/5     Time In: 11:20  Time Out: 12:05  Total Billable Time: 45 minutes     Precautions: Standard    Subjective     Patient reports: still getting occasional aching across the low back. Waiting on getting her new mattress which she thinks will help. Doing light exercises in the mornings has been helpful too.   She was compliant with home exercise program.  Response to previous treatment: did well  Functional change: mopping and sweeping without pain    Pain: 2-3/10  Location: across low back      Objective      Objective measures taken at reassess unless otherwise specified     Treatment     Nalini received the treatments listed below:      therapeutic exercises to develop strength, ROM, and flexibility for 10 minutes including:  SL open books 5x10 sec ea  NP Shuttle 2.5 bands 3x10    SKTC 3x20 sec  Wig wags x30 sec  Piriformis stretch 3x20 sec     Nalini participated in neuromuscular re-education activities to improve: Kinesthetic, Sense, Proprioception, and Posture for 20 minutes. The following activities were included:  Bridges 2x10, 5 sec hold  Hip add + TA set ball x15, 5 sec hold  Hip abd + TA set GTB x 15, 5 sec hold  TA + SKFO GTB x15 ea  TA + march x15 ea GTB  SL hip abduction x 10 each  Prone hip ext 2 x 10 each   NP Standing TA set + orange ball press 2x10, 5 sec hold    Nalini  participated in dynamic functional therapeutic activities to improve functional performance for 15 minutes, including:  Sit to stand from 18in box, holding 20# KB 2x10 (vc for TA and glute sets)  Hip abd walk Y sport loop x 10 steps each direction in walkway (longer distance next time)  hip hike + hip abd on 4in 2x10 ea  May add: forward step up with contra hip flexion  NP Pt edu on proper ergonomic for sewing.  May need a different sewing table to allow for proper height and seat height    Patient Education and Home Exercises       Education provided:   - importance of TA bracing with functional tasks to protect back  Pt gave verbal understanding to all education provided     Written Home Exercises Provided: Yes. Exercises were reviewed and Nalini was able to demonstrate them prior to the end of the session.  Nalini demonstrated good  understanding of the education provided. See Electronic Medical Record under Patient Instructions for exercises provided during therapy sessions    Assessment     Pt with improving core, hip and lower extremity strength and endurance. Less cueing required with several exercises and was able to progress several as well without symptom provocation. Responding well to core stabilization exercises both on the mat and while standing. Progressed sidesteps out of parallel bars. Will benefit from further progression as able for improved strength, endurance, and stability with functional activities.     Nalini Is progressing well towards her goals.   Patient prognosis is Good.     Patient will continue to benefit from skilled outpatient physical therapy to address the deficits listed in the problem list box on initial evaluation, provide pt/family education and to maximize pt's level of independence in the home and community environment.     Patient's spiritual, cultural and educational needs considered and pt agreeable to plan of care and goals.     Anticipated barriers to physical therapy:  none    Goals:   Short Term Goals:  4 weeks   1.Report decreased    low back    pain  <   / =  4  /10  to increase tolerance for ADLs (met)  2. Pt will demonstrate understanding of proper body mechanics for lifting and performing house chores. (Met)  3. Increased R LE strength by 1/3 muscle grade in all deficient planes to increase tolerance for ADLs. (Met)  4. Increased L LE strength by 1/3 muscle grade in all deficient planes to increase tolerance for ADLs. (Met)  5. Pt to increase B Ely's Test by > or = 10 degrees in order to improve flexibility and posture. (Met)  6. Pt to tolerate HEP to improve ROM and independence with ADL's (met)  7. Increased lumbar L side bending to 75% for increased ease with ADLs.(Progressing, not met)    Long Term Goals: 8 weeks (progressing, not met)  1.Report decreased    low back    pain  <   / =  2  /10  to increase tolerance for ADLs  2.Pt will perform sit to stand from 18in seat without UE support for 5/5 trials for increased core strength and functional mobility.   3.Increased R LE strength by 1 muscle grade in all deficient planes to increase tolerance for ADL and work activities.  4. Increased L LE strength by 1 muscle grade in all deficient planes to increase tolerance for ADL and work activities.  5. Pt to be Independent with HEP to improve ROM and independence with ADL's  6. Pt to demonstrate negative Bridge Test in order to show improved core strength for lumbar stabilization.     Plan     Continue PT towards established goals.  Progress LE and core strengthening and stabilization as tolerated.    Plan of care Certification: 10/2/2024 to 11/29/24.    Outpatient Physical Therapy 2 times weekly for 8 weeks to include the following interventions: Electrical Stimulation  , Gait Training, Manual Therapy, Moist Heat/ Ice, Neuromuscular Re-ed, Patient Education, Self Care, Therapeutic Activities, Therapeutic Exercise, Ultrasound, and dry needling.     Alicia Ornelas, PTA

## 2024-11-13 NOTE — PROGRESS NOTES
[facility]   Physical Therapy Treatment Note      Name: Nalini GAN [facility] Number: [id number]    Therapy Diagnosis:   Encounter Diagnoses   Name Primary?    Decreased range of motion Yes    Muscle weakness          Physician: Angel Yousif MD    Visit Date: 11/14/2024    Physician Orders: PT Eval and Treat   Medical Diagnosis from Referral: Lumbar spondylosis [M47.816], Myofascial pain   Evaluation Date: 10/2/2024  Authorization Period Expiration: 12/31/24  Plan of Care Expiration: 11/29/24  Visit # / Visits authorized: 9/ 20 (1 on prior auth)  FOTO: 69; goal: 62 by visit 11 (met)    PTA Visit #: 1/5     Time In: 10:35  Time Out: 11:15  Total Billable Time: 40 minutes     Precautions: Standard    Subjective     Patient reports: she woke up with some stiffness, but better after moving around.  She was compliant with home exercise program.  Response to previous treatment: did well  Functional change: mopping and sweeping without pain    Pain: 2/10  Location: across low back      Objective      Objective measures taken at reassess unless otherwise specified     Treatment     Nalini received the treatments listed below:      therapeutic exercises to develop strength, ROM, and flexibility for 10 minutes including:  SL open books 5x10 sec ea  NP Shuttle 2.5 bands 3x10    SKTC 3x20 sec  Wig wags x30 sec  Piriformis stretch 3x20 sec     Nalini participated in neuromuscular re-education activities to improve: Kinesthetic, Sense, Proprioception, and Posture for 15 minutes. The following activities were included:  Bridges x20, 5 sec hold  Hip add + TA set ball x15, 5 sec hold  Hip abd + TA set GTB x 15, 5 sec hold  TA + SKFO GTB x15 ea  TA + march x15 ea GTB  SL hip abduction 2x10 each  Prone hip ext 2 x 10 each   NP Standing TA set + orange ball press 2x10, 5 sec hold    Nalini participated in dynamic functional therapeutic activities to improve functional performance for 15 minutes,  including:  Sit to stand from 18in box, holding 20# KB 2x10 (vc for TA and glute sets)  Hip abd walk Y sport loop x4 laps in // bars (longer distance next time)  hip hike + hip abd on 4in 2x10 ea  forward step up with contra hip flexion 2x10 ea, 6in     NP Pt edu on proper ergonomic for sewing.  May need a different sewing table to allow for proper height and seat height    Patient Education and Home Exercises       Education provided:   - importance of TA bracing with functional tasks to protect back  Pt gave verbal understanding to all education provided     Written Home Exercises Provided: Yes. Exercises were reviewed and Nalini was able to demonstrate them prior to the end of the session.  Nalini demonstrated good  understanding of the education provided. See Electronic Medical Record under Patient Instructions for exercises provided during therapy sessions    Assessment     Pt tolerated treatment session well.  She was able to tolerate progression of core challenges. Will benefit from further progression as able for improved strength, endurance, and stability with functional activities.     Nalini Is progressing well towards her goals.   Patient prognosis is Good.     Patient will continue to benefit from skilled outpatient physical therapy to address the deficits listed in the problem list box on initial evaluation, provide pt/family education and to maximize pt's level of independence in the home and community environment.     Patient's spiritual, cultural and educational needs considered and pt agreeable to plan of care and goals.     Anticipated barriers to physical therapy: none    Goals:   Short Term Goals:  4 weeks   1.Report decreased    low back    pain  <   / =  4  /10  to increase tolerance for ADLs (met)  2. Pt will demonstrate understanding of proper body mechanics for lifting and performing house chores. (Met)  3. Increased R LE strength by 1/3 muscle grade in all deficient planes to increase  tolerance for ADLs. (Met)  4. Increased L LE strength by 1/3 muscle grade in all deficient planes to increase tolerance for ADLs. (Met)  5. Pt to increase B Ely's Test by > or = 10 degrees in order to improve flexibility and posture. (Met)  6. Pt to tolerate HEP to improve ROM and independence with ADL's (met)  7. Increased lumbar L side bending to 75% for increased ease with ADLs.(Progressing, not met)    Long Term Goals: 8 weeks (progressing, not met)  1.Report decreased    low back    pain  <   / =  2  /10  to increase tolerance for ADLs  2.Pt will perform sit to stand from 18in seat without UE support for 5/5 trials for increased core strength and functional mobility.   3.Increased R LE strength by 1 muscle grade in all deficient planes to increase tolerance for ADL and work activities.  4. Increased L LE strength by 1 muscle grade in all deficient planes to increase tolerance for ADL and work activities.  5. Pt to be Independent with HEP to improve ROM and independence with ADL's  6. Pt to demonstrate negative Bridge Test in order to show improved core strength for lumbar stabilization.     Plan     Continue PT towards established goals.  Progress LE and core strengthening and stabilization as tolerated.    Plan of care Certification: 10/2/2024 to 11/29/24.    Outpatient Physical Therapy 2 times weekly for 8 weeks to include the following interventions: Electrical Stimulation  , Gait Training, Manual Therapy, Moist Heat/ Ice, Neuromuscular Re-ed, Patient Education, Self Care, Therapeutic Activities, Therapeutic Exercise, Ultrasound, and dry needling.     Nalini Carroll, PT

## 2024-11-14 ENCOUNTER — CLINICAL SUPPORT (OUTPATIENT)
Dept: REHABILITATION | Facility: HOSPITAL | Age: 69
End: 2024-11-14
Payer: MEDICARE

## 2024-11-14 DIAGNOSIS — M25.60 DECREASED RANGE OF MOTION: Primary | ICD-10-CM

## 2024-11-14 DIAGNOSIS — M62.81 MUSCLE WEAKNESS: ICD-10-CM

## 2024-11-14 PROCEDURE — 97530 THERAPEUTIC ACTIVITIES: CPT | Mod: PN

## 2024-11-14 PROCEDURE — 97112 NEUROMUSCULAR REEDUCATION: CPT | Mod: PN

## 2024-11-14 PROCEDURE — 97110 THERAPEUTIC EXERCISES: CPT | Mod: PN

## 2024-11-19 ENCOUNTER — OFFICE VISIT (OUTPATIENT)
Dept: PAIN MEDICINE | Facility: CLINIC | Age: 69
End: 2024-11-19
Payer: MEDICARE

## 2024-11-19 ENCOUNTER — CLINICAL SUPPORT (OUTPATIENT)
Dept: ALLERGY | Facility: CLINIC | Age: 69
End: 2024-11-19
Payer: MEDICARE

## 2024-11-19 VITALS
DIASTOLIC BLOOD PRESSURE: 100 MMHG | WEIGHT: 196.56 LBS | HEIGHT: 63 IN | BODY MASS INDEX: 34.83 KG/M2 | HEART RATE: 73 BPM | SYSTOLIC BLOOD PRESSURE: 159 MMHG

## 2024-11-19 DIAGNOSIS — J30.9 CHRONIC ALLERGIC RHINITIS: Primary | ICD-10-CM

## 2024-11-19 DIAGNOSIS — M47.816 LUMBAR SPONDYLOSIS: Primary | ICD-10-CM

## 2024-11-19 PROCEDURE — 99999 PR PBB SHADOW E&M-EST. PATIENT-LVL III: CPT | Mod: PBBFAC,,, | Performed by: ANESTHESIOLOGY

## 2024-11-19 PROCEDURE — 3044F HG A1C LEVEL LT 7.0%: CPT | Mod: CPTII,S$GLB,, | Performed by: ANESTHESIOLOGY

## 2024-11-19 PROCEDURE — 3080F DIAST BP >= 90 MM HG: CPT | Mod: CPTII,S$GLB,, | Performed by: ANESTHESIOLOGY

## 2024-11-19 PROCEDURE — 99213 OFFICE O/P EST LOW 20 MIN: CPT | Mod: S$GLB,,, | Performed by: ANESTHESIOLOGY

## 2024-11-19 PROCEDURE — 1159F MED LIST DOCD IN RCRD: CPT | Mod: CPTII,S$GLB,, | Performed by: ANESTHESIOLOGY

## 2024-11-19 PROCEDURE — 95117 IMMUNOTHERAPY INJECTIONS: CPT | Mod: S$GLB,,, | Performed by: STUDENT IN AN ORGANIZED HEALTH CARE EDUCATION/TRAINING PROGRAM

## 2024-11-19 PROCEDURE — 99999 PR PBB SHADOW E&M-EST. PATIENT-LVL I: CPT | Mod: PBBFAC,,,

## 2024-11-19 PROCEDURE — 3008F BODY MASS INDEX DOCD: CPT | Mod: CPTII,S$GLB,, | Performed by: ANESTHESIOLOGY

## 2024-11-19 PROCEDURE — 3288F FALL RISK ASSESSMENT DOCD: CPT | Mod: CPTII,S$GLB,, | Performed by: ANESTHESIOLOGY

## 2024-11-19 PROCEDURE — 1125F AMNT PAIN NOTED PAIN PRSNT: CPT | Mod: CPTII,S$GLB,, | Performed by: ANESTHESIOLOGY

## 2024-11-19 PROCEDURE — 1160F RVW MEDS BY RX/DR IN RCRD: CPT | Mod: CPTII,S$GLB,, | Performed by: ANESTHESIOLOGY

## 2024-11-19 PROCEDURE — 3077F SYST BP >= 140 MM HG: CPT | Mod: CPTII,S$GLB,, | Performed by: ANESTHESIOLOGY

## 2024-11-19 PROCEDURE — 1101F PT FALLS ASSESS-DOCD LE1/YR: CPT | Mod: CPTII,S$GLB,, | Performed by: ANESTHESIOLOGY

## 2024-11-19 NOTE — PROGRESS NOTES
[facility] Follow Up Evaluation      Referred by: No ref. provider found    PCP:     CC:   Chief Complaint   Patient presents with    Low-back Pain          11/19/2024    11:36 AM 9/16/2024     2:49 PM 6/20/2024     1:18 PM   Last 3 PDI Scores   Pain Disability Index (PDI) 37 28 38       Interval HPI 11/19/24: Ms. Wooten returns to the office for follow up.  Today she reports she continues to have very good relief of her axial lower back pain.  She occasionally will get some pain if she over works however she reports that her day-to-day activity she has very good relief of her previous pain.    HPI:   Nalini Wooten is a 69 y.o. female patient who has a past medical history of Allergies, Bulging disc, Carpal tunnel syndrome, Degenerative disc disease, High cholesterol, and Pinched nerve in neck. She presents with back pain.  Had chronic back pain for many years that has been gradually worsening.  Today she reports her pain is 9 in 10, intermittent, aching.  Can get referred pain into the bilateral buttocks.  Pain is worse with sitting, standing, bending, coughing, walking, lifting and relieved with rest and injections.  She has had injections for 5 years ago with excellent relief of previous pain.  She denies any numbness or weakness.      Pain Intervention History:  - s/p 1st diagnostic bilateral L4-5 and L5-S1 medial branch blocks on 5/22/24 and reports she got 85% relief lasting for 8 hours.  Preprocedure pain score 7.  Postprocedure pain score 1  - s/p 2nd diagnostic bilateral L4-5 and L5-S1 medial branch blocks on 7/19/24 and reports she got 85% relief lasting for 8 hours.   - bilateral L4-5 and L5-S1 RFA on 08/08/2024 with 80% relief     Past Spine Surgical History:      Past and current medications:  Antineuropathics:  NSAIDs:  Physical therapy: yes, completed   Antidepressants:  Muscle relaxers:  Opioids: tramadol   Antiplatelets/Anticoagulants:     History:    Current Outpatient Medications:      chlorhexidine (PERIDEX) 0.12 % solution, Use as directed 15 mLs in the mouth or throat 2 (two) times daily., Disp: , Rfl:     cyanocobalamin 1,000 mcg/mL injection, Inject every week 1 cc, Disp: 10 mL, Rfl: 1    cyclobenzaprine (FLEXERIL) 5 MG tablet, Take 1 tablet (5 mg total) by mouth 2 (two) times daily as needed for Muscle spasms., Disp: 30 tablet, Rfl: 0    doxycycline (VIBRAMYCIN) 100 MG Cap, Take 1 pill by mouth daily x 1 month, Disp: 30 capsule, Rfl: 1    fexofenadine (ALLEGRA) 180 MG tablet, Take 1 tablet (180 mg total) by mouth once daily., Disp: 90 tablet, Rfl: 1    hydrocortisone 2.5 % cream, Apply twice daily as needed to rash on face, Disp: 30 g, Rfl: 2    LIDOcaine viscous HCl 2% (LIDOCAINE VISCOUS) 2 % Soln, 2.5 mLs by Mucous Membrane route every 6 (six) hours., Disp: , Rfl:     metFORMIN (GLUCOPHAGE) 500 MG tablet, Take 1 tablet (500 mg total) by mouth 2 (two) times daily with meals., Disp: 180 tablet, Rfl: 2    pantoprazole (PROTONIX) 40 MG tablet, Take 40 mg by mouth every morning., Disp: , Rfl:     phentermine (ADIPEX-P) 37.5 mg tablet, Take 1 tablet (37.5 mg total) by mouth once daily., Disp: 90 tablet, Rfl: 0    pimecrolimus (ELIDEL) 1 % cream, AAA bid, Disp: 60 g, Rfl: 3    pravastatin (PRAVACHOL) 20 MG tablet, Take 1 tablet (20 mg total) by mouth once daily., Disp: 90 tablet, Rfl: 3    tolterodine (DETROL LA) 4 MG 24 hr capsule, Take 1 capsule (4 mg total) by mouth once daily., Disp: 90 capsule, Rfl: 1    traMADoL (ULTRAM) 50 mg tablet, Take 1 tablet (50 mg total) by mouth every 12 (twelve) hours as needed for Pain., Disp: 30 tablet, Rfl: 0    Past Medical History:   Diagnosis Date    Allergies     Bulging disc     lower back     Carpal tunnel syndrome     Degenerative disc disease     High cholesterol     Pinched nerve in neck        Past Surgical History:   Procedure Laterality Date    ARTHROSCOPIC REPAIR OF ROTATOR CUFF OF SHOULDER Right 3/10/2021    Procedure: REPAIR, ROTATOR CUFF,  ARTHROSCOPIC;  Surgeon: Dustin Vanegas MD;  Location: [facility] OR;  Service: Orthopedics;  Laterality: Right;  arthrex notified    ARTHROSCOPY OF SHOULDER WITH DECOMPRESSION OF SUBACROMIAL SPACE Right 3/10/2021    Procedure: ARTHROSCOPY, SHOULDER, WITH SUBACROMIAL SPACE DECOMPRESSION;  Surgeon: Dustin Vanegas MD;  Location: [facility] OR;  Service: Orthopedics;  Laterality: Right;    BLADDER SUSPENSION  1990    CATARACT EXTRACTION W/  INTRAOCULAR LENS IMPLANT Right 6/21/2023    Procedure: CEIOL OD;  Surgeon: Shawn Shearer MD;  Location: [facility] OR;  Service: Ophthalmology;  Laterality: Right;    CATARACT EXTRACTION W/  INTRAOCULAR LENS IMPLANT Left 7/19/2023    Procedure: CEIOL OS;  Surgeon: Shawn Shearer MD;  Location: [facility] OR;  Service: Ophthalmology;  Laterality: Left;    DISTAL CLAVICLE EXCISION Right 3/10/2021    Procedure: EXCISION, CLAVICLE, DISTAL;  Surgeon: Dustin Vanegas MD;  Location: [facility] OR;  Service: Orthopedics;  Laterality: Right;    FOOT SURGERY Bilateral 2001    FOOT SURGERY Right 05/13/2022    HAND SURGERY Left 2017    HYSTERECTOMY  1990    INJECTION OF ANESTHETIC AGENT AROUND MEDIAL BRANCH NERVES INNERVATING LUMBAR FACET JOINT Bilateral 5/22/2024    Procedure: Block-nerve-medial branch-lumbar     L4/5, L5/S1;  Surgeon: Angel Yousif MD;  Location: [facility] OR;  Service: Pain Management;  Laterality: Bilateral;    INJECTION OF ANESTHETIC AGENT AROUND MEDIAL BRANCH NERVES INNERVATING LUMBAR FACET JOINT Bilateral 7/19/2024    Procedure: Block-nerve-medial branch-lumbar Bilat L4/5 and L5/S1;  Surgeon: Angel Yousif MD;  Location: [facility] OR;  Service: Pain Management;  Laterality: Bilateral;  Bilat L4/5 and L5/S1    JOINT REPLACEMENT  2013    KNEE SURGERY Bilateral 2013    MANDIBLE FRACTURE SURGERY  1981    RADIOFREQUENCY ABLATION OF LUMBAR MEDIAL BRANCH NERVE AT SINGLE LEVEL Bilateral 8/8/2024    Procedure: Radiofrequency Ablation, Nerve, Spinal, Lumbar, Medial Branch, L4/5 and L5/S1;  Surgeon: Angel Yousif  MD JASMYN;  Location: [facility] OR;  Service: Pain Management;  Laterality: Bilateral;    SHOULDER ARTHROSCOPY Left 02/27/2019    THYROIDECTOMY Left 05/13/2019        TONSILLECTOMY         Family History   Problem Relation Name Age of Onset    Arthritis Mother      Aneurysm Mother          Abdominal Aneurysm     Heart disease Father  65    Arthritis Father      Diabetes Father      Hearing loss Father      Hypertension Father      Dementia Father      No Known Problems Sister      No Known Problems Sister      Heart disease Brother  50 - 59        stent placement    Heart disease Brother  49        stent placement    Aneurysm Brother          Abdominal Aneurysm    Leukemia Son 3     Crohn's disease Son 3     Ankylosing spondylitis Son 3     Other Son 3         avascular necrosis of pancho hips    Rheumatologic disease Son 3     Heart defect Son 3     Kidney failure Son 3     SIDS Maternal Aunt 2     Uterine cancer Maternal Aunt 2     Dementia Paternal Aunt 1     Diabetes Paternal Grandmother      Heart disease Paternal Grandfather         Social History     Socioeconomic History    Marital status:     Number of children: 3   Tobacco Use    Smoking status: Never     Passive exposure: Never    Smokeless tobacco: Never   Substance and Sexual Activity    Alcohol use: Never     Alcohol/week: 0.0 standard drinks of alcohol    Drug use: No    Sexual activity: Yes     Birth control/protection: None     Social Drivers of Health     Financial Resource Strain: Low Risk  (8/1/2024)    Overall Financial Resource Strain (CARDIA)     Difficulty of Paying Living Expenses: Not hard at all   Food Insecurity: No Food Insecurity (8/1/2024)    Hunger Vital Sign     Worried About Running Out of Food in the Last Year: Never true     Ran Out of Food in the Last Year: Never true   Transportation Needs: No Transportation Needs (6/7/2023)    PRAPARE - Transportation     Lack of Transportation (Medical): No     Lack of Transportation  "(Non-Medical): No   Physical Activity: Insufficiently Active (8/1/2024)    Exercise Vital Sign     Days of Exercise per Week: 4 days     Minutes of Exercise per Session: 20 min   Stress: Stress Concern Present (8/1/2024)    [place] [employer] - Occupational Stress Questionnaire     Feeling of Stress : To some extent   Housing Stability: Low Risk  (6/7/2023)    Housing Stability Vital Sign     Unable to Pay for Housing in the Last Year: No     Number of Places Lived in the Last Year: 1     Unstable Housing in the Last Year: No       Review of patient's allergies indicates:   Allergen Reactions    Adhesive Swelling and Blisters     Adhesive that requires uv light    Estrogens Other (See Comments)     Mini stroke    Shingrix (pf)  [varicella-zoster [id number] (pf)] Diarrhea, Itching and Nausea And Vomiting       Review of Systems:  Positive for headaches, weight gain, difficulty sleeping, urinary frequency.  Balance of review of systems is negative.    Physical Exam:  Vitals:    11/19/24 1139   BP: (!) 159/100   Pulse: 73   Weight: 89.1 kg (196 lb 8.6 oz)   Height: 5' 3" (1.6 m)   PainSc:   3   PainLoc: Back     Body mass index is 34.82 kg/m².    Gen: NAD  Psych: mood appropriate for given condition  HEENT: eyes anicteric   CV: RRR  HEENT: anicteric   Respiratory: non-labored, no signs of respiratory distress  Abd: non-distended  Skin: warm, dry and intact.  Gait: No antalgic gait.     Reproducible pain with lumbar axial facet loading    Sensory:  Intact and symmetrical to light touch in L1-S1 dermatomes bilaterally.    Motor:     Right Left   L2/3 Iliacus Hip flexion  5  5   L3/4 Qudratus Femoris Knee Extension  5  5   L4/5 Tib Anterior Ankle Dorsiflexion   5  5   L5/S1 Extensor Hallicus Longus Great toe extension  5  5   S1/S2 Gastroc/Soleus Plantar Flexion  5  5      Right Left                  Patellar DTR 0 0   Achilles DTR 0 0                      Labs:  Lab Results   Component Value Date    " HGBA1C 6.3 (H) 10/07/2024       Lab Results   Component Value Date    WBC 6.52 10/07/2024    HGB 12.3 10/07/2024    HCT 39.2 10/07/2024    MCV 87 10/07/2024     10/07/2024         Imaging:  MRI lumbar spine 4/30/24  FINDINGS:  Vertebral column: There is degenerative change which will be described by level.  The lumbar vertebral bodies maintain normal height.  There is no fracture.  There is trace anterolisthesis of L4 on L5.  There is stable trace retrolisthesis of L2 on L3, L3 on L4.  The lumbar discs are desiccated.  There is mild-to-moderate disc space narrowing at the L2-3 level with mild disc space narrowing at the L3-4 level.  Baseline marrow signal is normal.     Spinal canal, conus, epidural space: The spinal canal is developmentally normal.  The conus terminates at the level of T12-L1 and is normal in contour and signal intensity.  There is no abnormal epidural mass or fluid collection.     Findings by level:     On the sagittal images, there is a minimal disc bulge at the T11-12 level but there is no spinal stenosis or cord compression.  T12-L1: There is a minimal disc bulge.  There is no spinal canal or significant foraminal stenosis.  There is a small perineural cyst in the superior left foraminal recess.  L1-2: There is mild facet joint arthropathy and a minimal disc bulge.  There is no spinal canal or significant foraminal stenosis.  L2-3: There is trace retrolisthesis of L2 on L3.  There is mild-to-moderate disc space narrowing.  There is a diffuse disc bulge with osteophytic ridging eccentric to the left.  There is mild facet joint arthropathy with ligamentum flavum thickening.  There is only borderline spinal stenosis.  There is crowding of the left lateral recess.  There is mild-to-moderate left and mild right foraminal stenosis.  L3-4: There is trace retrolisthesis of L3 on L4 where there is also mild disc space narrowing and inferior unroofing of a diffuse disc bulge with mild osteophytic  ridging eccentric to the left.  There is only mild facet joint arthropathy.  There is no spinal stenosis.  Again there is crowding of the left lateral recess.  There is mild-to-moderate left foraminal stenosis.  L4-5: There is subtle trace anterolisthesis of L4 on L5.  There is moderate facet joint arthropathy with ligamentum flavum thickening.  There is a mild disc bulge.  There is flattening of the ventral dural sac.  There is borderline to mild spinal stenosis.  There is mild bilateral foraminal stenosis.  L5-S1: There is facet joint arthropathy.  There is no spinal canal or significant foraminal stenosis.     Soft tissues, other: The posterior spinous muscles are somewhat atrophic.  The prevertebral soft tissues are normal.  The aorta is ectatic measuring up approximately 3 cm at the level of L3-4.  There is no hydronephrosis.    Assessment:   Problem List Items Addressed This Visit    None  Visit Diagnoses       Lumbar spondylosis    -  Primary                Nalini Wooten is a 69 y.o. female patient who has a past medical history of Allergies, Bulging disc, Carpal tunnel syndrome, Degenerative disc disease, High cholesterol, and Pinched nerve in neck. She presents with back pain.  Had chronic back pain for many years that has been gradually worsening.  Today she reports her pain is 9 in 10, intermittent, aching.          11/19/24 - Ms. Wooten returns to the office for follow up.  Today she reports she continues to have very good relief of her axial lower back pain.  She occasionally will get some pain if she over works however she reports that her day-to-day activity she has very good relief of her previous pain.  At this time she is going to maintain PT in continue PT directed home exercise program.  She will follow up with me on an as needed basis if her axial lower back pain should return        : Not applicable    Angel Yousif M.D.  Interventional Pain Medicine / Anesthesiology    This note was  completed with dictation software and grammatical errors may exist.

## 2024-11-25 NOTE — PROGRESS NOTES
[facility]   Physical Therapy Treatment Note      Name: Nalini GAN [facility] Number: [id number]    Therapy Diagnosis:   Encounter Diagnoses   Name Primary?    Decreased range of motion Yes    Muscle weakness        Physician: Angel Yousif MD    Visit Date: 11/26/2024    Physician Orders: PT Eval and Treat   Medical Diagnosis from Referral: Lumbar spondylosis [M47.816], Myofascial pain   Evaluation Date: 10/2/2024  Authorization Period Expiration: 12/31/24  Plan of Care Expiration: 11/29/24  Visit # / Visits authorized: 10/ 20 (1 on prior auth)  FOTO: 69; goal: 62 by visit 11 (met)    PTA Visit #: 1/5     Time In: 1:00  Time Out: 1:45  Total Billable Time: 40 minutes     Precautions: Standard    Subjective     Patient reports: she woke up with some stiffness, but better after moving around.  She was compliant with home exercise program.  Response to previous treatment: did well  Functional change: mopping and sweeping without pain    Pain: 5/10  Location: across low back      Objective      Objective measures taken at reassess unless otherwise specified     Treatment     Nalini received the treatments listed below:      therapeutic exercises to develop strength, ROM, and flexibility for 10 minutes including:  SL open books 5x10 sec ea  NP Shuttle 2.5 bands 3x10  SKTC 3x20 sec  Wig wags x30 sec  Piriformis stretch 3x20 sec     Nalini participated in neuromuscular re-education activities to improve: Kinesthetic, Sense, Proprioception, and Posture for 15 minutes. The following activities were included:  Bridges x20, 5 sec hold  Hip add + TA set ball x15, 5 sec hold  Hip abd + TA set GTB x 15, 5 sec hold  TA + SKFO GTB x15 ea  TA + march x15 ea GTB  SL hip abduction 2x10 each  Prone hip ext 2 x 10 each   NP Standing TA set + orange ball press 2x10, 5 sec hold    Nalini participated in dynamic functional therapeutic activities to improve functional performance for 15 minutes, including:  Sit  to stand from 18in box, holding 20# KB 2x10 (vc for TA and glute sets)  Hip abd walk Y sport loop x5 laps in // bars (  hip hike + hip abd on 4in 2x10 ea  NP forward step up with contra hip flexion 2x10 ea, 6in     NP Pt edu on proper ergonomic for sewing.  May need a different sewing table to allow for proper height and seat height    Patient Education and Home Exercises       Education provided:   - importance of TA bracing with functional tasks to protect back  Pt gave verbal understanding to all education provided     Written Home Exercises Provided: Yes. Exercises were reviewed and Nalini was able to demonstrate them prior to the end of the session.  Nalini demonstrated good  understanding of the education provided. See Electronic Medical Record under Patient Instructions for exercises provided during therapy sessions    Assessment     Pt presented with some increased pain after putting green house together.  She tolerated all exercises well and reported decreased pain to 2/10 post tx.      Nalini Is progressing well towards her goals.   Patient prognosis is Good.     Patient will continue to benefit from skilled outpatient physical therapy to address the deficits listed in the problem list box on initial evaluation, provide pt/family education and to maximize pt's level of independence in the home and community environment.     Patient's spiritual, cultural and educational needs considered and pt agreeable to plan of care and goals.     Anticipated barriers to physical therapy: none    Goals:   Short Term Goals:  4 weeks   1.Report decreased    low back    pain  <   / =  4  /10  to increase tolerance for ADLs (met)  2. Pt will demonstrate understanding of proper body mechanics for lifting and performing house chores. (Met)  3. Increased R LE strength by 1/3 muscle grade in all deficient planes to increase tolerance for ADLs. (Met)  4. Increased L LE strength by 1/3 muscle grade in all deficient planes to increase  tolerance for ADLs. (Met)  5. Pt to increase B Ely's Test by > or = 10 degrees in order to improve flexibility and posture. (Met)  6. Pt to tolerate HEP to improve ROM and independence with ADL's (met)  7. Increased lumbar L side bending to 75% for increased ease with ADLs.(Progressing, not met)    Long Term Goals: 8 weeks (progressing, not met)  1.Report decreased    low back    pain  <   / =  2  /10  to increase tolerance for ADLs  2.Pt will perform sit to stand from 18in seat without UE support for 5/5 trials for increased core strength and functional mobility.   3.Increased R LE strength by 1 muscle grade in all deficient planes to increase tolerance for ADL and work activities.  4. Increased L LE strength by 1 muscle grade in all deficient planes to increase tolerance for ADL and work activities.  5. Pt to be Independent with HEP to improve ROM and independence with ADL's  6. Pt to demonstrate negative Bridge Test in order to show improved core strength for lumbar stabilization.     Plan     Will reassess and probable discharge next visit.    Plan of care Certification: 10/2/2024 to 11/29/24.    Outpatient Physical Therapy 2 times weekly for 8 weeks to include the following interventions: Electrical Stimulation  , Gait Training, Manual Therapy, Moist Heat/ Ice, Neuromuscular Re-ed, Patient Education, Self Care, Therapeutic Activities, Therapeutic Exercise, Ultrasound, and dry needling.     Nalini Carroll, PT

## 2024-11-26 ENCOUNTER — CLINICAL SUPPORT (OUTPATIENT)
Dept: ALLERGY | Facility: CLINIC | Age: 69
End: 2024-11-26
Payer: MEDICARE

## 2024-11-26 ENCOUNTER — CLINICAL SUPPORT (OUTPATIENT)
Dept: REHABILITATION | Facility: HOSPITAL | Age: 69
End: 2024-11-26
Payer: MEDICARE

## 2024-11-26 ENCOUNTER — PATIENT MESSAGE (OUTPATIENT)
Dept: ALLERGY | Facility: CLINIC | Age: 69
End: 2024-11-26

## 2024-11-26 DIAGNOSIS — J30.9 CHRONIC ALLERGIC RHINITIS: Primary | ICD-10-CM

## 2024-11-26 DIAGNOSIS — M25.60 DECREASED RANGE OF MOTION: Primary | ICD-10-CM

## 2024-11-26 DIAGNOSIS — M62.81 MUSCLE WEAKNESS: ICD-10-CM

## 2024-11-26 PROCEDURE — 97530 THERAPEUTIC ACTIVITIES: CPT | Mod: PN

## 2024-11-26 PROCEDURE — 97110 THERAPEUTIC EXERCISES: CPT | Mod: PN

## 2024-11-26 PROCEDURE — 97112 NEUROMUSCULAR REEDUCATION: CPT | Mod: PN

## 2024-11-26 PROCEDURE — 95117 IMMUNOTHERAPY INJECTIONS: CPT | Mod: S$GLB,,, | Performed by: STUDENT IN AN ORGANIZED HEALTH CARE EDUCATION/TRAINING PROGRAM

## 2024-12-02 NOTE — PROGRESS NOTES
[facility]   Discharge Summary and Physical Therapy Treatment Note      Name: Nalini Wooten  Clinic Number: [id number]    Therapy Diagnosis:   Encounter Diagnoses   Name Primary?    Decreased range of motion Yes    Muscle weakness          Physician: Angel Yousif MD    Visit Date: 12/3/2024    Physician Orders: PT Eval and Treat   Medical Diagnosis from Referral: Lumbar spondylosis [M47.816], Myofascial pain   Evaluation Date: 10/2/2024  Authorization Period Expiration: 12/31/24  Plan of Care Expiration: 11/29/24  Visit # / Visits authorized: 11/ 20 (1 on prior auth)  FOTO: 69; goal: 62 by visit 11 (met)    PTA Visit #: 1/5     Time In: 10:40  Time Out: 11:20  Total Billable Time: 40 minutes     Precautions: Standard    Subjective     Patient reports: she got her new mattress on Friday and she is sleeping better and waking up with less pain.  She was compliant with home exercise program.  Response to previous treatment: did well  Functional change: mopping and sweeping without pain    Pain: 0/10  Location: across low back      Objective      Observation: pt is pleasant and cooperation    Posture:  fwd head, rounded shoulders, kyphosis    Lumbar Range of Motion:    % Pain   Flexion 100   no        Extension 100   no        Left Side Bending 75 no        Right Side Bending 75 no        Left rotation   75 no        Right Rotation   75 no             Lower Extremity Strength  Right LE  Left LE    Knee extension: 5/5 Knee extension: 5/5   Knee flexion: 5/5 Knee flexion: 5/5 pull to L low back   Hip flexion: 5/5  Hip flexion: 5/5   Hip extension:  4+/5 Hip extension: 4+/5   Hip abduction: 5/5 Hip abduction: 5/5   Hip adduction: 5/5 Hip adduction 5/5    Ankle dorsiflexion: 5/5 Ankle dorsiflexion: 5/5     Function:  Sit to stand: able to stand from chair with UE support  Mod I for all aspects of bed mobility    Special Tests:  -Piriformis Test: + B  -Bridge Test: +; pain to low back with  bridge    Neuro Dynamic Testing:    Sciatic nerve:      SLR: -      Joint Mobility: hypomobile L1 and L2 with PAs and pain    Palpation: TTP and tightness to B lumbar paraspinals and B piriformis    Sensation: grossly intact to light touch    Flexibility:    Ely's test: R = 100 degrees ; L = 110 degrees   Popliteal Angle: R = -5 degrees ; L = -10 degrees     Treatment     Nalini received the treatments listed below:      therapeutic exercises to develop strength, ROM, and flexibility for 10 minutes including:  Reassessment  SL open books 5x10 sec ea  NP Shuttle 2.5 bands 3x10  SKTC 3x20 sec  Wig wags x30 sec  Piriformis stretch 3x20 sec     Nalini participated in neuromuscular re-education activities to improve: Kinesthetic, Sense, Proprioception, and Posture for 15 minutes. The following activities were included:  Bridges x20, 5 sec hold  Hip add + TA set ball x15, 5 sec hold  Hip abd + TA set GTB x 15, 5 sec hold  TA + SKFO GTB x15 ea  TA + march x15 ea GTB  SL hip abduction x15 each  Prone hip ext 2 x 10 each   NP Standing TA set + orange ball press 2x10, 5 sec hold    Nalini participated in dynamic functional therapeutic activities to improve functional performance for 15 minutes, including:  Sit to stand from 18in box, holding 20# KB 2x10 (vc for TA and glute sets)  Hip abd walk Y sport loop x5 laps in // bars (  hip hike + hip abd on 4in 2x10 ea  NP forward step up with contra hip flexion 2x10 ea, 6in     NP Pt edu on proper ergonomic for sewing.  May need a different sewing table to allow for proper height and seat height    Patient Education and Home Exercises       Education provided:   - importance of TA bracing with functional tasks to protect back  Pt gave verbal understanding to all education provided     Written Home Exercises Provided: Yes. Exercises were reviewed and Nalini was able to demonstrate them prior to the end of the session.  Nalini demonstrated good  understanding of the education provided. See  Electronic Medical Record under Patient Instructions for exercises provided during therapy sessions    Assessment     Pt met all goals and is safe for discharge to continue to self manage with HEP.    Patient's spiritual, cultural and educational needs considered and pt agreeable to plan of care and goals.     Anticipated barriers to physical therapy: none    Goals:   Short Term Goals:  4 weeks   1.Report decreased    low back    pain  <   / =  4  /10  to increase tolerance for ADLs (met)  2. Pt will demonstrate understanding of proper body mechanics for lifting and performing house chores. (Met)  3. Increased R LE strength by 1/3 muscle grade in all deficient planes to increase tolerance for ADLs. (Met)  4. Increased L LE strength by 1/3 muscle grade in all deficient planes to increase tolerance for ADLs. (Met)  5. Pt to increase B Ely's Test by > or = 10 degrees in order to improve flexibility and posture. (Met)  6. Pt to tolerate HEP to improve ROM and independence with ADL's (met)  7. Increased lumbar L side bending to 75% for increased ease with ADLs.(met)    Long Term Goals: 8 weeks   1.Report decreased    low back    pain  <   / =  2  /10  to increase tolerance for ADLs (met)  2.Pt will perform sit to stand from 18in seat without UE support for 5/5 trials for increased core strength and functional mobility. (Met)  3.Increased R LE strength by 1 muscle grade in all deficient planes to increase tolerance for ADL and work activities. (Met)  4. Increased L LE strength by 1 muscle grade in all deficient planes to increase tolerance for ADL and work activities. (Met)  5. Pt to be Independent with HEP to improve ROM and independence with ADL's (met)  6. Pt to demonstrate negative Bridge Test in order to show improved core strength for lumbar stabilization. (Met)    Plan     Pt discharged from PT to continue to self manage with HEP.    Nalini Carroll, PT, DPT

## 2024-12-03 ENCOUNTER — CLINICAL SUPPORT (OUTPATIENT)
Dept: REHABILITATION | Facility: HOSPITAL | Age: 69
End: 2024-12-03
Payer: MEDICARE

## 2024-12-03 DIAGNOSIS — M62.81 MUSCLE WEAKNESS: ICD-10-CM

## 2024-12-03 DIAGNOSIS — M25.60 DECREASED RANGE OF MOTION: Primary | ICD-10-CM

## 2024-12-03 PROCEDURE — 97112 NEUROMUSCULAR REEDUCATION: CPT | Mod: PN

## 2024-12-03 PROCEDURE — 97110 THERAPEUTIC EXERCISES: CPT | Mod: PN

## 2024-12-03 PROCEDURE — 97530 THERAPEUTIC ACTIVITIES: CPT | Mod: PN

## 2024-12-04 ENCOUNTER — CLINICAL SUPPORT (OUTPATIENT)
Dept: ALLERGY | Facility: CLINIC | Age: 69
End: 2024-12-04
Payer: MEDICARE

## 2024-12-04 DIAGNOSIS — J30.9 CHRONIC ALLERGIC RHINITIS: Primary | ICD-10-CM

## 2024-12-04 PROCEDURE — 95117 IMMUNOTHERAPY INJECTIONS: CPT | Mod: S$GLB,,, | Performed by: ALLERGY & IMMUNOLOGY

## 2024-12-10 ENCOUNTER — CLINICAL SUPPORT (OUTPATIENT)
Dept: ALLERGY | Facility: CLINIC | Age: 69
End: 2024-12-10
Payer: MEDICARE

## 2024-12-10 DIAGNOSIS — J30.9 CHRONIC ALLERGIC RHINITIS: Primary | ICD-10-CM

## 2024-12-10 PROCEDURE — 95117 IMMUNOTHERAPY INJECTIONS: CPT | Mod: S$GLB,,, | Performed by: STUDENT IN AN ORGANIZED HEALTH CARE EDUCATION/TRAINING PROGRAM

## 2024-12-18 ENCOUNTER — PATIENT MESSAGE (OUTPATIENT)
Dept: ALLERGY | Facility: CLINIC | Age: 69
End: 2024-12-18

## 2024-12-18 ENCOUNTER — CLINICAL SUPPORT (OUTPATIENT)
Dept: ALLERGY | Facility: CLINIC | Age: 69
End: 2024-12-18
Payer: MEDICARE

## 2024-12-18 DIAGNOSIS — J30.9 CHRONIC ALLERGIC RHINITIS: Primary | ICD-10-CM

## 2024-12-18 PROCEDURE — 95117 IMMUNOTHERAPY INJECTIONS: CPT | Mod: S$GLB,,, | Performed by: ALLERGY & IMMUNOLOGY

## 2024-12-18 PROCEDURE — 99999 PR PBB SHADOW E&M-EST. PATIENT-LVL I: CPT | Mod: PBBFAC,,,

## 2025-01-06 NOTE — PROGRESS NOTES
ALLERGY & IMMUNOLOGY CLINIC -  NEW PATIENT     HISTORY OF PRESENT ILLNESS     Patient ID: Nalini Wooten is a 69 y.o. female    CC:   Chief Complaint   Patient presents with    Allergies    Annual Exam       HPI: Nalini Wooten is a 69 y.o. female presents for annual follow up. Has been on 2-vial allergen immunotherapy and not reached maintenance dose. Has been tolerating injections well and denies systemic reactions following injections. Some runny nose recently with the weather changes. Takes high dose AH day before and day of allergy shots with some dry throat.          REVIEW OF SYSTEMS     CONST: no F/C/NS, no unintentional weight changes  Balance of review of systems negative except as mentioned above     MEDICAL HISTORY     MedHx: active problems reviewed  SurgHx:   Past Surgical History:   Procedure Laterality Date    ARTHROSCOPIC REPAIR OF ROTATOR CUFF OF SHOULDER Right 3/10/2021    Procedure: REPAIR, ROTATOR CUFF, ARTHROSCOPIC;  Surgeon: Dustin Vanegas MD;  Location: [facility] OR;  Service: Orthopedics;  Laterality: Right;  arthrex notified    ARTHROSCOPY OF SHOULDER WITH DECOMPRESSION OF SUBACROMIAL SPACE Right 3/10/2021    Procedure: ARTHROSCOPY, SHOULDER, WITH SUBACROMIAL SPACE DECOMPRESSION;  Surgeon: Dustin Vanegas MD;  Location: [facility] OR;  Service: Orthopedics;  Laterality: Right;    BLADDER SUSPENSION  1990    CATARACT EXTRACTION W/  INTRAOCULAR LENS IMPLANT Right 6/21/2023    Procedure: CEIOL OD;  Surgeon: Shawn Shearer MD;  Location: [facility] OR;  Service: Ophthalmology;  Laterality: Right;    CATARACT EXTRACTION W/  INTRAOCULAR LENS IMPLANT Left 7/19/2023    Procedure: CEIOL OS;  Surgeon: Shawn Shearer MD;  Location: [facility] OR;  Service: Ophthalmology;  Laterality: Left;    DISTAL CLAVICLE EXCISION Right 3/10/2021    Procedure: EXCISION, CLAVICLE, DISTAL;  Surgeon: Dustin Vanegas MD;  Location: [facility] OR;  Service: Orthopedics;  Laterality: Right;    FOOT SURGERY Bilateral 2001    FOOT SURGERY Right  05/13/2022    HAND SURGERY Left 2017    HYSTERECTOMY  1990    INJECTION OF ANESTHETIC AGENT AROUND MEDIAL BRANCH NERVES INNERVATING LUMBAR FACET JOINT Bilateral 5/22/2024    Procedure: Block-nerve-medial branch-lumbar     L4/5, L5/S1;  Surgeon: Angel Yousif MD;  Location: [facility] OR;  Service: Pain Management;  Laterality: Bilateral;    INJECTION OF ANESTHETIC AGENT AROUND MEDIAL BRANCH NERVES INNERVATING LUMBAR FACET JOINT Bilateral 7/19/2024    Procedure: Block-nerve-medial branch-lumbar Bilat L4/5 and L5/S1;  Surgeon: Angel Yousif MD;  Location: [facility] OR;  Service: Pain Management;  Laterality: Bilateral;  Bilat L4/5 and L5/S1    JOINT REPLACEMENT  2013    KNEE SURGERY Bilateral 2013    MANDIBLE FRACTURE SURGERY  1981    RADIOFREQUENCY ABLATION OF LUMBAR MEDIAL BRANCH NERVE AT SINGLE LEVEL Bilateral 8/8/2024    Procedure: Radiofrequency Ablation, Nerve, Spinal, Lumbar, Medial Branch, L4/5 and L5/S1;  Surgeon: Angel Yousif MD;  Location: [facility] OR;  Service: Pain Management;  Laterality: Bilateral;    SHOULDER ARTHROSCOPY Left 02/27/2019    THYROIDECTOMY Left 05/13/2019        TONSILLECTOMY       Allergies: see below  Medications: MAR reviewed    No significant changes to medical history since last evaluation.      PHYSICAL EXAM     No vitals taken for today's visit  General: Awake, Alert and Cooperative with exam  Heart: RRR, No Murmurs  Lungs: CTAB, No wheezes  Skin: No rashes     ALLERGEN TESTING       Latest Reference Range & Units 01/26/24 14:09   A. alternata IgE <0.10 kU/L 2.41 (H)   Altern. alternata Class   CLASS 2   Aspergillus Fumigatus IgE <0.10 kU/L <0.10   A. fumigatus Class   CLASS 0   [place] Grass IgE <0.10 kU/L 0.11 (H)   [place] Grass Class   CLASS 0/1   Cat Dander IgE <0.10 kU/L <0.10   Cat Epithelium Class   CLASS 0   [place] IgE <0.10 kU/L <0.10   [place] Class   CLASS 0   Cockroach, IgE <0.10 kU/L  -  0.89 (H)  CLASS 2   D. farinae <0.10 kU/L 0.37 (H)   D. farinae Class   CLASS  1   D. pteronyssinus Dust Mite IgE <0.10 kU/L 0.28 (H)   D. pteronyssinus Class   CLASS 0/1   Dog Dander IgE <0.10 kU/L 0.13 (H)   Dog Dander Class   CLASS 0/1   English Plantain IgE <0.10 kU/L <0.10   English Plantain Class   CLASS 0   [place] IgE <0.10 kU/L 0.12 (H)   M[place]helder Class   CLASS 0/1   [place], Class   CLASS 0   Pecan [place] Tree IgE <0.10 kU/L <0.10   Pecan, Class   CLASS 0   Ragweed, Western IgE <0.10 kU/L 0.16 (H)   Ragweed, Western, Class   CLASS 0/1   [place]ani Grass IgE <0.10 kU/L <0.10   Phani Grass Class   CLASS 0   [place] Tree IgE <0.10 kU/L <0.10   Total IgE 0 - 100 IU/mL         ASSESSMENT/PLAN     Nalini Wooten is a 69 y.o. female returning for annual follow up of allergen immunotherapy. Recommend continuation of allergen immunotherapy for 3-5 years. Additionally recommended to notify me if starting any new long term medications especially those for blood pressure (ie Beta blockers). Continue antihistamine dosage prior to allergen immunotherapy. Notify myself or injection nurse for any issues related to allergen immunotherapy. Trial Atrovent as needed for runny nose      Follow up: 1 Year or sooner      Fidel Pérez MD    I spent a total of 30 minutes on the day of the visit. This includes face to face time and non-face to face time preparing to see the patient (eg, review of tests), obtaining and/or reviewing separately obtained history, documenting clinical information in the electronic or other health record, independently interpreting results and communicating results to the patient/family/caregiver, or care coordinator.

## 2025-01-07 ENCOUNTER — OFFICE VISIT (OUTPATIENT)
Dept: ALLERGY | Facility: CLINIC | Age: 70
End: 2025-01-07
Payer: MEDICARE

## 2025-01-07 VITALS — WEIGHT: 204.56 LBS | BODY MASS INDEX: 36.25 KG/M2 | HEIGHT: 63 IN

## 2025-01-07 DIAGNOSIS — L23.3 ALLERGIC CONTACT DERMATITIS DUE TO DRUGS IN CONTACT WITH SKIN: ICD-10-CM

## 2025-01-07 DIAGNOSIS — J30.9 CHRONIC ALLERGIC RHINITIS: Primary | ICD-10-CM

## 2025-01-07 PROCEDURE — 99999 PR PBB SHADOW E&M-EST. PATIENT-LVL III: CPT | Mod: PBBFAC,,, | Performed by: STUDENT IN AN ORGANIZED HEALTH CARE EDUCATION/TRAINING PROGRAM

## 2025-01-07 RX ORDER — IPRATROPIUM BROMIDE 21 UG/1
2 SPRAY, METERED NASAL 3 TIMES DAILY PRN
Qty: 30 ML | Refills: 3 | Status: SHIPPED | OUTPATIENT
Start: 2025-01-07

## 2025-01-08 ENCOUNTER — OFFICE VISIT (OUTPATIENT)
Dept: FAMILY MEDICINE | Facility: CLINIC | Age: 70
End: 2025-01-08
Payer: MEDICARE

## 2025-01-08 VITALS
DIASTOLIC BLOOD PRESSURE: 76 MMHG | HEART RATE: 88 BPM | HEIGHT: 63 IN | WEIGHT: 202 LBS | OXYGEN SATURATION: 99 % | SYSTOLIC BLOOD PRESSURE: 134 MMHG | BODY MASS INDEX: 35.79 KG/M2

## 2025-01-08 DIAGNOSIS — R53.83 FATIGUE, UNSPECIFIED TYPE: ICD-10-CM

## 2025-01-08 DIAGNOSIS — E78.5 DYSLIPIDEMIA: Primary | ICD-10-CM

## 2025-01-08 DIAGNOSIS — E66.9 OBESITY (BMI 30-39.9): ICD-10-CM

## 2025-01-08 DIAGNOSIS — J30.2 SEASONAL ALLERGIES: ICD-10-CM

## 2025-01-08 DIAGNOSIS — R73.9 HYPERGLYCEMIA: ICD-10-CM

## 2025-01-08 DIAGNOSIS — N32.81 OAB (OVERACTIVE BLADDER): ICD-10-CM

## 2025-01-08 PROCEDURE — 99999 PR PBB SHADOW E&M-EST. PATIENT-LVL IV: CPT | Mod: PBBFAC,,, | Performed by: NURSE PRACTITIONER

## 2025-01-08 RX ORDER — OXYBUTYNIN CHLORIDE 10 MG/1
10 TABLET, EXTENDED RELEASE ORAL DAILY
Qty: 90 TABLET | Refills: 0 | Status: SHIPPED | OUTPATIENT
Start: 2025-01-08

## 2025-01-08 RX ORDER — CYANOCOBALAMIN 1000 UG/ML
INJECTION, SOLUTION INTRAMUSCULAR; SUBCUTANEOUS
Qty: 10 ML | Refills: 1 | Status: SHIPPED | OUTPATIENT
Start: 2025-01-08

## 2025-01-08 RX ORDER — MINERAL OIL
180 ENEMA (ML) RECTAL DAILY
Qty: 90 TABLET | Refills: 1 | Status: SHIPPED | OUTPATIENT
Start: 2025-01-08

## 2025-01-08 RX ORDER — LEVOCETIRIZINE DIHYDROCHLORIDE 5 MG/1
5 TABLET, FILM COATED ORAL NIGHTLY
Qty: 90 TABLET | Refills: 1 | Status: SHIPPED | OUTPATIENT
Start: 2025-01-08

## 2025-01-08 RX ORDER — TOLTERODINE 4 MG/1
4 CAPSULE, EXTENDED RELEASE ORAL DAILY
Qty: 90 CAPSULE | Refills: 1 | Status: SHIPPED | OUTPATIENT
Start: 2025-01-08

## 2025-01-08 NOTE — PROGRESS NOTES
SUBJECTIVE:      Patient ID: Nalini Wooten is a 69 y.o. female.    Chief Complaint: Weight Check (3 month f/u Weight Check)    Presents for chronic medical issues    Follows with Dr. Pérez (allergy & immun)    Discussed outstanding health maintenance     Hyperlipidemia  This is a chronic problem. The current episode started more than 1 year ago. The problem is uncontrolled. Recent lipid tests were reviewed and are high. Exacerbating diseases include obesity. Factors aggravating her hyperlipidemia include fatty foods. Associated symptoms include myalgias. Pertinent negatives include no chest pain or shortness of breath. Current antihyperlipidemic treatment includes statins. The current treatment provides no improvement of lipids. Compliance problems include adherence to diet and adherence to exercise.  Risk factors for coronary artery disease include family history, dyslipidemia, obesity, post-menopausal and a sedentary lifestyle.   Female  Problem  Primary symptoms comment: OAB. This is a chronic problem. The current episode started more than 1 year ago. The problem occurs intermittently. The problem has been waxing and waning since onset. The patient is experiencing no pain. Pertinent negatives include no abdominal pain, back pain, constipation, diarrhea, dysuria, flank pain, frequency, hematuria, nausea, sore throat, urgency or vomiting. The symptoms are aggravated by heavy lifting (coughing, sneezing). Treatments tried: detrol, ditropan. The treatment provided moderate relief.   Fatigue  This is a chronic problem. The current episode started more than 1 year ago. The problem occurs intermittently. The problem has been gradually improving. Associated symptoms include arthralgias, fatigue and myalgias. Pertinent negatives include no abdominal pain, chest pain, congestion, coughing, joint swelling, nausea, neck pain, numbness, sore throat, vomiting or weakness. The symptoms are aggravated by exertion and  stress. She has tried rest, sleep, position changes and relaxation (vitamin supplementation) for the symptoms. The treatment provided moderate relief.       Past Surgical History:   Procedure Laterality Date    ARTHROSCOPIC REPAIR OF ROTATOR CUFF OF SHOULDER Right 3/10/2021    Procedure: REPAIR, ROTATOR CUFF, ARTHROSCOPIC;  Surgeon: Dustin Vanegas MD;  Location: [facility] OR;  Service: Orthopedics;  Laterality: Right;  arthrex notified    ARTHROSCOPY OF SHOULDER WITH DECOMPRESSION OF SUBACROMIAL SPACE Right 3/10/2021    Procedure: ARTHROSCOPY, SHOULDER, WITH SUBACROMIAL SPACE DECOMPRESSION;  Surgeon: Dustin Vanegas MD;  Location: [facility] OR;  Service: Orthopedics;  Laterality: Right;    BLADDER SUSPENSION  1990    CATARACT EXTRACTION W/  INTRAOCULAR LENS IMPLANT Right 6/21/2023    Procedure: CEIOL OD;  Surgeon: Shawn Shearer MD;  Location: [facility] OR;  Service: Ophthalmology;  Laterality: Right;    CATARACT EXTRACTION W/  INTRAOCULAR LENS IMPLANT Left 7/19/2023    Procedure: CEIOL OS;  Surgeon: Shawn Shearer MD;  Location: [facility] OR;  Service: Ophthalmology;  Laterality: Left;    DISTAL CLAVICLE EXCISION Right 3/10/2021    Procedure: EXCISION, CLAVICLE, DISTAL;  Surgeon: Dustin Vanegas MD;  Location: [facility] OR;  Service: Orthopedics;  Laterality: Right;    FOOT SURGERY Bilateral 2001    FOOT SURGERY Right 05/13/2022    HAND SURGERY Left 2017    HYSTERECTOMY  1990    INJECTION OF ANESTHETIC AGENT AROUND MEDIAL BRANCH NERVES INNERVATING LUMBAR FACET JOINT Bilateral 5/22/2024    Procedure: Block-nerve-medial branch-lumbar     L4/5, L5/S1;  Surgeon: Angel Yousif MD;  Location: [facility] OR;  Service: Pain Management;  Laterality: Bilateral;    INJECTION OF ANESTHETIC AGENT AROUND MEDIAL BRANCH NERVES INNERVATING LUMBAR FACET JOINT Bilateral 7/19/2024    Procedure: Block-nerve-medial branch-lumbar Bilat L4/5 and L5/S1;  Surgeon: Angel Yousif MD;  Location: [facility] OR;  Service: Pain Management;  Laterality: Bilateral;  Bilat L4/5  and L5/S1    JOINT REPLACEMENT  2013    KNEE SURGERY Bilateral 2013    MANDIBLE FRACTURE SURGERY  1981    RADIOFREQUENCY ABLATION OF LUMBAR MEDIAL BRANCH NERVE AT SINGLE LEVEL Bilateral 8/8/2024    Procedure: Radiofrequency Ablation, Nerve, Spinal, Lumbar, Medial Branch, L4/5 and L5/S1;  Surgeon: Angel Yousif MD;  Location: [facility];  Service: Pain Management;  Laterality: Bilateral;    SHOULDER ARTHROSCOPY Left 02/27/2019    THYROIDECTOMY Left 05/13/2019        TONSILLECTOMY       Family History   Problem Relation Name Age of Onset    Arthritis Mother      Aneurysm Mother          Abdominal Aneurysm     Heart disease Father  65    Arthritis Father      Diabetes Father      Hearing loss Father      Hypertension Father      Dementia Father      No Known Problems Sister      No Known Problems Sister      Heart disease Brother  50 - 59        stent placement    Heart disease Brother  49        stent placement    Aneurysm Brother          Abdominal Aneurysm    Leukemia Son 3     Crohn's disease Son 3     Ankylosing spondylitis Son 3     Other Son 3         avascular necrosis of pancho hips    Rheumatologic disease Son 3     Heart defect Son 3     Kidney failure Son 3     SIDS Maternal Aunt 2     Uterine cancer Maternal Aunt 2     Dementia Paternal Aunt 1     Diabetes Paternal Grandmother      Heart disease Paternal Grandfather        Social History     Socioeconomic History    Marital status:     Number of children: 3   Tobacco Use    Smoking status: Never     Passive exposure: Never    Smokeless tobacco: Never   Substance and Sexual Activity    Alcohol use: Never     Alcohol/week: 0.0 standard drinks of alcohol    Drug use: No    Sexual activity: Yes     Birth control/protection: None     Social Drivers of Health     Financial Resource Strain: Low Risk  (8/1/2024)    Overall Financial Resource Strain (CARDIA)     Difficulty of Paying Living Expenses: Not hard at all   Food Insecurity: No Food  Insecurity (8/1/2024)    Hunger Vital Sign     Worried About Running Out of Food in the Last Year: Never true     Ran Out of Food in the Last Year: Never true   Transportation Needs: No Transportation Needs (6/7/2023)    PRAPARE - Transportation     Lack of Transportation (Medical): No     Lack of Transportation (Non-Medical): No   Physical Activity: Insufficiently Active (8/1/2024)    Exercise Vital Sign     Days of Exercise per Week: 4 days     Minutes of Exercise per Session: 20 min   Stress: Stress Concern Present (8/1/2024)    [facility] [employer] - Occupational Stress Questionnaire     Feeling of Stress : To some extent   Housing Stability: Low Risk  (6/7/2023)    Housing Stability Vital Sign     Unable to Pay for Housing in the Last Year: No     Number of Places Lived in the Last Year: 1     Unstable Housing in the Last Year: No     Current Outpatient Medications   Medication Sig Dispense Refill    chlorhexidine (PERIDEX) 0.12 % solution Use as directed 15 mLs in the mouth or throat 2 (two) times daily.      cyclobenzaprine (FLEXERIL) 5 MG tablet Take 1 tablet (5 mg total) by mouth 2 (two) times daily as needed for Muscle spasms. 30 tablet 0    doxycycline (VIBRAMYCIN) 100 MG Cap Take 1 pill by mouth daily x 1 month 30 capsule 1    hydrocortisone 2.5 % cream Apply twice daily as needed to rash on face 30 g 2    ipratropium (ATROVENT) 21 mcg (0.03 %) nasal spray 2 sprays by Each Nostril route 3 (three) times daily as needed (runny nose). 30 mL 3    LIDOcaine viscous HCl 2% (LIDOCAINE VISCOUS) 2 % Soln 2.5 mLs by Mucous Membrane route every 6 (six) hours.      metFORMIN (GLUCOPHAGE) 500 MG tablet Take 1 tablet (500 mg total) by mouth 2 (two) times daily with meals. 180 tablet 2    pantoprazole (PROTONIX) 40 MG tablet Take 40 mg by mouth every morning.      phentermine (ADIPEX-P) 37.5 mg tablet Take 1 tablet (37.5 mg total) by mouth once daily. 90 tablet 0    pimecrolimus (ELIDEL) 1 % cream  AAA bid 60 g 3    pravastatin (PRAVACHOL) 20 MG tablet Take 1 tablet (20 mg total) by mouth once daily. 90 tablet 3    traMADoL (ULTRAM) 50 mg tablet Take 1 tablet (50 mg total) by mouth every 12 (twelve) hours as needed for Pain. 30 tablet 0    cyanocobalamin 1,000 mcg/mL injection Inject every week 1 cc 10 mL 1    fexofenadine (ALLEGRA) 180 MG tablet Take 1 tablet (180 mg total) by mouth once daily. 90 tablet 1    levocetirizine (XYZAL) 5 MG tablet Take 1 tablet (5 mg total) by mouth every evening. 90 tablet 1    oxybutynin (DITROPAN-XL) 10 MG 24 hr tablet Take 1 tablet (10 mg total) by mouth once daily. 90 tablet 0    tirzepatide 2.5 mg/0.5 mL PnIj Inject 2.5 mg into the skin every 7 days. 2 mL 2    tolterodine (DETROL LA) 4 MG 24 hr capsule Take 1 capsule (4 mg total) by mouth once daily. 90 capsule 1     No current facility-administered medications for this visit.     Review of patient's allergies indicates:   Allergen Reactions    Adhesive Swelling and Blisters     Adhesive that requires uv light    Estrogens Other (See Comments)     Mini stroke    Shingrix (pf)  [[id number] (pf)] Diarrhea, Itching and Nausea And Vomiting      Past Medical History:   Diagnosis Date    Allergies     Bulging disc     lower back     Carpal tunnel syndrome     Degenerative disc disease     High cholesterol     Pinched nerve in neck      Past Surgical History:   Procedure Laterality Date    ARTHROSCOPIC REPAIR OF ROTATOR CUFF OF SHOULDER Right 3/10/2021    Procedure: REPAIR, ROTATOR CUFF, ARTHROSCOPIC;  Surgeon: Dustin Vanegas MD;  Location: [facility] OR;  Service: Orthopedics;  Laterality: Right;  arthrex notified    ARTHROSCOPY OF SHOULDER WITH DECOMPRESSION OF SUBACROMIAL SPACE Right 3/10/2021    Procedure: ARTHROSCOPY, SHOULDER, WITH SUBACROMIAL SPACE DECOMPRESSION;  Surgeon: Dustin Vanegas MD;  Location: [facility] OR;  Service: Orthopedics;  Laterality: Right;    BLADDER SUSPENSION  1990    CATARACT EXTRACTION W/   INTRAOCULAR LENS IMPLANT Right 6/21/2023    Procedure: CEIOL OD;  Surgeon: Shawn Shearer MD;  Location: [facility] OR;  Service: Ophthalmology;  Laterality: Right;    CATARACT EXTRACTION W/  INTRAOCULAR LENS IMPLANT Left 7/19/2023    Procedure: CEIOL OS;  Surgeon: Shawn Shearer MD;  Location: [facility] OR;  Service: Ophthalmology;  Laterality: Left;    DISTAL CLAVICLE EXCISION Right 3/10/2021    Procedure: EXCISION, CLAVICLE, DISTAL;  Surgeon: Dustin Vanegas MD;  Location: [facility] OR;  Service: Orthopedics;  Laterality: Right;    FOOT SURGERY Bilateral 2001    FOOT SURGERY Right 05/13/2022    HAND SURGERY Left 2017    HYSTERECTOMY  1990    INJECTION OF ANESTHETIC AGENT AROUND MEDIAL BRANCH NERVES INNERVATING LUMBAR FACET JOINT Bilateral 5/22/2024    Procedure: Block-nerve-medial branch-lumbar     L4/5, L5/S1;  Surgeon: Angel Yousif MD;  Location: [facility] OR;  Service: Pain Management;  Laterality: Bilateral;    INJECTION OF ANESTHETIC AGENT AROUND MEDIAL BRANCH NERVES INNERVATING LUMBAR FACET JOINT Bilateral 7/19/2024    Procedure: Block-nerve-medial branch-lumbar Bilat L4/5 and L5/S1;  Surgeon: Angel Yousif MD;  Location: [facility] OR;  Service: Pain Management;  Laterality: Bilateral;  Bilat L4/5 and L5/S1    JOINT REPLACEMENT  2013    KNEE SURGERY Bilateral 2013    MANDIBLE FRACTURE SURGERY  1981    RADIOFREQUENCY ABLATION OF LUMBAR MEDIAL BRANCH NERVE AT SINGLE LEVEL Bilateral 8/8/2024    Procedure: Radiofrequency Ablation, Nerve, Spinal, Lumbar, Medial Branch, L4/5 and L5/S1;  Surgeon: Angel Yousif MD;  Location: [facility] OR;  Service: Pain Management;  Laterality: Bilateral;    SHOULDER ARTHROSCOPY Left 02/27/2019    THYROIDECTOMY Left 05/13/2019        TONSILLECTOMY         Review of Systems   Constitutional:  Positive for fatigue and unexpected weight change (gain). Negative for activity change and appetite change.   HENT:  Negative for congestion, ear pain, hearing loss, postnasal drip, sinus pressure,  "sinus pain, sneezing and sore throat.    Eyes:  Negative for photophobia and pain.   Respiratory:  Negative for cough, chest tightness, shortness of breath and wheezing.    Cardiovascular:  Negative for chest pain, palpitations and leg swelling.   Gastrointestinal:  Negative for abdominal distention, abdominal pain, blood in stool, constipation, diarrhea, nausea and vomiting.   Endocrine: Negative for cold intolerance, heat intolerance, polydipsia and polyuria.   Genitourinary:  Negative for difficulty urinating, dysuria, flank pain, frequency, hematuria and urgency.   Musculoskeletal:  Positive for arthralgias and myalgias. Negative for back pain, joint swelling and neck pain.   Skin:  Negative for pallor.   Allergic/Immunologic: Positive for environmental allergies. Negative for food allergies.   Neurological:  Negative for dizziness, weakness, light-headedness and numbness.   Hematological:  Does not bruise/bleed easily.   Psychiatric/Behavioral:  Negative for agitation, confusion, decreased concentration and sleep disturbance. The patient is not nervous/anxious.       OBJECTIVE:      Vitals:    01/08/25 1447 01/08/25 1539   BP: (!) 140/82 134/76   BP Location: Right arm Right arm   Patient Position: Sitting Sitting   Pulse: 88    SpO2: 99%    Weight: 91.6 kg (202 lb)    Height: 5' 3" (1.6 m)      Physical Exam  Vitals and nursing note reviewed.   Constitutional:       General: She is not in acute distress.     Appearance: Normal appearance. She is well-developed. She is obese.   HENT:      Head: Normocephalic and atraumatic.      Right Ear: Hearing normal.      Left Ear: Hearing normal.      Nose: Nose normal. No rhinorrhea.   Eyes:      General: Lids are normal.         Right eye: No discharge.         Left eye: No discharge.      Conjunctiva/sclera: Conjunctivae normal.      Right eye: Right conjunctiva is not injected.      Left eye: Left conjunctiva is not injected.      Pupils: Pupils are equal, round, and " reactive to light. Pupils are equal.      Right eye: Pupil is round and reactive.      Left eye: Pupil is round and reactive.   Neck:      Thyroid: No thyromegaly.      Vascular: No JVD.      Trachea: Trachea normal. No tracheal deviation.   Cardiovascular:      Rate and Rhythm: Normal rate and regular rhythm.      Pulses:           Radial pulses are 2+ on the right side and 2+ on the left side.        Posterior tibial pulses are 2+ on the right side and 2+ on the left side.      Heart sounds: Normal heart sounds. No murmur heard.     No friction rub. No gallop.   Pulmonary:      Effort: Pulmonary effort is normal. No respiratory distress.      Breath sounds: Normal breath sounds. No stridor. No decreased breath sounds, wheezing, rhonchi or rales.   Abdominal:      General: Bowel sounds are normal. There is no distension.      Palpations: Abdomen is soft. Abdomen is not rigid.      Tenderness: There is no abdominal tenderness. There is no guarding.   Musculoskeletal:         General: Normal range of motion.      Cervical back: Normal range of motion and neck supple.   Lymphadenopathy:      Cervical: No cervical adenopathy.   Skin:     General: Skin is warm and dry.      Capillary Refill: Capillary refill takes less than 2 seconds.      Coloration: Skin is not pale.      Findings: No lesion or rash.   Neurological:      Mental Status: She is alert and oriented to person, place, and time.      GCS: GCS eye subscore is 4. GCS verbal subscore is 5. GCS motor subscore is 6.      Cranial Nerves: Cranial nerves 2-12 are intact.      Sensory: Sensation is intact.      Motor: Motor function is intact. No atrophy.      Coordination: Coordination is intact. Coordination normal.      Gait: Gait is intact. Gait normal.   Psychiatric:         Attention and Perception: Attention and perception normal. She is attentive.         Mood and Affect: Mood and affect normal.         Speech: Speech normal.         Behavior: Behavior  normal.         Thought Content: Thought content normal.         Cognition and Memory: Cognition and memory normal.         Judgment: Judgment normal.        Assessment:       1. Dyslipidemia    2. OAB (overactive bladder)    3. Obesity (BMI 30-39.9)    4. Hyperglycemia    5. Fatigue, unspecified type    6. Seasonal allergies        Plan:       Dyslipidemia  -     LIPID PANEL; Future; Expected date: 01/08/2025  - recently started pravastatin & doing well without side effects    OAB (overactive bladder)  -     tolterodine (DETROL LA) 4 MG 24 hr capsule; Take 1 capsule (4 mg total) by mouth once daily.  Dispense: 90 capsule; Refill: 1  -     oxybutynin (DITROPAN-XL) 10 MG 24 hr tablet; Take 1 tablet (10 mg total) by mouth once daily.  Dispense: 90 tablet; Refill: 0    Obesity (BMI 30-39.9)  -     tirzepatide 2.5 mg/0.5 mL PnIj; Inject 2.5 mg into the skin every 7 days.  Dispense: 2 mL; Refill: 2    Hyperglycemia  -     Hemoglobin A1C; Future; Expected date: 01/08/2025    Fatigue, unspecified type  -     cyanocobalamin 1,000 mcg/mL injection; Inject every week 1 cc  Dispense: 10 mL; Refill: 1    Seasonal allergies  -     fexofenadine (ALLEGRA) 180 MG tablet; Take 1 tablet (180 mg total) by mouth once daily.  Dispense: 90 tablet; Refill: 1  -     levocetirizine (XYZAL) 5 MG tablet; Take 1 tablet (5 mg total) by mouth every evening.  Dispense: 90 tablet; Refill: 1            Follow up in about 3 months (around 4/8/2025) for weight recheck.      1/8/2025 CHICO Blackwood, FNP-C

## 2025-01-15 ENCOUNTER — CLINICAL SUPPORT (OUTPATIENT)
Dept: ALLERGY | Facility: CLINIC | Age: 70
End: 2025-01-15
Payer: MEDICARE

## 2025-01-15 DIAGNOSIS — J30.9 CHRONIC ALLERGIC RHINITIS: Primary | ICD-10-CM

## 2025-01-15 PROCEDURE — 95117 IMMUNOTHERAPY INJECTIONS: CPT | Mod: S$GLB,,, | Performed by: ALLERGY & IMMUNOLOGY

## 2025-01-15 PROCEDURE — 99999 PR PBB SHADOW E&M-EST. PATIENT-LVL I: CPT | Mod: PBBFAC,,,

## 2025-01-16 ENCOUNTER — LAB VISIT (OUTPATIENT)
Dept: LAB | Facility: HOSPITAL | Age: 70
End: 2025-01-16
Attending: NURSE PRACTITIONER
Payer: MEDICARE

## 2025-01-16 DIAGNOSIS — R73.9 HYPERGLYCEMIA: ICD-10-CM

## 2025-01-16 DIAGNOSIS — E78.5 DYSLIPIDEMIA: ICD-10-CM

## 2025-01-16 LAB
CHOLEST SERPL-MCNC: 260 MG/DL (ref 120–199)
CHOLEST/HDLC SERPL: 5.3 {RATIO} (ref 2–5)
HDLC SERPL-MCNC: 49 MG/DL (ref 40–75)
HDLC SERPL: 18.8 % (ref 20–50)
LDLC SERPL CALC-MCNC: 166.4 MG/DL (ref 63–159)
NONHDLC SERPL-MCNC: 211 MG/DL
TRIGL SERPL-MCNC: 223 MG/DL (ref 30–150)

## 2025-01-16 PROCEDURE — 80061 LIPID PANEL: CPT | Performed by: NURSE PRACTITIONER

## 2025-01-16 PROCEDURE — 83036 HEMOGLOBIN GLYCOSYLATED A1C: CPT | Performed by: NURSE PRACTITIONER

## 2025-01-17 LAB
ESTIMATED AVG GLUCOSE: 140 MG/DL (ref 68–131)
HBA1C MFR BLD: 6.5 % (ref 4.5–6.2)

## 2025-01-21 ENCOUNTER — TELEPHONE (OUTPATIENT)
Dept: PHARMACY | Facility: CLINIC | Age: 70
End: 2025-01-21
Payer: MEDICARE

## 2025-01-21 NOTE — TELEPHONE ENCOUNTER
[facility]/Population Health Chart Review & Patient Outreach Details For Medication Adherence Project    Reason for Outreach Encounter: 3rd Party payor non-compliance report ([employer], [employer], [employer], etc) and Follow up to a previous patient outreach  2.  Patient Outreach Method: Reviewed patient chart  and [employer] message  3.   Medication in question:    Diabetes Medications               metFORMIN (GLUCOPHAGE) 500 MG tablet Take 1 tablet (500 mg total) by mouth 2 (two) times daily with meals.    tirzepatide 2.5 mg/0.5 mL PnIj Inject 2.5 mg into the skin every 7 days.              Hyperlipidemia Medications               pravastatin (PRAVACHOL) 20 MG tablet Take 1 tablet (20 mg total) by mouth once daily.                  metformin  last filled  10/8/24 for 90 day supply  mounjaro  last filled 1/16/24 for 28 day supply  pravastatin  last filled 1/2/25 for 90 day supply    4.  Reviewed and or Updates Made To: Patient Chart  5. Outreach Outcomes and/or actions taken: Patient filled medication and is on track to be adherent and Medication discontinued  Additional Notes:   Metformin dc'ed after experiencing side effects like vomiting, diarrhea, and severe headache.

## 2025-01-27 ENCOUNTER — OFFICE VISIT (OUTPATIENT)
Dept: OPTOMETRY | Facility: CLINIC | Age: 70
End: 2025-01-27
Payer: MEDICARE

## 2025-01-27 ENCOUNTER — TELEPHONE (OUTPATIENT)
Dept: OPTOMETRY | Facility: CLINIC | Age: 70
End: 2025-01-27
Payer: MEDICARE

## 2025-01-27 DIAGNOSIS — H43.811 POSTERIOR VITREOUS DETACHMENT OF RIGHT EYE: Primary | ICD-10-CM

## 2025-01-27 PROCEDURE — 99999 PR PBB SHADOW E&M-EST. PATIENT-LVL II: CPT | Mod: PBBFAC,,, | Performed by: OPTOMETRIST

## 2025-01-27 PROCEDURE — 1159F MED LIST DOCD IN RCRD: CPT | Mod: CPTII,S$GLB,, | Performed by: OPTOMETRIST

## 2025-01-27 PROCEDURE — 1126F AMNT PAIN NOTED NONE PRSNT: CPT | Mod: CPTII,S$GLB,, | Performed by: OPTOMETRIST

## 2025-01-27 PROCEDURE — 1160F RVW MEDS BY RX/DR IN RCRD: CPT | Mod: CPTII,S$GLB,, | Performed by: OPTOMETRIST

## 2025-01-27 PROCEDURE — 3044F HG A1C LEVEL LT 7.0%: CPT | Mod: CPTII,S$GLB,, | Performed by: OPTOMETRIST

## 2025-01-27 PROCEDURE — 3288F FALL RISK ASSESSMENT DOCD: CPT | Mod: CPTII,S$GLB,, | Performed by: OPTOMETRIST

## 2025-01-27 PROCEDURE — 1101F PT FALLS ASSESS-DOCD LE1/YR: CPT | Mod: CPTII,S$GLB,, | Performed by: OPTOMETRIST

## 2025-01-27 PROCEDURE — 99213 OFFICE O/P EST LOW 20 MIN: CPT | Mod: S$GLB,,, | Performed by: OPTOMETRIST

## 2025-01-27 NOTE — TELEPHONE ENCOUNTER
----- Message from Dimple sent at 1/27/2025  8:33 AM CST -----  Contact: Patient  Type:  Same Day Appointment Request    Caller is requesting a same day appointment.  Caller declined first available appointment listed below.      Name of Caller: Patient  When is the first available appointment?  N/A    Symptoms:    spots and pieces of thread and lights flashing in right eye / cataract surgery last year    Best Call Back Number:  [phone]    Additional Information:   States she would like to speak with someone about being seen today instead of her 5/12 appointment - states this started this past Saturday, 1/25 - please call - thank you

## 2025-01-27 NOTE — PROGRESS NOTES
HPI     Spots and/or Floaters     Additional comments: DLE 8-2023           Comments    Pt here today for light flashes & floaters OD x 3 days.     Has a squiggly line in central vision with small spots around it with   light flashes in outer peripheral vision.    Denies any eye pain, loss of vision, curtains or veils coming down.     No trauma     Last time saw a flash was last night -- shooting star up and down  No changes to floater           Last edited by Hal Navarro, OD on 1/27/2025 12:34 PM.            Assessment /Plan     For exam results, see Encounter Report.    Posterior vitreous detachment of right eye      PVD OD with disc heme superior nasal  Negative denise's sign  No holes/tears/breaks/RD  RTC immediately if any worsening flashes, floaters, decreased vision, veiling of vision occurs.  Otherwise, f/u in 1 month

## 2025-01-29 ENCOUNTER — CLINICAL SUPPORT (OUTPATIENT)
Dept: ALLERGY | Facility: CLINIC | Age: 70
End: 2025-01-29
Payer: MEDICARE

## 2025-01-29 DIAGNOSIS — J30.9 CHRONIC ALLERGIC RHINITIS: Primary | ICD-10-CM

## 2025-01-29 PROCEDURE — 95117 IMMUNOTHERAPY INJECTIONS: CPT | Mod: S$GLB,,, | Performed by: ALLERGY & IMMUNOLOGY

## 2025-01-29 PROCEDURE — 99999 PR PBB SHADOW E&M-EST. PATIENT-LVL I: CPT | Mod: PBBFAC,,,

## 2025-02-04 ENCOUNTER — TELEPHONE (OUTPATIENT)
Dept: OPTOMETRY | Facility: CLINIC | Age: 70
End: 2025-02-04
Payer: MEDICARE

## 2025-02-05 ENCOUNTER — CLINICAL SUPPORT (OUTPATIENT)
Dept: ALLERGY | Facility: CLINIC | Age: 70
End: 2025-02-05
Payer: MEDICARE

## 2025-02-05 ENCOUNTER — PATIENT MESSAGE (OUTPATIENT)
Dept: FAMILY MEDICINE | Facility: CLINIC | Age: 70
End: 2025-02-05
Payer: MEDICARE

## 2025-02-05 DIAGNOSIS — E66.9 OBESITY (BMI 30-39.9): Primary | ICD-10-CM

## 2025-02-05 DIAGNOSIS — E78.5 DYSLIPIDEMIA: ICD-10-CM

## 2025-02-05 DIAGNOSIS — J30.9 CHRONIC ALLERGIC RHINITIS: Primary | ICD-10-CM

## 2025-02-05 PROCEDURE — 95117 IMMUNOTHERAPY INJECTIONS: CPT | Mod: S$GLB,,, | Performed by: ALLERGY & IMMUNOLOGY

## 2025-02-05 PROCEDURE — 99999 PR PBB SHADOW E&M-EST. PATIENT-LVL I: CPT | Mod: PBBFAC,,,

## 2025-02-05 RX ORDER — IPRATROPIUM BROMIDE 21 UG/1
2 SPRAY, METERED NASAL 3 TIMES DAILY PRN
Qty: 30 ML | Refills: 3 | Status: SHIPPED | OUTPATIENT
Start: 2025-02-05

## 2025-02-06 RX ORDER — TIRZEPATIDE 5 MG/.5ML
5 INJECTION, SOLUTION SUBCUTANEOUS
Qty: 2 ML | Refills: 2 | Status: SHIPPED | OUTPATIENT
Start: 2025-02-06

## 2025-02-06 RX ORDER — PRAVASTATIN SODIUM 20 MG/1
20 TABLET ORAL DAILY
Qty: 90 TABLET | Refills: 0 | Status: SHIPPED | OUTPATIENT
Start: 2025-02-06 | End: 2026-02-06

## 2025-02-11 PROCEDURE — 95165 ANTIGEN THERAPY SERVICES: CPT | Mod: S$GLB,,, | Performed by: STUDENT IN AN ORGANIZED HEALTH CARE EDUCATION/TRAINING PROGRAM

## 2025-02-12 ENCOUNTER — CLINICAL SUPPORT (OUTPATIENT)
Dept: ALLERGY | Facility: CLINIC | Age: 70
End: 2025-02-12
Payer: MEDICARE

## 2025-02-12 DIAGNOSIS — J30.9 CHRONIC ALLERGIC RHINITIS: Primary | ICD-10-CM

## 2025-02-12 PROCEDURE — 95117 IMMUNOTHERAPY INJECTIONS: CPT | Mod: S$GLB,,, | Performed by: ALLERGY & IMMUNOLOGY

## 2025-02-12 PROCEDURE — 99999 PR PBB SHADOW E&M-EST. PATIENT-LVL I: CPT | Mod: PBBFAC,,,

## 2025-02-18 ENCOUNTER — PATIENT MESSAGE (OUTPATIENT)
Dept: OPTOMETRY | Facility: CLINIC | Age: 70
End: 2025-02-18
Payer: MEDICARE

## 2025-03-11 ENCOUNTER — PATIENT MESSAGE (OUTPATIENT)
Dept: ALLERGY | Facility: CLINIC | Age: 70
End: 2025-03-11
Payer: MEDICARE

## 2025-03-12 ENCOUNTER — OFFICE VISIT (OUTPATIENT)
Dept: PAIN MEDICINE | Facility: CLINIC | Age: 70
End: 2025-03-12
Payer: MEDICARE

## 2025-03-12 ENCOUNTER — HOSPITAL ENCOUNTER (OUTPATIENT)
Dept: RADIOLOGY | Facility: HOSPITAL | Age: 70
Discharge: HOME | End: 2025-03-12
Attending: ANESTHESIOLOGY
Payer: MEDICARE

## 2025-03-12 ENCOUNTER — TELEPHONE (OUTPATIENT)
Dept: PAIN MEDICINE | Facility: CLINIC | Age: 70
End: 2025-03-12

## 2025-03-12 ENCOUNTER — CLINICAL SUPPORT (OUTPATIENT)
Dept: ALLERGY | Facility: CLINIC | Age: 70
End: 2025-03-12
Payer: MEDICARE

## 2025-03-12 VITALS
WEIGHT: 189.69 LBS | DIASTOLIC BLOOD PRESSURE: 87 MMHG | BODY MASS INDEX: 33.61 KG/M2 | HEIGHT: 63 IN | SYSTOLIC BLOOD PRESSURE: 129 MMHG | HEART RATE: 68 BPM

## 2025-03-12 DIAGNOSIS — J30.9 CHRONIC ALLERGIC RHINITIS: Primary | ICD-10-CM

## 2025-03-12 DIAGNOSIS — M54.9 DORSALGIA, UNSPECIFIED: ICD-10-CM

## 2025-03-12 DIAGNOSIS — M47.816 LUMBAR SPONDYLOSIS: Primary | ICD-10-CM

## 2025-03-12 DIAGNOSIS — M47.816 LUMBAR SPONDYLOSIS: ICD-10-CM

## 2025-03-12 PROCEDURE — 1101F PT FALLS ASSESS-DOCD LE1/YR: CPT | Mod: CPTII,S$GLB,, | Performed by: ANESTHESIOLOGY

## 2025-03-12 PROCEDURE — 1159F MED LIST DOCD IN RCRD: CPT | Mod: CPTII,S$GLB,, | Performed by: ANESTHESIOLOGY

## 2025-03-12 PROCEDURE — 99999 PR PBB SHADOW E&M-EST. PATIENT-LVL IV: CPT | Mod: PBBFAC,,, | Performed by: ANESTHESIOLOGY

## 2025-03-12 PROCEDURE — 72114 X-RAY EXAM L-S SPINE BENDING: CPT | Mod: 26,,, | Performed by: RADIOLOGY

## 2025-03-12 PROCEDURE — 99214 OFFICE O/P EST MOD 30 MIN: CPT | Mod: S$GLB,,, | Performed by: ANESTHESIOLOGY

## 2025-03-12 PROCEDURE — 72114 X-RAY EXAM L-S SPINE BENDING: CPT | Mod: TC,PO

## 2025-03-12 PROCEDURE — 95117 IMMUNOTHERAPY INJECTIONS: CPT | Mod: S$GLB,,, | Performed by: ALLERGY & IMMUNOLOGY

## 2025-03-12 PROCEDURE — 3079F DIAST BP 80-89 MM HG: CPT | Mod: CPTII,S$GLB,, | Performed by: ANESTHESIOLOGY

## 2025-03-12 PROCEDURE — 1125F AMNT PAIN NOTED PAIN PRSNT: CPT | Mod: CPTII,S$GLB,, | Performed by: ANESTHESIOLOGY

## 2025-03-12 PROCEDURE — 3044F HG A1C LEVEL LT 7.0%: CPT | Mod: CPTII,S$GLB,, | Performed by: ANESTHESIOLOGY

## 2025-03-12 PROCEDURE — 1160F RVW MEDS BY RX/DR IN RCRD: CPT | Mod: CPTII,S$GLB,, | Performed by: ANESTHESIOLOGY

## 2025-03-12 PROCEDURE — 3008F BODY MASS INDEX DOCD: CPT | Mod: CPTII,S$GLB,, | Performed by: ANESTHESIOLOGY

## 2025-03-12 PROCEDURE — 3288F FALL RISK ASSESSMENT DOCD: CPT | Mod: CPTII,S$GLB,, | Performed by: ANESTHESIOLOGY

## 2025-03-12 PROCEDURE — 99999 PR PBB SHADOW E&M-EST. PATIENT-LVL I: CPT | Mod: PBBFAC,,,

## 2025-03-12 PROCEDURE — 3074F SYST BP LT 130 MM HG: CPT | Mod: CPTII,S$GLB,, | Performed by: ANESTHESIOLOGY

## 2025-03-12 NOTE — PROGRESS NOTES
[facility] Follow Up Evaluation      Referred by: No ref. provider found    PCP:     CC:   Chief Complaint   Patient presents with    Pain    Low-back Pain          3/12/2025    11:35 AM 11/19/2024    11:36 AM 9/16/2024     2:49 PM   Last 3 PDI Scores   Pain Disability Index (PDI) 26 37 28       Interval HPI 3/12/25: Ms. Wooten returns to the office for follow up.  Today she reports return of her axial lower back pain.  Her pain is constant, aching, 6/10.  Guy any radicular pain.     HPI:   Nalini Wooten is a 70 y.o. female patient who has a past medical history of Allergies, Bulging disc, Carpal tunnel syndrome, Degenerative disc disease, High cholesterol, and Pinched nerve in neck. She presents with back pain.  Had chronic back pain for many years that has been gradually worsening.  Today she reports her pain is 9 in 10, intermittent, aching.  Can get referred pain into the bilateral buttocks.  Pain is worse with sitting, standing, bending, coughing, walking, lifting and relieved with rest and injections.  She has had injections for 5 years ago with excellent relief of previous pain.  She denies any numbness or weakness.      Pain Intervention History:  - s/p 1st diagnostic bilateral L4-5 and L5-S1 medial branch blocks on 5/22/24 and reports she got 85% relief lasting for 8 hours.  Preprocedure pain score 7.  Postprocedure pain score 1  - s/p 2nd diagnostic bilateral L4-5 and L5-S1 medial branch blocks on 7/19/24 and reports she got 85% relief lasting for 8 hours.   - bilateral L4-5 and L5-S1 RFA on 08/08/2024 with 80% relief lasting over 6 months    Past Spine Surgical History:      Past and current medications:  Antineuropathics:  NSAIDs:  Physical therapy: yes, completed   Antidepressants:  Muscle relaxers:  Opioids: tramadol   Antiplatelets/Anticoagulants:     History:    Current Outpatient Medications:     cyanocobalamin 1,000 mcg/mL injection, Inject every week 1 cc, Disp: 10 mL, Rfl: 1     cyclobenzaprine (FLEXERIL) 5 MG tablet, Take 1 tablet (5 mg total) by mouth 2 (two) times daily as needed for Muscle spasms., Disp: 30 tablet, Rfl: 0    fexofenadine (ALLEGRA) 180 MG tablet, Take 1 tablet (180 mg total) by mouth once daily., Disp: 90 tablet, Rfl: 1    hydrocortisone 2.5 % cream, Apply twice daily as needed to rash on face, Disp: 30 g, Rfl: 2    ipratropium (ATROVENT) 21 mcg (0.03 %) nasal spray, 2 sprays by Each Nostril route 3 (three) times daily as needed (runny nose)., Disp: 30 mL, Rfl: 3    levocetirizine (XYZAL) 5 MG tablet, Take 1 tablet (5 mg total) by mouth every evening., Disp: 90 tablet, Rfl: 1    metFORMIN (GLUCOPHAGE) 500 MG tablet, Take 1 tablet (500 mg total) by mouth 2 (two) times daily with meals., Disp: 180 tablet, Rfl: 2    oxybutynin (DITROPAN-XL) 10 MG 24 hr tablet, Take 1 tablet (10 mg total) by mouth once daily., Disp: 90 tablet, Rfl: 0    pantoprazole (PROTONIX) 40 MG tablet, Take 40 mg by mouth every morning., Disp: , Rfl:     pimecrolimus (ELIDEL) 1 % cream, AAA bid, Disp: 60 g, Rfl: 3    pravastatin (PRAVACHOL) 20 MG tablet, Take 1 tablet (20 mg total) by mouth once daily., Disp: 90 tablet, Rfl: 0    tirzepatide (MOUNJARO) 5 mg/0.5 mL PnIj, Inject 5 mg into the skin every 7 days., Disp: 2 mL, Rfl: 2    tolterodine (DETROL LA) 4 MG 24 hr capsule, Take 1 capsule (4 mg total) by mouth once daily., Disp: 90 capsule, Rfl: 1    traMADoL (ULTRAM) 50 mg tablet, Take 1 tablet (50 mg total) by mouth every 12 (twelve) hours as needed for Pain., Disp: 30 tablet, Rfl: 0    chlorhexidine (PERIDEX) 0.12 % solution, Use as directed 15 mLs in the mouth or throat 2 (two) times daily., Disp: , Rfl:     doxycycline (VIBRAMYCIN) 100 MG Cap, Take 1 pill by mouth daily x 1 month, Disp: 30 capsule, Rfl: 1    LIDOcaine viscous HCl 2% (LIDOCAINE VISCOUS) 2 % Soln, 2.5 mLs by Mucous Membrane route every 6 (six) hours., Disp: , Rfl:     phentermine (ADIPEX-P) 37.5 mg tablet, Take 1 tablet (37.5 mg  total) by mouth once daily., Disp: 90 tablet, Rfl: 0    Past Medical History:   Diagnosis Date    Allergies     Bulging disc     lower back     Carpal tunnel syndrome     Degenerative disc disease     High cholesterol     Pinched nerve in neck        Past Surgical History:   Procedure Laterality Date    ARTHROSCOPIC REPAIR OF ROTATOR CUFF OF SHOULDER Right 3/10/2021    Procedure: REPAIR, ROTATOR CUFF, ARTHROSCOPIC;  Surgeon: Dustin Vanegas MD;  Location: [facility] OR;  Service: Orthopedics;  Laterality: Right;  arthrex notified    ARTHROSCOPY OF SHOULDER WITH DECOMPRESSION OF SUBACROMIAL SPACE Right 3/10/2021    Procedure: ARTHROSCOPY, SHOULDER, WITH SUBACROMIAL SPACE DECOMPRESSION;  Surgeon: Dustin Vanegas MD;  Location: [facility] OR;  Service: Orthopedics;  Laterality: Right;    BLADDER SUSPENSION  1990    CATARACT EXTRACTION W/  INTRAOCULAR LENS IMPLANT Right 6/21/2023    Procedure: CEIOL OD;  Surgeon: Shawn Shearer MD;  Location: [facility] OR;  Service: Ophthalmology;  Laterality: Right;    CATARACT EXTRACTION W/  INTRAOCULAR LENS IMPLANT Left 7/19/2023    Procedure: CEIOL OS;  Surgeon: Shawn Shearer MD;  Location: [facility] OR;  Service: Ophthalmology;  Laterality: Left;    DISTAL CLAVICLE EXCISION Right 3/10/2021    Procedure: EXCISION, CLAVICLE, DISTAL;  Surgeon: Dustin Vanegas MD;  Location: [facility] OR;  Service: Orthopedics;  Laterality: Right;    FOOT SURGERY Bilateral 2001    FOOT SURGERY Right 05/13/2022    HAND SURGERY Left 2017    HYSTERECTOMY  1990    INJECTION OF ANESTHETIC AGENT AROUND MEDIAL BRANCH NERVES INNERVATING LUMBAR FACET JOINT Bilateral 5/22/2024    Procedure: Block-nerve-medial branch-lumbar     L4/5, L5/S1;  Surgeon: Angel Yousif MD;  Location: [facility] OR;  Service: Pain Management;  Laterality: Bilateral;    INJECTION OF ANESTHETIC AGENT AROUND MEDIAL BRANCH NERVES INNERVATING LUMBAR FACET JOINT Bilateral 7/19/2024    Procedure: Block-nerve-medial branch-lumbar Bilat L4/5 and L5/S1;  Surgeon: Nica  Angel VINES MD;  Location: [facility] OR;  Service: Pain Management;  Laterality: Bilateral;  Bilat L4/5 and L5/S1    JOINT REPLACEMENT  2013    KNEE SURGERY Bilateral 2013    MANDIBLE FRACTURE SURGERY  1981    RADIOFREQUENCY ABLATION OF LUMBAR MEDIAL BRANCH NERVE AT SINGLE LEVEL Bilateral 8/8/2024    Procedure: Radiofrequency Ablation, Nerve, Spinal, Lumbar, Medial Branch, L4/5 and L5/S1;  Surgeon: Angel Yousif MD;  Location: [facility] OR;  Service: Pain Management;  Laterality: Bilateral;    SHOULDER ARTHROSCOPY Left 02/27/2019    THYROIDECTOMY Left 05/13/2019        TONSILLECTOMY         Family History   Problem Relation Name Age of Onset    Arthritis Mother      Aneurysm Mother          Abdominal Aneurysm     Heart disease Father  65    Arthritis Father      Diabetes Father      Hearing loss Father      Hypertension Father      Dementia Father      No Known Problems Sister      No Known Problems Sister      Heart disease Brother  50 - 59        stent placement    Heart disease Brother  49        stent placement    Aneurysm Brother          Abdominal Aneurysm    Leukemia Son 3     Crohn's disease Son 3     Ankylosing spondylitis Son 3     Other Son 3         avascular necrosis of pancho hips    Rheumatologic disease Son 3     Heart defect Son 3     Kidney failure Son 3     SIDS Maternal Aunt 2     Uterine cancer Maternal Aunt 2     Dementia Paternal Aunt 1     Diabetes Paternal Grandmother      Heart disease Paternal Grandfather         Social History     Socioeconomic History    Marital status:     Number of children: 3   Tobacco Use    Smoking status: Never     Passive exposure: Never    Smokeless tobacco: Never   Substance and Sexual Activity    Alcohol use: Never     Alcohol/week: 0.0 standard drinks of alcohol    Drug use: No    Sexual activity: Yes     Birth control/protection: None     Social Drivers of Health     Financial Resource Strain: Low Risk  (8/1/2024)    Overall Financial Resource Strain  "(CARDIA)     Difficulty of Paying Living Expenses: Not hard at all   Food Insecurity: No Food Insecurity (8/1/2024)    Hunger Vital Sign     Worried About Running Out of Food in the Last Year: Never true     Ran Out of Food in the Last Year: Never true   Transportation Needs: No Transportation Needs (6/7/2023)    PRAPARE - Transportation     Lack of Transportation (Medical): No     Lack of Transportation (Non-Medical): No   Physical Activity: Insufficiently Active (8/1/2024)    Exercise Vital Sign     Days of Exercise per Week: 4 days     Minutes of Exercise per Session: 20 min   Stress: Stress Concern Present (8/1/2024)    [employer] [place] [employer] - Occupational Stress Questionnaire     Feeling of Stress : To some extent   Housing Stability: Low Risk  (6/7/2023)    Housing Stability Vital Sign     Unable to Pay for Housing in the Last Year: No     Number of Places Lived in the Last Year: 1     Unstable Housing in the Last Year: No       Review of patient's allergies indicates:   Allergen Reactions    Adhesive Swelling and Blisters     Adhesive that requires uv light    Estrogens Other (See Comments)     Mini stroke    Shingrix (pf)  [[id number] (pf)] Diarrhea, Itching and Nausea And Vomiting       Review of Systems:  Positive for headaches, weight gain, difficulty sleeping, urinary frequency.  Balance of review of systems is negative.    Physical Exam:  Vitals:    03/12/25 1137   BP: 129/87   Pulse: 68   Weight: 86 kg (189 lb 11.3 oz)   Height: 5' 3" (1.6 m)   PainSc:   4   PainLoc: Back     Body mass index is 33.6 kg/m².    Gen: NAD  Psych: mood appropriate for given condition  HEENT: eyes anicteric   CV: RRR  HEENT: anicteric   Respiratory: non-labored, no signs of respiratory distress  Abd: non-distended  Skin: warm, dry and intact.  Gait: No antalgic gait.     Reproducible pain with lumbar axial facet loading    Sensory:  Intact and symmetrical to light touch in L1-S1 dermatomes " bilaterally.    Motor:     Right Left   L2/3 Iliacus Hip flexion  5  5   L3/4 Qudratus Femoris Knee Extension  5  5   L4/5 Tib Anterior Ankle Dorsiflexion   5  5   L5/S1 Extensor Hallicus Longus Great toe extension  5  5   S1/S2 Gastroc/Soleus Plantar Flexion  5  5      Right Left                  Patellar DTR 0 0   Achilles DTR 0 0                      Labs:  Lab Results   Component Value Date    HGBA1C 6.5 (H) 01/16/2025       Lab Results   Component Value Date    WBC 6.52 10/07/2024    HGB 12.3 10/07/2024    HCT 39.2 10/07/2024    MCV 87 10/07/2024     10/07/2024         Imaging:  MRI lumbar spine 4/30/24  FINDINGS:  Vertebral column: There is degenerative change which will be described by level.  The lumbar vertebral bodies maintain normal height.  There is no fracture.  There is trace anterolisthesis of L4 on L5.  There is stable trace retrolisthesis of L2 on L3, L3 on L4.  The lumbar discs are desiccated.  There is mild-to-moderate disc space narrowing at the L2-3 level with mild disc space narrowing at the L3-4 level.  Baseline marrow signal is normal.     Spinal canal, conus, epidural space: The spinal canal is developmentally normal.  The conus terminates at the level of T12-L1 and is normal in contour and signal intensity.  There is no abnormal epidural mass or fluid collection.     Findings by level:     On the sagittal images, there is a minimal disc bulge at the T11-12 level but there is no spinal stenosis or cord compression.  T12-L1: There is a minimal disc bulge.  There is no spinal canal or significant foraminal stenosis.  There is a small perineural cyst in the superior left foraminal recess.  L1-2: There is mild facet joint arthropathy and a minimal disc bulge.  There is no spinal canal or significant foraminal stenosis.  L2-3: There is trace retrolisthesis of L2 on L3.  There is mild-to-moderate disc space narrowing.  There is a diffuse disc bulge with osteophytic ridging eccentric to the  left.  There is mild facet joint arthropathy with ligamentum flavum thickening.  There is only borderline spinal stenosis.  There is crowding of the left lateral recess.  There is mild-to-moderate left and mild right foraminal stenosis.  L3-4: There is trace retrolisthesis of L3 on L4 where there is also mild disc space narrowing and inferior unroofing of a diffuse disc bulge with mild osteophytic ridging eccentric to the left.  There is only mild facet joint arthropathy.  There is no spinal stenosis.  Again there is crowding of the left lateral recess.  There is mild-to-moderate left foraminal stenosis.  L4-5: There is subtle trace anterolisthesis of L4 on L5.  There is moderate facet joint arthropathy with ligamentum flavum thickening.  There is a mild disc bulge.  There is flattening of the ventral dural sac.  There is borderline to mild spinal stenosis.  There is mild bilateral foraminal stenosis.  L5-S1: There is facet joint arthropathy.  There is no spinal canal or significant foraminal stenosis.     Soft tissues, other: The posterior spinous muscles are somewhat atrophic.  The prevertebral soft tissues are normal.  The aorta is ectatic measuring up approximately 3 cm at the level of L3-4.  There is no hydronephrosis.    Assessment:   Problem List Items Addressed This Visit    None  Visit Diagnoses         Lumbar spondylosis    -  Primary    Relevant Orders    X-Ray Lumbar Complete Including Flex And Ext (Completed)      Dorsalgia, unspecified        Relevant Orders    X-Ray Lumbar Complete Including Flex And Ext (Completed)              Nalini Wooten is a 70 y.o. female patient who has a past medical history of Allergies, Bulging disc, Carpal tunnel syndrome, Degenerative disc disease, High cholesterol, and Pinched nerve in neck. She presents with back pain.  Had chronic back pain for many years that has been gradually worsening.  Today she reports her pain is 9 in 10, intermittent, aching.        3/12/25 -  Ms. Wooten returns to the office for follow up.  Today she reports return of her axial lower back pain.  Her pain is constant, aching, 6/10.  Guy any radicular pain.     - on exam she has reproducible pain with lumbar axial facet loading  - I independently reviewed her lumbar MRI and she has multilevel bilateral facet arthropathy  - over the past 12 months she has completed formal physical therapy and maintains PT directed home exercise program  - she reports axial back pain that I think is secondary to lumbar spondylosis that limits her mobility and interfering with the quality of her life  - she is status post bilateral L4-5 and L5-S1 RFA on 08/08/2024 with 80% relief lasting over 6 months  - we will schedule for repeat bilateral L4-5 and L5-S1 RFA. The risks and benefits of this intervention, and alternative therapies were discussed with the patient.  The discussion of risks included infection, bleeding, need for additional procedures or surgery, nerve damage.  Questions regarding the procedure, risks, expected outcome, and possible side effects were solicited and answered to the patient's satisfaction.  Nalini Wooten wishes to proceed with the injection or procedure.  Written consent was obtained.  - follow up 3-4 weeks post procedure      : Not applicable    Angel Yousif M.D.  Interventional Pain Medicine / Anesthesiology    This note was completed with dictation software and grammatical errors may exist.

## 2025-03-12 NOTE — TELEPHONE ENCOUNTER
Physician - Dr Yousif    Type of Procedure/Injection - Lumbar Radiofrequency Ablation  L4/5 and L5/S1           Laterality - Bilateral      Priority - Normal      Anxiolysis- RNIV      Need to hold medication - Yes      Tirzepatide (Mounjaro) 7 days      Clearance needed - No      Follow up - 4 week

## 2025-03-13 ENCOUNTER — PATIENT MESSAGE (OUTPATIENT)
Dept: PAIN MEDICINE | Facility: CLINIC | Age: 70
End: 2025-03-13
Payer: MEDICARE

## 2025-03-13 DIAGNOSIS — M47.816 LUMBAR SPONDYLOSIS: Primary | ICD-10-CM

## 2025-03-13 RX ORDER — SODIUM CHLORIDE, SODIUM LACTATE, POTASSIUM CHLORIDE, CALCIUM CHLORIDE 600; 310; 30; 20 MG/100ML; MG/100ML; MG/100ML; MG/100ML
INJECTION, SOLUTION INTRAVENOUS CONTINUOUS
OUTPATIENT
Start: 2025-03-13

## 2025-03-13 NOTE — TELEPHONE ENCOUNTER
Spoke with patient and scheduled. Advised to hold Mounjaro x 7 days prior. Pre op information given and follow up appointment scheduled.   Female

## 2025-03-25 ENCOUNTER — PATIENT MESSAGE (OUTPATIENT)
Dept: PAIN MEDICINE | Facility: CLINIC | Age: 70
End: 2025-03-25
Payer: MEDICARE

## 2025-03-25 ENCOUNTER — TELEPHONE (OUTPATIENT)
Dept: SURGERY | Facility: HOSPITAL | Age: 70
End: 2025-03-25
Payer: MEDICARE

## 2025-03-25 ENCOUNTER — HOSPITAL ENCOUNTER (OUTPATIENT)
Dept: RADIOLOGY | Facility: HOSPITAL | Age: 70
Discharge: HOME | End: 2025-03-25
Attending: ANESTHESIOLOGY | Admitting: ANESTHESIOLOGY
Payer: MEDICARE

## 2025-03-25 DIAGNOSIS — M47.816 LUMBAR SPONDYLOSIS: Primary | ICD-10-CM

## 2025-03-25 DIAGNOSIS — M54.50 LOWER BACK PAIN: ICD-10-CM

## 2025-03-25 RX ORDER — SODIUM CHLORIDE, SODIUM LACTATE, POTASSIUM CHLORIDE, CALCIUM CHLORIDE 600; 310; 30; 20 MG/100ML; MG/100ML; MG/100ML; MG/100ML
INJECTION, SOLUTION INTRAVENOUS CONTINUOUS
OUTPATIENT
Start: 2025-03-25

## 2025-03-25 NOTE — TELEPHONE ENCOUNTER
Patient scheduled for lumbar RFA today with Dr. Yousif. During initial assessment, pt reported having chest tightness when coughing. No other respiratory symptoms. Dr. Yousif notified and does not wish to proceed with today's procedure. Please reach out to patient to reschedule, thank you!

## 2025-03-26 ENCOUNTER — OFFICE VISIT (OUTPATIENT)
Dept: FAMILY MEDICINE | Facility: CLINIC | Age: 70
End: 2025-03-26
Payer: MEDICARE

## 2025-03-26 VITALS
RESPIRATION RATE: 20 BRPM | HEIGHT: 63 IN | BODY MASS INDEX: 33.98 KG/M2 | TEMPERATURE: 98 F | DIASTOLIC BLOOD PRESSURE: 72 MMHG | WEIGHT: 191.81 LBS | HEART RATE: 68 BPM | SYSTOLIC BLOOD PRESSURE: 140 MMHG

## 2025-03-26 DIAGNOSIS — J30.2 SEASONAL ALLERGIES: ICD-10-CM

## 2025-03-26 DIAGNOSIS — R05.9 COUGH, UNSPECIFIED TYPE: Primary | ICD-10-CM

## 2025-03-26 DIAGNOSIS — J02.9 SORE THROAT: ICD-10-CM

## 2025-03-26 LAB
CTP QC/QA: YES
CTP QC/QA: YES
MOLECULAR STREP A: NEGATIVE
SARS-COV-2 RDRP RESP QL NAA+PROBE: NEGATIVE

## 2025-03-26 PROCEDURE — 3008F BODY MASS INDEX DOCD: CPT | Mod: CPTII,S$GLB,, | Performed by: NURSE PRACTITIONER

## 2025-03-26 PROCEDURE — 87635 SARS-COV-2 COVID-19 AMP PRB: CPT | Mod: QW,S$GLB,, | Performed by: NURSE PRACTITIONER

## 2025-03-26 PROCEDURE — 3077F SYST BP >= 140 MM HG: CPT | Mod: CPTII,S$GLB,, | Performed by: NURSE PRACTITIONER

## 2025-03-26 PROCEDURE — 1101F PT FALLS ASSESS-DOCD LE1/YR: CPT | Mod: CPTII,S$GLB,, | Performed by: NURSE PRACTITIONER

## 2025-03-26 PROCEDURE — 99213 OFFICE O/P EST LOW 20 MIN: CPT | Mod: 25,S$GLB,, | Performed by: NURSE PRACTITIONER

## 2025-03-26 PROCEDURE — 87651 STREP A DNA AMP PROBE: CPT | Mod: QW,S$GLB,, | Performed by: NURSE PRACTITIONER

## 2025-03-26 PROCEDURE — 1125F AMNT PAIN NOTED PAIN PRSNT: CPT | Mod: CPTII,S$GLB,, | Performed by: NURSE PRACTITIONER

## 2025-03-26 PROCEDURE — 3044F HG A1C LEVEL LT 7.0%: CPT | Mod: CPTII,S$GLB,, | Performed by: NURSE PRACTITIONER

## 2025-03-26 PROCEDURE — 96372 THER/PROPH/DIAG INJ SC/IM: CPT | Mod: S$GLB,,, | Performed by: NURSE PRACTITIONER

## 2025-03-26 PROCEDURE — 1159F MED LIST DOCD IN RCRD: CPT | Mod: CPTII,S$GLB,, | Performed by: NURSE PRACTITIONER

## 2025-03-26 PROCEDURE — 99999 PR PBB SHADOW E&M-EST. PATIENT-LVL IV: CPT | Mod: PBBFAC,,, | Performed by: NURSE PRACTITIONER

## 2025-03-26 PROCEDURE — 3078F DIAST BP <80 MM HG: CPT | Mod: CPTII,S$GLB,, | Performed by: NURSE PRACTITIONER

## 2025-03-26 PROCEDURE — 1160F RVW MEDS BY RX/DR IN RCRD: CPT | Mod: CPTII,S$GLB,, | Performed by: NURSE PRACTITIONER

## 2025-03-26 PROCEDURE — 3288F FALL RISK ASSESSMENT DOCD: CPT | Mod: CPTII,S$GLB,, | Performed by: NURSE PRACTITIONER

## 2025-03-26 RX ORDER — BENZONATATE 100 MG/1
100 CAPSULE ORAL 3 TIMES DAILY PRN
Qty: 30 CAPSULE | Refills: 0 | Status: SHIPPED | OUTPATIENT
Start: 2025-03-26 | End: 2025-04-05

## 2025-03-26 RX ORDER — DEXAMETHASONE SODIUM PHOSPHATE 4 MG/ML
4 INJECTION, SOLUTION INTRA-ARTICULAR; INTRALESIONAL; INTRAMUSCULAR; INTRAVENOUS; SOFT TISSUE
Status: COMPLETED | OUTPATIENT
Start: 2025-03-26 | End: 2025-03-26

## 2025-03-26 RX ORDER — LORATADINE 10 MG/1
10 TABLET ORAL DAILY
Qty: 30 TABLET | Refills: 11 | Status: SHIPPED | OUTPATIENT
Start: 2025-03-26 | End: 2026-03-26

## 2025-03-26 RX ORDER — PROMETHAZINE HYDROCHLORIDE AND DEXTROMETHORPHAN HYDROBROMIDE 6.25; 15 MG/5ML; MG/5ML
5 SYRUP ORAL EVERY 6 HOURS PRN
Qty: 200 ML | Refills: 0 | Status: SHIPPED | OUTPATIENT
Start: 2025-03-26 | End: 2025-04-05

## 2025-03-26 RX ADMIN — DEXAMETHASONE SODIUM PHOSPHATE 4 MG: 4 INJECTION, SOLUTION INTRA-ARTICULAR; INTRALESIONAL; INTRAMUSCULAR; INTRAVENOUS; SOFT TISSUE at 12:03

## 2025-03-26 NOTE — PROGRESS NOTES
Patient ID: Nalini Wooten is a 70 y.o. female.    Chief Complaint: Cough and Sore Throat (71 yo female here c/o cough with sore throat times 3 days. No report of fever or chills. KM)    History of Present Illness    CHIEF COMPLAINT:  Ms. Wooten presents with a sore throat and cough for the past couple of days.    HPI:  Ms. Wooten reports a sore throat and cough for a few days. The cough is more severe at night when in a supine position compared to during the daytime and is non-productive. The sore throat developed after the onset of the cough. She notes congestion, which is observed to be more pronounced on one side during the exam. She mentions these symptoms occur annually when pollen counts are high, attributing them to allergies. She is currently 1 year into allergy injection treatment. In the past, she found relief from similar symptoms with an injection. A scheduled back ablation procedure was postponed due to the cough.    MEDICATIONS:  Ms. Wooten was on a dry roll medication to help with weight loss and prevent diabetes progression. She is also on some allergy medication. She discontinued the dry roll medication last week due to a scheduled ablation procedure, which was subsequently postponed.    MEDICAL HISTORY:  Ms. Wooten has a history of pre-diabetes. She receives allergy injections for her allergies.    SURGICAL HISTORY:  Ms. Wooten had a scheduled ablation procedure for her back, but it was postponed due to her cough.    TEST RESULTS:  Ms. Wooten recently underwent a COVID-19 test, which came back negative.    ALLERGIES:  Ms. Wooten mentions having allergies and has been receiving allergy injections for about a year.      ROS:  General: -fever, -chills, -fatigue, -weight gain, -weight loss  Eyes: -vision changes, -redness, -discharge  ENT: -ear pain, -nasal congestion, +sore throat, +dry mouth  Cardiovascular: -chest pain, -palpitations, -lower extremity edema  Respiratory: +cough, -shortness of  breath, +waking at night coughing, +chest tightness, +chest pressure, +chest congestion  Gastrointestinal: -abdominal pain, -nausea, -vomiting, -diarrhea, -constipation, -blood in stool  Genitourinary: -dysuria, -hematuria, -frequency  Musculoskeletal: -joint pain, -muscle pain  Skin: -rash, -lesion  Neurological: -headache, -dizziness, -numbness, -tingling  Psychiatric: -anxiety, -depression, -sleep difficulty  Allergic: +allergic reactions             Past Medical History:   Diagnosis Date    Allergies     Bulging disc     lower back     Carpal tunnel syndrome     Degenerative disc disease     High cholesterol     Pinched nerve in neck      Past Surgical History:   Procedure Laterality Date    ARTHROSCOPIC REPAIR OF ROTATOR CUFF OF SHOULDER Right 3/10/2021    Procedure: REPAIR, ROTATOR CUFF, ARTHROSCOPIC;  Surgeon: Dustin Vanegas MD;  Location: [facility] OR;  Service: Orthopedics;  Laterality: Right;  arthrex notified    ARTHROSCOPY OF SHOULDER WITH DECOMPRESSION OF SUBACROMIAL SPACE Right 3/10/2021    Procedure: ARTHROSCOPY, SHOULDER, WITH SUBACROMIAL SPACE DECOMPRESSION;  Surgeon: Dustin Vanegas MD;  Location: [facility] OR;  Service: Orthopedics;  Laterality: Right;    BLADDER SUSPENSION  1990    CATARACT EXTRACTION W/  INTRAOCULAR LENS IMPLANT Right 6/21/2023    Procedure: CEIOL OD;  Surgeon: Shawn Shearer MD;  Location: [facility] OR;  Service: Ophthalmology;  Laterality: Right;    CATARACT EXTRACTION W/  INTRAOCULAR LENS IMPLANT Left 7/19/2023    Procedure: CEIOL OS;  Surgeon: Shawn Shearer MD;  Location: [facility] OR;  Service: Ophthalmology;  Laterality: Left;    DISTAL CLAVICLE EXCISION Right 3/10/2021    Procedure: EXCISION, CLAVICLE, DISTAL;  Surgeon: Dustin Vanegas MD;  Location: [facility] OR;  Service: Orthopedics;  Laterality: Right;    FOOT SURGERY Bilateral 2001    FOOT SURGERY Right 05/13/2022    HAND SURGERY Left 2017    HYSTERECTOMY  1990    INJECTION OF ANESTHETIC AGENT AROUND MEDIAL BRANCH NERVES INNERVATING LUMBAR  "FACET JOINT Bilateral 5/22/2024    Procedure: Block-nerve-medial branch-lumbar     L4/5, L5/S1;  Surgeon: Angel Yousif MD;  Location: [facility] OR;  Service: Pain Management;  Laterality: Bilateral;    INJECTION OF ANESTHETIC AGENT AROUND MEDIAL BRANCH NERVES INNERVATING LUMBAR FACET JOINT Bilateral 7/19/2024    Procedure: Block-nerve-medial branch-lumbar Bilat L4/5 and L5/S1;  Surgeon: Angel Yousif MD;  Location: [facility] OR;  Service: Pain Management;  Laterality: Bilateral;  Bilat L4/5 and L5/S1    JOINT REPLACEMENT  2013    KNEE SURGERY Bilateral 2013    MANDIBLE FRACTURE SURGERY  1981    RADIOFREQUENCY ABLATION OF LUMBAR MEDIAL BRANCH NERVE AT SINGLE LEVEL Bilateral 8/8/2024    Procedure: Radiofrequency Ablation, Nerve, Spinal, Lumbar, Medial Branch, L4/5 and L5/S1;  Surgeon: Angel Yousif MD;  Location: [facility] OR;  Service: Pain Management;  Laterality: Bilateral;    SHOULDER ARTHROSCOPY Left 02/27/2019    THYROIDECTOMY Left 05/13/2019        TONSILLECTOMY           Tobacco History:  reports that she has never smoked. She has never been exposed to tobacco smoke. She has never used smokeless tobacco.      Review of patient's allergies indicates:   Allergen Reactions    Adhesive Swelling and Blisters     Adhesive that requires uv light    Estrogens Other (See Comments)     Mini stroke    Shingrix (pf)  [varicella-zoster [id number] (pf)] Diarrhea, Itching and Nausea And Vomiting     Current Medications[1]           Objective:      Vitals:    03/26/25 1132 03/26/25 1203   BP: (!) 148/70 (!) 140/72   Pulse: 68    Resp: 20    Temp: 97.9 °F (36.6 °C)    TempSrc: Oral    Weight: 87 kg (191 lb 12.8 oz)    Height: 5' 3" (1.6 m)      Physical Exam      Assessment:       1. Cough, unspecified type    2. Sore throat           Plan:       Assessment & Plan    E11.9 Diabetes mellitus type 2 without retinopathy  R05.1 Acute cough  R07.0 Pain in throat  J30.9 Allergic rhinitis, unspecified  R68.89 Other general " symptoms and signs  R73.03 Prediabetes  Z86.39 Personal history of other endocrine, nutritional and metabolic disease    IMPRESSION:  - Considered COVID-19 as potential cause of symptoms, but test results were negative.  - Symptoms likely related to allergies, given history of seasonal allergies and ongoing allergy injections.    DIABETES MELLITUS TYPE 2 WITHOUT RETINOPATHY:  - Confirmed patient's pre-diabetic status.  - Prescribed medication for weight loss and diabetes prevention.  - Noted patient's temporary discontinuation due to a scheduled procedure.  - Advised resumption of medication as prescribed.    ACUTE COUGH:  - Evaluated the patient's cough, present for 2 days and worsening at night when lying down.  - Auscultated the lungs, noting increased congestion on one side.  - Prescribed either promethazine DM or Tessalon pearls for nighttime cough relief, allowing the patient to choose based on cost and insurance coverage.  - Administered a steroid injection to address cough symptoms, which may also alleviate the sore throat.    PAIN IN THROAT:  - Examined the patient's throat and assessed that the sore throat might be related to the dry cough.  - The steroid injection administered for the cough may also help alleviate sore throat symptoms.    ALLERGIC RHINITIS:  - Confirmed the patient's history of allergies and current allergy injection regimen.  - Noted annual allergy symptoms during high pollen counts, considering allergies as a contributing factor to current symptoms.  - The steroid injection administered in the office will also address allergy symptoms.  - Allergy shots typically help manage the patient's symptoms.    OTHER GENERAL SYMPTOMS AND SIGNS:  - Performed a COVID-19 test, which returned negative results.  - The test was ordered in response to the patient's report of feeling unwell and was conducted by Miss Bryant.         Cough, unspecified type  -     POCT Strep A, Molecular  -     POCT COVID-19  Rapid Screening  -     promethazine-dextromethorphan (PROMETHAZINE-DM) 6.25-15 mg/5 mL Syrp; Take 5 mLs by mouth every 6 (six) hours as needed (cough).  Dispense: 200 mL; Refill: 0  -     dexAMETHasone injection 4 mg  -     benzonatate (TESSALON) 100 MG capsule; Take 1 capsule (100 mg total) by mouth 3 (three) times daily as needed for Cough.  Dispense: 30 capsule; Refill: 0    Sore throat  -     POCT Strep A, Molecular  -     POCT COVID-19 Rapid Screening      Follow up if symptoms worsen or fail to improve.        3/26/2025 Cyn Renee NP    This note was generated with the assistance of ambient listening technology. Verbal consent was obtained by the patient and accompanying visitor(s) for the recording of patient appointment to facilitate this note. I attest to having reviewed and edited the generated note for accuracy, though some syntax or spelling errors may persist. Please contact the author of this note for any clarification.             [1]   Current Outpatient Medications:     cyanocobalamin 1,000 mcg/mL injection, Inject every week 1 cc, Disp: 10 mL, Rfl: 1    cyclobenzaprine (FLEXERIL) 5 MG tablet, Take 1 tablet (5 mg total) by mouth 2 (two) times daily as needed for Muscle spasms., Disp: 30 tablet, Rfl: 0    fexofenadine (ALLEGRA) 180 MG tablet, Take 1 tablet (180 mg total) by mouth once daily., Disp: 90 tablet, Rfl: 1    hydrocortisone 2.5 % cream, Apply twice daily as needed to rash on face, Disp: 30 g, Rfl: 2    ipratropium (ATROVENT) 21 mcg (0.03 %) nasal spray, 2 sprays by Each Nostril route 3 (three) times daily as needed (runny nose)., Disp: 30 mL, Rfl: 3    levocetirizine (XYZAL) 5 MG tablet, Take 1 tablet (5 mg total) by mouth every evening., Disp: 90 tablet, Rfl: 1    metFORMIN (GLUCOPHAGE) 500 MG tablet, Take 1 tablet (500 mg total) by mouth 2 (two) times daily with meals., Disp: 180 tablet, Rfl: 2    pantoprazole (PROTONIX) 40 MG tablet, Take 40 mg by mouth every morning., Disp: ,  Rfl:     pimecrolimus (ELIDEL) 1 % cream, AAA bid, Disp: 60 g, Rfl: 3    pravastatin (PRAVACHOL) 20 MG tablet, Take 1 tablet (20 mg total) by mouth once daily., Disp: 90 tablet, Rfl: 0    tirzepatide (MOUNJARO) 5 mg/0.5 mL PnIj, Inject 5 mg into the skin every 7 days., Disp: 2 mL, Rfl: 2    tolterodine (DETROL LA) 4 MG 24 hr capsule, Take 1 capsule (4 mg total) by mouth once daily., Disp: 90 capsule, Rfl: 1    traMADoL (ULTRAM) 50 mg tablet, Take 1 tablet (50 mg total) by mouth every 12 (twelve) hours as needed for Pain., Disp: 30 tablet, Rfl: 0    benzonatate (TESSALON) 100 MG capsule, Take 1 capsule (100 mg total) by mouth 3 (three) times daily as needed for Cough., Disp: 30 capsule, Rfl: 0    chlorhexidine (PERIDEX) 0.12 % solution, Use as directed 15 mLs in the mouth or throat 2 (two) times daily., Disp: , Rfl:     doxycycline (VIBRAMYCIN) 100 MG Cap, Take 1 pill by mouth daily x 1 month, Disp: 30 capsule, Rfl: 1    LIDOcaine viscous HCl 2% (LIDOCAINE VISCOUS) 2 % Soln, 2.5 mLs by Mucous Membrane route every 6 (six) hours., Disp: , Rfl:     oxybutynin (DITROPAN-XL) 10 MG 24 hr tablet, Take 1 tablet (10 mg total) by mouth once daily., Disp: 90 tablet, Rfl: 0    phentermine (ADIPEX-P) 37.5 mg tablet, Take 1 tablet (37.5 mg total) by mouth once daily., Disp: 90 tablet, Rfl: 0    promethazine-dextromethorphan (PROMETHAZINE-DM) 6.25-15 mg/5 mL Syrp, Take 5 mLs by mouth every 6 (six) hours as needed (cough)., Disp: 200 mL, Rfl: 0  No current facility-administered medications for this visit.

## 2025-03-27 ENCOUNTER — PATIENT MESSAGE (OUTPATIENT)
Dept: FAMILY MEDICINE | Facility: CLINIC | Age: 70
End: 2025-03-27
Payer: MEDICARE

## 2025-04-01 ENCOUNTER — PATIENT MESSAGE (OUTPATIENT)
Dept: FAMILY MEDICINE | Facility: CLINIC | Age: 70
End: 2025-04-01
Payer: MEDICARE

## 2025-04-01 DIAGNOSIS — J06.9 UPPER RESPIRATORY TRACT INFECTION, UNSPECIFIED TYPE: Primary | ICD-10-CM

## 2025-04-01 RX ORDER — PROMETHAZINE HYDROCHLORIDE AND DEXTROMETHORPHAN HYDROBROMIDE 6.25; 15 MG/5ML; MG/5ML
5 SYRUP ORAL EVERY 6 HOURS PRN
Qty: 200 ML | Refills: 0 | Status: SHIPPED | OUTPATIENT
Start: 2025-04-01 | End: 2025-04-11

## 2025-04-01 RX ORDER — AZITHROMYCIN 250 MG/1
TABLET, FILM COATED ORAL
Qty: 6 TABLET | Refills: 0 | Status: SHIPPED | OUTPATIENT
Start: 2025-04-01 | End: 2025-04-06

## 2025-04-01 NOTE — TELEPHONE ENCOUNTER
Let her know I sent a zpak and refilled cough medication and if that does not help she would need to come back in to have someone reevaluate

## 2025-04-04 ENCOUNTER — TELEPHONE (OUTPATIENT)
Dept: SURGERY | Facility: HOSPITAL | Age: 70
End: 2025-04-04
Payer: MEDICARE

## 2025-04-04 NOTE — TELEPHONE ENCOUNTER
Spoke with Pt, she states her lumbar ablation was cancelled 2 weeks ago for a upper resp infection and severe coughing. Pt is still taking antibiotics, she has 2 more doses, and received a steroid injection on Thursday of last week. Pt states she is still coughing but has slightly improved. Please respond with how Dr Yousif would like to proceed. Her next ablation is scheduled Tuesday, 4/8/25. Thank you

## 2025-04-05 ENCOUNTER — NURSE TRIAGE (OUTPATIENT)
Dept: ADMINISTRATIVE | Facility: CLINIC | Age: 70
End: 2025-04-05
Payer: MEDICARE

## 2025-04-05 NOTE — TELEPHONE ENCOUNTER
Reason for Disposition   Earache  (Exceptions: Brief ear pain of < 60 minutes duration, or earache occurring during air travel.)    Additional Information   Ear pain is main symptom   Negative: [1] Recently diagnosed with otitis externa (swimmer's ear) AND [2] taking an antibiotic by mouth or using antibiotic ear drops   Negative: [1] Recently diagnosed with otitis media AND [2] taking an antibiotic by mouth   Negative: Followed an ear injury   Negative: Foreign body stuck in the ear canal (e.g., bug, piece of cotton)   Negative: [1] Stiff neck (can't touch chin to chest) AND [2] fever   Negative: [1] Bony area of skull behind the ear is pink or swollen AND [2] fever   Negative: Fever > 104 F (40 C)   Negative: Patient sounds very sick or weak to the triager   Negative: [1] SEVERE pain (e.g., excruciating) and [2] not improved 2 hours after pain medicine (e.g., acetaminophen or ibuprofen)   Negative: Walking is very unsteady or feels very dizzy   Negative: Sudden onset of ear pain after long - thin object was inserted into the ear canal (e.g., pencil, Q-tip)   Negative: Diabetes mellitus or weak immune system (e.g., HIV positive, cancer chemo, splenectomy, organ transplant, chronic steroids)   Negative: New blurred vision or vision changes   Negative: White, yellow, or green discharge (pus)   Negative: Bloody discharge or unexplained bleeding from ear canal    Protocols used: Ear - Congestion-A-, Earache-A-AH  Pt states she was diagnosed with allergies on 3/26/25 and received a steroid shot. States she called the Provider a week later because she had the same symptoms and a cough. States she was started on a Z Pack on 4/1/25. States she now has severe pain in her ear and she hears fluid in the ear. Pt advised to see a Healthcare Provider within 24 hrs. Advised of [facility] locations open on weekends. Pt verbalized understanding and states she will go to an [facility] on the [facility].  Instructed to call [facility] back if new/worsening symptoms develop. Pt verbalized understanding.

## 2025-04-07 ENCOUNTER — RESULTS FOLLOW-UP (OUTPATIENT)
Dept: FAMILY MEDICINE | Facility: CLINIC | Age: 70
End: 2025-04-07

## 2025-04-07 ENCOUNTER — OFFICE VISIT (OUTPATIENT)
Dept: FAMILY MEDICINE | Facility: CLINIC | Age: 70
End: 2025-04-07
Payer: MEDICARE

## 2025-04-07 ENCOUNTER — HOSPITAL ENCOUNTER (OUTPATIENT)
Dept: RADIOLOGY | Facility: HOSPITAL | Age: 70
Discharge: HOME | End: 2025-04-07
Attending: NURSE PRACTITIONER
Payer: MEDICARE

## 2025-04-07 VITALS
SYSTOLIC BLOOD PRESSURE: 128 MMHG | WEIGHT: 188.25 LBS | DIASTOLIC BLOOD PRESSURE: 70 MMHG | BODY MASS INDEX: 33.36 KG/M2 | TEMPERATURE: 97 F | RESPIRATION RATE: 20 BRPM | HEART RATE: 72 BPM | HEIGHT: 63 IN

## 2025-04-07 DIAGNOSIS — R19.7 DIARRHEA, UNSPECIFIED TYPE: ICD-10-CM

## 2025-04-07 DIAGNOSIS — J06.9 UPPER RESPIRATORY TRACT INFECTION, UNSPECIFIED TYPE: Primary | ICD-10-CM

## 2025-04-07 DIAGNOSIS — J06.9 UPPER RESPIRATORY TRACT INFECTION, UNSPECIFIED TYPE: ICD-10-CM

## 2025-04-07 DIAGNOSIS — R11.2 NAUSEA AND VOMITING, UNSPECIFIED VOMITING TYPE: ICD-10-CM

## 2025-04-07 DIAGNOSIS — K92.1 BLACK STOOL: ICD-10-CM

## 2025-04-07 PROCEDURE — 71046 X-RAY EXAM CHEST 2 VIEWS: CPT | Mod: TC,PO

## 2025-04-07 PROCEDURE — 71046 X-RAY EXAM CHEST 2 VIEWS: CPT | Mod: 26,,, | Performed by: RADIOLOGY

## 2025-04-07 PROCEDURE — 99999 PR PBB SHADOW E&M-EST. PATIENT-LVL IV: CPT | Mod: PBBFAC,,, | Performed by: NURSE PRACTITIONER

## 2025-04-07 RX ORDER — METHYLPREDNISOLONE 4 MG/1
TABLET ORAL
Qty: 21 EACH | Refills: 0 | Status: SHIPPED | OUTPATIENT
Start: 2025-04-07 | End: 2025-04-28

## 2025-04-07 RX ORDER — AMOXICILLIN AND CLAVULANATE POTASSIUM 875; 125 MG/1; MG/1
1 TABLET, FILM COATED ORAL EVERY 12 HOURS
Qty: 20 TABLET | Refills: 0 | Status: SHIPPED | OUTPATIENT
Start: 2025-04-07 | End: 2025-04-17

## 2025-04-07 RX ORDER — ONDANSETRON 8 MG/1
8 TABLET, ORALLY DISINTEGRATING ORAL ONCE
Qty: 1 TABLET | Refills: 0 | Status: SHIPPED | OUTPATIENT
Start: 2025-04-07 | End: 2025-04-07

## 2025-04-07 RX ORDER — PROMETHAZINE HYDROCHLORIDE AND DEXTROMETHORPHAN HYDROBROMIDE 6.25; 15 MG/5ML; MG/5ML
5 SYRUP ORAL EVERY 6 HOURS PRN
Qty: 200 ML | Refills: 0 | Status: SHIPPED | OUTPATIENT
Start: 2025-04-07 | End: 2025-04-17

## 2025-04-07 NOTE — PROGRESS NOTES
Patient ID: Nalini Wooten is a 70 y.o. female.    Chief Complaint: Cough (71 yo female here c/o cough with wheezing, vomiting times 4days, black stool and abdomen pain times 2 weeks.  No report of fever or chills. KM)    History of Present Illness    CHIEF COMPLAINT:  Ms. Wooten presents with persistent diarrhea, vomiting, and a prolonged cough, reporting symptoms for almost two weeks.    HPI:  Ms. Wooten reports diarrhea and vomiting for almost 2 weeks, beginning simultaneously 4 days ago. The diarrhea was initially entirely liquid but is now less fluid in consistency. She took a picture of her stool, describing it as black, which she showed to the doctor.    The vomiting has been severe enough to impair oral intake retention. A power drink consumed yesterday was regurgitated within 10 minutes. She was able to keep down a bowl of chicken noodle soup last night, though it took an hour to consume.    She also reports a cough that has changed in character over time, starting as dry, becoming productive, and now dry again. She has used cough medicine, which was helping, but she is almost out of her second bottle.    She felt fluid in her ear for a few days. She called an urgent care line and was advised to use swimmer's ear drops, which seemed to resolve the issue.    She was prescribed a Z-Rosendo (likely azithromycin) last week for her symptoms, but it was ineffective. She also took doxycycline before her first visit. She stopped taking all her medicines due to exacerbation of her abdomen symptoms.    Her son and neighbor had the same food (fish) on Friday but did not become ill. She has been mostly bedridden since her last visit to the doctor.    She denies having a fever.    MEDICATIONS:  Ms. Wooten started Doxycycline before her first visit. She was on Z-Rosendo (azithromycin) last week, which was ineffective. She took Kaopectate for diarrhea, with the last dose on Saturday between 2:00-4:00 PM. Ms. Wooten is on cough  medicine, which is helping but does not last long, and she is almost out of her second bottle. She has discontinued all medicines due to stomach upset.    MEDICAL HISTORY:  Ms. Wooten has a history of colonoscopy in 2015.    TEST RESULTS:  Ms. Wooten had flu and COVID tests during her last visit, both of which returned negative results.    ALLERGIES:  Ms. Wooten is allergic to adhesive, Shingrix, estrogen, and various pollens and air pollutants.      ROS:  General: -fever, -chills, -fatigue, -weight gain, -weight loss  Eyes: -vision changes, -redness, -discharge  ENT: -ear pain, -nasal congestion, -sore throat, +ear pressure, +post nasal drip, +nasal discharge, +runny nose  Cardiovascular: -chest pain, -palpitations, -lower extremity edema  Respiratory: +cough, -shortness of breath, +productive cough, +wheezing  Gastrointestinal: -abdominal pain, +nausea, +vomiting, +diarrhea, -constipation, -blood in stool, +stool color changes  Genitourinary: -dysuria, -hematuria, -frequency  Musculoskeletal: -joint pain, -muscle pain  Skin: -rash, -lesion  Neurological: -headache, -dizziness, -numbness, -tingling  Psychiatric: -anxiety, -depression, -sleep difficulty             Past Medical History:   Diagnosis Date    Allergies     Bulging disc     lower back     Carpal tunnel syndrome     Degenerative disc disease     High cholesterol     Pinched nerve in neck      Past Surgical History:   Procedure Laterality Date    ARTHROSCOPIC REPAIR OF ROTATOR CUFF OF SHOULDER Right 3/10/2021    Procedure: REPAIR, ROTATOR CUFF, ARTHROSCOPIC;  Surgeon: Dustin Vanegas MD;  Location: [facility] OR;  Service: Orthopedics;  Laterality: Right;  arthrex notified    ARTHROSCOPY OF SHOULDER WITH DECOMPRESSION OF SUBACROMIAL SPACE Right 3/10/2021    Procedure: ARTHROSCOPY, SHOULDER, WITH SUBACROMIAL SPACE DECOMPRESSION;  Surgeon: Dustin Vanegas MD;  Location: [facility] OR;  Service: Orthopedics;  Laterality: Right;    BLADDER SUSPENSION  1990    CATARACT  EXTRACTION W/  INTRAOCULAR LENS IMPLANT Right 6/21/2023    Procedure: CEIOL OD;  Surgeon: Shawn Shearer MD;  Location: [facility] OR;  Service: Ophthalmology;  Laterality: Right;    CATARACT EXTRACTION W/  INTRAOCULAR LENS IMPLANT Left 7/19/2023    Procedure: CEIOL OS;  Surgeon: Shawn Shearer MD;  Location: [facility] OR;  Service: Ophthalmology;  Laterality: Left;    DISTAL CLAVICLE EXCISION Right 3/10/2021    Procedure: EXCISION, CLAVICLE, DISTAL;  Surgeon: Dustin Vanegas MD;  Location: [facility] OR;  Service: Orthopedics;  Laterality: Right;    FOOT SURGERY Bilateral 2001    FOOT SURGERY Right 05/13/2022    HAND SURGERY Left 2017    HYSTERECTOMY  1990    INJECTION OF ANESTHETIC AGENT AROUND MEDIAL BRANCH NERVES INNERVATING LUMBAR FACET JOINT Bilateral 5/22/2024    Procedure: Block-nerve-medial branch-lumbar     L4/5, L5/S1;  Surgeon: Angel Yousif MD;  Location: [facility] OR;  Service: Pain Management;  Laterality: Bilateral;    INJECTION OF ANESTHETIC AGENT AROUND MEDIAL BRANCH NERVES INNERVATING LUMBAR FACET JOINT Bilateral 7/19/2024    Procedure: Block-nerve-medial branch-lumbar Bilat L4/5 and L5/S1;  Surgeon: Angel Yousif MD;  Location: [facility] OR;  Service: Pain Management;  Laterality: Bilateral;  Bilat L4/5 and L5/S1    JOINT REPLACEMENT  2013    KNEE SURGERY Bilateral 2013    MANDIBLE FRACTURE SURGERY  1981    RADIOFREQUENCY ABLATION OF LUMBAR MEDIAL BRANCH NERVE AT SINGLE LEVEL Bilateral 8/8/2024    Procedure: Radiofrequency Ablation, Nerve, Spinal, Lumbar, Medial Branch, L4/5 and L5/S1;  Surgeon: nAgel Yousif MD;  Location: [facility] OR;  Service: Pain Management;  Laterality: Bilateral;    SHOULDER ARTHROSCOPY Left 02/27/2019    THYROIDECTOMY Left 05/13/2019        TONSILLECTOMY           Tobacco History:  reports that she has never smoked. She has never been exposed to tobacco smoke. She has never used smokeless tobacco.      Review of patient's allergies indicates:   Allergen Reactions    Adhesive  "Swelling and Blisters     Adhesive that requires uv light    Estrogens Other (See Comments)     Mini stroke    Shingrix (pf)  [varicella-zoster [id number] (pf)] Diarrhea, Itching and Nausea And Vomiting     Current Medications[1]           Objective:      Vitals:    04/07/25 1050   BP: 128/70   Pulse: 72   Resp: 20   Temp: 96.6 °F (35.9 °C)   TempSrc: Oral   Weight: 85.4 kg (188 lb 4.4 oz)   Height: 5' 3" (1.6 m)     Physical Exam  Constitutional:       Appearance: She is ill-appearing.   Pulmonary:      Breath sounds: Wheezing present.   Chest:      Chest wall: Tenderness present.   Skin:     General: Skin is warm.   Neurological:      Mental Status: She is alert and oriented to person, place, and time. Mental status is at baseline.           Assessment:       1. Upper respiratory tract infection, unspecified type    2. Nausea and vomiting, unspecified vomiting type    3. Black stool    4. Diarrhea, unspecified type           Plan:       Assessment & Plan    E11.9 Diabetes mellitus type 2 without retinopathy  A09 Infectious gastroenteritis and colitis, unspecified  K92.1 Melena  R11.2 Nausea with vomiting, unspecified  J22 Unspecified acute lower respiratory infection  Z88.1 Allergy status to other antibiotic agents  Z88.8 Allergy status to other drugs, medicaments and biological substances  Z91.09 Other allergy status, other than to drugs and biological substances    IMPRESSION:  - Assessed symptoms as likely bronchitis based on prolonged illness and cough characteristics.  - Considered possibility of infectious etiology for GI symptoms.    DIABETES MELLITUS TYPE 2 WITHOUT RETINOPATHY:  - Ms. Wooten discontinued all medications due to stomach upset.  - Ordered complete blood count and iron test to assess current health status.    INFECTIOUS GASTROENTERITIS AND COLITIS:  - Ms. Wooten reports ongoing diarrhea and vomiting for almost 2 weeks, as well as black stool.  - Examined the stool sample provided by the " patient.  - Suspected possible infectious cause based on symptoms and exam.  - Instructed the patient to collect a stool sample cup from the imaging center or hospital lab and return it to the hospital lab for analysis.  - Ordered comprehensive stool studies including occult blood, WBC count, rotavirus, and adenovirus to rule out infectious causes.  - Prescribed Metronidazole to treat potential bacterial infection.  - Avoided prescribing anti-diarrheal medication to prevent trapping potential infection.  - Referred the patient to gastroenterology (Dr. Carlos Schroeder's office with [facility]) for specialized evaluation.    MELENA:  - Ms. Wooten reports black stool.  - Suspected possible bleeding and ordered tests to confirm.  - Referred the patient to gastroenterology for further evaluation.    NAUSEA WITH VOMITING:  - Ms. Wooten reports ongoing nausea and vomiting for almost 2 weeks and is unable to keep food down except for chicken noodle soup.  - Prescribed medication for nausea.  - Referred the patient to gastroenterology for further evaluation.    ACUTE LOWER RESPIRATORY INFECTION:  - Ms. Wooten reports persistent cough and wheezing.  - Assessed the cough as sounding severe.  - Suspected bronchitis based on symptoms and exam.  - Instructed the patient to complete labs and chest XR at the imaging center to rule out pneumonia given persistent respiratory symptoms.  - Prescribed Augmentin (amoxicillin/clavulanate) for 10 days to treat respiratory infection, given lack of improvement with previous interventions.  - Prescribed Medrol Dosepak (methylprednisolone) to reduce inflammation.  - Continued cough medicine for symptomatic relief.    ALLERGY STATUS:  - Ms. Wooten's allergy status includes adhesive, shingrix, and estrogen.  - Ms. Wooten also reports allergies to pollens and air pollutants.         Upper respiratory tract infection, unspecified type  -     X-Ray Chest PA And Lateral; Future; Expected date: 04/07/2025  -      methylPREDNISolone (MEDROL DOSEPACK) 4 mg tablet; use as directed  Dispense: 21 each; Refill: 0  -     amoxicillin-clavulanate 875-125mg (AUGMENTIN) 875-125 mg per tablet; Take 1 tablet by mouth every 12 (twelve) hours. for 10 days  Dispense: 20 tablet; Refill: 0  -     promethazine-dextromethorphan (PROMETHAZINE-DM) 6.25-15 mg/5 mL Syrp; Take 5 mLs by mouth every 6 (six) hours as needed.  Dispense: 200 mL; Refill: 0    Nausea and vomiting, unspecified vomiting type  -     Ambulatory referral/consult to Gastroenterology; Future; Expected date: 04/14/2025  -     ondansetron (ZOFRAN-ODT) 8 MG TbDL; Take 1 tablet (8 mg total) by mouth once. for 1 dose  Dispense: 1 tablet; Refill: 0    Black stool  -     Ambulatory referral/consult to Gastroenterology; Future; Expected date: 04/14/2025  -     CBC Auto Differential; Future; Expected date: 04/07/2025  -     IRON AND TIBC; Future; Expected date: 04/07/2025  -     Pancreatic elastase, fecal; Future; Expected date: 04/07/2025  -     Fecal fat, qualitative; Future; Expected date: 04/07/2025  -     Occult blood x 1, stool; Future; Expected date: 04/07/2025  -     WBC, Stool; Future; Expected date: 04/07/2025  -     Rotavirus antigen, stool; Future; Expected date: 04/07/2025  -     Adenovirus Molecular Detection, PCR, Non-Blood Stool; Future; Expected date: 04/07/2025  -     Calprotectin, Stool; Future; Expected date: 04/07/2025  -     Giardia / Cryptosporidum, EIA; Future; Expected date: 04/07/2025  -     Stool Exam-Ova,Cysts,Parasites; Future; Expected date: 04/07/2025  -     Clostridium difficile EIA; Future; Expected date: 04/07/2025  -     Stool culture; Future; Expected date: 04/07/2025    Diarrhea, unspecified type  -     Ambulatory referral/consult to Gastroenterology; Future; Expected date: 04/14/2025  -     Pancreatic elastase, fecal; Future; Expected date: 04/07/2025  -     Fecal fat, qualitative; Future; Expected date: 04/07/2025  -     Occult blood x 1, stool;  Future; Expected date: 04/07/2025  -     WBC, Stool; Future; Expected date: 04/07/2025  -     Rotavirus antigen, stool; Future; Expected date: 04/07/2025  -     Adenovirus Molecular Detection, PCR, Non-Blood Stool; Future; Expected date: 04/07/2025  -     Calprotectin, Stool; Future; Expected date: 04/07/2025  -     Giardia / Cryptosporidum, EIA; Future; Expected date: 04/07/2025  -     Stool Exam-Ova,Cysts,Parasites; Future; Expected date: 04/07/2025  -     Clostridium difficile EIA; Future; Expected date: 04/07/2025  -     Stool culture; Future; Expected date: 04/07/2025      Follow up if symptoms worsen or fail to improve.        4/7/2025 Cyn Renee NP    This note was generated with the assistance of ambient listening technology. Verbal consent was obtained by the patient and accompanying visitor(s) for the recording of patient appointment to facilitate this note. I attest to having reviewed and edited the generated note for accuracy, though some syntax or spelling errors may persist. Please contact the author of this note for any clarification.             [1]   Current Outpatient Medications:     cyanocobalamin 1,000 mcg/mL injection, Inject every week 1 cc, Disp: 10 mL, Rfl: 1    cyclobenzaprine (FLEXERIL) 5 MG tablet, Take 1 tablet (5 mg total) by mouth 2 (two) times daily as needed for Muscle spasms., Disp: 30 tablet, Rfl: 0    hydrocortisone 2.5 % cream, Apply twice daily as needed to rash on face, Disp: 30 g, Rfl: 2    ipratropium (ATROVENT) 21 mcg (0.03 %) nasal spray, 2 sprays by Each Nostril route 3 (three) times daily as needed (runny nose)., Disp: 30 mL, Rfl: 3    levocetirizine (XYZAL) 5 MG tablet, Take 1 tablet (5 mg total) by mouth every evening., Disp: 90 tablet, Rfl: 1    loratadine (CLARITIN) 10 mg tablet, Take 1 tablet (10 mg total) by mouth once daily., Disp: 30 tablet, Rfl: 11    metFORMIN (GLUCOPHAGE) 500 MG tablet, Take 1 tablet (500 mg total) by mouth 2 (two) times daily with  meals., Disp: 180 tablet, Rfl: 2    pantoprazole (PROTONIX) 40 MG tablet, Take 40 mg by mouth every morning., Disp: , Rfl:     pimecrolimus (ELIDEL) 1 % cream, AAA bid, Disp: 60 g, Rfl: 3    pravastatin (PRAVACHOL) 20 MG tablet, Take 1 tablet (20 mg total) by mouth once daily., Disp: 90 tablet, Rfl: 0    promethazine-dextromethorphan (PROMETHAZINE-DM) 6.25-15 mg/5 mL Syrp, Take 5 mLs by mouth every 6 (six) hours as needed (cough)., Disp: 200 mL, Rfl: 0    tirzepatide (MOUNJARO) 5 mg/0.5 mL PnIj, Inject 5 mg into the skin every 7 days., Disp: 2 mL, Rfl: 2    tolterodine (DETROL LA) 4 MG 24 hr capsule, Take 1 capsule (4 mg total) by mouth once daily., Disp: 90 capsule, Rfl: 1    traMADoL (ULTRAM) 50 mg tablet, Take 1 tablet (50 mg total) by mouth every 12 (twelve) hours as needed for Pain., Disp: 30 tablet, Rfl: 0    amoxicillin-clavulanate 875-125mg (AUGMENTIN) 875-125 mg per tablet, Take 1 tablet by mouth every 12 (twelve) hours. for 10 days, Disp: 20 tablet, Rfl: 0    methylPREDNISolone (MEDROL DOSEPACK) 4 mg tablet, use as directed, Disp: 21 each, Rfl: 0    ondansetron (ZOFRAN-ODT) 8 MG TbDL, Take 1 tablet (8 mg total) by mouth once. for 1 dose, Disp: 1 tablet, Rfl: 0    promethazine-dextromethorphan (PROMETHAZINE-DM) 6.25-15 mg/5 mL Syrp, Take 5 mLs by mouth every 6 (six) hours as needed., Disp: 200 mL, Rfl: 0

## 2025-04-08 ENCOUNTER — PATIENT MESSAGE (OUTPATIENT)
Dept: ALLERGY | Facility: CLINIC | Age: 70
End: 2025-04-08
Payer: MEDICARE

## 2025-04-08 DIAGNOSIS — E66.9 OBESITY (BMI 30-39.9): Primary | ICD-10-CM

## 2025-04-08 NOTE — TELEPHONE ENCOUNTER
Appointment canceled: 04/09/25 - Template Changed  Rescheduled to: 05/05/25    **Patient asked if her Mounjaro could be upped to the next dose?    Medication pended for 1 month -double check if I calculated right before signing

## 2025-04-09 ENCOUNTER — LAB VISIT (OUTPATIENT)
Dept: LAB | Facility: HOSPITAL | Age: 70
End: 2025-04-09
Attending: NURSE PRACTITIONER
Payer: MEDICARE

## 2025-04-09 DIAGNOSIS — K92.1 BLACK STOOL: ICD-10-CM

## 2025-04-09 DIAGNOSIS — R19.7 DIARRHEA, UNSPECIFIED TYPE: ICD-10-CM

## 2025-04-09 LAB
OB PNL STL: NEGATIVE
WBC #/AREA STL HPF: NORMAL /[HPF]

## 2025-04-09 PROCEDURE — 82705 FATS/LIPIDS FECES QUAL: CPT

## 2025-04-09 PROCEDURE — 89055 LEUKOCYTE ASSESSMENT FECAL: CPT

## 2025-04-09 PROCEDURE — 87425 ROTAVIRUS AG IA: CPT

## 2025-04-09 PROCEDURE — 82272 OCCULT BLD FECES 1-3 TESTS: CPT

## 2025-04-09 PROCEDURE — 83993 ASSAY FOR CALPROTECTIN FECAL: CPT

## 2025-04-09 PROCEDURE — 30000890 MISCELLANEOUS LABCORP TEST (BEAKER)

## 2025-04-09 PROCEDURE — 82653 EL-1 FECAL QUANTITATIVE: CPT

## 2025-04-09 PROCEDURE — 87301 ADENOVIRUS AG IA: CPT

## 2025-04-09 PROCEDURE — 87045 FECES CULTURE AEROBIC BACT: CPT

## 2025-04-09 PROCEDURE — 87177 OVA AND PARASITES SMEARS: CPT

## 2025-04-09 PROCEDURE — 30000890 HC MISC. SEND OUT TEST

## 2025-04-09 PROCEDURE — 87329 GIARDIA AG IA: CPT

## 2025-04-09 PROCEDURE — 87328 CRYPTOSPORIDIUM AG IA: CPT

## 2025-04-10 ENCOUNTER — LAB VISIT (OUTPATIENT)
Dept: LAB | Facility: HOSPITAL | Age: 70
End: 2025-04-10
Payer: MEDICARE

## 2025-04-10 ENCOUNTER — OFFICE VISIT (OUTPATIENT)
Dept: GASTROENTEROLOGY | Facility: CLINIC | Age: 70
End: 2025-04-10
Payer: MEDICARE

## 2025-04-10 VITALS
DIASTOLIC BLOOD PRESSURE: 74 MMHG | BODY MASS INDEX: 33.12 KG/M2 | HEIGHT: 63 IN | SYSTOLIC BLOOD PRESSURE: 136 MMHG | HEART RATE: 80 BPM | WEIGHT: 186.94 LBS

## 2025-04-10 DIAGNOSIS — K92.1 BLACK STOOL: ICD-10-CM

## 2025-04-10 DIAGNOSIS — R10.32 BILATERAL LOWER ABDOMINAL CRAMPING: ICD-10-CM

## 2025-04-10 DIAGNOSIS — K21.9 GASTROESOPHAGEAL REFLUX DISEASE WITHOUT ESOPHAGITIS: ICD-10-CM

## 2025-04-10 DIAGNOSIS — R19.7 DIARRHEA, UNSPECIFIED TYPE: Primary | ICD-10-CM

## 2025-04-10 DIAGNOSIS — R11.2 NAUSEA AND VOMITING, UNSPECIFIED VOMITING TYPE: ICD-10-CM

## 2025-04-10 DIAGNOSIS — R10.31 BILATERAL LOWER ABDOMINAL CRAMPING: ICD-10-CM

## 2025-04-10 DIAGNOSIS — R10.12 LUQ PAIN: ICD-10-CM

## 2025-04-10 DIAGNOSIS — R19.4 CHANGE IN BOWEL HABIT: ICD-10-CM

## 2025-04-10 LAB
ALBUMIN SERPL-MCNC: 4.3 G/DL (ref 3.5–5.2)
ALP SERPL-CCNC: 69 UNIT/L (ref 55–135)
ALT SERPL-CCNC: 23 UNIT/L (ref 10–44)
ANION GAP (SMH): 10 MMOL/L (ref 8–16)
AST SERPL-CCNC: 15 UNIT/L (ref 10–40)
BILIRUB SERPL-MCNC: 0.6 MG/DL (ref 0.1–1)
BUN SERPL-MCNC: 24 MG/DL (ref 8–23)
CALCIUM SERPL-MCNC: 8.9 MG/DL (ref 8.7–10.5)
CHLORIDE SERPL-SCNC: 102 MMOL/L (ref 95–110)
CO2 SERPL-SCNC: 27 MMOL/L (ref 23–29)
CREAT SERPL-MCNC: 1.2 MG/DL (ref 0.5–1.4)
GFR SERPLBLD CREATININE-BSD FMLA CKD-EPI: 49 ML/MIN/1.73/M2
GLUCOSE SERPL-MCNC: 118 MG/DL (ref 70–110)
LIPASE SERPL-CCNC: 31 U/L (ref 4–60)
POTASSIUM SERPL-SCNC: 3.7 MMOL/L (ref 3.5–5.1)
PROT SERPL-MCNC: 7.2 GM/DL (ref 6–8.4)
SODIUM SERPL-SCNC: 139 MMOL/L (ref 136–145)

## 2025-04-10 PROCEDURE — 99999 PR PBB SHADOW E&M-EST. PATIENT-LVL V: CPT | Mod: PBBFAC,,,

## 2025-04-10 PROCEDURE — 83690 ASSAY OF LIPASE: CPT

## 2025-04-10 PROCEDURE — 82040 ASSAY OF SERUM ALBUMIN: CPT

## 2025-04-10 PROCEDURE — 36415 COLL VENOUS BLD VENIPUNCTURE: CPT | Mod: PO

## 2025-04-10 RX ORDER — DICYCLOMINE HYDROCHLORIDE 10 MG/1
10 CAPSULE ORAL 3 TIMES DAILY PRN
Qty: 90 CAPSULE | Refills: 0 | Status: SHIPPED | OUTPATIENT
Start: 2025-04-10 | End: 2025-07-09

## 2025-04-10 RX ORDER — ONDANSETRON 4 MG/1
4 TABLET, ORALLY DISINTEGRATING ORAL EVERY 12 HOURS PRN
Qty: 60 TABLET | Refills: 0 | Status: SHIPPED | OUTPATIENT
Start: 2025-04-10 | End: 2025-05-10

## 2025-04-10 NOTE — PROGRESS NOTES
Subjective:       Patient ID: Nalini Wooten is a 70 y.o. female Body mass index is 33.12 kg/m².    Chief Complaint: Diarrhea (Vomiting, Abdominal Pain 8/10)    This patient is new to me.  Referring Provider: [facility]dee[facility]erral Self for diarrhea.                         GI Problem  The primary symptoms include abdominal pain (lower abdominal pain started last thursday daily described as crampy after bm pain goes away), nausea, vomiting and diarrhea. Primary symptoms do not include fever, weight loss, fatigue, melena, hematemesis, jaundice or hematochezia.   The abdominal pain is located in the LLQ and RLQ (eating). The severity of the abdominal pain is 8/10. Relieved by: Kaopectate helps with the pain.   The illness does not include dysphagia, bloating or constipation. Associated symptoms comments: Ms. Wooten reports with hx of DDD, allergies, and HLD presents to clinic to discuss ongoing diarrhea and vomiting that started on 4/3/25 ,reports prior to diarrhea starting she ate catfish at a [employer] but none of her family started with diarrhea and vomiting except her additionally reports developed a sinus infection and took doxycycline reports was unable to tolerate doxycycline so was started on azithromycin completed azithromycin symptoms continued so now taking Augmentin, denies any other changes in medications denies any traveling, denies any ill contacts, reports started on Mounjaro 3 months ago for weight loss and prediabetes, states after starting on medication did not notice any of these symptoms, stool tests are pending, reports when diarrhea 1st started her stools were black was taking Kaopectate reports her stools are now brown, diarrhea worsened by coffee and protein shakes, states will have 5-10 bowel movements per day rates stool type 5 through 7 on Pacific stool scale, prior to diarrhea starting reports a normal bowel habit, states had a colonoscopy about 10 years ago no history of colon polyps, last EGD around  the same time no significant findings she states, also reports nausea and vomiting intermittently since diarrhea started will vomit up foods and liquids no blood or black vomit/sputum seen denies chronic NSAID use and EtOH use, denies family history of colon cancer reports her son has Crohn's disease. Significant associated medical issues include GERD (GERD symptoms controlled on Protonix 40 mg daily). Associated medical issues do not include inflammatory bowel disease, gallstones, liver disease, alcohol abuse, PUD, gastric bypass, bowel resection, irritable bowel syndrome, hemorrhoids or diverticulitis.       Review of Systems   Constitutional:  Negative for fatigue, fever and weight loss.   Gastrointestinal:  Positive for abdominal pain (lower abdominal pain started last thursday daily described as crampy after bm pain goes away), diarrhea, nausea and vomiting. Negative for abdominal distention, anal bleeding, bloating, blood in stool, constipation, dysphagia, hematemesis, hematochezia, jaundice, melena and rectal pain.         No LMP recorded. Patient has had a hysterectomy.  Past Medical History:   Diagnosis Date    Allergies     Bulging disc     lower back     Carpal tunnel syndrome     Degenerative disc disease     GERD (gastroesophageal reflux disease)     High cholesterol     Pinched nerve in neck      Past Surgical History:   Procedure Laterality Date    ARTHROSCOPIC REPAIR OF ROTATOR CUFF OF SHOULDER Right 03/10/2021    Procedure: REPAIR, ROTATOR CUFF, ARTHROSCOPIC;  Surgeon: Dustin Vanegas MD;  Location: [facility] OR;  Service: Orthopedics;  Laterality: Right;  arthrex notified    ARTHROSCOPY OF SHOULDER WITH DECOMPRESSION OF SUBACROMIAL SPACE Right 03/10/2021    Procedure: ARTHROSCOPY, SHOULDER, WITH SUBACROMIAL SPACE DECOMPRESSION;  Surgeon: Dustin Vanegas MD;  Location: [facility] OR;  Service: Orthopedics;  Laterality: Right;    BLADDER SUSPENSION  1990    CATARACT EXTRACTION W/  INTRAOCULAR LENS IMPLANT Right  06/21/2023    Procedure: CEIOL OD;  Surgeon: Shawn Shearer MD;  Location: [facility] OR;  Service: Ophthalmology;  Laterality: Right;    CATARACT EXTRACTION W/  INTRAOCULAR LENS IMPLANT Left 07/19/2023    Procedure: CEIOL OS;  Surgeon: Shawn Shearer MD;  Location: [facility] OR;  Service: Ophthalmology;  Laterality: Left;    COLONOSCOPY      10 years ago    DISTAL CLAVICLE EXCISION Right 03/10/2021    Procedure: EXCISION, CLAVICLE, DISTAL;  Surgeon: Dustin Vanegas MD;  Location: [facility] OR;  Service: Orthopedics;  Laterality: Right;    FOOT SURGERY Bilateral 2001    FOOT SURGERY Right 05/13/2022    HAND SURGERY Left 2017    HYSTERECTOMY  1990    INJECTION OF ANESTHETIC AGENT AROUND MEDIAL BRANCH NERVES INNERVATING LUMBAR FACET JOINT Bilateral 05/22/2024    Procedure: Block-nerve-medial branch-lumbar     L4/5, L5/S1;  Surgeon: Angel Yousif MD;  Location: [facility] OR;  Service: Pain Management;  Laterality: Bilateral;    INJECTION OF ANESTHETIC AGENT AROUND MEDIAL BRANCH NERVES INNERVATING LUMBAR FACET JOINT Bilateral 07/19/2024    Procedure: Block-nerve-medial branch-lumbar Bilat L4/5 and L5/S1;  Surgeon: Angel Yousif MD;  Location: [facility] OR;  Service: Pain Management;  Laterality: Bilateral;  Bilat L4/5 and L5/S1    JOINT REPLACEMENT  2013    KNEE SURGERY Bilateral 2013    MANDIBLE FRACTURE SURGERY  1981    RADIOFREQUENCY ABLATION OF LUMBAR MEDIAL BRANCH NERVE AT SINGLE LEVEL Bilateral 08/08/2024    Procedure: Radiofrequency Ablation, Nerve, Spinal, Lumbar, Medial Branch, L4/5 and L5/S1;  Surgeon: Angel Yousif MD;  Location: [facility] OR;  Service: Pain Management;  Laterality: Bilateral;    SHOULDER ARTHROSCOPY Left 02/27/2019    THYROIDECTOMY Left 05/13/2019        TONSILLECTOMY      UPPER GASTROINTESTINAL ENDOSCOPY      [id number]     Family History   Problem Relation Name Age of Onset    Arthritis Mother      Aneurysm Mother          Abdominal Aneurysm     Heart disease Father  65    Arthritis Father       Diabetes Father      Hearing loss Father      Hypertension Father      Dementia Father      No Known Problems Sister      No Known Problems Sister      Heart disease Brother  50 - 59        stent placement    Heart disease Brother  49        stent placement    Aneurysm Brother          Abdominal Aneurysm    SIDS Maternal Aunt 2     Uterine cancer Maternal Aunt 2     Dementia Paternal Aunt 1     Diabetes Paternal Grandmother      Heart disease Paternal Grandfather      Leukemia Son 3     Crohn's disease Son 3     Ankylosing spondylitis Son 3     Rheumatologic disease Son 3     Heart defect Son 3     Kidney failure Son 3     Ulcerative colitis Neg Hx      Colon cancer Neg Hx       Social History[1]  Wt Readings from Last 10 Encounters:   04/10/25 84.8 kg (186 lb 15.2 oz)   04/07/25 85.4 kg (188 lb 4.4 oz)   03/26/25 87 kg (191 lb 12.8 oz)   03/21/25 85.7 kg (189 lb)   03/12/25 86 kg (189 lb 11.3 oz)   01/08/25 91.6 kg (202 lb)   01/07/25 92.8 kg (204 lb 9.4 oz)   11/19/24 89.1 kg (196 lb 8.6 oz)   10/08/24 87.9 kg (193 lb 11.2 oz)   09/16/24 89.4 kg (196 lb 15.7 oz)     Lab Results   Component Value Date    WBC 7.48 04/07/2025    HGB 12.2 04/07/2025    HCT 39.0 04/07/2025    MCV 86 04/07/2025     04/07/2025     CMP  Sodium   Date Value Ref Range Status   10/07/2024 139 136 - 145 mmol/L Final   04/30/2019 139 134 - 144 mmol/L      Potassium   Date Value Ref Range Status   10/07/2024 4.4 3.5 - 5.1 mmol/L Final     Chloride   Date Value Ref Range Status   10/07/2024 105 95 - 110 mmol/L Final   04/30/2019 102 98 - 110 mmol/L      CO2   Date Value Ref Range Status   10/07/2024 27 23 - 29 mmol/L Final     Glucose   Date Value Ref Range Status   10/07/2024 104 70 - 110 mg/dL Final   04/30/2019 103 (H) 70 - 99 mg/dL      BUN   Date Value Ref Range Status   10/07/2024 26 (H) 8 - 23 mg/dL Final     Creatinine   Date Value Ref Range Status   10/07/2024 1.0 0.5 - 1.4 mg/dL Final   05/14/2019 0.73 0.60 - 1.40 mg/dL   "    Calcium   Date Value Ref Range Status   10/07/2024 8.8 8.7 - 10.5 mg/dL Final     Total Protein   Date Value Ref Range Status   10/07/2024 6.5 6.0 - 8.4 g/dL Final     Albumin   Date Value Ref Range Status   10/07/2024 4.0 3.5 - 5.2 g/dL Final   02/19/2019 3.5 3.1 - 4.7 g/dL      Total Bilirubin   Date Value Ref Range Status   10/07/2024 0.4 0.1 - 1.0 mg/dL Final     Comment:     For infants and newborns, interpretation of results should be based  on gestational age, weight and in agreement with clinical  observations.    Premature Infant recommended reference ranges:  Up to 24 hours.............<8.0 mg/dL  Up to 48 hours............<12.0 mg/dL  3-5 days..................<15.0 mg/dL  6-29 days.................<15.0 mg/dL       Alkaline Phosphatase   Date Value Ref Range Status   10/07/2024 62 55 - 135 U/L Final     AST   Date Value Ref Range Status   10/07/2024 21 10 - 40 U/L Final     ALT   Date Value Ref Range Status   10/07/2024 21 10 - 44 U/L Final     Anion Gap   Date Value Ref Range Status   10/07/2024 7 (L) 8 - 16 mmol/L Final     eGFR if    Date Value Ref Range Status   06/28/2022 >60.0 >60 mL/min/1.73 m^2 Final     eGFR if non    Date Value Ref Range Status   06/28/2022 >60.0 >60 mL/min/1.73 m^2 Final     Comment:     Calculation used to obtain the estimated glomerular filtration  rate (eGFR) is the CKD-EPI equation.        No results found for: "LIPASE"  No results found for: "LIPASERES"  Lab Results   Component Value Date    TSH 2.161 10/07/2024       Reviewed prior medical records including radiology report of & endoscopy history (see surgical history/procedures).    Objective:      Physical Exam  Vitals and nursing note reviewed.   Constitutional:       Appearance: Normal appearance. She is normal weight.   Cardiovascular:      Rate and Rhythm: Normal rate and regular rhythm.      Heart sounds: Normal heart sounds.   Pulmonary:      Breath sounds: Normal breath " sounds.   Abdominal:      General: Bowel sounds are normal.      Palpations: Abdomen is soft.      Tenderness: There is abdominal tenderness in the left upper quadrant.   Skin:     General: Skin is warm and dry.      Coloration: Skin is not jaundiced.   Neurological:      Mental Status: She is alert and oriented to person, place, and time.   Psychiatric:         Mood and Affect: Mood normal.         Behavior: Behavior normal.         Assessment:       1. Diarrhea, unspecified type    2. Change in bowel habit    3. Bilateral lower abdominal cramping    4. Nausea and vomiting, unspecified vomiting type    5. LUQ pain    6. History of black stool    7. Gastroesophageal reflux disease without esophagitis        Plan:       Diarrhea, unspecified type   -Stool tests pending   -Possible symptoms coming a viral issue  -   START  dicyclomine (BENTYL) 10 MG capsule; Take 1 capsule (10 mg total) by mouth 3 (three) times daily as needed (diarrhea, abdominal pain).  Dispense: 90 capsule; Refill: 0  -     Case Request Endoscopy: COLONOSCOPY  - schedule Colonoscopy, discussed procedure with the patient, including risks and benefits, patient verbalized understanding  - recommend OTC probiotic, such as Florastor or Culturelle, taken as directed on packaging  - avoid lactose, alcohol, & caffeine  - avoid known triggers  -Chaves diet    Change in bowel habit  -     Case Request Endoscopy: COLONOSCOPY   - schedule Colonoscopy, discussed procedure with the patient, including risks and benefits, patient verbalized understanding    Bilateral lower abdominal cramping  -     Comprehensive Metabolic Panel; Future; Expected date: 04/10/2025  -     Case Request Endoscopy: COLONOSCOPY   -   START  dicyclomine (BENTYL) 10 MG capsule; Take 1 capsule (10 mg total) by mouth 3 (three) times daily as needed (diarrhea, abdominal pain).  Dispense: 90 capsule; Refill: 0   -If pain worsens will schedule CT of abdomen   - schedule Colonoscopy, discussed  procedure with the patient, including risks and benefits, patient verbalized understanding   If no improvement in symptoms or symptoms worsen, call/follow-up at clinic or go to ER    Nausea and vomiting, unspecified vomiting type  -  START   ondansetron (ZOFRAN-ODT) 4 MG TbDL; Take 1 tablet (4 mg total) by mouth every 12 (twelve) hours as needed (nausea).  Dispense: 60 tablet; Refill: 0  -     Case Request Endoscopy: EGD (ESOPHAGOGASTRODUODENOSCOPY)   - schedule EGD, discussed procedure with patient, including risks and benefits, patient verbalized understanding    LUQ pain  -     Lipase; Future; Expected date: 04/10/2025  -     Comprehensive Metabolic Panel; Future; Expected date: 04/10/2025  -     Case Request Endoscopy: EGD (ESOPHAGOGASTRODUODENOSCOPY)   -Bentyl prn   -Educated pt on s/e of Mounjaro    History of black stool   - schedule EGD, discussed procedure with patient, including risks and benefits, patient verbalized understanding   - Discussed with patient about possible side effects of bismuth/iron supplementation use, including but not limited to change in stool color; patient verbalized understanding  - avoid/minimize use of NSAIDs- since they can cause GI upset, bleeding and/or ulcers. If NSAID must be taken, recommend take with food.    Gastroesophageal reflux disease without esophagitis  -     Case Request Endoscopy: EGD (ESOPHAGOGASTRODUODENOSCOPY)   - schedule EGD, discussed procedure with patient, including risks and benefits, patient verbalized understanding   -Continue protonix 40mg daily  -Take PPI 30min-1hr before eating breakfast  -Educated patient on lifestyle modifications to help control/reduce reflux/abdominal pain including: avoid large meals, avoid eating within 2-3 hours of bedtime (avoid late night eating & lying down soon after eating), elevate head of bed if nocturnal symptoms are present, smoking cessation (if current smoker), & weight loss (if overweight).   -Educated to avoid  known foods which trigger reflux symptoms & to minimize/avoid high-fat foods, chocolate, caffeine, citrus, alcohol, & tomato products.  -Advised to avoid/limit use of NSAID's, since they can cause GI upset, bleeding, and/or ulcers. If needed, take with food.       Follow up if symptoms worsen or fail to improve.      If no improvement in symptoms or symptoms worsen, call/follow-up at clinic or go to ER.       [facility] - GASTROENTEROLOGY  [facility], [place]     Dictation software program was used for this note. Please expect some simple typographical  errors in this note.    Encounter includes face to face time and non-face to face time preparing to see the patient (eg, review of tests), obtaining and/or reviewing separately obtained history, documenting clinical information in the electronic or other health record, independently interpreting results (not separately reported) and communicating results to the patient/family/caregiver, or care coordination (not separately reported).             [1]   Social History  Tobacco Use    Smoking status: Never     Passive exposure: Never    Smokeless tobacco: Never   Substance Use Topics    Alcohol use: Never     Alcohol/week: 0.0 standard drinks of alcohol    Drug use: No

## 2025-04-10 NOTE — H&P (VIEW-ONLY)
Subjective:       Patient ID: Nalini Wooten is a 70 y.o. female Body mass index is 33.12 kg/m².    Chief Complaint: Diarrhea (Vomiting, Abdominal Pain 8/10)    This patient is new to me.  Referring Provider: Haydeereferral Self for diarrhea.                         GI Problem  The primary symptoms include abdominal pain (lower abdominal pain started last thursday daily described as crampy after bm pain goes away), nausea, vomiting and diarrhea. Primary symptoms do not include fever, weight loss, fatigue, melena, hematemesis, jaundice or hematochezia.   The abdominal pain is located in the LLQ and RLQ (eating). The severity of the abdominal pain is 8/10. Relieved by: Kaopectate helps with the pain.   The illness does not include dysphagia, bloating or constipation. Associated symptoms comments: Ms. Wooten reports with hx of DDD, allergies, and HLD presents to clinic to discuss ongoing diarrhea and vomiting that started on 4/3/25 ,reports prior to diarrhea starting she ate catfish at a [employer] but none of her family started with diarrhea and vomiting except her additionally reports developed a sinus infection and took doxycycline reports was unable to tolerate doxycycline so was started on azithromycin completed azithromycin symptoms continued so now taking Augmentin, denies any other changes in medications denies any traveling, denies any ill contacts, reports started on Mounjaro 3 months ago for weight loss and prediabetes, states after starting on medication did not notice any of these symptoms, stool tests are pending, reports when diarrhea 1st started her stools were black was taking Kaopectate reports her stools are now brown, diarrhea worsened by coffee and protein shakes, states will have 5-10 bowel movements per day rates stool type 5 through 7 on Swift stool scale, prior to diarrhea starting reports a normal bowel habit, states had a colonoscopy about 10 years ago no history of colon polyps, last EGD around  the same time no significant findings she states, also reports nausea and vomiting intermittently since diarrhea started will vomit up foods and liquids no blood or black vomit/sputum seen denies chronic NSAID use and EtOH use, denies family history of colon cancer reports her son has Crohn's disease. Significant associated medical issues include GERD (GERD symptoms controlled on Protonix 40 mg daily). Associated medical issues do not include inflammatory bowel disease, gallstones, liver disease, alcohol abuse, PUD, gastric bypass, bowel resection, irritable bowel syndrome, hemorrhoids or diverticulitis.       Review of Systems   Constitutional:  Negative for fatigue, fever and weight loss.   Gastrointestinal:  Positive for abdominal pain (lower abdominal pain started last thursday daily described as crampy after bm pain goes away), diarrhea, nausea and vomiting. Negative for abdominal distention, anal bleeding, bloating, blood in stool, constipation, dysphagia, hematemesis, hematochezia, jaundice, melena and rectal pain.         No LMP recorded. Patient has had a hysterectomy.  Past Medical History:   Diagnosis Date    Allergies     Bulging disc     lower back     Carpal tunnel syndrome     Degenerative disc disease     GERD (gastroesophageal reflux disease)     High cholesterol     Pinched nerve in neck      Past Surgical History:   Procedure Laterality Date    ARTHROSCOPIC REPAIR OF ROTATOR CUFF OF SHOULDER Right 03/10/2021    Procedure: REPAIR, ROTATOR CUFF, ARTHROSCOPIC;  Surgeon: Dustin Vanegas MD;  Location: [facility] OR;  Service: Orthopedics;  Laterality: Right;  arthrex notified    ARTHROSCOPY OF SHOULDER WITH DECOMPRESSION OF SUBACROMIAL SPACE Right 03/10/2021    Procedure: ARTHROSCOPY, SHOULDER, WITH SUBACROMIAL SPACE DECOMPRESSION;  Surgeon: Dustin Vanegas MD;  Location: [facility] OR;  Service: Orthopedics;  Laterality: Right;    BLADDER SUSPENSION  1990    CATARACT EXTRACTION W/  INTRAOCULAR LENS IMPLANT Right  06/21/2023    Procedure: CEIOL OD;  Surgeon: Shawn Shearer MD;  Location: [facility] OR;  Service: Ophthalmology;  Laterality: Right;    CATARACT EXTRACTION W/  INTRAOCULAR LENS IMPLANT Left 07/19/2023    Procedure: CEIOL OS;  Surgeon: Shawn Shearer MD;  Location: [facility] OR;  Service: Ophthalmology;  Laterality: Left;    COLONOSCOPY      10 years ago    DISTAL CLAVICLE EXCISION Right 03/10/2021    Procedure: EXCISION, CLAVICLE, DISTAL;  Surgeon: Dustin Vanegas MD;  Location: [facility] OR;  Service: Orthopedics;  Laterality: Right;    FOOT SURGERY Bilateral 2001    FOOT SURGERY Right 05/13/2022    HAND SURGERY Left 2017    HYSTERECTOMY  1990    INJECTION OF ANESTHETIC AGENT AROUND MEDIAL BRANCH NERVES INNERVATING LUMBAR FACET JOINT Bilateral 05/22/2024    Procedure: Block-nerve-medial branch-lumbar     L4/5, L5/S1;  Surgeon: Angel Yousif MD;  Location: [facility] OR;  Service: Pain Management;  Laterality: Bilateral;    INJECTION OF ANESTHETIC AGENT AROUND MEDIAL BRANCH NERVES INNERVATING LUMBAR FACET JOINT Bilateral 07/19/2024    Procedure: Block-nerve-medial branch-lumbar Bilat L4/5 and L5/S1;  Surgeon: Angel Yousif MD;  Location: [facility] OR;  Service: Pain Management;  Laterality: Bilateral;  Bilat L4/5 and L5/S1    JOINT REPLACEMENT  2013    KNEE SURGERY Bilateral 2013    MANDIBLE FRACTURE SURGERY  1981    RADIOFREQUENCY ABLATION OF LUMBAR MEDIAL BRANCH NERVE AT SINGLE LEVEL Bilateral 08/08/2024    Procedure: Radiofrequency Ablation, Nerve, Spinal, Lumbar, Medial Branch, L4/5 and L5/S1;  Surgeon: Angel Yousif MD;  Location: [facility] OR;  Service: Pain Management;  Laterality: Bilateral;    SHOULDER ARTHROSCOPY Left 02/27/2019    THYROIDECTOMY Left 05/13/2019        TONSILLECTOMY      UPPER GASTROINTESTINAL ENDOSCOPY      [id number]     Family History   Problem Relation Name Age of Onset    Arthritis Mother      Aneurysm Mother          Abdominal Aneurysm     Heart disease Father  65    Arthritis Father       Diabetes Father      Hearing loss Father      Hypertension Father      Dementia Father      No Known Problems Sister      No Known Problems Sister      Heart disease Brother  50 - 59        stent placement    Heart disease Brother  49        stent placement    Aneurysm Brother          Abdominal Aneurysm    SIDS Maternal Aunt 2     Uterine cancer Maternal Aunt 2     Dementia Paternal Aunt 1     Diabetes Paternal Grandmother      Heart disease Paternal Grandfather      Leukemia Son 3     Crohn's disease Son 3     Ankylosing spondylitis Son 3     Rheumatologic disease Son 3     Heart defect Son 3     Kidney failure Son 3     Ulcerative colitis Neg Hx      Colon cancer Neg Hx       Social History[1]  Wt Readings from Last 10 Encounters:   04/10/25 84.8 kg (186 lb 15.2 oz)   04/07/25 85.4 kg (188 lb 4.4 oz)   03/26/25 87 kg (191 lb 12.8 oz)   03/21/25 85.7 kg (189 lb)   03/12/25 86 kg (189 lb 11.3 oz)   01/08/25 91.6 kg (202 lb)   01/07/25 92.8 kg (204 lb 9.4 oz)   11/19/24 89.1 kg (196 lb 8.6 oz)   10/08/24 87.9 kg (193 lb 11.2 oz)   09/16/24 89.4 kg (196 lb 15.7 oz)     Lab Results   Component Value Date    WBC 7.48 04/07/2025    HGB 12.2 04/07/2025    HCT 39.0 04/07/2025    MCV 86 04/07/2025     04/07/2025     CMP  Sodium   Date Value Ref Range Status   10/07/2024 139 136 - 145 mmol/L Final   04/30/2019 139 134 - 144 mmol/L      Potassium   Date Value Ref Range Status   10/07/2024 4.4 3.5 - 5.1 mmol/L Final     Chloride   Date Value Ref Range Status   10/07/2024 105 95 - 110 mmol/L Final   04/30/2019 102 98 - 110 mmol/L      CO2   Date Value Ref Range Status   10/07/2024 27 23 - 29 mmol/L Final     Glucose   Date Value Ref Range Status   10/07/2024 104 70 - 110 mg/dL Final   04/30/2019 103 (H) 70 - 99 mg/dL      BUN   Date Value Ref Range Status   10/07/2024 26 (H) 8 - 23 mg/dL Final     Creatinine   Date Value Ref Range Status   10/07/2024 1.0 0.5 - 1.4 mg/dL Final   05/14/2019 0.73 0.60 - 1.40 mg/dL   "    Calcium   Date Value Ref Range Status   10/07/2024 8.8 8.7 - 10.5 mg/dL Final     Total Protein   Date Value Ref Range Status   10/07/2024 6.5 6.0 - 8.4 g/dL Final     Albumin   Date Value Ref Range Status   10/07/2024 4.0 3.5 - 5.2 g/dL Final   02/19/2019 3.5 3.1 - 4.7 g/dL      Total Bilirubin   Date Value Ref Range Status   10/07/2024 0.4 0.1 - 1.0 mg/dL Final     Comment:     For infants and newborns, interpretation of results should be based  on gestational age, weight and in agreement with clinical  observations.    Premature Infant recommended reference ranges:  Up to 24 hours.............<8.0 mg/dL  Up to 48 hours............<12.0 mg/dL  3-5 days..................<15.0 mg/dL  6-29 days.................<15.0 mg/dL       Alkaline Phosphatase   Date Value Ref Range Status   10/07/2024 62 55 - 135 U/L Final     AST   Date Value Ref Range Status   10/07/2024 21 10 - 40 U/L Final     ALT   Date Value Ref Range Status   10/07/2024 21 10 - 44 U/L Final     Anion Gap   Date Value Ref Range Status   10/07/2024 7 (L) 8 - 16 mmol/L Final     eGFR if    Date Value Ref Range Status   06/28/2022 >60.0 >60 mL/min/1.73 m^2 Final     eGFR if non    Date Value Ref Range Status   06/28/2022 >60.0 >60 mL/min/1.73 m^2 Final     Comment:     Calculation used to obtain the estimated glomerular filtration  rate (eGFR) is the CKD-EPI equation.        No results found for: "LIPASE"  No results found for: "LIPASERES"  Lab Results   Component Value Date    TSH 2.161 10/07/2024       Reviewed prior medical records including radiology report of & endoscopy history (see surgical history/procedures).    Objective:      Physical Exam  Vitals and nursing note reviewed.   Constitutional:       Appearance: Normal appearance. She is normal weight.   Cardiovascular:      Rate and Rhythm: Normal rate and regular rhythm.      Heart sounds: Normal heart sounds.   Pulmonary:      Breath sounds: Normal breath " sounds.   Abdominal:      General: Bowel sounds are normal.      Palpations: Abdomen is soft.      Tenderness: There is abdominal tenderness in the left upper quadrant.   Skin:     General: Skin is warm and dry.      Coloration: Skin is not jaundiced.   Neurological:      Mental Status: She is alert and oriented to person, place, and time.   Psychiatric:         Mood and Affect: Mood normal.         Behavior: Behavior normal.         Assessment:       1. Diarrhea, unspecified type    2. Change in bowel habit    3. Bilateral lower abdominal cramping    4. Nausea and vomiting, unspecified vomiting type    5. LUQ pain    6. History of black stool    7. Gastroesophageal reflux disease without esophagitis        Plan:       Diarrhea, unspecified type   -Stool tests pending   -Possible symptoms coming a viral issue  -   START  dicyclomine (BENTYL) 10 MG capsule; Take 1 capsule (10 mg total) by mouth 3 (three) times daily as needed (diarrhea, abdominal pain).  Dispense: 90 capsule; Refill: 0  -     Case Request Endoscopy: COLONOSCOPY  - schedule Colonoscopy, discussed procedure with the patient, including risks and benefits, patient verbalized understanding  - recommend OTC probiotic, such as Florastor or Culturelle, taken as directed on packaging  - avoid lactose, alcohol, & caffeine  - avoid known triggers  -[place] diet    Change in bowel habit  -     Case Request Endoscopy: COLONOSCOPY   - schedule Colonoscopy, discussed procedure with the patient, including risks and benefits, patient verbalized understanding    Bilateral lower abdominal cramping  -     Comprehensive Metabolic Panel; Future; Expected date: 04/10/2025  -     Case Request Endoscopy: COLONOSCOPY   -   START  dicyclomine (BENTYL) 10 MG capsule; Take 1 capsule (10 mg total) by mouth 3 (three) times daily as needed (diarrhea, abdominal pain).  Dispense: 90 capsule; Refill: 0   -If pain worsens will schedule CT of abdomen   - schedule Colonoscopy, discussed  procedure with the patient, including risks and benefits, patient verbalized understanding   If no improvement in symptoms or symptoms worsen, call/follow-up at clinic or go to ER    Nausea and vomiting, unspecified vomiting type  -  START   ondansetron (ZOFRAN-ODT) 4 MG TbDL; Take 1 tablet (4 mg total) by mouth every 12 (twelve) hours as needed (nausea).  Dispense: 60 tablet; Refill: 0  -     Case Request Endoscopy: EGD (ESOPHAGOGASTRODUODENOSCOPY)   - schedule EGD, discussed procedure with patient, including risks and benefits, patient verbalized understanding    LUQ pain  -     Lipase; Future; Expected date: 04/10/2025  -     Comprehensive Metabolic Panel; Future; Expected date: 04/10/2025  -     Case Request Endoscopy: EGD (ESOPHAGOGASTRODUODENOSCOPY)   -Bentyl prn   -Educated pt on s/e of Mounjaro    History of black stool   - schedule EGD, discussed procedure with patient, including risks and benefits, patient verbalized understanding   - Discussed with patient about possible side effects of bismuth/iron supplementation use, including but not limited to change in stool color; patient verbalized understanding  - avoid/minimize use of NSAIDs- since they can cause GI upset, bleeding and/or ulcers. If NSAID must be taken, recommend take with food.    Gastroesophageal reflux disease without esophagitis  -     Case Request Endoscopy: EGD (ESOPHAGOGASTRODUODENOSCOPY)   - schedule EGD, discussed procedure with patient, including risks and benefits, patient verbalized understanding   -Continue protonix 40mg daily  -Take PPI 30min-1hr before eating breakfast  -Educated patient on lifestyle modifications to help control/reduce reflux/abdominal pain including: avoid large meals, avoid eating within 2-3 hours of bedtime (avoid late night eating & lying down soon after eating), elevate head of bed if nocturnal symptoms are present, smoking cessation (if current smoker), & weight loss (if overweight).   -Educated to avoid  known foods which trigger reflux symptoms & to minimize/avoid high-fat foods, chocolate, caffeine, citrus, alcohol, & tomato products.  -Advised to avoid/limit use of NSAID's, since they can cause GI upset, bleeding, and/or ulcers. If needed, take with food.       Follow up if symptoms worsen or fail to improve.      If no improvement in symptoms or symptoms worsen, call/follow-up at clinic or go to ER.       [facility] - GASTROENTEROLOGY  [facility], [place]     Dictation software program was used for this note. Please expect some simple typographical  errors in this note.    Encounter includes face to face time and non-face to face time preparing to see the patient (eg, review of tests), obtaining and/or reviewing separately obtained history, documenting clinical information in the electronic or other health record, independently interpreting results (not separately reported) and communicating results to the patient/family/caregiver, or care coordination (not separately reported).             [1]   Social History  Tobacco Use    Smoking status: Never     Passive exposure: Never    Smokeless tobacco: Never   Substance Use Topics    Alcohol use: Never     Alcohol/week: 0.0 standard drinks of alcohol    Drug use: No

## 2025-04-11 ENCOUNTER — RESULTS FOLLOW-UP (OUTPATIENT)
Dept: GASTROENTEROLOGY | Facility: CLINIC | Age: 70
End: 2025-04-11

## 2025-04-11 LAB
BACTERIA STL CULT: NORMAL
ELASTASE PANC STL-MCNT: >800 UG ELAST./G
G LAMBLIA AG STL QL IA: NEGATIVE

## 2025-04-12 LAB
CALPROTECTIN STL-MCNT: 517 UG/G (ref 0–120)
LABCORP MISCELLANEOUS TEST RESULT: NORMAL

## 2025-04-14 ENCOUNTER — PATIENT MESSAGE (OUTPATIENT)
Dept: PAIN MEDICINE | Facility: CLINIC | Age: 70
End: 2025-04-14
Payer: MEDICARE

## 2025-04-14 ENCOUNTER — TELEPHONE (OUTPATIENT)
Dept: SURGERY | Facility: HOSPITAL | Age: 70
End: 2025-04-14
Payer: MEDICARE

## 2025-04-14 ENCOUNTER — TELEPHONE (OUTPATIENT)
Dept: GASTROENTEROLOGY | Facility: CLINIC | Age: 70
End: 2025-04-14
Payer: MEDICARE

## 2025-04-14 ENCOUNTER — PATIENT MESSAGE (OUTPATIENT)
Dept: FAMILY MEDICINE | Facility: CLINIC | Age: 70
End: 2025-04-14
Payer: MEDICARE

## 2025-04-14 LAB
O+P SPEC MICRO: NORMAL
O+P STL CONC: NORMAL

## 2025-04-14 NOTE — TELEPHONE ENCOUNTER
Patient is scheduled with Dr. Yousif 4/17. She is having another procedure that day and needs to reschedule please. Thank you!

## 2025-04-14 NOTE — TELEPHONE ENCOUNTER
----- Message from Nakia sent at 4/14/2025  8:53 AM CDT -----  Contact: self  Type:  Sooner Appointment RequestCaller is requesting a sooner appointment.  Caller declined first available appointment listed below.  Caller will not accept being placed on the waitlist and is requesting a message be sent to doctor.Name of Caller:  pt When is the first available appointment?  5/2/25 and 5/23/25Symptoms:  pt has two procedures on those dates and would either like to have them on the same date prefers 5/2/25 or if not possible together then she wants to flip the appts and have the one megan on 5/2/25 moved to 5/23/25 and the one megan on 5/23/25 moved to 5/2/25Would the patient rather a call back or a response via [employer]? Call back Best Call Back Number:  [phone] Information:  thanks

## 2025-04-17 ENCOUNTER — ANESTHESIA EVENT (OUTPATIENT)
Dept: ENDOSCOPY | Facility: HOSPITAL | Age: 70
End: 2025-04-17
Payer: MEDICARE

## 2025-04-17 ENCOUNTER — HOSPITAL ENCOUNTER (OUTPATIENT)
Facility: HOSPITAL | Age: 70
Discharge: HOME | End: 2025-04-17
Attending: INTERNAL MEDICINE | Admitting: NURSE PRACTITIONER
Payer: MEDICARE

## 2025-04-17 ENCOUNTER — ANESTHESIA (OUTPATIENT)
Dept: ENDOSCOPY | Facility: HOSPITAL | Age: 70
End: 2025-04-17
Payer: MEDICARE

## 2025-04-17 VITALS
WEIGHT: 184 LBS | BODY MASS INDEX: 32.6 KG/M2 | RESPIRATION RATE: 14 BRPM | OXYGEN SATURATION: 99 % | HEIGHT: 63 IN | HEART RATE: 65 BPM | TEMPERATURE: 98 F | SYSTOLIC BLOOD PRESSURE: 141 MMHG | DIASTOLIC BLOOD PRESSURE: 70 MMHG

## 2025-04-17 DIAGNOSIS — R19.4 CHANGE IN BOWEL HABITS: ICD-10-CM

## 2025-04-17 DIAGNOSIS — K64.8 INTERNAL HEMORRHOIDS: Primary | ICD-10-CM

## 2025-04-17 PROCEDURE — 37000008 HC ANESTHESIA 1ST 15 MINUTES: Performed by: INTERNAL MEDICINE

## 2025-04-17 PROCEDURE — 25000003 PHARM REV CODE 250: Performed by: INTERNAL MEDICINE

## 2025-04-17 PROCEDURE — 45380 COLONOSCOPY AND BIOPSY: CPT | Mod: ,,, | Performed by: INTERNAL MEDICINE

## 2025-04-17 PROCEDURE — 37000009 HC ANESTHESIA EA ADD 15 MINS: Performed by: INTERNAL MEDICINE

## 2025-04-17 PROCEDURE — 63600175 PHARM REV CODE 636 W HCPCS: Performed by: NURSE ANESTHETIST, CERTIFIED REGISTERED

## 2025-04-17 PROCEDURE — 45380 COLONOSCOPY AND BIOPSY: CPT | Performed by: INTERNAL MEDICINE

## 2025-04-17 PROCEDURE — 27201012 HC FORCEPS, HOT/COLD, DISP: Performed by: INTERNAL MEDICINE

## 2025-04-17 PROCEDURE — 88305 TISSUE EXAM BY PATHOLOGIST: CPT | Mod: TC,59 | Performed by: PATHOLOGY

## 2025-04-17 RX ORDER — PROPOFOL 10 MG/ML
VIAL (ML) INTRAVENOUS
Status: DISCONTINUED | OUTPATIENT
Start: 2025-04-17 | End: 2025-04-17

## 2025-04-17 RX ORDER — LIDOCAINE HYDROCHLORIDE 20 MG/ML
INJECTION, SOLUTION EPIDURAL; INFILTRATION; INTRACAUDAL; PERINEURAL
Status: DISCONTINUED | OUTPATIENT
Start: 2025-04-17 | End: 2025-04-17

## 2025-04-17 RX ORDER — SODIUM CHLORIDE 9 MG/ML
INJECTION, SOLUTION INTRAVENOUS CONTINUOUS
Status: DISCONTINUED | OUTPATIENT
Start: 2025-04-17 | End: 2025-04-17 | Stop reason: HOSPADM

## 2025-04-17 RX ADMIN — PROPOFOL 30 MG: 10 INJECTION, EMULSION INTRAVENOUS at 07:04

## 2025-04-17 RX ADMIN — SODIUM CHLORIDE: 9 INJECTION, SOLUTION INTRAVENOUS at 07:04

## 2025-04-17 RX ADMIN — LIDOCAINE HYDROCHLORIDE 100 MG: 20 INJECTION, SOLUTION EPIDURAL; INFILTRATION; INTRACAUDAL; PERINEURAL at 07:04

## 2025-04-17 RX ADMIN — PROPOFOL 100 MG: 10 INJECTION, EMULSION INTRAVENOUS at 07:04

## 2025-04-17 RX ADMIN — GLYCOPYRROLATE 0.2 MG: 0.2 INJECTION, SOLUTION INTRAMUSCULAR; INTRAVITREAL at 07:04

## 2025-04-17 NOTE — ANESTHESIA PREPROCEDURE EVALUATION
04/17/2025  Nalini Wooten is a 70 y.o., female.      Pre-op Assessment    I have reviewed the Patient Summary Reports.     I have reviewed the Nursing Notes. I have reviewed the NPO Status.   I have reviewed the Medications.     Review of Systems  Anesthesia Hx:  No problems with previous Anesthesia                Social:  Non-Smoker       Cardiovascular:                hyperlipidemia                               Pulmonary:  Pulmonary Normal                       Renal/:  Renal/ Normal                 Hepatic/GI:     GERD, well controlled                Musculoskeletal:         Spine Disorders: lumbar Degenerative disease and Disc disease           Neurological:   CVA, no residual symptoms Neuromuscular Disease,                                   Endocrine:   Hypothyroidism        Obesity / BMI > 30      Physical Exam  General: Well nourished, Cooperative, Alert and Oriented    Airway:  Mallampati: II   Mouth Opening: Normal  TM Distance: Normal  Neck ROM: Normal ROM    Anesthesia Plan  Type of Anesthesia, risks & benefits discussed:    Anesthesia Type: Gen ETT, Gen Supraglottic Airway, Gen Natural Airway, MAC  Intra-op Monitoring Plan: Standard ASA Monitors  Post Op Pain Control Plan: multimodal analgesia  Induction:  IV  Airway Plan: Direct, Video and Fiberoptic, Post-Induction  Informed Consent: Informed consent signed with the Patient and all parties understand the risks and agree with anesthesia plan.  All questions answered.   ASA Score: 3    Ready For Surgery From Anesthesia Perspective.   .

## 2025-04-17 NOTE — ANESTHESIA POSTPROCEDURE EVALUATION
Anesthesia Post Evaluation    Patient: Nalini Wooten    Procedure(s) Performed: Procedure(s) (LRB):  COLONOSCOPY (N/A)    Final Anesthesia Type: general      Patient location during evaluation: PACU  Patient participation: Yes- Able to Participate  Level of consciousness: awake and alert and oriented  Post-procedure vital signs: reviewed and stable  Pain management: adequate  Airway patency: patent    PONV status at discharge: No PONV  Anesthetic complications: no      Cardiovascular status: blood pressure returned to baseline and stable  Respiratory status: unassisted and spontaneous ventilation  Hydration status: euvolemic  Follow-up not needed.              Vitals Value Taken Time   /70 04/17/25 08:10   Temp 36.8 °C (98.2 °F) 04/17/25 08:10   Pulse 65 04/17/25 08:10   Resp 14 04/17/25 08:10   SpO2 99 % 04/17/25 08:10         Event Time   Out of Recovery 08:18:54         Pain/Martinez Score: Martinez Score: 10 (4/17/2025  8:15 AM)

## 2025-04-17 NOTE — PLAN OF CARE
Vss, agus po fluids, denies pain, ambulates easily. IV removed, catheter intact. Discharge instructions provided and states understanding. States ready to go home.  Discharged from facility with family per wheelchair.

## 2025-04-17 NOTE — TRANSFER OF CARE
"Anesthesia Transfer of Care Note    Patient: Nalini Wooten    Procedure(s) Performed: Procedure(s) (LRB):  COLONOSCOPY (N/A)    Patient location: PACU    Anesthesia Type: general    Transport from OR: Transported from OR on room air with adequate spontaneous ventilation    Post pain: adequate analgesia    Post assessment: no apparent anesthetic complications and tolerated procedure well    Post vital signs: stable    Level of consciousness: sedated and responds to stimulation    Nausea/Vomiting: no nausea/vomiting    Complications: none    Transfer of care protocol was followed      Last vitals: Visit Vitals  BP (!) 143/73 (BP Location: Left arm, Patient Position: Lying)   Pulse 63   Temp 36.8 °C (98.2 °F) (Skin)   Resp 16   Ht 5' 3" (1.6 m)   Wt 83.5 kg (184 lb)   SpO2 100%   Breastfeeding No   BMI 32.59 kg/m²     "

## 2025-04-17 NOTE — PROVATION PATIENT INSTRUCTIONS
Discharge Summary/Instructions after an Endoscopic Procedure  Patient Name: Nalini Wooten  Patient MRN: [id number]  Patient YOB: 1955  Thursday, April 17, 2025  Lucian Miller MD  Dear patient,  As a result of recent federal legislation (The Federal Cures Act), you may   receive lab or pathology results from your procedure in your [facility]   account before your physician is able to contact you. Your physician or   their representative will relay the results to you with their   recommendations at their soonest availability.  Thank you,  RESTRICTIONS:  During your procedure today, you received medications for sedation.  These   medications may affect your judgment, balance and coordination.  Therefore,   for 24 hours, you have the following restrictions:   - DO NOT drive a car, operate machinery, make legal/financial decisions,   sign important papers or drink alcohol.    ACTIVITY:  Today: no heavy lifting, straining or running due to procedural   sedation/anesthesia.  The following day: return to full activity including work.  DIET:  Eat and drink normally unless instructed otherwise.     TREATMENT FOR COMMON SIDE EFFECTS:  - Mild abdominal pain, nausea, belching, bloating or excessive gas:  rest,   eat lightly and use a heating pad.  - Sore Throat: treat with throat lozenges and/or gargle with warm salt   water.  - Because air was used during the procedure, expelling large amounts of air   from your rectum or belching is normal.  - If a bowel prep was taken, you may not have a bowel movement for 1-3 days.    This is normal.  SYMPTOMS TO WATCH FOR AND REPORT TO YOUR PHYSICIAN:  1. Abdominal pain or bloating, other than gas cramps.  2. Chest pain.  3. Back pain.  4. Signs of infection such as: chills or fever occurring within 24 hours   after the procedure.  5. Rectal bleeding, which would show as bright red, maroon, or black stools.   (A tablespoon of blood from the rectum is not serious, especially if    hemorrhoids are present.)  6. Vomiting.  7. Weakness or dizziness.  GO DIRECTLY TO THE NEAREST EMERGENCY ROOM IF YOU HAVE ANY OF THE FOLLOWING:      Difficulty breathing              Chills and/or fever over 101 F   Persistent vomiting and/or vomiting blood   Severe abdominal pain   Severe chest pain   Black, tarry stools   Bleeding- more than one tablespoon   Any other symptom or condition that you feel may need urgent attention  Your doctor recommends these additional instructions:  If any biopsies were taken, your doctors clinic will contact you in 1 to 2   weeks with any results.  - Patient has a contact number available for emergencies.  The signs and   symptoms of potential delayed complications were discussed with the   patient.  Return to normal activities tomorrow.  Written discharge   instructions were provided to the patient.   - High fiber diet.   - Continue present medications.   - Await pathology results.   - Repeat colonoscopy in 10 years for surveillance.   - Discharge patient to home (ambulatory).   - Return to GI office after studies are complete.  For questions, problems or results please call your physician - Lucian Miller MD at Work:  [phone].  [facility] PHONE NUMBER: [phone]  IF A COMPLICATION OR EMERGENCY SITUATION ARISES AND YOU ARE UNABLE TO REACH   YOUR PHYSICIAN - GO DIRECTLY TO THE EMERGENCY ROOM.  Lucian Miller MD  4/17/2025 7:49:29 AM  This report has been verified and signed electronically.  Dear patient,  As a result of recent federal legislation (The Federal Cures Act), you may   receive lab or pathology results from your procedure in your [facility]   account before your physician is able to contact you. Your physician or   their representative will relay the results to you with their   recommendations at their soonest availability.  Thank you,  PROVATION

## 2025-04-20 ENCOUNTER — RESULTS FOLLOW-UP (OUTPATIENT)
Dept: GASTROENTEROLOGY | Facility: CLINIC | Age: 70
End: 2025-04-20

## 2025-04-22 ENCOUNTER — CLINICAL SUPPORT (OUTPATIENT)
Dept: ALLERGY | Facility: CLINIC | Age: 70
End: 2025-04-22
Payer: MEDICARE

## 2025-04-22 DIAGNOSIS — J30.9 CHRONIC ALLERGIC RHINITIS: Primary | ICD-10-CM

## 2025-04-22 PROCEDURE — 95117 IMMUNOTHERAPY INJECTIONS: CPT | Mod: S$GLB,,, | Performed by: STUDENT IN AN ORGANIZED HEALTH CARE EDUCATION/TRAINING PROGRAM

## 2025-04-22 PROCEDURE — 99999 PR PBB SHADOW E&M-EST. PATIENT-LVL I: CPT | Mod: PBBFAC,,,

## 2025-04-25 ENCOUNTER — TELEPHONE (OUTPATIENT)
Dept: PHARMACY | Facility: CLINIC | Age: 70
End: 2025-04-25
Payer: MEDICARE

## 2025-04-29 ENCOUNTER — CLINICAL SUPPORT (OUTPATIENT)
Dept: ALLERGY | Facility: CLINIC | Age: 70
End: 2025-04-29
Payer: MEDICARE

## 2025-04-29 DIAGNOSIS — J30.9 CHRONIC ALLERGIC RHINITIS: Primary | ICD-10-CM

## 2025-04-29 PROCEDURE — 99999 PR PBB SHADOW E&M-EST. PATIENT-LVL I: CPT | Mod: PBBFAC,,,

## 2025-04-29 PROCEDURE — 95117 IMMUNOTHERAPY INJECTIONS: CPT | Mod: S$GLB,,, | Performed by: STUDENT IN AN ORGANIZED HEALTH CARE EDUCATION/TRAINING PROGRAM

## 2025-04-30 ENCOUNTER — HOSPITAL ENCOUNTER (OUTPATIENT)
Facility: HOSPITAL | Age: 70
Discharge: HOME | End: 2025-04-30
Attending: ANESTHESIOLOGY | Admitting: ANESTHESIOLOGY
Payer: MEDICARE

## 2025-04-30 ENCOUNTER — HOSPITAL ENCOUNTER (OUTPATIENT)
Dept: RADIOLOGY | Facility: HOSPITAL | Age: 70
Discharge: HOME | End: 2025-04-30
Attending: ANESTHESIOLOGY | Admitting: ANESTHESIOLOGY
Payer: MEDICARE

## 2025-04-30 VITALS
HEART RATE: 62 BPM | SYSTOLIC BLOOD PRESSURE: 165 MMHG | WEIGHT: 184 LBS | OXYGEN SATURATION: 98 % | HEIGHT: 63 IN | BODY MASS INDEX: 32.6 KG/M2 | RESPIRATION RATE: 18 BRPM | TEMPERATURE: 98 F | DIASTOLIC BLOOD PRESSURE: 78 MMHG

## 2025-04-30 DIAGNOSIS — M47.816 LUMBAR SPONDYLOSIS: Primary | ICD-10-CM

## 2025-04-30 DIAGNOSIS — M54.50 LOWER BACK PAIN: ICD-10-CM

## 2025-04-30 LAB — GLUCOSE SERPL-MCNC: 107 MG/DL (ref 70–110)

## 2025-04-30 PROCEDURE — 99152 MOD SED SAME PHYS/QHP 5/>YRS: CPT | Mod: ,,, | Performed by: ANESTHESIOLOGY

## 2025-04-30 PROCEDURE — 64635 DESTROY LUMB/SAC FACET JNT: CPT | Mod: 50,PO | Performed by: ANESTHESIOLOGY

## 2025-04-30 PROCEDURE — 64636 DESTROY L/S FACET JNT ADDL: CPT | Mod: 50,,, | Performed by: ANESTHESIOLOGY

## 2025-04-30 PROCEDURE — 64636 DESTROY L/S FACET JNT ADDL: CPT | Mod: 50,PO | Performed by: ANESTHESIOLOGY

## 2025-04-30 PROCEDURE — 63600175 PHARM REV CODE 636 W HCPCS: Mod: PO | Performed by: ANESTHESIOLOGY

## 2025-04-30 PROCEDURE — 64635 DESTROY LUMB/SAC FACET JNT: CPT | Mod: 50,,, | Performed by: ANESTHESIOLOGY

## 2025-04-30 PROCEDURE — 25000003 PHARM REV CODE 250: Mod: PO | Performed by: ANESTHESIOLOGY

## 2025-04-30 RX ORDER — MIDAZOLAM HYDROCHLORIDE 1 MG/ML
INJECTION INTRAMUSCULAR; INTRAVENOUS
Status: DISCONTINUED | OUTPATIENT
Start: 2025-04-30 | End: 2025-04-30 | Stop reason: HOSPADM

## 2025-04-30 RX ORDER — LIDOCAINE HYDROCHLORIDE 10 MG/ML
INJECTION, SOLUTION EPIDURAL; INFILTRATION; INTRACAUDAL; PERINEURAL
Status: DISCONTINUED | OUTPATIENT
Start: 2025-04-30 | End: 2025-04-30 | Stop reason: HOSPADM

## 2025-04-30 RX ORDER — TRIAMCINOLONE ACETONIDE 40 MG/ML
INJECTION, SUSPENSION INTRA-ARTICULAR; INTRAMUSCULAR
Status: DISCONTINUED | OUTPATIENT
Start: 2025-04-30 | End: 2025-04-30 | Stop reason: HOSPADM

## 2025-04-30 RX ORDER — FENTANYL CITRATE 50 UG/ML
INJECTION, SOLUTION INTRAMUSCULAR; INTRAVENOUS
Status: DISCONTINUED | OUTPATIENT
Start: 2025-04-30 | End: 2025-04-30 | Stop reason: HOSPADM

## 2025-04-30 RX ORDER — SODIUM CHLORIDE 9 MG/ML
INJECTION, SOLUTION INTRAVENOUS CONTINUOUS
Status: DISCONTINUED | OUTPATIENT
Start: 2025-04-30 | End: 2025-04-30 | Stop reason: HOSPADM

## 2025-04-30 RX ORDER — BUPIVACAINE HYDROCHLORIDE 2.5 MG/ML
INJECTION, SOLUTION EPIDURAL; INFILTRATION; INTRACAUDAL; PERINEURAL
Status: DISCONTINUED | OUTPATIENT
Start: 2025-04-30 | End: 2025-04-30 | Stop reason: HOSPADM

## 2025-04-30 RX ORDER — SODIUM CHLORIDE, SODIUM LACTATE, POTASSIUM CHLORIDE, CALCIUM CHLORIDE 600; 310; 30; 20 MG/100ML; MG/100ML; MG/100ML; MG/100ML
INJECTION, SOLUTION INTRAVENOUS CONTINUOUS
Status: DISCONTINUED | OUTPATIENT
Start: 2025-04-30 | End: 2025-04-30

## 2025-04-30 RX ORDER — LIDOCAINE HYDROCHLORIDE 20 MG/ML
INJECTION, SOLUTION EPIDURAL; INFILTRATION; INTRACAUDAL; PERINEURAL
Status: DISCONTINUED | OUTPATIENT
Start: 2025-04-30 | End: 2025-04-30 | Stop reason: HOSPADM

## 2025-04-30 RX ADMIN — SODIUM CHLORIDE: 9 INJECTION, SOLUTION INTRAVENOUS at 01:04

## 2025-04-30 NOTE — H&P
[place] - Surgery  History & Physical - Short Stay  Pain Management       SUBJECTIVE:     Procedure: Procedure(s) (LRB):  Radiofrequency Ablation L4/5 L5/S1 (Bilateral)    Chief Complaint/Reason for Admission:  Lumbar spondylosis [M47.816]    PTA Medications   Medication Sig    metFORMIN (GLUCOPHAGE) 500 MG tablet Take 1 tablet (500 mg total) by mouth 2 (two) times daily with meals.    cyanocobalamin 1,000 mcg/mL injection Inject every week 1 cc    cyclobenzaprine (FLEXERIL) 5 MG tablet Take 1 tablet (5 mg total) by mouth 2 (two) times daily as needed for Muscle spasms.    dicyclomine (BENTYL) 10 MG capsule Take 1 capsule (10 mg total) by mouth 3 (three) times daily as needed (diarrhea, abdominal pain).    hydrocortisone 2.5 % cream Apply twice daily as needed to rash on face    ipratropium (ATROVENT) 21 mcg (0.03 %) nasal spray 2 sprays by Each Nostril route 3 (three) times daily as needed (runny nose).    levocetirizine (XYZAL) 5 MG tablet Take 1 tablet (5 mg total) by mouth every evening.    loratadine (CLARITIN) 10 mg tablet Take 1 tablet (10 mg total) by mouth once daily.    ondansetron (ZOFRAN-ODT) 4 MG TbDL Take 1 tablet (4 mg total) by mouth every 12 (twelve) hours as needed (nausea).    pantoprazole (PROTONIX) 40 MG tablet Take 40 mg by mouth every morning.    pimecrolimus (ELIDEL) 1 % cream AAA bid    pravastatin (PRAVACHOL) 20 MG tablet Take 1 tablet (20 mg total) by mouth once daily.    tirzepatide 7.5 mg/0.5 mL PnIj Inject 7.5 mg into the skin every 7 days.    tolterodine (DETROL LA) 4 MG 24 hr capsule Take 1 capsule (4 mg total) by mouth once daily.    traMADoL (ULTRAM) 50 mg tablet Take 1 tablet (50 mg total) by mouth every 12 (twelve) hours as needed for Pain.       Review of patient's allergies indicates:   Allergen Reactions    Adhesive Swelling and Blisters     Adhesive that requires uv light    Estrogens Other (See Comments)     Mini stroke    Shingrix (pf)  [[id number] [id number] (pf)]  Diarrhea, Itching and Nausea And Vomiting       Past Medical History:   Diagnosis Date    Allergies     Bulging disc     lower back     Carpal tunnel syndrome     Degenerative disc disease     GERD (gastroesophageal reflux disease)     High cholesterol     Pinched nerve in neck      Past Surgical History:   Procedure Laterality Date    ARTHROSCOPIC REPAIR OF ROTATOR CUFF OF SHOULDER Right 03/10/2021    Procedure: REPAIR, ROTATOR CUFF, ARTHROSCOPIC;  Surgeon: Dustin Vanegas MD;  Location: [facility];  Service: Orthopedics;  Laterality: Right;  arthrex notified    ARTHROSCOPY OF SHOULDER WITH DECOMPRESSION OF SUBACROMIAL SPACE Right 03/10/2021    Procedure: ARTHROSCOPY, SHOULDER, WITH SUBACROMIAL SPACE DECOMPRESSION;  Surgeon: Dustin Vanegas MD;  Location: [facility] OR;  Service: Orthopedics;  Laterality: Right;    BLADDER SUSPENSION  1990    CATARACT EXTRACTION W/  INTRAOCULAR LENS IMPLANT Right 06/21/2023    Procedure: CEIOL OD;  Surgeon: Shawn Shearer MD;  Location: [facility] OR;  Service: Ophthalmology;  Laterality: Right;    CATARACT EXTRACTION W/  INTRAOCULAR LENS IMPLANT Left 07/19/2023    Procedure: CEIOL OS;  Surgeon: Shawn Shearer MD;  Location: [facility] OR;  Service: Ophthalmology;  Laterality: Left;    COLONOSCOPY      10 years ago    COLONOSCOPY N/A 4/17/2025    Procedure: COLONOSCOPY;  Surgeon: Lucian Lundberg MD;  Location: [facility] ENDO;  Service: Endoscopy;  Laterality: N/A;    DISTAL CLAVICLE EXCISION Right 03/10/2021    Procedure: EXCISION, CLAVICLE, DISTAL;  Surgeon: Dustin Vanegas MD;  Location: [facility];  Service: Orthopedics;  Laterality: Right;    FOOT SURGERY Bilateral 2001    FOOT SURGERY Right 05/13/2022    HAND SURGERY Left 2017    HYSTERECTOMY  1990    INJECTION OF ANESTHETIC AGENT AROUND MEDIAL BRANCH NERVES INNERVATING LUMBAR FACET JOINT Bilateral 05/22/2024    Procedure: Block-nerve-medial branch-lumbar     L4/5, L5/S1;  Surgeon: Angel Yousif MD;  Location: [facility] OR;  Service: Pain Management;   Laterality: Bilateral;    INJECTION OF ANESTHETIC AGENT AROUND MEDIAL BRANCH NERVES INNERVATING LUMBAR FACET JOINT Bilateral 07/19/2024    Procedure: Block-nerve-medial branch-lumbar Bilat L4/5 and L5/S1;  Surgeon: Angel Yousif MD;  Location: [facility] OR;  Service: Pain Management;  Laterality: Bilateral;  Bilat L4/5 and L5/S1    JOINT REPLACEMENT  2013    KNEE SURGERY Bilateral 2013    MANDIBLE FRACTURE SURGERY  1981    RADIOFREQUENCY ABLATION OF LUMBAR MEDIAL BRANCH NERVE AT SINGLE LEVEL Bilateral 08/08/2024    Procedure: Radiofrequency Ablation, Nerve, Spinal, Lumbar, Medial Branch, L4/5 and L5/S1;  Surgeon: Angel Yousif MD;  Location: [facility] OR;  Service: Pain Management;  Laterality: Bilateral;    SHOULDER ARTHROSCOPY Left 02/27/2019    THYROIDECTOMY Left 05/13/2019        TONSILLECTOMY      UPPER GASTROINTESTINAL ENDOSCOPY      [id number]     Family History   Problem Relation Name Age of Onset    Arthritis Mother      Aneurysm Mother          Abdominal Aneurysm     Heart disease Father  65    Arthritis Father      Diabetes Father      Hearing loss Father      Hypertension Father      Dementia Father      No Known Problems Sister      No Known Problems Sister      Heart disease Brother  50 - 59        stent placement    Heart disease Brother  49        stent placement    Aneurysm Brother          Abdominal Aneurysm    Leukemia Son 3     Crohn's disease Son 3     Ankylosing spondylitis Son 3     Rheumatologic disease Son 3     Heart defect Son 3     Kidney failure Son 3     SIDS Maternal Aunt 2     Uterine cancer Maternal Aunt 2     Dementia Paternal Aunt 1     Diabetes Paternal Grandmother      Heart disease Paternal Grandfather      Ulcerative colitis Neg Hx      Colon cancer Neg Hx       Social History[1]   Current Medications[2]    Review of Systems:  General ROS: negative for - fever  Dermatological ROS: negative for rash    OBJECTIVE:     Vital Signs (Most Recent):  Temp: 97.9 °F (36.6 °C)  (04/30/25 1251)  Pulse: (!) 58 (04/30/25 1251)  Resp: 18 (04/30/25 1251)  BP: (!) 172/80 (04/30/25 1251)  SpO2: 98 % (04/30/25 1251)  Body mass index is 32.59 kg/m².    Physical Exam:  General appearance - alert, well appearing, and in no distress  Mental status - AOx3  Eyes - pupils equal and reactive, extraocular eye movements intact  Heart - normal rate, regular rhythm, normal S1, S2, no murmurs, rubs, clicks or gallops  Chest - clear to auscultation, no wheezes, rales or rhonchi, symmetric air entry  Abdomen - soft, nontender, nondistended, no masses or organomegaly  Neurological - alert, oriented, normal speech, no focal findings or movement disorder noted  Extremities - peripheral pulses normal, no pedal edema, no clubbing or cyanosis      ASSESSMENT/PLAN:     There are no hospital problems to display for this patient.     We will proceed with L4/5 and L5/S1 RFA. The risks and benefits of this intervention, and alternative therapies were discussed with the patient.  The discussion of risks included infection, bleeding, need for additional procedures or surgery, nerve damage.  Questions regarding the procedure, risks, expected outcome, and possible side effects were solicited and answered to the patient's satisfaction.  Nalini Wooten wishes to proceed with the injection or procedure.  Written consent was obtained.  ASA 2, MP II    Proceed with intervention as scheduled.    Angel Yousif M.D.  Interventional Pain Medicine / Anesthesiology       [1]   Social History  Tobacco Use    Smoking status: Never     Passive exposure: Never    Smokeless tobacco: Never   Substance Use Topics    Alcohol use: Never     Alcohol/week: 0.0 standard drinks of alcohol    Drug use: No   [2]   Current Facility-Administered Medications:     0.9% NaCl infusion, , Intravenous, Continuous, Angel Yousif MD, Last Rate: 50 mL/hr at 04/30/25 1309, New Bag at 04/30/25 1308

## 2025-04-30 NOTE — OP NOTE
Procedure Note    Procedure Date: 4/30/2025    Procedure Performed:  Radiofrequency ablation of the L4-5 and L5-S1 medial branch nerves on the  bilateral side utilizing fluoroscopy    Indication: The patient has clinical and radiologic findings suggestive of recurrent facet mediated pain.  The patient is s/p bilateral lumbar L4/5 and L5/S1 RFA with over 50% improvement of their pain lasting at least 6 months.     Pre-op diagnosis: Lumbar Spondylosis    Post-op diagnosis: same    Physician: Angel Yousif MD    IV Sedation medications: Moderate sedation was achieved with midazolam 2 mg and fentanyl 75 mcg.  Continuous monitoring of EKG, blood pressure and pulse oximetry was provided by a registered nurse during the entire course of the procedure under my supervision and recorded in the patient's medical record.   Total time for sedation was 21 minutes.    Medications injected:  Pre-lesion, 1ml of 2% lidocaine at each level.  From a mixture of 5ml of 0.25% bupivacaine and 40mg of methylprednisolone, 1ml of this solution was injected at each level post-lesion.    Local anesthetic used: 1% Lidocaine, 5 ml    Estimated Blood Loss: none    Complications:  none    Technique:  The patient was interviewed in the holding area and Risks/Benefits were discussed, including, but not limited to, the possibility of new or different pain, bleeding or infection.   All questions were answered.  The patient understood and accepted risks.  Consent was reviewed.  A time out was taken to identify the patient, procedure and side of the procedure. The patient was placed in a prone position, then prepped and draped in the usual sterile fashion using ChloraPrep x2 and sterile towels.  The levels were determined under fluoroscopic guidance and then marked.  AP and oblique fluoroscopy were used to identify and solo the junctions between the superior articular processes and transverse processes at the L3-5 levels on the Bilateral side.  The  Bilateral sacral ala was also identified and marked.  The skin and subcutaneous tissues in these identified areas were anesthetized with 1% lidocaine. A 20-gauge 100 mm IntegraGen RF needle was advanced towards each of these points under fluoroscopic guidance such that the tip of the needle was positioned posterior to the Neuro-foramen on lateral fluoroscopic view.  Then, each needle was positioned such that, on stimulation, the patient had an appropriate pain response between 0.3-0.5 V, with no motor response, other than Lumbar Paraspinal Muscles up to 2.0V.  One mL of 2% lidocaine was then injected at each level prior to lesioning, which was performed for 90 seconds at 80°C.  Once the lesion was complete, 1 mL of a solution consisting of 5 mL 0.25% bupivacaine and 40mg methylprednisolone was injected through each probe. The probes were removed with a 1% lidocaine flush.  The patient tolerated the procedure well.  The patient was monitored after the procedure.  Patient was given post procedure and discharge instructions to follow at home.  The patient was discharged in a stable condition.    Event Time In   In Facility 1136   In Pre-Procedure 1244   Physician Available    Anesthesia Available    Pre-Op: Bedside Procedure Start    Pre-Op: Bedside Procedure Stop    Pre-Procedure Complete 1314   Out of Pre-Procedure    Anesthesia Start    Anesthesia Start Data Collection    Setup Start    Setup Complete    In Room 1407   Prep Start    Procedure Prep Complete    MD notified pt. ready    Procedure Start 1411   Procedure Closing    Emergence    Procedure Finish 1427   Sedation Start 1406   Scope In    Extent Reached    Scope Out    Sedation End 1427   Out of Room 1430   Cleanup Start    Cleanup Complete    Cosmetic Start    Cosmetic Stop    Pain Mgmt In Room    Pain Mgmt Out Room    In Recovery    Anesthesia Finish    Bedside Procedure Start    Bedside Procedure Stop    Recovery Care Complete    Out of Recovery    To Phase  II    In Phase II    Pain Mgmt Recovery Start 1430   Pain Mgmt Recovery Stop    Obs Rec Start    Obs Rec Stop    Phase II Care Complete    Out of Phase II    Procedural Care Complete    Discharge    Pain Follow Up Needed    Pain Follow Up Complete

## 2025-04-30 NOTE — DISCHARGE SUMMARY
[facility]  Discharge Note  Short Stay    Admit Date: 4/30/2025    Discharge Date: 4/30/2025    Attending Physician: Angel Yousif     Discharge Provider: Angel Yousif    Diagnoses:  There are no hospital problems to display for this patient.      Discharged Condition: Good    Final Diagnoses: Lumbar spondylosis [M47.816]    Disposition: Home or Self Care    Hospital Course: No complications, uneventful    Outcome of Hospitalization, Treatment, Procedure, or Surgery:  Patient was admitted for outpatient interventional pain management procedure. The patient tolerated the procedure well with no complications.    Follow up/Patient Instructions:  Follow up as scheduled in Pain Management office in 2-3 weeks.  Patient has received instructions and follow up date and time.    Medications:  Continue previous medications    Discharge Procedure Orders   Notify your health care provider if you experience any of the following:  temperature >100.4     Notify your health care provider if you experience any of the following:  persistent nausea and vomiting or diarrhea     Notify your health care provider if you experience any of the following:  severe uncontrolled pain     Notify your health care provider if you experience any of the following:  redness, tenderness, or signs of infection (pain, swelling, redness, odor or green/yellow discharge around incision site)     Notify your health care provider if you experience any of the following:  difficulty breathing or increased cough     Notify your health care provider if you experience any of the following:  severe persistent headache     Notify your health care provider if you experience any of the following:  worsening rash     Notify your health care provider if you experience any of the following:  persistent dizziness, light-headedness, or visual disturbances     Notify your health care provider if you experience any of the following:  increased confusion or  weakness     Activity as tolerated

## 2025-05-07 ENCOUNTER — CLINICAL SUPPORT (OUTPATIENT)
Dept: ALLERGY | Facility: CLINIC | Age: 70
End: 2025-05-07
Payer: MEDICARE

## 2025-05-07 DIAGNOSIS — J30.9 CHRONIC ALLERGIC RHINITIS: Primary | ICD-10-CM

## 2025-05-07 PROCEDURE — 99999 PR PBB SHADOW E&M-EST. PATIENT-LVL I: CPT | Mod: PBBFAC,,,

## 2025-05-07 PROCEDURE — 95117 IMMUNOTHERAPY INJECTIONS: CPT | Mod: S$GLB,,, | Performed by: ALLERGY & IMMUNOLOGY

## 2025-05-14 ENCOUNTER — PATIENT MESSAGE (OUTPATIENT)
Dept: ALLERGY | Facility: CLINIC | Age: 70
End: 2025-05-14
Payer: MEDICARE

## 2025-05-21 ENCOUNTER — CLINICAL SUPPORT (OUTPATIENT)
Dept: ALLERGY | Facility: CLINIC | Age: 70
End: 2025-05-21
Payer: MEDICARE

## 2025-05-21 DIAGNOSIS — J30.9 CHRONIC ALLERGIC RHINITIS: Primary | ICD-10-CM

## 2025-05-21 PROCEDURE — 95117 IMMUNOTHERAPY INJECTIONS: CPT | Mod: S$GLB,,, | Performed by: FAMILY MEDICINE

## 2025-05-21 PROCEDURE — 99999 PR PBB SHADOW E&M-EST. PATIENT-LVL I: CPT | Mod: PBBFAC,,,

## 2025-05-28 ENCOUNTER — PATIENT MESSAGE (OUTPATIENT)
Dept: FAMILY MEDICINE | Facility: CLINIC | Age: 70
End: 2025-05-28
Payer: MEDICARE

## 2025-05-28 DIAGNOSIS — E66.9 OBESITY (BMI 30-39.9): Primary | ICD-10-CM

## 2025-05-29 ENCOUNTER — OFFICE VISIT (OUTPATIENT)
Dept: PAIN MEDICINE | Facility: CLINIC | Age: 70
End: 2025-05-29
Payer: MEDICARE

## 2025-05-29 VITALS
DIASTOLIC BLOOD PRESSURE: 83 MMHG | WEIGHT: 187.81 LBS | SYSTOLIC BLOOD PRESSURE: 144 MMHG | HEIGHT: 63 IN | BODY MASS INDEX: 33.28 KG/M2 | HEART RATE: 66 BPM

## 2025-05-29 DIAGNOSIS — M79.18 MYOFASCIAL PAIN: ICD-10-CM

## 2025-05-29 DIAGNOSIS — M47.816 LUMBAR SPONDYLOSIS: ICD-10-CM

## 2025-05-29 PROCEDURE — 99999 PR PBB SHADOW E&M-EST. PATIENT-LVL III: CPT | Mod: PBBFAC,,, | Performed by: ANESTHESIOLOGY

## 2025-05-29 RX ORDER — TIRZEPATIDE 10 MG/.5ML
10 INJECTION, SOLUTION SUBCUTANEOUS
Qty: 2 ML | Refills: 2 | Status: SHIPPED | OUTPATIENT
Start: 2025-05-29

## 2025-05-29 RX ORDER — CYCLOBENZAPRINE HCL 5 MG
5 TABLET ORAL 2 TIMES DAILY PRN
Qty: 30 TABLET | Refills: 0 | Status: SHIPPED | OUTPATIENT
Start: 2025-05-29

## 2025-05-29 RX ORDER — TRAMADOL HYDROCHLORIDE 50 MG/1
50 TABLET, FILM COATED ORAL EVERY 12 HOURS PRN
Qty: 20 TABLET | Refills: 0 | Status: SHIPPED | OUTPATIENT
Start: 2025-05-29

## 2025-05-29 NOTE — PROGRESS NOTES
[facility] Follow Up Evaluation      Referred by: No ref. provider found    PCP:     CC:   Chief Complaint   Patient presents with    Low-back Pain          5/29/2025    11:39 AM 3/12/2025    11:35 AM 11/19/2024    11:36 AM   Last 3 PDI Scores   Pain Disability Index (PDI) 15 26 37       Interval HPI 5/29/25: Ms. Wooten returns to the office for follow up.  She is s/p bilateral L4/5 and L5/S1 RFA on 4/30/25 with 50% relief of her axial lower back pain.  Improvement in her mobility and her quality of life.  She still has some upper lumbar back pain that can bother her at times.      HPI:   Nalini Wooten is a 70 y.o. female patient who has a past medical history of Allergies, Bulging disc, Carpal tunnel syndrome, Degenerative disc disease, GERD (gastroesophageal reflux disease), High cholesterol, and Pinched nerve in neck. She presents with back pain.  Had chronic back pain for many years that has been gradually worsening.  Today she reports her pain is 9 in 10, intermittent, aching.  Can get referred pain into the bilateral buttocks.  Pain is worse with sitting, standing, bending, coughing, walking, lifting and relieved with rest and injections.  She has had injections for 5 years ago with excellent relief of previous pain.  She denies any numbness or weakness.      Pain Intervention History:  - s/p 1st diagnostic bilateral L4-5 and L5-S1 medial branch blocks on 5/22/24 and reports she got 85% relief lasting for 8 hours.  Preprocedure pain score 7.  Postprocedure pain score 1  - s/p 2nd diagnostic bilateral L4-5 and L5-S1 medial branch blocks on 7/19/24 and reports she got 85% relief lasting for 8 hours.   - s/p bilateral L4-5 and L5-S1 RFA on 08/08/2024 with 80% relief lasting over 6 months  - s/p bilateral L4/5 and L5/S1 RFA on 4/30/25 with 50% relief    Past Spine Surgical History:      Past and current medications:  Antineuropathics:  NSAIDs:  Physical therapy: yes, completed    Antidepressants:  Muscle relaxers:  Opioids: tramadol   Antiplatelets/Anticoagulants:     History:    Current Outpatient Medications:     cyanocobalamin 1,000 mcg/mL injection, Inject every week 1 cc, Disp: 10 mL, Rfl: 1    dicyclomine (BENTYL) 10 MG capsule, Take 1 capsule (10 mg total) by mouth 3 (three) times daily as needed (diarrhea, abdominal pain)., Disp: 90 capsule, Rfl: 0    hydrocortisone 2.5 % cream, Apply twice daily as needed to rash on face, Disp: 30 g, Rfl: 2    ipratropium (ATROVENT) 21 mcg (0.03 %) nasal spray, 2 sprays by Each Nostril route 3 (three) times daily as needed (runny nose)., Disp: 30 mL, Rfl: 3    levocetirizine (XYZAL) 5 MG tablet, Take 1 tablet (5 mg total) by mouth every evening., Disp: 90 tablet, Rfl: 1    loratadine (CLARITIN) 10 mg tablet, Take 1 tablet (10 mg total) by mouth once daily., Disp: 30 tablet, Rfl: 11    metFORMIN (GLUCOPHAGE) 500 MG tablet, Take 1 tablet (500 mg total) by mouth 2 (two) times daily with meals., Disp: 180 tablet, Rfl: 2    pantoprazole (PROTONIX) 40 MG tablet, Take 40 mg by mouth every morning., Disp: , Rfl:     pimecrolimus (ELIDEL) 1 % cream, AAA bid, Disp: 60 g, Rfl: 3    pravastatin (PRAVACHOL) 20 MG tablet, Take 1 tablet (20 mg total) by mouth once daily., Disp: 90 tablet, Rfl: 0    tirzepatide (MOUNJARO) 10 mg/0.5 mL PnIj, Inject 10 mg into the skin every 7 days., Disp: 2 mL, Rfl: 2    tolterodine (DETROL LA) 4 MG 24 hr capsule, Take 1 capsule (4 mg total) by mouth once daily., Disp: 90 capsule, Rfl: 1    cyclobenzaprine (FLEXERIL) 5 MG tablet, Take 1 tablet (5 mg total) by mouth 2 (two) times daily as needed for Muscle spasms., Disp: 30 tablet, Rfl: 0    traMADoL (ULTRAM) 50 mg tablet, Take 1 tablet (50 mg total) by mouth every 12 (twelve) hours as needed for Pain., Disp: 20 tablet, Rfl: 0    Past Medical History:   Diagnosis Date    Allergies     Bulging disc     lower back     Carpal tunnel syndrome     Degenerative disc disease     GERD  (gastroesophageal reflux disease)     High cholesterol     Pinched nerve in neck        Past Surgical History:   Procedure Laterality Date    ARTHROSCOPIC REPAIR OF ROTATOR CUFF OF SHOULDER Right 03/10/2021    Procedure: REPAIR, ROTATOR CUFF, ARTHROSCOPIC;  Surgeon: Dustin Vanegas MD;  Location: [facility] OR;  Service: Orthopedics;  Laterality: Right;  arthrex notified    ARTHROSCOPY OF SHOULDER WITH DECOMPRESSION OF SUBACROMIAL SPACE Right 03/10/2021    Procedure: ARTHROSCOPY, SHOULDER, WITH SUBACROMIAL SPACE DECOMPRESSION;  Surgeon: Dustin Vanegas MD;  Location: [facility] OR;  Service: Orthopedics;  Laterality: Right;    BLADDER SUSPENSION  1990    CATARACT EXTRACTION W/  INTRAOCULAR LENS IMPLANT Right 06/21/2023    Procedure: CEIOL OD;  Surgeon: Shawn Shearer MD;  Location: [facility] OR;  Service: Ophthalmology;  Laterality: Right;    CATARACT EXTRACTION W/  INTRAOCULAR LENS IMPLANT Left 07/19/2023    Procedure: CEIOL OS;  Surgeon: Shawn Shearer MD;  Location: [facility];  Service: Ophthalmology;  Laterality: Left;    COLONOSCOPY      10 years ago    COLONOSCOPY N/A 4/17/2025    Procedure: COLONOSCOPY;  Surgeon: Lucian Lundberg MD;  Location: [facility];  Service: Endoscopy;  Laterality: N/A;    DISTAL CLAVICLE EXCISION Right 03/10/2021    Procedure: EXCISION, CLAVICLE, DISTAL;  Surgeon: Dustin Vanegas MD;  Location: [facility] OR;  Service: Orthopedics;  Laterality: Right;    FOOT SURGERY Bilateral 2001    FOOT SURGERY Right 05/13/2022    HAND SURGERY Left 2017    HYSTERECTOMY  1990    INJECTION OF ANESTHETIC AGENT AROUND MEDIAL BRANCH NERVES INNERVATING LUMBAR FACET JOINT Bilateral 05/22/2024    Procedure: Block-nerve-medial branch-lumbar     L4/5, L5/S1;  Surgeon: Angel Yousif MD;  Location: [facility] OR;  Service: Pain Management;  Laterality: Bilateral;    INJECTION OF ANESTHETIC AGENT AROUND MEDIAL BRANCH NERVES INNERVATING LUMBAR FACET JOINT Bilateral 07/19/2024    Procedure: Block-nerve-medial branch-lumbar Bilat L4/5 and  L5/S1;  Surgeon: Angel Yousif MD;  Location: [facility] OR;  Service: Pain Management;  Laterality: Bilateral;  Bilat L4/5 and L5/S1    JOINT REPLACEMENT  2013    KNEE SURGERY Bilateral 2013    MANDIBLE FRACTURE SURGERY  1981    RADIOFREQUENCY ABLATION Bilateral 4/30/2025    Procedure: Radiofrequency Ablation L4/5 L5/S1;  Surgeon: Angel Yousif MD;  Location: [facility] OR;  Service: Pain Management;  Laterality: Bilateral;    RADIOFREQUENCY ABLATION OF LUMBAR MEDIAL BRANCH NERVE AT SINGLE LEVEL Bilateral 08/08/2024    Procedure: Radiofrequency Ablation, Nerve, Spinal, Lumbar, Medial Branch, L4/5 and L5/S1;  Surgeon: Angel Yousif MD;  Location: [facility] OR;  Service: Pain Management;  Laterality: Bilateral;    SHOULDER ARTHROSCOPY Left 02/27/2019    THYROIDECTOMY Left 05/13/2019        TONSILLECTOMY      UPPER GASTROINTESTINAL ENDOSCOPY      [id number]       Family History   Problem Relation Name Age of Onset    Arthritis Mother      Aneurysm Mother          Abdominal Aneurysm     Heart disease Father  65    Arthritis Father      Diabetes Father      Hearing loss Father      Hypertension Father      Dementia Father      No Known Problems Sister      No Known Problems Sister      Heart disease Brother  50 - 59        stent placement    Heart disease Brother  49        stent placement    Aneurysm Brother          Abdominal Aneurysm    Leukemia Son 3     Crohn's disease Son 3     Ankylosing spondylitis Son 3     Rheumatologic disease Son 3     Heart defect Son 3     Kidney failure Son 3     SIDS Maternal Aunt 2     Uterine cancer Maternal Aunt 2     Dementia Paternal Aunt 1     Diabetes Paternal Grandmother      Heart disease Paternal Grandfather      Ulcerative colitis Neg Hx      Colon cancer Neg Hx         Social History     Socioeconomic History    Marital status:     Number of children: 3   Tobacco Use    Smoking status: Never     Passive exposure: Never    Smokeless tobacco: Never   Substance and  "Sexual Activity    Alcohol use: Never     Alcohol/week: 0.0 standard drinks of alcohol    Drug use: No    Sexual activity: Yes     Birth control/protection: None     Social Drivers of Health     Financial Resource Strain: Low Risk  (8/1/2024)    Overall Financial Resource Strain (CARDIA)     Difficulty of Paying Living Expenses: Not hard at all   Food Insecurity: No Food Insecurity (8/1/2024)    Hunger Vital Sign     Worried About Running Out of Food in the Last Year: Never true     Ran Out of Food in the Last Year: Never true   Transportation Needs: No Transportation Needs (6/7/2023)    PRAPARE - Transportation     Lack of Transportation (Medical): No     Lack of Transportation (Non-Medical): No   Physical Activity: Insufficiently Active (8/1/2024)    Exercise Vital Sign     Days of Exercise per Week: 4 days     Minutes of Exercise per Session: 20 min   Stress: Stress Concern Present (8/1/2024)    [employer] - Occupational Stress Questionnaire     Feeling of Stress : To some extent   Housing Stability: Low Risk  (6/7/2023)    Housing Stability Vital Sign     Unable to Pay for Housing in the Last Year: No     Number of Places Lived in the Last Year: 1     Unstable Housing in the Last Year: No       Review of patient's allergies indicates:   Allergen Reactions    Adhesive Swelling and Blisters     Adhesive that requires uv light    Estrogens Other (See Comments)     Mini stroke    Shingrix (pf)  [varicella-zoster [id number] (pf)] Diarrhea, Itching and Nausea And Vomiting       Review of Systems:  Positive for headaches, weight gain, difficulty sleeping, urinary frequency.  Balance of review of systems is negative.    Physical Exam:  Vitals:    05/29/25 1139   BP: (!) 144/83   Pulse: 66   Weight: 85.2 kg (187 lb 13.3 oz)   Height: 5' 3" (1.6 m)   PainSc:   4   PainLoc: Back       Body mass index is 33.27 kg/m².    Gen: NAD  Psych: mood appropriate for given condition  HEENT: eyes anicteric "   CV: RRR  HEENT: anicteric   Respiratory: non-labored, no signs of respiratory distress  Abd: non-distended  Skin: warm, dry and intact.  Gait: No antalgic gait.     Reproducible pain with lumbar axial facet loading    Sensory:  Intact and symmetrical to light touch in L1-S1 dermatomes bilaterally.    Motor:     Right Left   L2/3 Iliacus Hip flexion  5  5   L3/4 Qudratus Femoris Knee Extension  5  5   L4/5 Tib Anterior Ankle Dorsiflexion   5  5   L5/S1 Extensor Hallicus Longus Great toe extension  5  5   S1/S2 Gastroc/Soleus Plantar Flexion  5  5      Right Left                  Patellar DTR 0 0   Achilles DTR 0 0                      Labs:  Lab Results   Component Value Date    HGBA1C 6.5 (H) 01/16/2025       Lab Results   Component Value Date    WBC 7.48 04/07/2025    HGB 12.2 04/07/2025    HCT 39.0 04/07/2025    MCV 86 04/07/2025     04/07/2025         Imaging:  MRI lumbar spine 4/30/24  FINDINGS:  Vertebral column: There is degenerative change which will be described by level.  The lumbar vertebral bodies maintain normal height.  There is no fracture.  There is trace anterolisthesis of L4 on L5.  There is stable trace retrolisthesis of L2 on L3, L3 on L4.  The lumbar discs are desiccated.  There is mild-to-moderate disc space narrowing at the L2-3 level with mild disc space narrowing at the L3-4 level.  Baseline marrow signal is normal.     Spinal canal, conus, epidural space: The spinal canal is developmentally normal.  The conus terminates at the level of T12-L1 and is normal in contour and signal intensity.  There is no abnormal epidural mass or fluid collection.     Findings by level:     On the sagittal images, there is a minimal disc bulge at the T11-12 level but there is no spinal stenosis or cord compression.  T12-L1: There is a minimal disc bulge.  There is no spinal canal or significant foraminal stenosis.  There is a small perineural cyst in the superior left foraminal recess.  L1-2: There is  mild facet joint arthropathy and a minimal disc bulge.  There is no spinal canal or significant foraminal stenosis.  L2-3: There is trace retrolisthesis of L2 on L3.  There is mild-to-moderate disc space narrowing.  There is a diffuse disc bulge with osteophytic ridging eccentric to the left.  There is mild facet joint arthropathy with ligamentum flavum thickening.  There is only borderline spinal stenosis.  There is crowding of the left lateral recess.  There is mild-to-moderate left and mild right foraminal stenosis.  L3-4: There is trace retrolisthesis of L3 on L4 where there is also mild disc space narrowing and inferior unroofing of a diffuse disc bulge with mild osteophytic ridging eccentric to the left.  There is only mild facet joint arthropathy.  There is no spinal stenosis.  Again there is crowding of the left lateral recess.  There is mild-to-moderate left foraminal stenosis.  L4-5: There is subtle trace anterolisthesis of L4 on L5.  There is moderate facet joint arthropathy with ligamentum flavum thickening.  There is a mild disc bulge.  There is flattening of the ventral dural sac.  There is borderline to mild spinal stenosis.  There is mild bilateral foraminal stenosis.  L5-S1: There is facet joint arthropathy.  There is no spinal canal or significant foraminal stenosis.     Soft tissues, other: The posterior spinous muscles are somewhat atrophic.  The prevertebral soft tissues are normal.  The aorta is ectatic measuring up approximately 3 cm at the level of L3-4.  There is no hydronephrosis.    Assessment:   Problem List Items Addressed This Visit    None  Visit Diagnoses         Myofascial pain        Relevant Medications    cyclobenzaprine (FLEXERIL) 5 MG tablet      Lumbar spondylosis        Relevant Medications    traMADoL (ULTRAM) 50 mg tablet                Nalini Wooten is a 70 y.o. female patient who has a past medical history of Allergies, Bulging disc, Carpal tunnel syndrome, Degenerative  disc disease, GERD (gastroesophageal reflux disease), High cholesterol, and Pinched nerve in neck. She presents with back pain.  Had chronic back pain for many years that has been gradually worsening.  Today she reports her pain is 9 in 10, intermittent, aching.        5/29/25 - Ms. Wooten returns to the office for follow up.  She is s/p bilateral L4/5 and L5/S1 RFA on 4/30/25 with 50% relief of her axial lower back pain.  Improvement in her mobility and her quality of life.  She still has some upper lumbar back pain that can bother her at times.    - I independently reviewed her lumbar MRI with her again today.  She has multilevel bilateral facet arthropathy  - she is going to maintain PT directed home exercises  - she will continue to use Flexeril on an as needed basis for myofascial pain.  On occasion she uses tramadol on an as needed basis for severe breakthrough pain and I have refilled this for her today  - she will follow up with me on an as needed basis.  If she has any worsening symptoms we can consider a repeat lumbar MRI to look for any interval changes such as Modic changes that may be contributing to her back pain        : Not applicable    Angel Yousif M.D.  Interventional Pain Medicine / Anesthesiology    This note was completed with dictation software and grammatical errors may exist.

## 2025-06-11 ENCOUNTER — OFFICE VISIT (OUTPATIENT)
Dept: FAMILY MEDICINE | Facility: CLINIC | Age: 70
End: 2025-06-11
Payer: MEDICARE

## 2025-06-11 VITALS
WEIGHT: 183 LBS | OXYGEN SATURATION: 99 % | DIASTOLIC BLOOD PRESSURE: 76 MMHG | HEIGHT: 63 IN | BODY MASS INDEX: 32.43 KG/M2 | HEART RATE: 71 BPM | SYSTOLIC BLOOD PRESSURE: 126 MMHG

## 2025-06-11 DIAGNOSIS — E78.5 DYSLIPIDEMIA: ICD-10-CM

## 2025-06-11 DIAGNOSIS — N32.81 OAB (OVERACTIVE BLADDER): ICD-10-CM

## 2025-06-11 DIAGNOSIS — Z12.31 ENCOUNTER FOR SCREENING MAMMOGRAM FOR MALIGNANT NEOPLASM OF BREAST: ICD-10-CM

## 2025-06-11 DIAGNOSIS — F33.0 MILD EPISODE OF RECURRENT MAJOR DEPRESSIVE DISORDER: Primary | ICD-10-CM

## 2025-06-11 DIAGNOSIS — R53.83 FATIGUE, UNSPECIFIED TYPE: ICD-10-CM

## 2025-06-11 DIAGNOSIS — R19.7 DIARRHEA, UNSPECIFIED TYPE: ICD-10-CM

## 2025-06-11 DIAGNOSIS — J30.2 SEASONAL ALLERGIES: ICD-10-CM

## 2025-06-11 DIAGNOSIS — R73.9 HYPERGLYCEMIA: ICD-10-CM

## 2025-06-11 PROCEDURE — 3074F SYST BP LT 130 MM HG: CPT | Mod: CPTII,S$GLB,, | Performed by: NURSE PRACTITIONER

## 2025-06-11 PROCEDURE — 3044F HG A1C LEVEL LT 7.0%: CPT | Mod: CPTII,S$GLB,, | Performed by: NURSE PRACTITIONER

## 2025-06-11 PROCEDURE — 99999 PR PBB SHADOW E&M-EST. PATIENT-LVL IV: CPT | Mod: PBBFAC,,, | Performed by: NURSE PRACTITIONER

## 2025-06-11 PROCEDURE — 1160F RVW MEDS BY RX/DR IN RCRD: CPT | Mod: CPTII,S$GLB,, | Performed by: NURSE PRACTITIONER

## 2025-06-11 PROCEDURE — 99214 OFFICE O/P EST MOD 30 MIN: CPT | Mod: S$GLB,,, | Performed by: NURSE PRACTITIONER

## 2025-06-11 PROCEDURE — 1101F PT FALLS ASSESS-DOCD LE1/YR: CPT | Mod: CPTII,S$GLB,, | Performed by: NURSE PRACTITIONER

## 2025-06-11 PROCEDURE — 3078F DIAST BP <80 MM HG: CPT | Mod: CPTII,S$GLB,, | Performed by: NURSE PRACTITIONER

## 2025-06-11 PROCEDURE — 3008F BODY MASS INDEX DOCD: CPT | Mod: CPTII,S$GLB,, | Performed by: NURSE PRACTITIONER

## 2025-06-11 PROCEDURE — 1126F AMNT PAIN NOTED NONE PRSNT: CPT | Mod: CPTII,S$GLB,, | Performed by: NURSE PRACTITIONER

## 2025-06-11 PROCEDURE — 1159F MED LIST DOCD IN RCRD: CPT | Mod: CPTII,S$GLB,, | Performed by: NURSE PRACTITIONER

## 2025-06-11 PROCEDURE — 3288F FALL RISK ASSESSMENT DOCD: CPT | Mod: CPTII,S$GLB,, | Performed by: NURSE PRACTITIONER

## 2025-06-11 RX ORDER — CYANOCOBALAMIN 1000 UG/ML
INJECTION, SOLUTION INTRAMUSCULAR; SUBCUTANEOUS
Qty: 10 ML | Refills: 1 | Status: SHIPPED | OUTPATIENT
Start: 2025-06-11

## 2025-06-11 RX ORDER — VILAZODONE HYDROCHLORIDE 20 MG/1
1 TABLET ORAL DAILY
Qty: 90 TABLET | Refills: 1 | Status: SHIPPED | OUTPATIENT
Start: 2025-06-11

## 2025-06-11 RX ORDER — ONDANSETRON 8 MG/1
TABLET, ORALLY DISINTEGRATING ORAL
COMMUNITY
Start: 2025-04-07

## 2025-06-11 RX ORDER — DICYCLOMINE HYDROCHLORIDE 10 MG/1
10 CAPSULE ORAL 3 TIMES DAILY PRN
Qty: 90 CAPSULE | Refills: 1 | Status: SHIPPED | OUTPATIENT
Start: 2025-06-11

## 2025-06-11 RX ORDER — TOLTERODINE 4 MG/1
4 CAPSULE, EXTENDED RELEASE ORAL DAILY
Qty: 90 CAPSULE | Refills: 1 | Status: SHIPPED | OUTPATIENT
Start: 2025-06-11 | End: 2025-06-11

## 2025-06-11 RX ORDER — LEVOCETIRIZINE DIHYDROCHLORIDE 5 MG/1
5 TABLET, FILM COATED ORAL NIGHTLY
Qty: 90 TABLET | Refills: 1 | Status: SHIPPED | OUTPATIENT
Start: 2025-06-11

## 2025-06-11 RX ORDER — PRAVASTATIN SODIUM 20 MG/1
20 TABLET ORAL DAILY
Qty: 90 TABLET | Refills: 1 | Status: SHIPPED | OUTPATIENT
Start: 2025-06-11

## 2025-06-11 RX ORDER — MIRABEGRON 25 MG/1
25 TABLET, FILM COATED, EXTENDED RELEASE ORAL DAILY
Qty: 90 TABLET | Refills: 1 | Status: SHIPPED | OUTPATIENT
Start: 2025-06-11

## 2025-06-13 ENCOUNTER — TELEPHONE (OUTPATIENT)
Dept: FAMILY MEDICINE | Facility: CLINIC | Age: 70
End: 2025-06-13
Payer: MEDICARE

## 2025-06-13 ENCOUNTER — PATIENT MESSAGE (OUTPATIENT)
Dept: FAMILY MEDICINE | Facility: CLINIC | Age: 70
End: 2025-06-13
Payer: MEDICARE

## 2025-06-13 NOTE — TELEPHONE ENCOUNTER
B12 PA This drug/product is not covered under the pharmacy benefit. Prior Authorization is not available.  Prior auth initiated by: [id number] - [place], [place] - [address] AT [place] [id number]    [phone]

## 2025-06-17 NOTE — PROGRESS NOTES
SUBJECTIVE:      Patient ID: Nalini Wooten is a 70 y.o. female.    Chief Complaint: Follow-up (Check-Up/Med Refills)    Presents for chronic medical issues    Follows with Dr. Pérez (allergy & immun) & gastro    Discussed outstanding health maintenance     Hyperlipidemia  This is a chronic problem. The current episode started more than 1 year ago. The problem is uncontrolled. Recent lipid tests were reviewed and are high. Exacerbating diseases include obesity. Factors aggravating her hyperlipidemia include fatty foods. Associated symptoms include myalgias. Pertinent negatives include no chest pain or shortness of breath. Current antihyperlipidemic treatment includes statins. Compliance problems include adherence to diet and adherence to exercise.  Risk factors for coronary artery disease include family history, dyslipidemia, obesity, post-menopausal and a sedentary lifestyle.   Female  Problem  Primary symptoms comment: OAB. This is a chronic problem. The current episode started more than 1 year ago. The problem occurs intermittently. The problem has been waxing and waning since onset. The patient is experiencing no pain. Associated symptoms include diarrhea. Pertinent negatives include no abdominal pain, back pain, constipation, dysuria, flank pain, frequency, headaches, hematuria, nausea, sore throat, urgency or vomiting. The symptoms are aggravated by heavy lifting (coughing, sneezing). Treatments tried: detrol, ditropan. The treatment provided moderate relief.   Fatigue  This is a chronic problem. The current episode started more than 1 year ago. The problem occurs intermittently. The problem has been waxing and waning. Associated symptoms include arthralgias, fatigue and myalgias. Pertinent negatives include no abdominal pain, chest pain, congestion, coughing, headaches, joint swelling, nausea, neck pain, numbness, sore throat, vomiting or weakness. The symptoms are aggravated by exertion and stress.  She has tried rest, sleep, position changes and relaxation (vitamin supplementation) for the symptoms. The treatment provided moderate relief.   Depression  Visit Type: follow-up  Patient presents with the following symptoms: depressed mood, fatigue, malaise and weight loss.  Patient is not experiencing: confusion, decreased concentration, nervousness/anxiety, palpitations and shortness of breath.  Frequency of symptoms: most days   Severity: moderate   Sleep quality: fair  Compliance with medications:  %    Diarrhea   This is a recurrent problem. The current episode started more than 1 month ago. The problem occurs 2 to 4 times per day. The problem has been waxing and waning. The stool consistency is described as Watery. The patient states that diarrhea does not awaken her from sleep. Associated symptoms include arthralgias, myalgias and weight loss. Pertinent negatives include no abdominal pain, coughing, headaches or vomiting. She has tried anti-motility drug and bismuth subsalicylate for the symptoms. The treatment provided moderate relief.       Past Surgical History:   Procedure Laterality Date    ARTHROSCOPIC REPAIR OF ROTATOR CUFF OF SHOULDER Right 03/10/2021    Procedure: REPAIR, ROTATOR CUFF, ARTHROSCOPIC;  Surgeon: Dustin Vanegas MD;  Location: [facility] OR;  Service: Orthopedics;  Laterality: Right;  arthrex notified    ARTHROSCOPY OF SHOULDER WITH DECOMPRESSION OF SUBACROMIAL SPACE Right 03/10/2021    Procedure: ARTHROSCOPY, SHOULDER, WITH SUBACROMIAL SPACE DECOMPRESSION;  Surgeon: Dustin Vanegas MD;  Location: [facility] OR;  Service: Orthopedics;  Laterality: Right;    BLADDER SUSPENSION  1990    CATARACT EXTRACTION W/  INTRAOCULAR LENS IMPLANT Right 06/21/2023    Procedure: CEIOL OD;  Surgeon: Shawn Shearer MD;  Location: [facility] OR;  Service: Ophthalmology;  Laterality: Right;    CATARACT EXTRACTION W/  INTRAOCULAR LENS IMPLANT Left 07/19/2023    Procedure: CEIOL OS;  Surgeon: Shawn Shearer MD;  Location:  [facility] OR;  Service: Ophthalmology;  Laterality: Left;    COLONOSCOPY      10 years ago    COLONOSCOPY N/A 4/17/2025    Procedure: COLONOSCOPY;  Surgeon: Lucian Lundberg MD;  Location: [facility];  Service: Endoscopy;  Laterality: N/A;    DISTAL CLAVICLE EXCISION Right 03/10/2021    Procedure: EXCISION, CLAVICLE, DISTAL;  Surgeon: Dustin Vanegas MD;  Location: [facility] OR;  Service: Orthopedics;  Laterality: Right;    FOOT SURGERY Bilateral 2001    FOOT SURGERY Right 05/13/2022    HAND SURGERY Left 2017    HYSTERECTOMY  1990    INJECTION OF ANESTHETIC AGENT AROUND MEDIAL BRANCH NERVES INNERVATING LUMBAR FACET JOINT Bilateral 05/22/2024    Procedure: Block-nerve-medial branch-lumbar     L4/5, L5/S1;  Surgeon: Angel Yousif MD;  Location: [facility] OR;  Service: Pain Management;  Laterality: Bilateral;    INJECTION OF ANESTHETIC AGENT AROUND MEDIAL BRANCH NERVES INNERVATING LUMBAR FACET JOINT Bilateral 07/19/2024    Procedure: Block-nerve-medial branch-lumbar Bilat L4/5 and L5/S1;  Surgeon: Angel Yousif MD;  Location: [facility] OR;  Service: Pain Management;  Laterality: Bilateral;  Bilat L4/5 and L5/S1    JOINT REPLACEMENT  2013    KNEE SURGERY Bilateral 2013    MANDIBLE FRACTURE SURGERY  1981    RADIOFREQUENCY ABLATION Bilateral 4/30/2025    Procedure: Radiofrequency Ablation L4/5 L5/S1;  Surgeon: Angel Yousif MD;  Location: [facility] OR;  Service: Pain Management;  Laterality: Bilateral;    RADIOFREQUENCY ABLATION OF LUMBAR MEDIAL BRANCH NERVE AT SINGLE LEVEL Bilateral 08/08/2024    Procedure: Radiofrequency Ablation, Nerve, Spinal, Lumbar, Medial Branch, L4/5 and L5/S1;  Surgeon: Angel Yousif MD;  Location: [facility] OR;  Service: Pain Management;  Laterality: Bilateral;    SHOULDER ARTHROSCOPY Left 02/27/2019    THYROIDECTOMY Left 05/13/2019        TONSILLECTOMY      UPPER GASTROINTESTINAL ENDOSCOPY      [id number]     Family History   Problem Relation Name Age of Onset    Arthritis Mother      Aneurysm  Mother          Abdominal Aneurysm     Heart disease Father  65    Arthritis Father      Diabetes Father      Hearing loss Father      Hypertension Father      Dementia Father      No Known Problems Sister      No Known Problems Sister      Heart disease Brother  50 - 59        stent placement    Heart disease Brother  49        stent placement    Aneurysm Brother          Abdominal Aneurysm    Leukemia Son 3     Crohn's disease Son 3     Ankylosing spondylitis Son 3     Rheumatologic disease Son 3     Heart defect Son 3     Kidney failure Son 3     SIDS Maternal Aunt 2     Uterine cancer Maternal Aunt 2     Dementia Paternal Aunt 1     Diabetes Paternal Grandmother      Heart disease Paternal Grandfather      Ulcerative colitis Neg Hx      Colon cancer Neg Hx        Social History     Socioeconomic History    Marital status:     Number of children: 3   Tobacco Use    Smoking status: Never     Passive exposure: Never    Smokeless tobacco: Never   Substance and Sexual Activity    Alcohol use: Never     Alcohol/week: 0.0 standard drinks of alcohol    Drug use: No    Sexual activity: Yes     Birth control/protection: None     Social Drivers of Health     Financial Resource Strain: Low Risk  (8/1/2024)    Overall Financial Resource Strain (CARDIA)     Difficulty of Paying Living Expenses: Not hard at all   Food Insecurity: No Food Insecurity (8/1/2024)    Hunger Vital Sign     Worried About Running Out of Food in the Last Year: Never true     Ran Out of Food in the Last Year: Never true   Transportation Needs: No Transportation Needs (6/7/2023)    PRAPARE - Transportation     Lack of Transportation (Medical): No     Lack of Transportation (Non-Medical): No   Physical Activity: Insufficiently Active (8/1/2024)    Exercise Vital Sign     Days of Exercise per Week: 4 days     Minutes of Exercise per Session: 20 min   Stress: Stress Concern Present (8/1/2024)    [employer] - Occupational  Stress Questionnaire     Feeling of Stress : To some extent   Housing Stability: Low Risk  (6/7/2023)    Housing Stability Vital Sign     Unable to Pay for Housing in the Last Year: No     Number of Places Lived in the Last Year: 1     Unstable Housing in the Last Year: No     Current Outpatient Medications   Medication Sig Dispense Refill    cyclobenzaprine (FLEXERIL) 5 MG tablet Take 1 tablet (5 mg total) by mouth 2 (two) times daily as needed for Muscle spasms. 30 tablet 0    hydrocortisone 2.5 % cream Apply twice daily as needed to rash on face 30 g 2    ipratropium (ATROVENT) 21 mcg (0.03 %) nasal spray 2 sprays by Each Nostril route 3 (three) times daily as needed (runny nose). 30 mL 3    loratadine (CLARITIN) 10 mg tablet Take 1 tablet (10 mg total) by mouth once daily. 30 tablet 11    metFORMIN (GLUCOPHAGE) 500 MG tablet Take 1 tablet (500 mg total) by mouth 2 (two) times daily with meals. 180 tablet 2    ondansetron (ZOFRAN-ODT) 8 MG TbDL Take by mouth.      pantoprazole (PROTONIX) 40 MG tablet Take 40 mg by mouth every morning.      pimecrolimus (ELIDEL) 1 % cream AAA bid 60 g 3    tirzepatide (MOUNJARO) 10 mg/0.5 mL PnIj Inject 10 mg into the skin every 7 days. 2 mL 2    traMADoL (ULTRAM) 50 mg tablet Take 1 tablet (50 mg total) by mouth every 12 (twelve) hours as needed for Pain. 20 tablet 0    cyanocobalamin 1,000 mcg/mL injection Inject every week 1 cc 10 mL 1    dicyclomine (BENTYL) 10 MG capsule Take 1 capsule (10 mg total) by mouth 3 (three) times daily as needed (diarrhea, abdominal pain). 90 capsule 1    levocetirizine (XYZAL) 5 MG tablet Take 1 tablet (5 mg total) by mouth every evening. 90 tablet 1    mirabegron (MYRBETRIQ) 25 mg Tb24 ER tablet Take 1 tablet (25 mg total) by mouth once daily. 90 tablet 1    pravastatin (PRAVACHOL) 20 MG tablet Take 1 tablet (20 mg total) by mouth once daily. 90 tablet 1    vilazodone (VIIBRYD) 20 mg Tab Take 1 tablet (20 mg total) by mouth once daily. 90 tablet  1     No current facility-administered medications for this visit.     Review of patient's allergies indicates:   Allergen Reactions    Adhesive Swelling and Blisters     Adhesive that requires uv light    Estrogens Other (See Comments)     Mini stroke    Shingrix (pf)  [[id number] (pf)] Diarrhea, Itching and Nausea And Vomiting      Past Medical History:   Diagnosis Date    Allergies     Bulging disc     lower back     Carpal tunnel syndrome     Degenerative disc disease     GERD (gastroesophageal reflux disease)     High cholesterol     Pinched nerve in neck      Past Surgical History:   Procedure Laterality Date    ARTHROSCOPIC REPAIR OF ROTATOR CUFF OF SHOULDER Right 03/10/2021    Procedure: REPAIR, ROTATOR CUFF, ARTHROSCOPIC;  Surgeon: Dustin Vanegas MD;  Location: [facility] OR;  Service: Orthopedics;  Laterality: Right;  arthrex notified    ARTHROSCOPY OF SHOULDER WITH DECOMPRESSION OF SUBACROMIAL SPACE Right 03/10/2021    Procedure: ARTHROSCOPY, SHOULDER, WITH SUBACROMIAL SPACE DECOMPRESSION;  Surgeon: Dustin Vanegas MD;  Location: [facility] OR;  Service: Orthopedics;  Laterality: Right;    BLADDER SUSPENSION  1990    CATARACT EXTRACTION W/  INTRAOCULAR LENS IMPLANT Right 06/21/2023    Procedure: CEIOL OD;  Surgeon: Shawn Shearer MD;  Location: [facility] OR;  Service: Ophthalmology;  Laterality: Right;    CATARACT EXTRACTION W/  INTRAOCULAR LENS IMPLANT Left 07/19/2023    Procedure: CEIOL OS;  Surgeon: Shawn Shearer MD;  Location: [facility] OR;  Service: Ophthalmology;  Laterality: Left;    COLONOSCOPY      10 years ago    COLONOSCOPY N/A 4/17/2025    Procedure: COLONOSCOPY;  Surgeon: Lucian Lundberg MD;  Location: [facility] ENDO;  Service: Endoscopy;  Laterality: N/A;    DISTAL CLAVICLE EXCISION Right 03/10/2021    Procedure: EXCISION, CLAVICLE, DISTAL;  Surgeon: Dustin Vanegas MD;  Location: [facility] OR;  Service: Orthopedics;  Laterality: Right;    FOOT SURGERY Bilateral 2001    FOOT SURGERY Right 05/13/2022     HAND SURGERY Left 2017    HYSTERECTOMY  1990    INJECTION OF ANESTHETIC AGENT AROUND MEDIAL BRANCH NERVES INNERVATING LUMBAR FACET JOINT Bilateral 05/22/2024    Procedure: Block-nerve-medial branch-lumbar     L4/5, L5/S1;  Surgeon: Angel Yousif MD;  Location: [facility] OR;  Service: Pain Management;  Laterality: Bilateral;    INJECTION OF ANESTHETIC AGENT AROUND MEDIAL BRANCH NERVES INNERVATING LUMBAR FACET JOINT Bilateral 07/19/2024    Procedure: Block-nerve-medial branch-lumbar Bilat L4/5 and L5/S1;  Surgeon: Angel Yousif MD;  Location: [facility] OR;  Service: Pain Management;  Laterality: Bilateral;  Bilat L4/5 and L5/S1    JOINT REPLACEMENT  2013    KNEE SURGERY Bilateral 2013    MANDIBLE FRACTURE SURGERY  1981    RADIOFREQUENCY ABLATION Bilateral 4/30/2025    Procedure: Radiofrequency Ablation L4/5 L5/S1;  Surgeon: Angel Yousif MD;  Location: [facility] OR;  Service: Pain Management;  Laterality: Bilateral;    RADIOFREQUENCY ABLATION OF LUMBAR MEDIAL BRANCH NERVE AT SINGLE LEVEL Bilateral 08/08/2024    Procedure: Radiofrequency Ablation, Nerve, Spinal, Lumbar, Medial Branch, L4/5 and L5/S1;  Surgeon: Angel Yousif MD;  Location: [facility] OR;  Service: Pain Management;  Laterality: Bilateral;    SHOULDER ARTHROSCOPY Left 02/27/2019    THYROIDECTOMY Left 05/13/2019        TONSILLECTOMY      UPPER GASTROINTESTINAL ENDOSCOPY      [id number]       Review of Systems   Constitutional:  Positive for fatigue and weight loss. Negative for activity change, appetite change and unexpected weight change.   HENT:  Negative for congestion, ear pain, hearing loss, postnasal drip, rhinorrhea, sinus pressure, sinus pain, sneezing, sore throat and trouble swallowing.    Eyes:  Negative for photophobia, pain, discharge and visual disturbance.   Respiratory:  Negative for cough, chest tightness, shortness of breath and wheezing.    Cardiovascular:  Negative for chest pain, palpitations and leg swelling.   Gastrointestinal:   "Positive for diarrhea. Negative for abdominal distention, abdominal pain, blood in stool, constipation, nausea and vomiting.   Endocrine: Negative for cold intolerance, heat intolerance, polydipsia and polyuria.   Genitourinary:  Negative for difficulty urinating, dysuria, flank pain, frequency, hematuria, menstrual problem and urgency.   Musculoskeletal:  Positive for arthralgias and myalgias. Negative for back pain, joint swelling and neck pain.   Skin:  Negative for pallor.   Allergic/Immunologic: Positive for environmental allergies. Negative for food allergies.   Neurological:  Negative for dizziness, weakness, light-headedness, numbness and headaches.   Hematological:  Does not bruise/bleed easily.   Psychiatric/Behavioral:  Positive for depression and dysphoric mood. Negative for agitation, confusion, decreased concentration and sleep disturbance. The patient is not nervous/anxious.       OBJECTIVE:      Vitals:    06/11/25 1049   BP: 126/76   BP Location: Right arm   Patient Position: Sitting   Pulse: 71   SpO2: 99%   Weight: 83 kg (182 lb 15.7 oz)   Height: 5' 3" (1.6 m)     Physical Exam  Vitals and nursing note reviewed.   Constitutional:       General: She is not in acute distress.     Appearance: Normal appearance. She is well-developed and well-groomed. She is obese.      Comments: 6# loss since January visit   HENT:      Head: Normocephalic and atraumatic.      Right Ear: Hearing normal.      Left Ear: Hearing normal.      Nose: Nose normal. No rhinorrhea.   Eyes:      General: Lids are normal.         Right eye: No discharge.         Left eye: No discharge.      Conjunctiva/sclera: Conjunctivae normal.      Right eye: Right conjunctiva is not injected.      Left eye: Left conjunctiva is not injected.      Pupils: Pupils are equal, round, and reactive to light. Pupils are equal.      Right eye: Pupil is round and reactive.      Left eye: Pupil is round and reactive.   Neck:      Thyroid: No " thyromegaly.      Vascular: No JVD.      Trachea: Trachea normal. No tracheal deviation.   Cardiovascular:      Rate and Rhythm: Normal rate and regular rhythm.      Pulses:           Radial pulses are 2+ on the right side and 2+ on the left side.        Posterior tibial pulses are 2+ on the right side and 2+ on the left side.      Heart sounds: Normal heart sounds. No murmur heard.     No friction rub. No gallop.   Pulmonary:      Effort: Pulmonary effort is normal. No respiratory distress.      Breath sounds: Normal breath sounds. No stridor. No decreased breath sounds, wheezing, rhonchi or rales.   Abdominal:      General: Bowel sounds are normal. There is no distension.      Palpations: Abdomen is soft. Abdomen is not rigid.      Tenderness: There is no abdominal tenderness. There is no guarding.   Musculoskeletal:         General: Normal range of motion.      Cervical back: Normal range of motion and neck supple.   Lymphadenopathy:      Cervical: No cervical adenopathy.   Skin:     General: Skin is warm and dry.      Capillary Refill: Capillary refill takes less than 2 seconds.      Coloration: Skin is not pale.      Findings: No lesion or rash.   Neurological:      Mental Status: She is alert and oriented to person, place, and time.      GCS: GCS eye subscore is 4. GCS verbal subscore is 5. GCS motor subscore is 6.      Cranial Nerves: Cranial nerves 2-12 are intact.      Sensory: Sensation is intact.      Motor: Motor function is intact. No atrophy.      Coordination: Coordination is intact. Coordination normal.      Gait: Gait is intact. Gait normal.   Psychiatric:         Attention and Perception: Attention and perception normal. She is attentive.         Mood and Affect: Mood and affect normal.         Speech: Speech normal.         Behavior: Behavior normal.         Thought Content: Thought content normal.         Cognition and Memory: Cognition and memory normal.         Judgment: Judgment normal.         Assessment:       1. Mild episode of recurrent major depressive disorder    2. Dyslipidemia    3. OAB (overactive bladder)    4. Hyperglycemia    5. Fatigue, unspecified type    6. Diarrhea, unspecified type    7. Seasonal allergies    8. Encounter for screening mammogram for malignant neoplasm of breast        Plan:       Mild episode of recurrent major depressive disorder  -     vilazodone (VIIBRYD) 20 mg Tab; Take 1 tablet (20 mg total) by mouth once daily.  Dispense: 90 tablet; Refill: 1    Dyslipidemia  -     pravastatin (PRAVACHOL) 20 MG tablet; Take 1 tablet (20 mg total) by mouth once daily.  Dispense: 90 tablet; Refill: 1  -     Lipid Panel; Future; Expected date: 06/11/2025  -     Comprehensive Metabolic Panel; Future; Expected date: 06/11/2025  -     TSH; Future; Expected date: 06/11/2025    OAB (overactive bladder)  -     Discontinue: tolterodine (DETROL LA) 4 MG 24 hr capsule; Take 1 capsule (4 mg total) by mouth once daily.  Dispense: 90 capsule; Refill: 1  -     mirabegron (MYRBETRIQ) 25 mg Tb24 ER tablet; Take 1 tablet (25 mg total) by mouth once daily.  Dispense: 90 tablet; Refill: 1  -     Ambulatory referral/consult to Urogynecology; Future; Expected date: 06/18/2025    Hyperglycemia  -     Hemoglobin A1C; Future; Expected date: 06/11/2025  -     Comprehensive Metabolic Panel; Future; Expected date: 06/11/2025  -     TSH; Future; Expected date: 06/11/2025    Fatigue, unspecified type  -     cyanocobalamin 1,000 mcg/mL injection; Inject every week 1 cc  Dispense: 10 mL; Refill: 1    Diarrhea, unspecified type  -     dicyclomine (BENTYL) 10 MG capsule; Take 1 capsule (10 mg total) by mouth 3 (three) times daily as needed (diarrhea, abdominal pain).  Dispense: 90 capsule; Refill: 1    Seasonal allergies  -     levocetirizine (XYZAL) 5 MG tablet; Take 1 tablet (5 mg total) by mouth every evening.  Dispense: 90 tablet; Refill: 1  -     CBC Auto Differential; Future; Expected date:  06/11/2025    Encounter for screening mammogram for malignant neoplasm of breast  -     Mammo Digital Screening Bilat w/ Vj (XPD); Future; Expected date: 06/11/2025                Follow up in about 6 months (around 12/11/2025) for HLD.      6/17/2025 CHICO Blackwood, FNP-C

## 2025-06-18 ENCOUNTER — CLINICAL SUPPORT (OUTPATIENT)
Dept: ALLERGY | Facility: CLINIC | Age: 70
End: 2025-06-18
Payer: MEDICARE

## 2025-06-18 DIAGNOSIS — J30.9 CHRONIC ALLERGIC RHINITIS: Primary | ICD-10-CM

## 2025-06-18 PROCEDURE — 99999 PR PBB SHADOW E&M-EST. PATIENT-LVL I: CPT | Mod: PBBFAC,,,

## 2025-06-18 PROCEDURE — 95117 IMMUNOTHERAPY INJECTIONS: CPT | Mod: S$GLB,,, | Performed by: ALLERGY & IMMUNOLOGY

## 2025-06-23 DIAGNOSIS — Z00.00 ENCOUNTER FOR MEDICARE ANNUAL WELLNESS EXAM: ICD-10-CM

## 2025-06-25 ENCOUNTER — TELEPHONE (OUTPATIENT)
Dept: PHARMACY | Facility: CLINIC | Age: 70
End: 2025-06-25
Payer: MEDICARE

## 2025-06-25 NOTE — TELEPHONE ENCOUNTER
[facility]/Population Health Chart Review & Patient Outreach Details For Medication Adherence Project    Reason for Outreach Encounter: 3rd Party payor non-compliance report ([employer], [employer], C, etc)  2.  Patient Outreach Method: Reviewed Patient Chart  3.   Medication in question: mounjaro   LAST FILLED: 5/30/25 for 28 day supply  Diabetes Medications              metFORMIN (GLUCOPHAGE) 500 MG tablet Take 1 tablet (500 mg total) by mouth 2 (two) times daily with meals.    tirzepatide (MOUNJARO) 10 mg/0.5 mL PnIj Inject 10 mg into the skin every 7 days.              4.  Reviewed and or Updates Made To: Patient Chart  5. Outreach Outcomes and/or actions taken: Patient filled medication and is on track to be adherent

## 2025-06-27 ENCOUNTER — HOSPITAL ENCOUNTER (OUTPATIENT)
Dept: RADIOLOGY | Facility: HOSPITAL | Age: 70
Discharge: HOME | End: 2025-06-27
Attending: NURSE PRACTITIONER
Payer: MEDICARE

## 2025-06-27 VITALS — WEIGHT: 182 LBS | BODY MASS INDEX: 32.25 KG/M2 | HEIGHT: 63 IN

## 2025-06-27 DIAGNOSIS — Z12.31 ENCOUNTER FOR SCREENING MAMMOGRAM FOR MALIGNANT NEOPLASM OF BREAST: ICD-10-CM

## 2025-06-27 PROCEDURE — 77067 SCR MAMMO BI INCL CAD: CPT | Mod: TC,PO

## 2025-06-27 PROCEDURE — 77067 SCR MAMMO BI INCL CAD: CPT | Mod: 26,,, | Performed by: RADIOLOGY

## 2025-06-27 PROCEDURE — 77063 BREAST TOMOSYNTHESIS BI: CPT | Mod: 26,,, | Performed by: RADIOLOGY

## 2025-06-29 DIAGNOSIS — R11.0 NAUSEA: Primary | ICD-10-CM

## 2025-06-30 ENCOUNTER — TELEPHONE (OUTPATIENT)
Dept: PHARMACY | Facility: CLINIC | Age: 70
End: 2025-06-30
Payer: MEDICARE

## 2025-06-30 NOTE — TELEPHONE ENCOUNTER
[facility]Population Health Chart Review & Patient Outreach Details For Medication Adherence Project    Reason for Outreach Encounter: 3rd Party payor non-compliance report ([employer], [employer], [facility], etc)  2.  Patient Outreach Method: Reviewed Patient Chart  3.   Medication in question: pravastatin   LAST FILLED: 6/11/25 for 90 day supply  Hyperlipidemia Medications              pravastatin (PRAVACHOL) 20 MG tablet Take 1 tablet (20 mg total) by mouth once daily.               4.  Reviewed and or Updates Made To: Patient Chart  5. Outreach Outcomes and/or actions taken: Patient filled medication and is on track to be adherent

## 2025-07-01 RX ORDER — ONDANSETRON 8 MG/1
8 TABLET, ORALLY DISINTEGRATING ORAL EVERY 12 HOURS PRN
Qty: 30 TABLET | Refills: 0 | Status: SHIPPED | OUTPATIENT
Start: 2025-07-01

## 2025-07-07 ENCOUNTER — RESULTS FOLLOW-UP (OUTPATIENT)
Dept: FAMILY MEDICINE | Facility: CLINIC | Age: 70
End: 2025-07-07

## 2025-07-08 ENCOUNTER — PATIENT MESSAGE (OUTPATIENT)
Dept: FAMILY MEDICINE | Facility: CLINIC | Age: 70
End: 2025-07-08
Payer: MEDICARE

## 2025-07-09 ENCOUNTER — TELEPHONE (OUTPATIENT)
Dept: ALLERGY | Facility: CLINIC | Age: 70
End: 2025-07-09
Payer: MEDICARE

## 2025-07-19 ENCOUNTER — PATIENT MESSAGE (OUTPATIENT)
Dept: FAMILY MEDICINE | Facility: CLINIC | Age: 70
End: 2025-07-19
Payer: MEDICARE

## 2025-07-19 ENCOUNTER — PATIENT MESSAGE (OUTPATIENT)
Dept: ALLERGY | Facility: CLINIC | Age: 70
End: 2025-07-19
Payer: MEDICARE

## 2025-07-21 ENCOUNTER — PATIENT MESSAGE (OUTPATIENT)
Dept: FAMILY MEDICINE | Facility: CLINIC | Age: 70
End: 2025-07-21
Payer: MEDICARE

## 2025-07-22 ENCOUNTER — CLINICAL SUPPORT (OUTPATIENT)
Dept: ALLERGY | Facility: CLINIC | Age: 70
End: 2025-07-22
Payer: MEDICARE

## 2025-07-22 DIAGNOSIS — J30.9 CHRONIC ALLERGIC RHINITIS: Primary | ICD-10-CM

## 2025-07-22 PROCEDURE — 95117 IMMUNOTHERAPY INJECTIONS: CPT | Mod: S$GLB,,, | Performed by: FAMILY MEDICINE

## 2025-07-22 PROCEDURE — 99999 PR PBB SHADOW E&M-EST. PATIENT-LVL I: CPT | Mod: PBBFAC,,,

## 2025-07-24 ENCOUNTER — PATIENT MESSAGE (OUTPATIENT)
Dept: FAMILY MEDICINE | Facility: CLINIC | Age: 70
End: 2025-07-24
Payer: MEDICARE

## 2025-07-24 DIAGNOSIS — E66.811 CLASS 1 OBESITY DUE TO EXCESS CALORIES WITH SERIOUS COMORBIDITY AND BODY MASS INDEX (BMI) OF 32.0 TO 32.9 IN ADULT: ICD-10-CM

## 2025-07-24 DIAGNOSIS — R73.9 HYPERGLYCEMIA: Primary | ICD-10-CM

## 2025-07-24 DIAGNOSIS — E66.09 CLASS 1 OBESITY DUE TO EXCESS CALORIES WITH SERIOUS COMORBIDITY AND BODY MASS INDEX (BMI) OF 32.0 TO 32.9 IN ADULT: ICD-10-CM

## 2025-07-24 NOTE — TELEPHONE ENCOUNTER
Angie returned a mychart messsage to discuss recent upset stomach.     Angie states that she has been having upset stomach but no nausea or emesis for the last 3-4 weeks. Angie has not been using her zofran. She also states that she is having some joint pain at night in the feet and back. She stated that her nausea felt similar when she first started olaparib but got better once reduced from 300 mg BID -> 300 mg qAM and 150 mg qPM.     Plan:     Will discuss with Dr. Hicks. Recommend that Angie takes zofran for a few days to see if it helps with the upset stomach.     Barak Faria, PharmD BCPS     Patient message from 07/19 : I have been on 10mg for 2 months. Im at 178   Can I go to 12.5 now? Please advise

## 2025-07-25 ENCOUNTER — OFFICE VISIT (OUTPATIENT)
Dept: FAMILY MEDICINE | Facility: CLINIC | Age: 70
End: 2025-07-25
Payer: MEDICARE

## 2025-07-25 VITALS
WEIGHT: 181.88 LBS | HEIGHT: 63 IN | SYSTOLIC BLOOD PRESSURE: 134 MMHG | BODY MASS INDEX: 32.23 KG/M2 | DIASTOLIC BLOOD PRESSURE: 86 MMHG | TEMPERATURE: 98 F | OXYGEN SATURATION: 99 % | HEART RATE: 62 BPM

## 2025-07-25 DIAGNOSIS — J02.9 PHARYNGITIS, UNSPECIFIED ETIOLOGY: Primary | ICD-10-CM

## 2025-07-25 PROCEDURE — 99999 PR PBB SHADOW E&M-EST. PATIENT-LVL IV: CPT | Mod: PBBFAC,,, | Performed by: FAMILY MEDICINE

## 2025-07-25 RX ORDER — PROMETHAZINE HYDROCHLORIDE AND DEXTROMETHORPHAN HYDROBROMIDE 6.25; 15 MG/5ML; MG/5ML
5 SYRUP ORAL NIGHTLY PRN
Qty: 180 ML | Refills: 1 | Status: SHIPPED | OUTPATIENT
Start: 2025-07-25 | End: 2025-10-05

## 2025-07-25 NOTE — PATIENT INSTRUCTIONS
Benadryl and Maalox mouth rinse- for pharyngitis/laryngitis    Mix 4 oz. Of liquid Benadryl (diphenhydramine) with 4 oz of Maalox. Keep refrigerated. Mix and pour 1/2 oz into a medicine cup, gargle and spit, repeat three times a day.

## 2025-07-25 NOTE — PROGRESS NOTES
Subjective:       Patient ID: Nalini Wooten is a 70 y.o. female.    Chief Complaint: Sore Throat (X 3days ), Otalgia (Rt ear x 3days), and Headache (X 3 days)      History of Present Illness    CHIEF COMPLAINT:  Ms. Wooten presents with a sore throat that started two days ago, accompanied by ear pain and headache.    HPI:  Ms. Wooten reports a sore throat that began 2 days ago, along with right ear pain exacerbated when swallowing. Her glands are swollen, with the right side more affected than the left. She describes pain localized to the right side of her throat. She has a dry, non-productive cough, which causes throat pain. She has observed a white coating on the top of her tongue, which she has not noticed before. She reports having allergies, mentioning recent allergy testing that revealed multiple allergens, including many airborne substances like pollen. Her son had a sore throat a few weeks ago, which may be relevant as a potential source of exposure. She performed a home COVID test yesterday, which was negative, but she notes that the test kit was 2 years .    She denies fever.    MEDICAL HISTORY:  Ms. Wooten has a history of childhood asthma, which she has outgrown. She also has allergies and recently underwent allergy testing, revealing allergies to multiple substances including pollen.    FAMILY HISTORY:  Family history is significant for the patient's son with lymphoma, leukemia, and Crohn's disease.    SURGICAL HISTORY:  Ms. Wooten has undergone a left thyroid removal, though the date of this procedure was not specified.    TEST RESULTS:  Ms. Wooten underwent a COVID-19 rapid antigen test yesterday, which yielded a negative result. It's worth noting that the test kit used was 2 years . She recently completed allergy testing, which identified multiple allergens including pollen and airborne substances.    ALLERGIES:  Ms. Wooten is allergic to pollen and ragweed, both of which cause  allergic reactions. She is also allergic to multiple environmental allergens, including numerous airborne substances.    SOCIAL HISTORY:  Smoking: Denies      ROS:  Constitutional: -fevers  Head: +headaches  ENT: +ear pain, +sore throat  Respiratory: +cough  Hematologic/Lymphatic: +swollen lymph nodes, +swollen glands  Allergic: +allergic reactions          Allergies and Medications:   Review of patient's allergies indicates:   Allergen Reactions    Adhesive Swelling and Blisters     Adhesive that requires uv light    Estrogens Other (See Comments)     Mini stroke    Shingrix (pf)  [varicella-zoster [id number] (pf)] Diarrhea, Itching and Nausea And Vomiting     Current Medications[1]    Family History:   Family History   Problem Relation Name Age of Onset    Arthritis Mother      Aneurysm Mother          Abdominal Aneurysm     Heart disease Father  65    Arthritis Father      Diabetes Father      Hearing loss Father      Hypertension Father      Dementia Father      No Known Problems Sister      No Known Problems Sister      Heart disease Brother  50 - 59        stent placement    Heart disease Brother  49        stent placement    Aneurysm Brother          Abdominal Aneurysm    Leukemia Son 3     Crohn's disease Son 3     Ankylosing spondylitis Son 3     Rheumatologic disease Son 3     Heart defect Son 3     Kidney failure Son 3     SIDS Maternal Aunt 2     Uterine cancer Maternal Aunt 2     Dementia Paternal Aunt 1     Diabetes Paternal Grandmother      Heart disease Paternal Grandfather      Ulcerative colitis Neg Hx      Colon cancer Neg Hx         Social History:   Social History[2]        Objective:     Vitals:    07/25/25 1316   BP: 134/86   Pulse: 62   Temp: 98.4 °F (36.9 °C)        Physical Exam    General: No acute distress. Well-developed. Well-nourished.  Eyes: EOMI. Sclerae anicteric.  HENT: Normocephalic. Atraumatic. Nares patent. Moist oral mucosa. Oropharynx slightly erythematous without  exudates.  Ears: Tympanic membranes clear bilaterally with slight decreased movement on the right.  Cardiovascular: Regular rate. Regular rhythm. No murmurs. No rubs. No gallops. Normal S1, S2.  Respiratory: Normal respiratory effort. Clear to auscultation bilaterally. No rales. No rhonchi. No wheezing.  Musculoskeletal: No  obvious deformity.  Extremities: No lower extremity edema.  Neurological: Alert & oriented x3. No slurred speech. Normal gait.  Psychiatric: Normal mood. Normal affect. Good insight. Good judgment.  Skin: Warm. Dry. No rash.  Neck: Right cervical adenopathy.  Mouth: Tongue without geographic nature or effusions.          P.o. CT COVID and strep tests all negative.  Assessment:       1. Pharyngitis, unspecified etiology      Probably viral and self-limiting.  Plan:       Assessment & Plan    J02.0 Streptococcal pharyngitis  H92.01 Otalgia, right ear  R59.0 Localized enlarged lymph nodes  J30.1 Allergic rhinitis due to pollen  Z87.09 Personal history of other diseases of the respiratory system  Z90.89 Acquired absence of other organs  Z80.6 Family history of leukemia  Z83.79 Family history of other diseases of the digestive system    STREPTOCOCCAL PHARYNGITIS:  - Ms. Wooten presents with sore throat for 2 days, headache, pain when swallowing, dry cough, and swollen glands.  - Examination revealed slight oropharyngeal erythema without exudates and white coating on tongue, likely due to viral infection (commonly called geographic tongue or coated tongue).  - Ordered strep throat test to confirm suspected infection.  - Given current symptoms, also ordered COVID-19 rapid test to rule out potential COVID-19 infection.    OTALGIA, RIGHT EAR:  - Ms. Wooten reports right ear pain, more pronounced on the right side.  - Examination showed no otitis media or infection, but slight decreased tympanic membrane mobility on the right.  - Pain is likely referred from pharyngeal inflammation.    LOCALIZED ENLARGED  LYMPH NODES:  - Ms. Wooten reports swollen glands, worse on the right side.  - Examination confirmed right cervical adenopathy, likely secondary to pharyngeal inflammation.    ALLERGIC RHINITIS DUE TO POLLEN:  - Ms. Wooten has multiple allergies and reports recent allergy testing.  - Discussed upcoming ragweed season and its potential impact.  - Current allergy issues may be contributing to upper respiratory symptoms.    PERSONAL HISTORY OF RESPIRATORY SYSTEM DISEASES:  - Ms. Wooten had childhood asthma but has outgrown it and is not currently using inhalers.  - This history was considered in evaluating current respiratory symptoms.    ACQUIRED ABSENCE OF OTHER ORGANS:  - Ms. Wooten has history of left hemithyroidectomy, which was considered in the overall assessment.    FAMILY HISTORY OF LEUKEMIA:  - Ms. Wooten's son has leukemia.  - This family history was considered in the overall health assessment.    FAMILY HISTORY OF DIGESTIVE SYSTEM DISEASES:  - Ms. Wooten's son has Crohn's disease.  - This family history was considered in the overall health assessment.        Nalini was seen today for sore throat, otalgia and headache.    Diagnoses and all orders for this visit:    Pharyngitis, unspecified etiology  -     POCT Strep A, Molecular  -     POCT COVID-19 Rapid Screening  -     promethazine-dextromethorphan (PROMETHAZINE-DM) 6.25-15 mg/5 mL Syrp; Take 5 mLs by mouth nightly as needed (cough).         No follow-ups on file.  This note was generated with the assistance of ambient listening technology. Verbal consent was obtained by the patient and accompanying visitor(s) for the recording of patient appointment to facilitate this note. I attest to having reviewed and edited the generated note for accuracy, though some syntax or spelling errors may persist. Please contact the author of this note for any clarification.            [1]   Current Outpatient Medications   Medication Sig Dispense Refill    cyanocobalamin  1,000 mcg/mL injection Inject every week 1 cc 10 mL 1    cyclobenzaprine (FLEXERIL) 5 MG tablet Take 1 tablet (5 mg total) by mouth 2 (two) times daily as needed for Muscle spasms. 30 tablet 0    hydrocortisone 2.5 % cream Apply twice daily as needed to rash on face 30 g 2    levocetirizine (XYZAL) 5 MG tablet Take 1 tablet (5 mg total) by mouth every evening. 90 tablet 1    loratadine (CLARITIN) 10 mg tablet Take 1 tablet (10 mg total) by mouth once daily. 30 tablet 11    mirabegron (MYRBETRIQ) 25 mg Tb24 ER tablet Take 1 tablet (25 mg total) by mouth once daily. 90 tablet 1    ondansetron (ZOFRAN-ODT) 8 MG TbDL Take 1 tablet (8 mg total) by mouth every 12 (twelve) hours as needed (nausea). 30 tablet 0    pantoprazole (PROTONIX) 40 MG tablet Take 40 mg by mouth every morning.      pimecrolimus (ELIDEL) 1 % cream AAA bid 60 g 3    pravastatin (PRAVACHOL) 20 MG tablet Take 1 tablet (20 mg total) by mouth once daily. 90 tablet 1    tirzepatide (MOUNJARO) 10 mg/0.5 mL PnIj Inject 10 mg into the skin every 7 days. 2 mL 2    traMADoL (ULTRAM) 50 mg tablet Take 1 tablet (50 mg total) by mouth every 12 (twelve) hours as needed for Pain. 20 tablet 0    vilazodone (VIIBRYD) 20 mg Tab Take 1 tablet (20 mg total) by mouth once daily. 90 tablet 1    dicyclomine (BENTYL) 10 MG capsule Take 1 capsule (10 mg total) by mouth 3 (three) times daily as needed (diarrhea, abdominal pain). (Patient not taking: Reported on 7/25/2025) 90 capsule 1    ipratropium (ATROVENT) 21 mcg (0.03 %) nasal spray 2 sprays by Each Nostril route 3 (three) times daily as needed (runny nose). (Patient not taking: Reported on 7/25/2025) 30 mL 3    metFORMIN (GLUCOPHAGE) 500 MG tablet Take 1 tablet (500 mg total) by mouth 2 (two) times daily with meals. (Patient not taking: Reported on 7/25/2025) 180 tablet 2    promethazine-dextromethorphan (PROMETHAZINE-DM) 6.25-15 mg/5 mL Syrp Take 5 mLs by mouth nightly as needed (cough). 180 mL 1     No current  facility-administered medications for this visit.   [2]   Social History  Socioeconomic History    Marital status:     Number of children: 3   Tobacco Use    Smoking status: Never     Passive exposure: Never    Smokeless tobacco: Never   Substance and Sexual Activity    Alcohol use: Never     Alcohol/week: 0.0 standard drinks of alcohol    Drug use: No    Sexual activity: Yes     Birth control/protection: None     Social Drivers of Health     Financial Resource Strain: Low Risk  (8/1/2024)    Overall Financial Resource Strain (CARDIA)     Difficulty of Paying Living Expenses: Not hard at all   Food Insecurity: No Food Insecurity (8/1/2024)    Hunger Vital Sign     Worried About Running Out of Food in the Last Year: Never true     Ran Out of Food in the Last Year: Never true   Transportation Needs: No Transportation Needs (6/7/2023)    PRAPARE - Transportation     Lack of Transportation (Medical): No     Lack of Transportation (Non-Medical): No   Physical Activity: Insufficiently Active (8/1/2024)    Exercise Vital Sign     Days of Exercise per Week: 4 days     Minutes of Exercise per Session: 20 min   Stress: Stress Concern Present (8/1/2024)    [employer] - Occupational Stress Questionnaire     Feeling of Stress : To some extent   Housing Stability: Low Risk  (6/7/2023)    Housing Stability Vital Sign     Unable to Pay for Housing in the Last Year: No     Number of Places Lived in the Last Year: 1     Unstable Housing in the Last Year: No

## 2025-07-28 ENCOUNTER — PATIENT OUTREACH (OUTPATIENT)
Dept: ADMINISTRATIVE | Facility: HOSPITAL | Age: 70
End: 2025-07-28
Payer: MEDICARE

## 2025-07-28 NOTE — PROGRESS NOTES
Population Health Chart Review & Patient Outreach Details      Additional [facility] Health Notes:               Updates Requested / Reviewed:      Updated Care Coordination Note, Care Everywhere, , and Immunizations Reconciliation Completed or Queried: [facility] Topics Overdue:      [facility] Score: 0     Patient is not due for any topics at this time.    Pneumonia Vaccine  Shingles/Zoster Vaccine  RSV Vaccine                  Health Maintenance Topic(s) Outreach Outcomes & Actions Taken:    Lab(s) - Outreach Outcomes & Actions Taken  : The patient is prediabetic.

## 2025-08-04 ENCOUNTER — PATIENT MESSAGE (OUTPATIENT)
Dept: FAMILY MEDICINE | Facility: CLINIC | Age: 70
End: 2025-08-04
Payer: MEDICARE

## 2025-08-11 ENCOUNTER — LAB VISIT (OUTPATIENT)
Dept: LAB | Facility: HOSPITAL | Age: 70
End: 2025-08-11
Attending: NURSE PRACTITIONER
Payer: MEDICARE

## 2025-08-11 DIAGNOSIS — R73.9 HYPERGLYCEMIA: ICD-10-CM

## 2025-08-11 DIAGNOSIS — J30.2 SEASONAL ALLERGIES: ICD-10-CM

## 2025-08-11 DIAGNOSIS — E78.5 DYSLIPIDEMIA: ICD-10-CM

## 2025-08-11 LAB
ABSOLUTE EOSINOPHIL (SMH): 0.16 K/UL
ABSOLUTE MONOCYTE (SMH): 0.53 K/UL (ref 0.3–1)
ABSOLUTE NEUTROPHIL COUNT (SMH): 5 K/UL (ref 1.8–7.7)
ALBUMIN SERPL-MCNC: 4.3 G/DL (ref 3.5–5.2)
ALP SERPL-CCNC: 67 UNIT/L (ref 55–135)
ALT SERPL-CCNC: 14 UNIT/L (ref 10–44)
ANION GAP (SMH): 7 MMOL/L (ref 8–16)
AST SERPL-CCNC: 16 UNIT/L (ref 10–40)
BASOPHILS # BLD AUTO: 0.05 K/UL
BASOPHILS NFR BLD AUTO: 0.7 %
BILIRUB SERPL-MCNC: 0.4 MG/DL (ref 0.1–1)
BUN SERPL-MCNC: 16 MG/DL (ref 8–23)
CALCIUM SERPL-MCNC: 9.1 MG/DL (ref 8.7–10.5)
CHLORIDE SERPL-SCNC: 103 MMOL/L (ref 95–110)
CHOLEST SERPL-MCNC: 292 MG/DL (ref 120–199)
CHOLEST/HDLC SERPL: 7.7 {RATIO} (ref 2–5)
CO2 SERPL-SCNC: 28 MMOL/L (ref 23–29)
CREAT SERPL-MCNC: 0.8 MG/DL (ref 0.5–1.4)
EAG (SMH): 126 MG/DL (ref 68–131)
ERYTHROCYTE [DISTWIDTH] IN BLOOD BY AUTOMATED COUNT: 12.4 % (ref 11.5–14.5)
GFR SERPLBLD CREATININE-BSD FMLA CKD-EPI: >60 ML/MIN/1.73/M2
GLUCOSE SERPL-MCNC: 91 MG/DL (ref 70–110)
HBA1C MFR BLD: 6 % (ref 4.5–6.2)
HCT VFR BLD AUTO: 37.8 % (ref 37–48.5)
HDLC SERPL-MCNC: 38 MG/DL (ref 40–75)
HDLC SERPL: 13 % (ref 20–50)
HGB BLD-MCNC: 12.3 GM/DL (ref 12–16)
IMM GRANULOCYTES # BLD AUTO: 0.02 K/UL (ref 0–0.04)
IMM GRANULOCYTES NFR BLD AUTO: 0.3 % (ref 0–0.5)
LDLC SERPL CALC-MCNC: 204.6 MG/DL (ref 63–159)
LYMPHOCYTES # BLD AUTO: 1.82 K/UL (ref 1–4.8)
MCH RBC QN AUTO: 28.2 PG (ref 27–31)
MCHC RBC AUTO-ENTMCNC: 32.5 G/DL (ref 32–36)
MCV RBC AUTO: 87 FL (ref 82–98)
NONHDLC SERPL-MCNC: 254 MG/DL
NUCLEATED RBC (/100WBC) (SMH): 0 /100 WBC
PLATELET # BLD AUTO: 275 K/UL (ref 150–450)
PMV BLD AUTO: 9.6 FL (ref 9.2–12.9)
POTASSIUM SERPL-SCNC: 3.9 MMOL/L (ref 3.5–5.1)
PROT SERPL-MCNC: 6.8 GM/DL (ref 6–8.4)
RBC # BLD AUTO: 4.36 M/UL (ref 4–5.4)
RELATIVE EOSINOPHIL (SMH): 2.1 % (ref 0–8)
RELATIVE LYMPHOCYTE (SMH): 24 % (ref 18–48)
RELATIVE MONOCYTE (SMH): 7 % (ref 4–15)
RELATIVE NEUTROPHIL (SMH): 65.9 % (ref 38–73)
SODIUM SERPL-SCNC: 138 MMOL/L (ref 136–145)
TRIGL SERPL-MCNC: 247 MG/DL (ref 30–150)
TSH SERPL-ACNC: 1.49 UIU/ML (ref 0.34–5.6)
WBC # BLD AUTO: 7.57 K/UL (ref 3.9–12.7)

## 2025-08-11 PROCEDURE — 84443 ASSAY THYROID STIM HORMONE: CPT

## 2025-08-11 PROCEDURE — 36415 COLL VENOUS BLD VENIPUNCTURE: CPT | Mod: PO

## 2025-08-11 PROCEDURE — 84075 ASSAY ALKALINE PHOSPHATASE: CPT

## 2025-08-11 PROCEDURE — 83036 HEMOGLOBIN GLYCOSYLATED A1C: CPT

## 2025-08-11 PROCEDURE — 80061 LIPID PANEL: CPT

## 2025-08-11 PROCEDURE — 85025 COMPLETE CBC W/AUTO DIFF WBC: CPT

## 2025-08-15 ENCOUNTER — TELEPHONE (OUTPATIENT)
Dept: OPHTHALMOLOGY | Facility: CLINIC | Age: 70
End: 2025-08-15
Payer: MEDICARE

## 2025-08-20 ENCOUNTER — CLINICAL SUPPORT (OUTPATIENT)
Dept: ALLERGY | Facility: CLINIC | Age: 70
End: 2025-08-20
Payer: MEDICARE

## 2025-08-20 ENCOUNTER — OFFICE VISIT (OUTPATIENT)
Dept: OPHTHALMOLOGY | Facility: CLINIC | Age: 70
End: 2025-08-20
Payer: MEDICARE

## 2025-08-20 DIAGNOSIS — H04.123 DRY EYE SYNDROME OF BOTH EYES: ICD-10-CM

## 2025-08-20 DIAGNOSIS — H35.3131 EARLY DRY STAGE NONEXUDATIVE AGE-RELATED MACULAR DEGENERATION OF BOTH EYES: ICD-10-CM

## 2025-08-20 DIAGNOSIS — J30.9 CHRONIC ALLERGIC RHINITIS: Primary | ICD-10-CM

## 2025-08-20 DIAGNOSIS — H43.813 POSTERIOR VITREOUS DETACHMENT OF BOTH EYES: Primary | ICD-10-CM

## 2025-08-20 DIAGNOSIS — Z96.1 PSEUDOPHAKIA OF BOTH EYES: ICD-10-CM

## 2025-08-20 PROCEDURE — 95117 IMMUNOTHERAPY INJECTIONS: CPT | Mod: S$GLB,,, | Performed by: STUDENT IN AN ORGANIZED HEALTH CARE EDUCATION/TRAINING PROGRAM

## 2025-08-20 PROCEDURE — 1159F MED LIST DOCD IN RCRD: CPT | Mod: CPTII,S$GLB,, | Performed by: OPHTHALMOLOGY

## 2025-08-20 PROCEDURE — 3044F HG A1C LEVEL LT 7.0%: CPT | Mod: CPTII,S$GLB,, | Performed by: OPHTHALMOLOGY

## 2025-08-20 PROCEDURE — 1101F PT FALLS ASSESS-DOCD LE1/YR: CPT | Mod: CPTII,S$GLB,, | Performed by: OPHTHALMOLOGY

## 2025-08-20 PROCEDURE — 92201 OPSCPY EXTND RTA DRAW UNI/BI: CPT | Mod: S$GLB,,, | Performed by: OPHTHALMOLOGY

## 2025-08-20 PROCEDURE — 99214 OFFICE O/P EST MOD 30 MIN: CPT | Mod: S$GLB,,, | Performed by: OPHTHALMOLOGY

## 2025-08-20 PROCEDURE — 1160F RVW MEDS BY RX/DR IN RCRD: CPT | Mod: CPTII,S$GLB,, | Performed by: OPHTHALMOLOGY

## 2025-08-20 PROCEDURE — 92134 CPTRZ OPH DX IMG PST SGM RTA: CPT | Mod: S$GLB,,, | Performed by: OPHTHALMOLOGY

## 2025-08-20 PROCEDURE — 1126F AMNT PAIN NOTED NONE PRSNT: CPT | Mod: CPTII,S$GLB,, | Performed by: OPHTHALMOLOGY

## 2025-08-20 PROCEDURE — 3288F FALL RISK ASSESSMENT DOCD: CPT | Mod: CPTII,S$GLB,, | Performed by: OPHTHALMOLOGY

## 2025-08-20 PROCEDURE — 99999 PR PBB SHADOW E&M-EST. PATIENT-LVL I: CPT | Mod: PBBFAC,,,

## 2025-08-20 PROCEDURE — 99999 PR PBB SHADOW E&M-EST. PATIENT-LVL III: CPT | Mod: PBBFAC,,, | Performed by: OPHTHALMOLOGY

## 2025-08-25 ENCOUNTER — OFFICE VISIT (OUTPATIENT)
Dept: PODIATRY | Facility: CLINIC | Age: 70
End: 2025-08-25
Payer: MEDICARE

## 2025-08-25 VITALS — OXYGEN SATURATION: 99 % | WEIGHT: 180.75 LBS | BODY MASS INDEX: 32.03 KG/M2 | HEIGHT: 63 IN

## 2025-08-25 DIAGNOSIS — B35.1 ONYCHOMYCOSIS DUE TO DERMATOPHYTE: ICD-10-CM

## 2025-08-25 DIAGNOSIS — L60.0 INGROWN NAIL: Primary | ICD-10-CM

## 2025-08-25 DIAGNOSIS — M79.675 PAIN OF TOE OF LEFT FOOT: ICD-10-CM

## 2025-08-25 PROCEDURE — 99999 PR PBB SHADOW E&M-EST. PATIENT-LVL III: CPT | Mod: PBBFAC,,, | Performed by: PODIATRIST

## 2025-08-25 RX ORDER — FLUCONAZOLE 200 MG/1
200 TABLET ORAL WEEKLY
Qty: 28 TABLET | Refills: 0 | Status: SHIPPED | OUTPATIENT
Start: 2025-08-25 | End: 2026-03-03

## 2025-09-02 ENCOUNTER — OFFICE VISIT (OUTPATIENT)
Dept: UROGYNECOLOGY | Facility: CLINIC | Age: 70
End: 2025-09-02
Payer: MEDICARE

## 2025-09-02 VITALS
WEIGHT: 180.75 LBS | SYSTOLIC BLOOD PRESSURE: 134 MMHG | HEART RATE: 62 BPM | BODY MASS INDEX: 32.03 KG/M2 | DIASTOLIC BLOOD PRESSURE: 86 MMHG | HEIGHT: 63 IN

## 2025-09-02 DIAGNOSIS — N39.8 VOIDING DYSFUNCTION: ICD-10-CM

## 2025-09-02 DIAGNOSIS — R35.0 URINARY FREQUENCY: ICD-10-CM

## 2025-09-02 DIAGNOSIS — N95.2 ATROPHIC VAGINITIS: ICD-10-CM

## 2025-09-02 DIAGNOSIS — N32.81 OVERACTIVE BLADDER: Primary | ICD-10-CM

## 2025-09-02 LAB
BILIRUBIN, UA POC OHS: NEGATIVE
BLOOD, UA POC OHS: NEGATIVE
CLARITY, UA POC OHS: CLEAR
COLOR, UA POC OHS: YELLOW
GLUCOSE, UA POC OHS: NEGATIVE
KETONES, UA POC OHS: NEGATIVE
LEUKOCYTES, UA POC OHS: ABNORMAL
NITRITE, UA POC OHS: NEGATIVE
PH, UA POC OHS: 5.5
POC RESIDUAL URINE VOLUME: 70 ML (ref 0–100)
PROTEIN, UA POC OHS: NEGATIVE
SPECIFIC GRAVITY, UA POC OHS: 1.02
UROBILINOGEN, UA POC OHS: 0.2

## 2025-09-02 PROCEDURE — 99999 PR PBB SHADOW E&M-EST. PATIENT-LVL V: CPT | Mod: PBBFAC,,, | Performed by: NURSE PRACTITIONER

## 2025-09-02 PROCEDURE — 87086 URINE CULTURE/COLONY COUNT: CPT | Performed by: NURSE PRACTITIONER

## 2025-09-02 RX ORDER — ESTRADIOL 0.1 MG/G
CREAM VAGINAL
Qty: 42.5 G | Refills: 3 | Status: SHIPPED | OUTPATIENT
Start: 2025-09-02

## 2025-09-04 LAB — BACTERIA UR CULT: NORMAL

## (undated) DEVICE — POUCH INSTRUMENT 1018

## (undated) DEVICE — Device

## (undated) DEVICE — SYS LABEL CORRECT MED

## (undated) DEVICE — KNIFE ANGLE 1MM

## (undated) DEVICE — TIP PHACO 45 DEGREE KELMAN

## (undated) DEVICE — GLOVE 7.5 PROTEXIS PI MICRO

## (undated) DEVICE — TRAY NERVE BLOCK

## (undated) DEVICE — GLOVE SURG ULTRA TOUCH 6

## (undated) DEVICE — GLOVE SURG ULTRA TOUCH 7.5

## (undated) DEVICE — TUBING CYSTO IRRIGATION SET 4LEAD

## (undated) DEVICE — BLADE 90-S SERFAS 3.5 279-351-100

## (undated) DEVICE — CONNECTOR 5-IN-1 CON2001

## (undated) DEVICE — PACK CUSTOM EYE KIT

## (undated) DEVICE — SCORPION NEEDLE

## (undated) DEVICE — BANDAGE ACE STERILE 4 REB3114

## (undated) DEVICE — NDL SPINAL 25GX3.5 SPINOCAN

## (undated) DEVICE — PAD ELECTROSURGICAL PAT PLATE

## (undated) DEVICE — SOL BETADINE 5%

## (undated) DEVICE — CANNULA VENOM 20G 10X100MM

## (undated) DEVICE — NDL FLTR 5MCRN BLNT TIP 18GX1

## (undated) DEVICE — STRIP TRACTION LG 3874-03

## (undated) DEVICE — PACK ARTHROSCOPY SMHS009-07

## (undated) DEVICE — SUT 10/0 12IN ETHILON BLK M

## (undated) DEVICE — SUTURE ETHILON 3-0 PS-1 18 1663H

## (undated) DEVICE — SLEEVE ULTRA INFUSION

## (undated) DEVICE — DRAPE OPTHALMIC W/POUCH

## (undated) DEVICE — APPLICATOR CHLORAPREP CLR 10.5

## (undated) DEVICE — BLADE SURG BVL ANG COAX 2.4MM

## (undated) DEVICE — TIP I & A

## (undated) DEVICE — NDL BLUNT W/O FILTER 18GX1.5IN

## (undated) DEVICE — SHIELD EYE PLASTIC 3100G

## (undated) DEVICE — BLADE AGRESSIVE PLUS 4.0 375-544-000

## (undated) DEVICE — SLING ULTRA LARGE 11-0138-4

## (undated) DEVICE — DRAPE IOBAN 23X33 6651EZ

## (undated) DEVICE — DRAPE SPLIT W/ ADHESIVE

## (undated) DEVICE — TOWEL OR DISP STRL BLUE 4/PK

## (undated) DEVICE — SYR LUER LOCK 1CC

## (undated) DEVICE — SYR DISP LL 5CC

## (undated) DEVICE — PAD BOVIE ADULT

## (undated) DEVICE — SOLUTION NACL 0.9% 3000ML

## (undated) DEVICE — UNDERGLOVE BIOGEL MICRO BLUE SZ 8.5

## (undated) DEVICE — CYSTOTOME IRRIG 24G 13MM

## (undated) DEVICE — HANDLE SURG LIGHT NONRIGID

## (undated) DEVICE — TAPE CLOTH 4 MEDIPORE 2864

## (undated) DEVICE — PAD ABD 5X9   7196D

## (undated) DEVICE — COVER MAYO STAND XLG 89602

## (undated) DEVICE — DRAPE 3/4

## (undated) DEVICE — RESECTOR 5.5MM

## (undated) DEVICE — SOLUTION IRRI NS BOTTLE 1000ML R5200-01

## (undated) DEVICE — DRAPE U-SLOT 1019

## (undated) DEVICE — GLOVE BIOGEL PI MICRO SZ 7

## (undated) DEVICE — DRAPE ARTHROSCOPY SHOULDER 89492

## (undated) DEVICE — GLOVE BIOGEL PI GOLD SZ  8.5